# Patient Record
Sex: FEMALE | Race: WHITE | Employment: OTHER | URBAN - NONMETROPOLITAN AREA
[De-identification: names, ages, dates, MRNs, and addresses within clinical notes are randomized per-mention and may not be internally consistent; named-entity substitution may affect disease eponyms.]

---

## 2017-01-01 ENCOUNTER — TELEPHONE (OUTPATIENT)
Dept: PRIMARY CARE CLINIC | Age: 67
End: 2017-01-01

## 2017-01-01 ENCOUNTER — OFFICE VISIT (OUTPATIENT)
Dept: PRIMARY CARE CLINIC | Age: 67
End: 2017-01-01
Payer: MEDICARE

## 2017-01-01 ENCOUNTER — TELEPHONE (OUTPATIENT)
Dept: VASCULAR SURGERY | Age: 67
End: 2017-01-01

## 2017-01-01 ENCOUNTER — HOSPITAL ENCOUNTER (EMERGENCY)
Age: 67
End: 2017-12-24
Attending: EMERGENCY MEDICINE
Payer: MEDICARE

## 2017-01-01 ENCOUNTER — APPOINTMENT (OUTPATIENT)
Dept: GENERAL RADIOLOGY | Age: 67
End: 2017-01-01
Payer: COMMERCIAL

## 2017-01-01 ENCOUNTER — APPOINTMENT (OUTPATIENT)
Dept: GENERAL RADIOLOGY | Age: 67
DRG: 280 | End: 2017-01-01
Payer: MEDICARE

## 2017-01-01 ENCOUNTER — HOSPITAL ENCOUNTER (OUTPATIENT)
Dept: WOUND CARE | Age: 67
Discharge: HOME | End: 2017-10-20
Payer: MEDICARE

## 2017-01-01 ENCOUNTER — HOSPITAL ENCOUNTER (OUTPATIENT)
Dept: GENERAL RADIOLOGY | Age: 67
Discharge: HOME | End: 2017-02-06
Payer: COMMERCIAL

## 2017-01-01 ENCOUNTER — HOSPITAL ENCOUNTER (INPATIENT)
Age: 67
LOS: 1 days | Discharge: HOME | DRG: 264 | End: 2017-12-09
Attending: SURGERY | Admitting: SURGERY
Payer: MEDICARE

## 2017-01-01 ENCOUNTER — OFFICE VISIT (OUTPATIENT)
Dept: PRIMARY CARE CLINIC | Age: 67
End: 2017-01-01
Payer: COMMERCIAL

## 2017-01-01 ENCOUNTER — OFFICE VISIT (OUTPATIENT)
Dept: URGENT CARE | Age: 67
End: 2017-01-01

## 2017-01-01 ENCOUNTER — HOSPITAL ENCOUNTER (OUTPATIENT)
Dept: WOUND CARE | Age: 67
Discharge: HOME | End: 2017-10-06
Payer: MEDICARE

## 2017-01-01 ENCOUNTER — APPOINTMENT (OUTPATIENT)
Dept: GENERAL RADIOLOGY | Age: 67
End: 2017-01-01
Payer: MEDICARE

## 2017-01-01 ENCOUNTER — HOSPITAL ENCOUNTER (OUTPATIENT)
Dept: NUCLEAR MEDICINE | Age: 67
Discharge: HOME | End: 2017-11-15
Payer: MEDICARE

## 2017-01-01 ENCOUNTER — HOSPITAL ENCOUNTER (OUTPATIENT)
Dept: WOUND CARE | Age: 67
Discharge: HOME | End: 2017-06-05
Payer: COMMERCIAL

## 2017-01-01 ENCOUNTER — APPOINTMENT (OUTPATIENT)
Dept: GENERAL RADIOLOGY | Age: 67
DRG: 167 | End: 2017-01-01
Payer: COMMERCIAL

## 2017-01-01 ENCOUNTER — HOSPITAL ENCOUNTER (OUTPATIENT)
Dept: WOUND CARE | Age: 67
Discharge: HOME | End: 2017-04-10
Payer: COMMERCIAL

## 2017-01-01 ENCOUNTER — HOSPITAL ENCOUNTER (OUTPATIENT)
Dept: GENERAL RADIOLOGY | Age: 67
Discharge: HOME | End: 2017-09-11
Payer: MEDICARE

## 2017-01-01 ENCOUNTER — HOSPITAL ENCOUNTER (OUTPATIENT)
Dept: WOUND CARE | Age: 67
Discharge: HOME | End: 2017-07-31
Payer: MEDICARE

## 2017-01-01 ENCOUNTER — ANESTHESIA (OUTPATIENT)
Dept: OPERATING ROOM | Age: 67
End: 2017-01-01

## 2017-01-01 ENCOUNTER — HOSPITAL ENCOUNTER (OUTPATIENT)
Dept: WOUND CARE | Age: 67
Discharge: HOME | End: 2017-09-29
Payer: MEDICARE

## 2017-01-01 ENCOUNTER — HOSPITAL ENCOUNTER (OUTPATIENT)
Dept: WOUND CARE | Age: 67
Discharge: HOME | End: 2017-05-25
Payer: COMMERCIAL

## 2017-01-01 ENCOUNTER — HOSPITAL ENCOUNTER (INPATIENT)
Age: 67
LOS: 6 days | Discharge: HOME | DRG: 190 | End: 2017-01-23
Attending: EMERGENCY MEDICINE | Admitting: FAMILY MEDICINE
Payer: COMMERCIAL

## 2017-01-01 ENCOUNTER — HOSPITAL ENCOUNTER (OUTPATIENT)
Age: 67
Setting detail: OUTPATIENT SURGERY
Discharge: HOME | End: 2017-10-04
Attending: SURGERY | Admitting: SURGERY
Payer: MEDICARE

## 2017-01-01 ENCOUNTER — HOSPITAL ENCOUNTER (OUTPATIENT)
Dept: WOUND CARE | Age: 67
Discharge: HOME | End: 2017-05-11
Payer: COMMERCIAL

## 2017-01-01 ENCOUNTER — HOSPITAL ENCOUNTER (INPATIENT)
Age: 67
LOS: 8 days | Discharge: NURSING, REHAB OR LONG-TERM CARE | DRG: 167 | End: 2017-04-12
Attending: EMERGENCY MEDICINE | Admitting: HOSPITALIST
Payer: COMMERCIAL

## 2017-01-01 ENCOUNTER — HOSPITAL ENCOUNTER (OUTPATIENT)
Dept: WOUND CARE | Age: 67
Discharge: HOME | End: 2017-05-02
Payer: COMMERCIAL

## 2017-01-01 ENCOUNTER — HOSPITAL ENCOUNTER (OUTPATIENT)
Dept: GENERAL RADIOLOGY | Age: 67
Discharge: HOME | End: 2017-11-29
Payer: MEDICARE

## 2017-01-01 ENCOUNTER — HOSPITAL ENCOUNTER (OUTPATIENT)
Dept: GENERAL RADIOLOGY | Age: 67
Discharge: HOME | End: 2017-02-24
Payer: COMMERCIAL

## 2017-01-01 ENCOUNTER — APPOINTMENT (OUTPATIENT)
Dept: CT IMAGING | Age: 67
DRG: 682 | End: 2017-01-01
Payer: COMMERCIAL

## 2017-01-01 ENCOUNTER — APPOINTMENT (OUTPATIENT)
Dept: GENERAL RADIOLOGY | Age: 67
DRG: 189 | End: 2017-01-01
Payer: COMMERCIAL

## 2017-01-01 ENCOUNTER — HOSPITAL ENCOUNTER (INPATIENT)
Age: 67
LOS: 10 days | Discharge: HOME HEALTH | DRG: 682 | End: 2017-03-20
Attending: FAMILY MEDICINE | Admitting: HOSPITALIST
Payer: COMMERCIAL

## 2017-01-01 ENCOUNTER — HOSPITAL ENCOUNTER (OUTPATIENT)
Dept: WOUND CARE | Age: 67
Discharge: HOME | End: 2017-09-15
Payer: MEDICARE

## 2017-01-01 ENCOUNTER — HOSPITAL ENCOUNTER (INPATIENT)
Age: 67
LOS: 18 days | Discharge: NURSING, REHAB OR LONG-TERM CARE | DRG: 189 | End: 2017-06-19
Attending: EMERGENCY MEDICINE | Admitting: HOSPITALIST
Payer: COMMERCIAL

## 2017-01-01 ENCOUNTER — APPOINTMENT (OUTPATIENT)
Dept: CT IMAGING | Age: 67
DRG: 391 | End: 2017-01-01
Payer: MEDICARE

## 2017-01-01 ENCOUNTER — APPOINTMENT (OUTPATIENT)
Dept: CT IMAGING | Age: 67
DRG: 190 | End: 2017-01-01
Payer: COMMERCIAL

## 2017-01-01 ENCOUNTER — CARE COORDINATOR VISIT (OUTPATIENT)
Dept: PRIMARY CARE CLINIC | Age: 67
End: 2017-01-01

## 2017-01-01 ENCOUNTER — APPOINTMENT (OUTPATIENT)
Dept: GENERAL RADIOLOGY | Age: 67
DRG: 190 | End: 2017-01-01
Payer: COMMERCIAL

## 2017-01-01 ENCOUNTER — HOSPITAL ENCOUNTER (OUTPATIENT)
Dept: NON INVASIVE DIAGNOSTICS | Age: 67
Discharge: HOME | End: 2017-11-15
Payer: MEDICARE

## 2017-01-01 ENCOUNTER — HOSPITAL ENCOUNTER (OUTPATIENT)
Dept: WOUND CARE | Age: 67
Discharge: HOME | End: 2017-09-01
Payer: MEDICARE

## 2017-01-01 ENCOUNTER — HOSPITAL ENCOUNTER (OUTPATIENT)
Dept: WOUND CARE | Age: 67
Discharge: HOME | End: 2017-02-24
Payer: COMMERCIAL

## 2017-01-01 ENCOUNTER — HOSPITAL ENCOUNTER (OUTPATIENT)
Dept: PREADMISSION TESTING | Age: 67
Discharge: HOME | End: 2017-11-29
Payer: MEDICARE

## 2017-01-01 ENCOUNTER — APPOINTMENT (OUTPATIENT)
Dept: NUCLEAR MEDICINE | Age: 67
DRG: 190 | End: 2017-01-01
Payer: COMMERCIAL

## 2017-01-01 ENCOUNTER — APPOINTMENT (OUTPATIENT)
Dept: GENERAL RADIOLOGY | Age: 67
DRG: 391 | End: 2017-01-01
Payer: MEDICARE

## 2017-01-01 ENCOUNTER — APPOINTMENT (OUTPATIENT)
Dept: INTERVENTIONAL RADIOLOGY/VASCULAR | Age: 67
DRG: 189 | End: 2017-01-01
Payer: COMMERCIAL

## 2017-01-01 ENCOUNTER — PREP FOR PROCEDURE (OUTPATIENT)
Dept: VASCULAR SURGERY | Age: 67
End: 2017-01-01

## 2017-01-01 ENCOUNTER — APPOINTMENT (OUTPATIENT)
Dept: GENERAL RADIOLOGY | Age: 67
End: 2017-01-01
Attending: SURGERY
Payer: MEDICARE

## 2017-01-01 ENCOUNTER — HOSPITAL ENCOUNTER (INPATIENT)
Age: 67
LOS: 5 days | Discharge: HOME HEALTH | DRG: 391 | End: 2017-10-30
Attending: INTERNAL MEDICINE | Admitting: INTERNAL MEDICINE
Payer: MEDICARE

## 2017-01-01 ENCOUNTER — ANESTHESIA EVENT (OUTPATIENT)
Dept: OPERATING ROOM | Age: 67
End: 2017-01-01

## 2017-01-01 ENCOUNTER — OFFICE VISIT (OUTPATIENT)
Dept: CARDIOLOGY | Age: 67
End: 2017-01-01
Payer: MEDICARE

## 2017-01-01 ENCOUNTER — HOSPITAL ENCOUNTER (INPATIENT)
Age: 67
LOS: 4 days | Discharge: HOME | DRG: 280 | End: 2017-12-20
Attending: EMERGENCY MEDICINE | Admitting: FAMILY MEDICINE
Payer: MEDICARE

## 2017-01-01 ENCOUNTER — HOSPITAL ENCOUNTER (INPATIENT)
Age: 67
LOS: 1 days | Discharge: HOME | DRG: 286 | End: 2017-12-23
Attending: EMERGENCY MEDICINE | Admitting: INTERNAL MEDICINE
Payer: MEDICARE

## 2017-01-01 ENCOUNTER — HOSPITAL ENCOUNTER (OUTPATIENT)
Dept: WOUND CARE | Age: 67
Discharge: HOME | End: 2017-08-14
Payer: MEDICARE

## 2017-01-01 ENCOUNTER — HOSPITAL ENCOUNTER (OUTPATIENT)
Dept: VASCULAR LAB | Age: 67
Discharge: HOME | End: 2017-08-09
Payer: MEDICARE

## 2017-01-01 ENCOUNTER — ANESTHESIA EVENT (OUTPATIENT)
Dept: OPERATING ROOM | Age: 67
DRG: 264 | End: 2017-01-01
Payer: MEDICARE

## 2017-01-01 ENCOUNTER — APPOINTMENT (OUTPATIENT)
Dept: ULTRASOUND IMAGING | Age: 67
DRG: 682 | End: 2017-01-01
Payer: COMMERCIAL

## 2017-01-01 ENCOUNTER — HOSPITAL ENCOUNTER (OUTPATIENT)
Dept: WOUND CARE | Age: 67
Discharge: HOME | End: 2017-04-19
Payer: COMMERCIAL

## 2017-01-01 ENCOUNTER — HOSPITAL ENCOUNTER (OUTPATIENT)
Dept: WOUND CARE | Age: 67
Discharge: HOME | End: 2017-11-03
Payer: MEDICARE

## 2017-01-01 ENCOUNTER — ANESTHESIA (OUTPATIENT)
Dept: OPERATING ROOM | Age: 67
DRG: 264 | End: 2017-01-01
Payer: MEDICARE

## 2017-01-01 ENCOUNTER — APPOINTMENT (OUTPATIENT)
Dept: GENERAL RADIOLOGY | Age: 67
DRG: 286 | End: 2017-01-01
Payer: MEDICARE

## 2017-01-01 ENCOUNTER — HOSPITAL ENCOUNTER (OUTPATIENT)
Dept: WOUND CARE | Age: 67
Discharge: HOME | End: 2017-05-17
Payer: COMMERCIAL

## 2017-01-01 ENCOUNTER — HOSPITAL ENCOUNTER (OUTPATIENT)
Dept: PREADMISSION TESTING | Age: 67
Discharge: HOME | End: 2017-09-11
Payer: MEDICARE

## 2017-01-01 ENCOUNTER — HOSPITAL ENCOUNTER (INPATIENT)
Age: 67
LOS: 4 days | Discharge: HOME HEALTH | DRG: 189 | End: 2017-04-28
Attending: EMERGENCY MEDICINE | Admitting: INTERNAL MEDICINE
Payer: COMMERCIAL

## 2017-01-01 ENCOUNTER — APPOINTMENT (OUTPATIENT)
Dept: ULTRASOUND IMAGING | Age: 67
DRG: 189 | End: 2017-01-01
Payer: COMMERCIAL

## 2017-01-01 ENCOUNTER — OFFICE VISIT (OUTPATIENT)
Dept: VASCULAR SURGERY | Age: 67
End: 2017-01-01
Payer: MEDICARE

## 2017-01-01 ENCOUNTER — HOSPITAL ENCOUNTER (OUTPATIENT)
Dept: PREADMISSION TESTING | Age: 67
Discharge: HOME | End: 2017-11-28

## 2017-01-01 ENCOUNTER — APPOINTMENT (OUTPATIENT)
Dept: GENERAL RADIOLOGY | Age: 67
DRG: 682 | End: 2017-01-01
Payer: COMMERCIAL

## 2017-01-01 ENCOUNTER — HOSPITAL ENCOUNTER (EMERGENCY)
Age: 67
Discharge: HOME | End: 2017-01-28
Attending: EMERGENCY MEDICINE
Payer: COMMERCIAL

## 2017-01-01 VITALS
BODY MASS INDEX: 57.52 KG/M2 | OXYGEN SATURATION: 85 % | WEIGHT: 293 LBS | SYSTOLIC BLOOD PRESSURE: 126 MMHG | HEART RATE: 86 BPM | HEIGHT: 60 IN | RESPIRATION RATE: 20 BRPM | DIASTOLIC BLOOD PRESSURE: 72 MMHG | TEMPERATURE: 97.4 F

## 2017-01-01 VITALS
SYSTOLIC BLOOD PRESSURE: 96 MMHG | RESPIRATION RATE: 20 BRPM | DIASTOLIC BLOOD PRESSURE: 59 MMHG | HEIGHT: 61 IN | OXYGEN SATURATION: 91 % | HEART RATE: 85 BPM | WEIGHT: 250.3 LBS | BODY MASS INDEX: 47.25 KG/M2 | TEMPERATURE: 98.3 F

## 2017-01-01 VITALS
RESPIRATION RATE: 19 BRPM | WEIGHT: 280.13 LBS | DIASTOLIC BLOOD PRESSURE: 65 MMHG | OXYGEN SATURATION: 92 % | HEIGHT: 61 IN | SYSTOLIC BLOOD PRESSURE: 123 MMHG | HEART RATE: 73 BPM | BODY MASS INDEX: 52.89 KG/M2 | TEMPERATURE: 97.3 F

## 2017-01-01 VITALS
RESPIRATION RATE: 18 BRPM | WEIGHT: 288 LBS | TEMPERATURE: 97.9 F | DIASTOLIC BLOOD PRESSURE: 63 MMHG | HEART RATE: 74 BPM | SYSTOLIC BLOOD PRESSURE: 125 MMHG | HEIGHT: 60 IN | BODY MASS INDEX: 56.54 KG/M2

## 2017-01-01 VITALS
RESPIRATION RATE: 23 BRPM | SYSTOLIC BLOOD PRESSURE: 126 MMHG | WEIGHT: 293 LBS | HEIGHT: 60 IN | OXYGEN SATURATION: 93 % | BODY MASS INDEX: 57.52 KG/M2 | TEMPERATURE: 97.6 F | DIASTOLIC BLOOD PRESSURE: 76 MMHG | HEART RATE: 84 BPM

## 2017-01-01 VITALS
WEIGHT: 258 LBS | RESPIRATION RATE: 20 BRPM | TEMPERATURE: 97.3 F | HEART RATE: 82 BPM | BODY MASS INDEX: 48.71 KG/M2 | SYSTOLIC BLOOD PRESSURE: 88 MMHG | DIASTOLIC BLOOD PRESSURE: 61 MMHG | HEIGHT: 61 IN

## 2017-01-01 VITALS
DIASTOLIC BLOOD PRESSURE: 54 MMHG | TEMPERATURE: 97.1 F | HEART RATE: 72 BPM | SYSTOLIC BLOOD PRESSURE: 82 MMHG | RESPIRATION RATE: 22 BRPM | OXYGEN SATURATION: 98 % | HEIGHT: 61 IN | BODY MASS INDEX: 48.15 KG/M2 | WEIGHT: 255 LBS

## 2017-01-01 VITALS
SYSTOLIC BLOOD PRESSURE: 93 MMHG | HEIGHT: 61 IN | HEART RATE: 84 BPM | DIASTOLIC BLOOD PRESSURE: 50 MMHG | RESPIRATION RATE: 24 BRPM | WEIGHT: 255 LBS | BODY MASS INDEX: 48.15 KG/M2 | TEMPERATURE: 98.5 F

## 2017-01-01 VITALS
TEMPERATURE: 98.5 F | SYSTOLIC BLOOD PRESSURE: 104 MMHG | BODY MASS INDEX: 56.54 KG/M2 | HEART RATE: 68 BPM | WEIGHT: 288 LBS | HEIGHT: 60 IN | RESPIRATION RATE: 16 BRPM | DIASTOLIC BLOOD PRESSURE: 54 MMHG

## 2017-01-01 VITALS
BODY MASS INDEX: 48.15 KG/M2 | DIASTOLIC BLOOD PRESSURE: 54 MMHG | WEIGHT: 255 LBS | SYSTOLIC BLOOD PRESSURE: 104 MMHG | TEMPERATURE: 98.4 F | HEIGHT: 61 IN | HEART RATE: 102 BPM | RESPIRATION RATE: 24 BRPM

## 2017-01-01 VITALS
HEIGHT: 62 IN | DIASTOLIC BLOOD PRESSURE: 60 MMHG | SYSTOLIC BLOOD PRESSURE: 101 MMHG | WEIGHT: 246.9 LBS | RESPIRATION RATE: 18 BRPM | OXYGEN SATURATION: 94 % | TEMPERATURE: 96.4 F | BODY MASS INDEX: 45.43 KG/M2 | HEART RATE: 73 BPM

## 2017-01-01 VITALS
DIASTOLIC BLOOD PRESSURE: 57 MMHG | TEMPERATURE: 98.4 F | BODY MASS INDEX: 44.59 KG/M2 | SYSTOLIC BLOOD PRESSURE: 117 MMHG | RESPIRATION RATE: 16 BRPM | HEIGHT: 62 IN | WEIGHT: 242.31 LBS | OXYGEN SATURATION: 94 % | HEART RATE: 82 BPM

## 2017-01-01 VITALS
TEMPERATURE: 98 F | SYSTOLIC BLOOD PRESSURE: 96 MMHG | HEART RATE: 83 BPM | WEIGHT: 255 LBS | HEIGHT: 61 IN | RESPIRATION RATE: 18 BRPM | DIASTOLIC BLOOD PRESSURE: 57 MMHG | BODY MASS INDEX: 48.15 KG/M2

## 2017-01-01 VITALS — HEIGHT: 61 IN | BODY MASS INDEX: 48.15 KG/M2 | WEIGHT: 255 LBS

## 2017-01-01 VITALS
HEIGHT: 61 IN | BODY MASS INDEX: 47.2 KG/M2 | RESPIRATION RATE: 22 BRPM | TEMPERATURE: 97.7 F | DIASTOLIC BLOOD PRESSURE: 60 MMHG | HEART RATE: 83 BPM | WEIGHT: 250 LBS | SYSTOLIC BLOOD PRESSURE: 96 MMHG

## 2017-01-01 VITALS
HEART RATE: 107 BPM | TEMPERATURE: 97.1 F | DIASTOLIC BLOOD PRESSURE: 24 MMHG | BODY MASS INDEX: 48.71 KG/M2 | DIASTOLIC BLOOD PRESSURE: 54 MMHG | OXYGEN SATURATION: 100 % | SYSTOLIC BLOOD PRESSURE: 85 MMHG | OXYGEN SATURATION: 86 % | RESPIRATION RATE: 20 BRPM | HEIGHT: 61 IN | RESPIRATION RATE: 1 BRPM | WEIGHT: 258 LBS | SYSTOLIC BLOOD PRESSURE: 102 MMHG

## 2017-01-01 VITALS
HEIGHT: 61 IN | BODY MASS INDEX: 48.15 KG/M2 | WEIGHT: 255 LBS | SYSTOLIC BLOOD PRESSURE: 95 MMHG | TEMPERATURE: 97.3 F | RESPIRATION RATE: 22 BRPM | HEART RATE: 73 BPM | DIASTOLIC BLOOD PRESSURE: 55 MMHG

## 2017-01-01 VITALS
TEMPERATURE: 96.8 F | RESPIRATION RATE: 12 BRPM | DIASTOLIC BLOOD PRESSURE: 69 MMHG | HEART RATE: 46 BPM | OXYGEN SATURATION: 90 % | SYSTOLIC BLOOD PRESSURE: 161 MMHG

## 2017-01-01 VITALS
HEART RATE: 101 BPM | BODY MASS INDEX: 47.58 KG/M2 | HEIGHT: 61 IN | WEIGHT: 252 LBS | DIASTOLIC BLOOD PRESSURE: 60 MMHG | SYSTOLIC BLOOD PRESSURE: 118 MMHG

## 2017-01-01 VITALS
HEART RATE: 66 BPM | WEIGHT: 288.8 LBS | DIASTOLIC BLOOD PRESSURE: 68 MMHG | SYSTOLIC BLOOD PRESSURE: 124 MMHG | OXYGEN SATURATION: 90 % | HEIGHT: 60 IN | BODY MASS INDEX: 56.7 KG/M2

## 2017-01-01 VITALS
BODY MASS INDEX: 48.15 KG/M2 | TEMPERATURE: 97.7 F | DIASTOLIC BLOOD PRESSURE: 39 MMHG | HEIGHT: 61 IN | OXYGEN SATURATION: 87 % | HEART RATE: 68 BPM | WEIGHT: 255 LBS | RESPIRATION RATE: 16 BRPM | SYSTOLIC BLOOD PRESSURE: 115 MMHG

## 2017-01-01 VITALS
RESPIRATION RATE: 18 BRPM | BODY MASS INDEX: 56.54 KG/M2 | DIASTOLIC BLOOD PRESSURE: 69 MMHG | HEIGHT: 60 IN | SYSTOLIC BLOOD PRESSURE: 121 MMHG | TEMPERATURE: 97.9 F | HEART RATE: 69 BPM | WEIGHT: 288 LBS

## 2017-01-01 VITALS
HEART RATE: 73 BPM | BODY MASS INDEX: 57.52 KG/M2 | RESPIRATION RATE: 28 BRPM | SYSTOLIC BLOOD PRESSURE: 127 MMHG | WEIGHT: 293 LBS | HEIGHT: 60 IN | OXYGEN SATURATION: 77 % | DIASTOLIC BLOOD PRESSURE: 60 MMHG | TEMPERATURE: 97.9 F

## 2017-01-01 VITALS
TEMPERATURE: 98.1 F | WEIGHT: 293 LBS | HEIGHT: 60 IN | HEART RATE: 72 BPM | RESPIRATION RATE: 22 BRPM | SYSTOLIC BLOOD PRESSURE: 133 MMHG | BODY MASS INDEX: 57.52 KG/M2 | DIASTOLIC BLOOD PRESSURE: 68 MMHG

## 2017-01-01 VITALS
BODY MASS INDEX: 50.05 KG/M2 | SYSTOLIC BLOOD PRESSURE: 103 MMHG | HEART RATE: 91 BPM | RESPIRATION RATE: 18 BRPM | WEIGHT: 272 LBS | DIASTOLIC BLOOD PRESSURE: 57 MMHG | HEIGHT: 62 IN | TEMPERATURE: 97.8 F | OXYGEN SATURATION: 96 %

## 2017-01-01 VITALS
SYSTOLIC BLOOD PRESSURE: 133 MMHG | BODY MASS INDEX: 56.07 KG/M2 | TEMPERATURE: 97 F | DIASTOLIC BLOOD PRESSURE: 65 MMHG | RESPIRATION RATE: 20 BRPM | HEIGHT: 60 IN | HEART RATE: 66 BPM | WEIGHT: 285.6 LBS | OXYGEN SATURATION: 93 %

## 2017-01-01 VITALS
WEIGHT: 293 LBS | HEIGHT: 60 IN | BODY MASS INDEX: 57.52 KG/M2 | HEART RATE: 66 BPM | TEMPERATURE: 98.2 F | RESPIRATION RATE: 23 BRPM | DIASTOLIC BLOOD PRESSURE: 53 MMHG | OXYGEN SATURATION: 90 % | SYSTOLIC BLOOD PRESSURE: 114 MMHG

## 2017-01-01 VITALS
RESPIRATION RATE: 18 BRPM | HEART RATE: 58 BPM | DIASTOLIC BLOOD PRESSURE: 59 MMHG | SYSTOLIC BLOOD PRESSURE: 119 MMHG | TEMPERATURE: 97.8 F | WEIGHT: 293 LBS | HEIGHT: 60 IN | BODY MASS INDEX: 57.52 KG/M2

## 2017-01-01 VITALS
RESPIRATION RATE: 20 BRPM | SYSTOLIC BLOOD PRESSURE: 105 MMHG | DIASTOLIC BLOOD PRESSURE: 54 MMHG | WEIGHT: 293 LBS | BODY MASS INDEX: 55.32 KG/M2 | HEART RATE: 64 BPM | OXYGEN SATURATION: 97 % | TEMPERATURE: 96.8 F | HEIGHT: 61 IN

## 2017-01-01 VITALS
HEART RATE: 69 BPM | BODY MASS INDEX: 48.55 KG/M2 | TEMPERATURE: 97.2 F | RESPIRATION RATE: 18 BRPM | SYSTOLIC BLOOD PRESSURE: 89 MMHG | WEIGHT: 247.3 LBS | DIASTOLIC BLOOD PRESSURE: 54 MMHG | HEIGHT: 60 IN | OXYGEN SATURATION: 90 %

## 2017-01-01 VITALS
HEART RATE: 88 BPM | HEIGHT: 62 IN | DIASTOLIC BLOOD PRESSURE: 60 MMHG | RESPIRATION RATE: 24 BRPM | BODY MASS INDEX: 45.08 KG/M2 | SYSTOLIC BLOOD PRESSURE: 110 MMHG | WEIGHT: 245 LBS | TEMPERATURE: 97.6 F

## 2017-01-01 VITALS
DIASTOLIC BLOOD PRESSURE: 65 MMHG | SYSTOLIC BLOOD PRESSURE: 119 MMHG | BODY MASS INDEX: 47.46 KG/M2 | TEMPERATURE: 97.5 F | WEIGHT: 251.4 LBS | HEIGHT: 61 IN | HEART RATE: 76 BPM | RESPIRATION RATE: 20 BRPM | OXYGEN SATURATION: 93 %

## 2017-01-01 VITALS — BODY MASS INDEX: 47.39 KG/M2 | HEIGHT: 61 IN | WEIGHT: 251 LBS

## 2017-01-01 VITALS
SYSTOLIC BLOOD PRESSURE: 116 MMHG | HEART RATE: 87 BPM | TEMPERATURE: 98 F | BODY MASS INDEX: 49.5 KG/M2 | HEIGHT: 61 IN | DIASTOLIC BLOOD PRESSURE: 68 MMHG | WEIGHT: 262.2 LBS | OXYGEN SATURATION: 97 %

## 2017-01-01 VITALS
SYSTOLIC BLOOD PRESSURE: 104 MMHG | RESPIRATION RATE: 18 BRPM | HEART RATE: 52 BPM | TEMPERATURE: 97.2 F | DIASTOLIC BLOOD PRESSURE: 50 MMHG

## 2017-01-01 VITALS
BODY MASS INDEX: 48.15 KG/M2 | SYSTOLIC BLOOD PRESSURE: 80 MMHG | HEIGHT: 61 IN | HEART RATE: 74 BPM | DIASTOLIC BLOOD PRESSURE: 49 MMHG | TEMPERATURE: 97.2 F | RESPIRATION RATE: 20 BRPM | WEIGHT: 255 LBS

## 2017-01-01 VITALS
HEART RATE: 75 BPM | HEIGHT: 60 IN | BODY MASS INDEX: 57.52 KG/M2 | TEMPERATURE: 97.8 F | OXYGEN SATURATION: 90 % | DIASTOLIC BLOOD PRESSURE: 66 MMHG | WEIGHT: 293 LBS | SYSTOLIC BLOOD PRESSURE: 130 MMHG | RESPIRATION RATE: 18 BRPM

## 2017-01-01 DIAGNOSIS — F41.9 ANXIETY: ICD-10-CM

## 2017-01-01 DIAGNOSIS — G89.29 OTHER CHRONIC PAIN: ICD-10-CM

## 2017-01-01 DIAGNOSIS — J96.10 CHRONIC RESPIRATORY FAILURE, UNSPECIFIED WHETHER WITH HYPOXIA OR HYPERCAPNIA (HCC): ICD-10-CM

## 2017-01-01 DIAGNOSIS — N17.9 AKI (ACUTE KIDNEY INJURY) (HCC): Primary | ICD-10-CM

## 2017-01-01 DIAGNOSIS — L97.512 SKIN ULCER OF RIGHT GREAT TOE WITH FAT LAYER EXPOSED (HCC): Chronic | ICD-10-CM

## 2017-01-01 DIAGNOSIS — E11.621 DIABETIC ULCER OF LEFT HEEL WITH FAT LAYER EXPOSED (HCC): ICD-10-CM

## 2017-01-01 DIAGNOSIS — L89.153 DECUBITUS ULCER OF COCCYGEAL REGION, STAGE 3 (HCC): ICD-10-CM

## 2017-01-01 DIAGNOSIS — N18.30 TYPE 2 DIABETES MELLITUS WITH STAGE 3 CHRONIC KIDNEY DISEASE, WITH LONG-TERM CURRENT USE OF INSULIN (HCC): Chronic | ICD-10-CM

## 2017-01-01 DIAGNOSIS — L97.511 DIABETIC ULCER OF TOE OF RIGHT FOOT ASSOCIATED WITH TYPE 2 DIABETES MELLITUS, LIMITED TO BREAKDOWN OF SKIN (HCC): Chronic | ICD-10-CM

## 2017-01-01 DIAGNOSIS — N18.9 ACUTE ON CHRONIC RENAL FAILURE (HCC): ICD-10-CM

## 2017-01-01 DIAGNOSIS — S60.221A CONTUSION OF RIGHT HAND, INITIAL ENCOUNTER: Primary | ICD-10-CM

## 2017-01-01 DIAGNOSIS — I27.20 PULMONARY HYPERTENSION (HCC): Chronic | ICD-10-CM

## 2017-01-01 DIAGNOSIS — E66.01 MORBID OBESITY DUE TO EXCESS CALORIES (HCC): Chronic | ICD-10-CM

## 2017-01-01 DIAGNOSIS — J44.9 COPD (CHRONIC OBSTRUCTIVE PULMONARY DISEASE) WITH CHRONIC BRONCHITIS (HCC): ICD-10-CM

## 2017-01-01 DIAGNOSIS — I50.32 CHRONIC DIASTOLIC CHF (CONGESTIVE HEART FAILURE) (HCC): ICD-10-CM

## 2017-01-01 DIAGNOSIS — E78.2 DM TYPE 2 WITH DIABETIC MIXED HYPERLIPIDEMIA (HCC): ICD-10-CM

## 2017-01-01 DIAGNOSIS — N19 RENAL FAILURE: ICD-10-CM

## 2017-01-01 DIAGNOSIS — R06.02 SOB (SHORTNESS OF BREATH): Primary | ICD-10-CM

## 2017-01-01 DIAGNOSIS — J81.0 ACUTE PULMONARY EDEMA (HCC): ICD-10-CM

## 2017-01-01 DIAGNOSIS — Z99.2 TYPE 2 DIABETES MELLITUS WITH CHRONIC KIDNEY DISEASE ON CHRONIC DIALYSIS, WITHOUT LONG-TERM CURRENT USE OF INSULIN (HCC): ICD-10-CM

## 2017-01-01 DIAGNOSIS — N18.9 CHRONIC RENAL FAILURE, UNSPECIFIED CKD STAGE: Primary | ICD-10-CM

## 2017-01-01 DIAGNOSIS — J44.9 CHRONIC OBSTRUCTIVE PULMONARY DISEASE, UNSPECIFIED COPD TYPE (HCC): ICD-10-CM

## 2017-01-01 DIAGNOSIS — J44.9 COPD MIXED TYPE (HCC): ICD-10-CM

## 2017-01-01 DIAGNOSIS — E03.9 HYPOTHYROIDISM, UNSPECIFIED TYPE: ICD-10-CM

## 2017-01-01 DIAGNOSIS — I46.9 CARDIAC ARREST (HCC): Primary | ICD-10-CM

## 2017-01-01 DIAGNOSIS — J96.01 ACUTE RESPIRATORY FAILURE WITH HYPOXEMIA (HCC): Primary | ICD-10-CM

## 2017-01-01 DIAGNOSIS — N18.6 CKD (CHRONIC KIDNEY DISEASE) STAGE V REQUIRING CHRONIC DIALYSIS (HCC): Primary | ICD-10-CM

## 2017-01-01 DIAGNOSIS — R09.02 HYPOXIA: Primary | ICD-10-CM

## 2017-01-01 DIAGNOSIS — I10 ESSENTIAL HYPERTENSION: Chronic | ICD-10-CM

## 2017-01-01 DIAGNOSIS — R94.31 ABNORMAL EKG: ICD-10-CM

## 2017-01-01 DIAGNOSIS — Z79.4 TYPE 2 DIABETES MELLITUS WITH STAGE 3 CHRONIC KIDNEY DISEASE, WITH LONG-TERM CURRENT USE OF INSULIN (HCC): Chronic | ICD-10-CM

## 2017-01-01 DIAGNOSIS — E11.621 DIABETIC ULCER OF LEFT FOOT ASSOCIATED WITH TYPE 2 DIABETES MELLITUS (HCC): Chronic | ICD-10-CM

## 2017-01-01 DIAGNOSIS — N28.89 MASS OF URETER: Primary | ICD-10-CM

## 2017-01-01 DIAGNOSIS — I95.9 HYPOTENSION, UNSPECIFIED HYPOTENSION TYPE: ICD-10-CM

## 2017-01-01 DIAGNOSIS — I50.9 ACUTE ON CHRONIC CONGESTIVE HEART FAILURE, UNSPECIFIED CONGESTIVE HEART FAILURE TYPE: ICD-10-CM

## 2017-01-01 DIAGNOSIS — E11.40 CONTROLLED TYPE 2 DIABETES MELLITUS WITH DIABETIC NEUROPATHY, WITH LONG-TERM CURRENT USE OF INSULIN (HCC): ICD-10-CM

## 2017-01-01 DIAGNOSIS — J96.01 ACUTE RESPIRATORY FAILURE WITH HYPOXIA (HCC): Primary | ICD-10-CM

## 2017-01-01 DIAGNOSIS — N18.6 TYPE 2 DIABETES MELLITUS WITH CHRONIC KIDNEY DISEASE ON CHRONIC DIALYSIS, WITHOUT LONG-TERM CURRENT USE OF INSULIN (HCC): ICD-10-CM

## 2017-01-01 DIAGNOSIS — E11.621 DIABETIC ULCER OF TOE OF RIGHT FOOT ASSOCIATED WITH TYPE 2 DIABETES MELLITUS, LIMITED TO BREAKDOWN OF SKIN (HCC): Chronic | ICD-10-CM

## 2017-01-01 DIAGNOSIS — E87.20 LACTIC ACID ACIDOSIS: ICD-10-CM

## 2017-01-01 DIAGNOSIS — I10 ESSENTIAL HYPERTENSION: ICD-10-CM

## 2017-01-01 DIAGNOSIS — R77.8 ELEVATED TROPONIN: ICD-10-CM

## 2017-01-01 DIAGNOSIS — N30.00 ACUTE CYSTITIS WITHOUT HEMATURIA: ICD-10-CM

## 2017-01-01 DIAGNOSIS — N18.4 CHRONIC KIDNEY DISEASE, STAGE IV (SEVERE) (HCC): Chronic | ICD-10-CM

## 2017-01-01 DIAGNOSIS — Z79.4 CONTROLLED TYPE 2 DIABETES MELLITUS WITH DIABETIC NEUROPATHY, WITH LONG-TERM CURRENT USE OF INSULIN (HCC): ICD-10-CM

## 2017-01-01 DIAGNOSIS — L97.422 DIABETIC ULCER OF LEFT HEEL WITH FAT LAYER EXPOSED (HCC): Chronic | ICD-10-CM

## 2017-01-01 DIAGNOSIS — Z09 FOLLOW-UP EXAM: Primary | ICD-10-CM

## 2017-01-01 DIAGNOSIS — R11.2 NAUSEA AND VOMITING, INTRACTABILITY OF VOMITING NOT SPECIFIED, UNSPECIFIED VOMITING TYPE: Chronic | ICD-10-CM

## 2017-01-01 DIAGNOSIS — E11.69 DM TYPE 2 WITH DIABETIC MIXED HYPERLIPIDEMIA (HCC): ICD-10-CM

## 2017-01-01 DIAGNOSIS — E66.01 MORBID OBESITY DUE TO EXCESS CALORIES (HCC): ICD-10-CM

## 2017-01-01 DIAGNOSIS — Z99.2 STAGE 5 CHRONIC KIDNEY DISEASE ON CHRONIC DIALYSIS (HCC): ICD-10-CM

## 2017-01-01 DIAGNOSIS — E11.621 DIABETIC ULCER OF RIGHT FOOT ASSOCIATED WITH TYPE 2 DIABETES MELLITUS (HCC): ICD-10-CM

## 2017-01-01 DIAGNOSIS — Z99.2 HEMODIALYSIS PATIENT (HCC): ICD-10-CM

## 2017-01-01 DIAGNOSIS — E11.22 TYPE 2 DIABETES MELLITUS WITH STAGE 3 CHRONIC KIDNEY DISEASE, WITH LONG-TERM CURRENT USE OF INSULIN (HCC): Chronic | ICD-10-CM

## 2017-01-01 DIAGNOSIS — I10 ESSENTIAL HYPERTENSION: Primary | Chronic | ICD-10-CM

## 2017-01-01 DIAGNOSIS — Z76.89 REFERRAL OF PATIENT: ICD-10-CM

## 2017-01-01 DIAGNOSIS — R17 ELEVATED BILIRUBIN: ICD-10-CM

## 2017-01-01 DIAGNOSIS — M51.36 DISC DEGENERATION, LUMBAR: ICD-10-CM

## 2017-01-01 DIAGNOSIS — L97.422 DIABETIC ULCER OF LEFT HEEL WITH FAT LAYER EXPOSED (HCC): ICD-10-CM

## 2017-01-01 DIAGNOSIS — Z09 HOSPITAL DISCHARGE FOLLOW-UP: Primary | ICD-10-CM

## 2017-01-01 DIAGNOSIS — E78.2 MIXED HYPERLIPIDEMIA: ICD-10-CM

## 2017-01-01 DIAGNOSIS — I27.81 COR PULMONALE (CHRONIC) (HCC): Chronic | ICD-10-CM

## 2017-01-01 DIAGNOSIS — R06.02 SHORTNESS OF BREATH: ICD-10-CM

## 2017-01-01 DIAGNOSIS — R73.9 HYPERGLYCEMIA: ICD-10-CM

## 2017-01-01 DIAGNOSIS — E11.621 DIABETIC ULCER OF LEFT HEEL WITH FAT LAYER EXPOSED (HCC): Chronic | ICD-10-CM

## 2017-01-01 DIAGNOSIS — G47.33 OBSTRUCTIVE SLEEP APNEA: ICD-10-CM

## 2017-01-01 DIAGNOSIS — G47.33 OSA ON CPAP: ICD-10-CM

## 2017-01-01 DIAGNOSIS — N17.9 ACUTE ON CHRONIC RENAL FAILURE (HCC): ICD-10-CM

## 2017-01-01 DIAGNOSIS — Z01.818 PRE-OP EVALUATION: Primary | ICD-10-CM

## 2017-01-01 DIAGNOSIS — J18.9 PNEUMONIA DUE TO INFECTIOUS ORGANISM, UNSPECIFIED LATERALITY, UNSPECIFIED PART OF LUNG: ICD-10-CM

## 2017-01-01 DIAGNOSIS — Z09 HOSPITAL DISCHARGE FOLLOW-UP: ICD-10-CM

## 2017-01-01 DIAGNOSIS — R09.02 HYPOXIA: ICD-10-CM

## 2017-01-01 DIAGNOSIS — Z01.818 PREOP EXAMINATION: Primary | ICD-10-CM

## 2017-01-01 DIAGNOSIS — J44.1 COPD EXACERBATION (HCC): ICD-10-CM

## 2017-01-01 DIAGNOSIS — R07.9 ACUTE CHEST PAIN: Primary | ICD-10-CM

## 2017-01-01 DIAGNOSIS — L97.529 DIABETIC ULCER OF LEFT FOOT ASSOCIATED WITH TYPE 2 DIABETES MELLITUS (HCC): Chronic | ICD-10-CM

## 2017-01-01 DIAGNOSIS — R93.89 ABNORMAL CT SCAN: ICD-10-CM

## 2017-01-01 DIAGNOSIS — N18.6 STAGE 5 CHRONIC KIDNEY DISEASE ON CHRONIC DIALYSIS (HCC): ICD-10-CM

## 2017-01-01 DIAGNOSIS — R35.0 FREQUENCY OF URINATION: Chronic | ICD-10-CM

## 2017-01-01 DIAGNOSIS — E87.5 HYPERKALEMIA: ICD-10-CM

## 2017-01-01 DIAGNOSIS — N18.6 END STAGE RENAL DISEASE (HCC): ICD-10-CM

## 2017-01-01 DIAGNOSIS — J81.0 ACUTE PULMONARY EDEMA (HCC): Primary | ICD-10-CM

## 2017-01-01 DIAGNOSIS — L97.519 DIABETIC ULCER OF RIGHT FOOT ASSOCIATED WITH TYPE 2 DIABETES MELLITUS (HCC): ICD-10-CM

## 2017-01-01 DIAGNOSIS — K52.9 COLITIS: Primary | ICD-10-CM

## 2017-01-01 DIAGNOSIS — M15.9 OSTEOARTHRITIS OF MULTIPLE JOINTS, UNSPECIFIED OSTEOARTHRITIS TYPE: ICD-10-CM

## 2017-01-01 DIAGNOSIS — N17.9 ACUTE RENAL FAILURE, UNSPECIFIED ACUTE RENAL FAILURE TYPE (HCC): Primary | ICD-10-CM

## 2017-01-01 DIAGNOSIS — Z99.2 CKD (CHRONIC KIDNEY DISEASE) STAGE V REQUIRING CHRONIC DIALYSIS (HCC): Primary | ICD-10-CM

## 2017-01-01 DIAGNOSIS — E78.00 HYPERCHOLESTEREMIA: ICD-10-CM

## 2017-01-01 DIAGNOSIS — R10.31 RIGHT LOWER QUADRANT ABDOMINAL PAIN: Primary | ICD-10-CM

## 2017-01-01 DIAGNOSIS — E11.22 TYPE 2 DIABETES MELLITUS WITH CHRONIC KIDNEY DISEASE ON CHRONIC DIALYSIS, WITHOUT LONG-TERM CURRENT USE OF INSULIN (HCC): ICD-10-CM

## 2017-01-01 DIAGNOSIS — J96.21 ACUTE ON CHRONIC RESPIRATORY FAILURE WITH HYPOXIA AND HYPERCAPNIA (HCC): ICD-10-CM

## 2017-01-01 DIAGNOSIS — E11.21 CONTROLLED TYPE 2 DIABETES MELLITUS WITH DIABETIC NEPHROPATHY, WITHOUT LONG-TERM CURRENT USE OF INSULIN (HCC): ICD-10-CM

## 2017-01-01 DIAGNOSIS — Z51.81 THERAPEUTIC DRUG MONITORING: Primary | ICD-10-CM

## 2017-01-01 DIAGNOSIS — J44.1 COPD EXACERBATION (HCC): Primary | ICD-10-CM

## 2017-01-01 DIAGNOSIS — R06.00 DYSPNEA, UNSPECIFIED TYPE: ICD-10-CM

## 2017-01-01 DIAGNOSIS — N18.4 CHRONIC KIDNEY DISEASE, STAGE IV (SEVERE) (HCC): Primary | Chronic | ICD-10-CM

## 2017-01-01 DIAGNOSIS — J96.22 ACUTE ON CHRONIC RESPIRATORY FAILURE WITH HYPOXIA AND HYPERCAPNIA (HCC): Primary | ICD-10-CM

## 2017-01-01 DIAGNOSIS — L97.211 NON-PRESSURE CHRONIC ULCER OF RIGHT CALF, LIMITED TO BREAKDOWN OF SKIN (HCC): ICD-10-CM

## 2017-01-01 DIAGNOSIS — Z01.818 PRE-OP EVALUATION: ICD-10-CM

## 2017-01-01 DIAGNOSIS — J96.22 ACUTE ON CHRONIC RESPIRATORY FAILURE WITH HYPOXIA AND HYPERCAPNIA (HCC): ICD-10-CM

## 2017-01-01 DIAGNOSIS — J96.21 ACUTE ON CHRONIC RESPIRATORY FAILURE WITH HYPOXIA AND HYPERCAPNIA (HCC): Primary | ICD-10-CM

## 2017-01-01 DIAGNOSIS — Z99.89 OSA ON CPAP: ICD-10-CM

## 2017-01-01 DIAGNOSIS — J18.9 PNEUMONIA DUE TO ORGANISM: ICD-10-CM

## 2017-01-01 DIAGNOSIS — N18.6 ESRD (END STAGE RENAL DISEASE) (HCC): Chronic | ICD-10-CM

## 2017-01-01 DIAGNOSIS — L89.153 DECUBITUS ULCER OF COCCYGEAL REGION, STAGE 3 (HCC): Chronic | ICD-10-CM

## 2017-01-01 DIAGNOSIS — J98.4 CHRONIC LUNG DISEASE: ICD-10-CM

## 2017-01-01 DIAGNOSIS — N18.9 CHRONIC RENAL FAILURE, UNSPECIFIED STAGE: ICD-10-CM

## 2017-01-01 DIAGNOSIS — G47.33 OBSTRUCTIVE SLEEP APNEA TREATED WITH BIPAP: Chronic | ICD-10-CM

## 2017-01-01 DIAGNOSIS — M51.36 DISC DEGENERATION, LUMBAR: Primary | ICD-10-CM

## 2017-01-01 LAB
ACETONE BLOOD: NEGATIVE
ALBUMIN SERPL-MCNC: 2.11 G/DL (ref 3.75–5.01)
ALBUMIN SERPL-MCNC: 2.4 G/DL (ref 3.5–5.2)
ALBUMIN SERPL-MCNC: 2.8 G/DL (ref 3.5–5.2)
ALBUMIN SERPL-MCNC: 2.9 G/DL (ref 3.5–5.2)
ALBUMIN SERPL-MCNC: 3 G/DL (ref 3.5–5.2)
ALBUMIN SERPL-MCNC: 3.1 G/DL (ref 3.5–5.2)
ALBUMIN SERPL-MCNC: 3.1 G/DL (ref 3.5–5.2)
ALBUMIN SERPL-MCNC: 3.2 G/DL (ref 3.5–5.2)
ALBUMIN SERPL-MCNC: 3.2 G/DL (ref 3.5–5.2)
ALBUMIN SERPL-MCNC: 3.3 G/DL (ref 3.5–5.2)
ALBUMIN SERPL-MCNC: 3.4 G/DL (ref 3.5–5.2)
ALBUMIN SERPL-MCNC: 3.5 G/DL (ref 3.5–5.2)
ALBUMIN SERPL-MCNC: 3.5 G/DL (ref 3.5–5.2)
ALBUMIN SERPL-MCNC: 3.6 G/DL (ref 3.5–5.2)
ALBUMIN SERPL-MCNC: 3.6 G/DL (ref 3.5–5.2)
ALBUMIN SERPL-MCNC: 3.7 G/DL (ref 3.5–5.2)
ALBUMIN SERPL-MCNC: 3.9 G/DL (ref 3.5–5.2)
ALBUMIN SERPL-MCNC: 4 G/DL (ref 3.5–5.2)
ALP BLD-CCNC: 105 U/L (ref 35–104)
ALP BLD-CCNC: 128 U/L (ref 35–104)
ALP BLD-CCNC: 47 U/L (ref 35–104)
ALP BLD-CCNC: 49 U/L (ref 35–104)
ALP BLD-CCNC: 50 U/L (ref 35–104)
ALP BLD-CCNC: 53 U/L (ref 35–104)
ALP BLD-CCNC: 55 U/L (ref 35–104)
ALP BLD-CCNC: 58 U/L (ref 35–104)
ALP BLD-CCNC: 61 U/L (ref 35–104)
ALP BLD-CCNC: 62 U/L (ref 35–104)
ALP BLD-CCNC: 72 U/L (ref 35–104)
ALP BLD-CCNC: 73 U/L (ref 35–104)
ALP BLD-CCNC: 74 U/L (ref 35–104)
ALP BLD-CCNC: 76 U/L (ref 35–104)
ALP BLD-CCNC: 78 U/L (ref 35–104)
ALP BLD-CCNC: 79 U/L (ref 35–104)
ALP BLD-CCNC: 79 U/L (ref 35–104)
ALP BLD-CCNC: 80 U/L (ref 35–104)
ALP BLD-CCNC: 81 U/L (ref 35–104)
ALP BLD-CCNC: 81 U/L (ref 35–104)
ALP BLD-CCNC: 83 U/L (ref 35–104)
ALP BLD-CCNC: 84 U/L (ref 35–104)
ALP BLD-CCNC: 85 U/L (ref 35–104)
ALP BLD-CCNC: 87 U/L (ref 35–104)
ALPHA-1-GLOBULIN: 0.67 G/DL (ref 0.19–0.46)
ALPHA-2-GLOBULIN: 1.26 G/DL (ref 0.48–1.05)
ALT SERPL-CCNC: 10 U/L (ref 5–33)
ALT SERPL-CCNC: 13 U/L (ref 5–33)
ALT SERPL-CCNC: 13 U/L (ref 5–33)
ALT SERPL-CCNC: 14 U/L (ref 5–33)
ALT SERPL-CCNC: 15 U/L (ref 5–33)
ALT SERPL-CCNC: 16 U/L (ref 5–33)
ALT SERPL-CCNC: 17 U/L (ref 5–33)
ALT SERPL-CCNC: 19 U/L (ref 5–33)
ALT SERPL-CCNC: 24 U/L (ref 5–33)
ALT SERPL-CCNC: 26 U/L (ref 5–33)
ALT SERPL-CCNC: 27 U/L (ref 5–33)
ALT SERPL-CCNC: 29 U/L (ref 5–33)
ALT SERPL-CCNC: 35 U/L (ref 5–33)
ALT SERPL-CCNC: 423 U/L (ref 5–33)
ALT SERPL-CCNC: 7 U/L (ref 5–33)
ALT SERPL-CCNC: 8 U/L (ref 5–33)
ALT SERPL-CCNC: 8 U/L (ref 5–33)
ALT SERPL-CCNC: 9 U/L (ref 5–33)
AMPHETAMINE SCREEN, URINE: NORMAL
AMPHETAMINE SCREEN, URINE: POSITIVE
ANA BY IFA: NORMAL
ANA IGG, ELISA: NORMAL
ANCA IFA: NORMAL
ANION GAP SERPL CALCULATED.3IONS-SCNC: 10 MMOL/L (ref 7–19)
ANION GAP SERPL CALCULATED.3IONS-SCNC: 11 MMOL/L (ref 7–19)
ANION GAP SERPL CALCULATED.3IONS-SCNC: 12 MMOL/L (ref 7–19)
ANION GAP SERPL CALCULATED.3IONS-SCNC: 13 MMOL/L (ref 7–19)
ANION GAP SERPL CALCULATED.3IONS-SCNC: 14 MMOL/L (ref 7–19)
ANION GAP SERPL CALCULATED.3IONS-SCNC: 15 MMOL/L (ref 7–19)
ANION GAP SERPL CALCULATED.3IONS-SCNC: 16 MMOL/L (ref 7–19)
ANION GAP SERPL CALCULATED.3IONS-SCNC: 17 MMOL/L (ref 7–19)
ANION GAP SERPL CALCULATED.3IONS-SCNC: 18 MMOL/L (ref 7–19)
ANION GAP SERPL CALCULATED.3IONS-SCNC: 19 MMOL/L (ref 7–19)
ANION GAP SERPL CALCULATED.3IONS-SCNC: 20 MMOL/L (ref 7–19)
ANION GAP SERPL CALCULATED.3IONS-SCNC: 21 MMOL/L (ref 7–19)
ANION GAP SERPL CALCULATED.3IONS-SCNC: 24 MMOL/L (ref 7–19)
ANION GAP SERPL CALCULATED.3IONS-SCNC: 38 MMOL/L (ref 7–19)
ANISOCYTOSIS: ABNORMAL
APTT: 27 SEC (ref 26–36.2)
APTT: 36.6 SEC (ref 26–36.2)
APTT: 41.6 SEC (ref 26–36.2)
APTT: 91.9 SEC (ref 26–36.2)
AST SERPL-CCNC: 1031 U/L (ref 5–32)
AST SERPL-CCNC: 11 U/L (ref 5–32)
AST SERPL-CCNC: 13 U/L (ref 5–32)
AST SERPL-CCNC: 14 U/L (ref 5–32)
AST SERPL-CCNC: 14 U/L (ref 5–32)
AST SERPL-CCNC: 17 U/L (ref 5–32)
AST SERPL-CCNC: 18 U/L (ref 5–32)
AST SERPL-CCNC: 19 U/L (ref 5–32)
AST SERPL-CCNC: 19 U/L (ref 5–32)
AST SERPL-CCNC: 20 U/L (ref 5–32)
AST SERPL-CCNC: 22 U/L (ref 5–32)
AST SERPL-CCNC: 24 U/L (ref 5–32)
AST SERPL-CCNC: 25 U/L (ref 5–32)
AST SERPL-CCNC: 26 U/L (ref 5–32)
AST SERPL-CCNC: 26 U/L (ref 5–32)
AST SERPL-CCNC: 29 U/L (ref 5–32)
AST SERPL-CCNC: 31 U/L (ref 5–32)
AST SERPL-CCNC: 33 U/L (ref 5–32)
AST SERPL-CCNC: 34 U/L (ref 5–32)
AST SERPL-CCNC: 36 U/L (ref 5–32)
AST SERPL-CCNC: 42 U/L (ref 5–32)
AST SERPL-CCNC: 48 U/L (ref 5–32)
ATYPICAL LYMPHOCYTE RELATIVE PERCENT: 1 % (ref 0–8)
BACTERIA: ABNORMAL /HPF
BANDED NEUTROPHILS RELATIVE PERCENT: 7 % (ref 0–5)
BARBITURATE SCREEN URINE: NEGATIVE
BARBITURATE SCREEN, URINE: NORMAL
BASE EXCESS ARTERIAL: -0.1 MMOL/L (ref -2–2)
BASE EXCESS ARTERIAL: -1.2 MMOL/L (ref -2–2)
BASE EXCESS ARTERIAL: 0 MMOL/L (ref -2–2)
BASE EXCESS ARTERIAL: 1.7 MMOL/L (ref -2–2)
BASE EXCESS ARTERIAL: 10.7 MMOL/L (ref -2–2)
BASE EXCESS ARTERIAL: 19.5 MMOL/L (ref -2–2)
BASE EXCESS ARTERIAL: 2.1 MMOL/L (ref -2–2)
BASE EXCESS ARTERIAL: 2.1 MMOL/L (ref -2–2)
BASE EXCESS ARTERIAL: 2.9 MMOL/L (ref -2–2)
BASE EXCESS ARTERIAL: 20.8 MMOL/L (ref -2–2)
BASE EXCESS ARTERIAL: 21.2 MMOL/L (ref -2–2)
BASE EXCESS ARTERIAL: 21.9 MMOL/L (ref -2–2)
BASE EXCESS ARTERIAL: 4 MMOL/L (ref -2–2)
BASE EXCESS ARTERIAL: 4.2 MMOL/L (ref -2–2)
BASE EXCESS ARTERIAL: 4.6 MMOL/L (ref -2–2)
BASE EXCESS ARTERIAL: 5.3 MMOL/L (ref -2–2)
BASE EXCESS ARTERIAL: 6.5 MMOL/L (ref -2–2)
BASOPHILS ABSOLUTE: 0 K/UL (ref 0–0.2)
BASOPHILS ABSOLUTE: 0.1 K/UL (ref 0–0.2)
BASOPHILS ABSOLUTE: 0.2 K/UL (ref 0–0.2)
BASOPHILS MANUAL: 0 %
BASOPHILS MANUAL: 0 %
BASOPHILS RELATIVE PERCENT: 0 % (ref 0–1)
BASOPHILS RELATIVE PERCENT: 0 % (ref 0–1)
BASOPHILS RELATIVE PERCENT: 0.1 % (ref 0–1)
BASOPHILS RELATIVE PERCENT: 0.1 % (ref 0–1)
BASOPHILS RELATIVE PERCENT: 0.2 % (ref 0–1)
BASOPHILS RELATIVE PERCENT: 0.3 % (ref 0–1)
BASOPHILS RELATIVE PERCENT: 0.4 % (ref 0–1)
BASOPHILS RELATIVE PERCENT: 0.5 % (ref 0–1)
BASOPHILS RELATIVE PERCENT: 0.5 % (ref 0–1)
BASOPHILS RELATIVE PERCENT: 0.7 % (ref 0–1)
BASOPHILS RELATIVE PERCENT: 0.8 % (ref 0–1)
BASOPHILS RELATIVE PERCENT: 1 % (ref 0–1)
BENZODIAZEPINE SCREEN, URINE: NEGATIVE
BENZODIAZEPINE SCREEN, URINE: NORMAL
BETA GLOBULIN: 0 AU/ML (ref 0–19)
BETA GLOBULIN: 0.91 G/DL (ref 0.48–1.1)
BILIRUB SERPL-MCNC: 0.3 MG/DL (ref 0.2–1.2)
BILIRUB SERPL-MCNC: 0.3 MG/DL (ref 0.2–1.2)
BILIRUB SERPL-MCNC: 0.4 MG/DL (ref 0.2–1.2)
BILIRUB SERPL-MCNC: 0.5 MG/DL (ref 0.2–1.2)
BILIRUB SERPL-MCNC: 0.6 MG/DL (ref 0.2–1.2)
BILIRUB SERPL-MCNC: 0.7 MG/DL (ref 0.2–1.2)
BILIRUB SERPL-MCNC: 0.8 MG/DL (ref 0.2–1.2)
BILIRUB SERPL-MCNC: 0.9 MG/DL (ref 0.2–1.2)
BILIRUB SERPL-MCNC: 1 MG/DL (ref 0.2–1.2)
BILIRUB SERPL-MCNC: 1.1 MG/DL (ref 0.2–1.2)
BILIRUB SERPL-MCNC: 1.2 MG/DL (ref 0.2–1.2)
BILIRUB SERPL-MCNC: 1.3 MG/DL (ref 0.2–1.2)
BILIRUBIN URINE: ABNORMAL
BILIRUBIN URINE: NEGATIVE
BLOOD CULTURE, ROUTINE: ABNORMAL
BLOOD CULTURE, ROUTINE: NORMAL
BLOOD, URINE: ABNORMAL
BLOOD, URINE: NEGATIVE
BUN BLDV-MCNC: 113 MG/DL (ref 8–23)
BUN BLDV-MCNC: 117 MG/DL (ref 8–23)
BUN BLDV-MCNC: 118 MG/DL (ref 8–23)
BUN BLDV-MCNC: 122 MG/DL (ref 8–23)
BUN BLDV-MCNC: 122 MG/DL (ref 8–23)
BUN BLDV-MCNC: 123 MG/DL (ref 8–23)
BUN BLDV-MCNC: 18 MG/DL (ref 8–23)
BUN BLDV-MCNC: 20 MG/DL (ref 8–23)
BUN BLDV-MCNC: 21 MG/DL (ref 8–23)
BUN BLDV-MCNC: 21 MG/DL (ref 8–23)
BUN BLDV-MCNC: 23 MG/DL (ref 8–23)
BUN BLDV-MCNC: 25 MG/DL (ref 8–23)
BUN BLDV-MCNC: 28 MG/DL (ref 8–23)
BUN BLDV-MCNC: 28 MG/DL (ref 8–23)
BUN BLDV-MCNC: 29 MG/DL (ref 8–23)
BUN BLDV-MCNC: 29 MG/DL (ref 8–23)
BUN BLDV-MCNC: 31 MG/DL (ref 8–23)
BUN BLDV-MCNC: 32 MG/DL (ref 8–23)
BUN BLDV-MCNC: 35 MG/DL (ref 8–23)
BUN BLDV-MCNC: 36 MG/DL (ref 8–23)
BUN BLDV-MCNC: 40 MG/DL (ref 8–23)
BUN BLDV-MCNC: 41 MG/DL (ref 8–23)
BUN BLDV-MCNC: 43 MG/DL (ref 8–23)
BUN BLDV-MCNC: 44 MG/DL (ref 8–23)
BUN BLDV-MCNC: 44 MG/DL (ref 8–23)
BUN BLDV-MCNC: 47 MG/DL (ref 8–23)
BUN BLDV-MCNC: 50 MG/DL (ref 8–23)
BUN BLDV-MCNC: 51 MG/DL (ref 8–23)
BUN BLDV-MCNC: 53 MG/DL (ref 8–23)
BUN BLDV-MCNC: 55 MG/DL (ref 8–23)
BUN BLDV-MCNC: 56 MG/DL (ref 8–23)
BUN BLDV-MCNC: 58 MG/DL (ref 8–23)
BUN BLDV-MCNC: 59 MG/DL (ref 8–23)
BUN BLDV-MCNC: 59 MG/DL (ref 8–23)
BUN BLDV-MCNC: 61 MG/DL (ref 8–23)
BUN BLDV-MCNC: 63 MG/DL (ref 8–23)
BUN BLDV-MCNC: 65 MG/DL (ref 8–23)
BUN BLDV-MCNC: 66 MG/DL (ref 8–23)
BUN BLDV-MCNC: 67 MG/DL (ref 8–23)
BUN BLDV-MCNC: 68 MG/DL (ref 8–23)
BUN BLDV-MCNC: 68 MG/DL (ref 8–23)
BUN BLDV-MCNC: 69 MG/DL (ref 8–23)
BUN BLDV-MCNC: 72 MG/DL (ref 8–23)
BUN BLDV-MCNC: 73 MG/DL (ref 8–23)
BUN BLDV-MCNC: 73 MG/DL (ref 8–23)
BUN BLDV-MCNC: 74 MG/DL (ref 8–23)
BUN BLDV-MCNC: 85 MG/DL (ref 8–23)
BUN BLDV-MCNC: 86 MG/DL (ref 8–23)
BUN BLDV-MCNC: 89 MG/DL (ref 8–23)
BUN BLDV-MCNC: 93 MG/DL (ref 8–23)
C DIFFICILE TOXIN, EIA: NORMAL
C3 COMPLEMENT: 181 MG/DL (ref 88–201)
C4 COMPLEMENT: 37 MG/DL (ref 10–40)
CALCIUM IONIZED: 1.24 MMOL/L (ref 1.12–1.32)
CALCIUM IONIZED: 1.37 MMOL/L (ref 1.12–1.32)
CALCIUM IONIZED: 1.46 MMOL/L (ref 1.12–1.32)
CALCIUM SERPL-MCNC: 10 MG/DL (ref 8.8–10.2)
CALCIUM SERPL-MCNC: 10.2 MG/DL (ref 8.8–10.2)
CALCIUM SERPL-MCNC: 10.3 MG/DL (ref 8.8–10.2)
CALCIUM SERPL-MCNC: 10.4 MG/DL (ref 8.8–10.2)
CALCIUM SERPL-MCNC: 10.5 MG/DL (ref 8.8–10.2)
CALCIUM SERPL-MCNC: 10.5 MG/DL (ref 8.8–10.2)
CALCIUM SERPL-MCNC: 10.6 MG/DL (ref 8.8–10.2)
CALCIUM SERPL-MCNC: 10.6 MG/DL (ref 8.8–10.2)
CALCIUM SERPL-MCNC: 10.7 MG/DL (ref 8.8–10.2)
CALCIUM SERPL-MCNC: 12.1 MG/DL (ref 8.8–10.2)
CALCIUM SERPL-MCNC: 12.2 MG/DL (ref 8.8–10.2)
CALCIUM SERPL-MCNC: 12.3 MG/DL (ref 8.8–10.2)
CALCIUM SERPL-MCNC: 12.4 MG/DL (ref 8.8–10.2)
CALCIUM SERPL-MCNC: 12.6 MG/DL (ref 8.8–10.2)
CALCIUM SERPL-MCNC: 7.9 MG/DL (ref 8.8–10.2)
CALCIUM SERPL-MCNC: 8.2 MG/DL (ref 8.8–10.2)
CALCIUM SERPL-MCNC: 8.2 MG/DL (ref 8.8–10.2)
CALCIUM SERPL-MCNC: 8.3 MG/DL (ref 8.8–10.2)
CALCIUM SERPL-MCNC: 8.4 MG/DL (ref 8.8–10.2)
CALCIUM SERPL-MCNC: 8.4 MG/DL (ref 8.8–10.2)
CALCIUM SERPL-MCNC: 8.5 MG/DL (ref 8.8–10.2)
CALCIUM SERPL-MCNC: 8.5 MG/DL (ref 8.8–10.2)
CALCIUM SERPL-MCNC: 8.7 MG/DL (ref 8.8–10.2)
CALCIUM SERPL-MCNC: 8.7 MG/DL (ref 8.8–10.2)
CALCIUM SERPL-MCNC: 8.8 MG/DL (ref 8.8–10.2)
CALCIUM SERPL-MCNC: 8.9 MG/DL (ref 8.8–10.2)
CALCIUM SERPL-MCNC: 9 MG/DL (ref 8.8–10.2)
CALCIUM SERPL-MCNC: 9.1 MG/DL (ref 8.8–10.2)
CALCIUM SERPL-MCNC: 9.2 MG/DL (ref 8.8–10.2)
CALCIUM SERPL-MCNC: 9.3 MG/DL (ref 8.8–10.2)
CALCIUM SERPL-MCNC: 9.4 MG/DL (ref 8.8–10.2)
CALCIUM SERPL-MCNC: 9.5 MG/DL (ref 8.8–10.2)
CALCIUM SERPL-MCNC: 9.6 MG/DL (ref 8.8–10.2)
CALCIUM SERPL-MCNC: 9.7 MG/DL (ref 8.8–10.2)
CALCIUM SERPL-MCNC: 9.7 MG/DL (ref 8.8–10.2)
CALCIUM SERPL-MCNC: 9.8 MG/DL (ref 8.8–10.2)
CALCIUM SERPL-MCNC: 9.8 MG/DL (ref 8.8–10.2)
CALCIUM SERPL-MCNC: 9.9 MG/DL (ref 8.8–10.2)
CANNABINOID SCREEN URINE: NEGATIVE
CARBOXYHEMOGLOBIN ARTERIAL: 1.4 % (ref 0–5)
CARBOXYHEMOGLOBIN ARTERIAL: 2.3 % (ref 0–5)
CARBOXYHEMOGLOBIN ARTERIAL: 2.4 % (ref 0–5)
CARBOXYHEMOGLOBIN ARTERIAL: 2.5 % (ref 0–5)
CARBOXYHEMOGLOBIN ARTERIAL: 2.5 % (ref 0–5)
CARBOXYHEMOGLOBIN ARTERIAL: 2.6 % (ref 0–5)
CARBOXYHEMOGLOBIN ARTERIAL: 2.8 % (ref 0–5)
CARBOXYHEMOGLOBIN ARTERIAL: 3 % (ref 0–5)
CARBOXYHEMOGLOBIN ARTERIAL: 3 % (ref 0–5)
CARBOXYHEMOGLOBIN ARTERIAL: 3.1 % (ref 0–5)
CARBOXYHEMOGLOBIN ARTERIAL: 3.2 % (ref 0–5)
CARBOXYHEMOGLOBIN ARTERIAL: 3.2 % (ref 0–5)
CARBOXYHEMOGLOBIN ARTERIAL: 3.4 % (ref 0–5)
CASTS: ABNORMAL /LPF
CHLORIDE BLD-SCNC: 100 MMOL/L (ref 98–111)
CHLORIDE BLD-SCNC: 102 MMOL/L (ref 98–111)
CHLORIDE BLD-SCNC: 104 MMOL/L (ref 98–111)
CHLORIDE BLD-SCNC: 107 MMOL/L (ref 98–111)
CHLORIDE BLD-SCNC: 107 MMOL/L (ref 98–111)
CHLORIDE BLD-SCNC: 108 MMOL/L (ref 98–111)
CHLORIDE BLD-SCNC: 87 MMOL/L (ref 98–111)
CHLORIDE BLD-SCNC: 89 MMOL/L (ref 98–111)
CHLORIDE BLD-SCNC: 90 MMOL/L (ref 98–111)
CHLORIDE BLD-SCNC: 90 MMOL/L (ref 98–111)
CHLORIDE BLD-SCNC: 91 MMOL/L (ref 98–111)
CHLORIDE BLD-SCNC: 92 MMOL/L (ref 98–111)
CHLORIDE BLD-SCNC: 93 MMOL/L (ref 98–111)
CHLORIDE BLD-SCNC: 94 MMOL/L (ref 98–111)
CHLORIDE BLD-SCNC: 95 MMOL/L (ref 98–111)
CHLORIDE BLD-SCNC: 96 MMOL/L (ref 98–111)
CHLORIDE BLD-SCNC: 97 MMOL/L (ref 98–111)
CHLORIDE BLD-SCNC: 98 MMOL/L (ref 98–111)
CHLORIDE BLD-SCNC: 98 MMOL/L (ref 98–111)
CHLORIDE BLD-SCNC: 99 MMOL/L (ref 98–111)
CHP ED QC CHECK: NORMAL
CLARITY: ABNORMAL
CO2: 20 MMOL/L (ref 22–29)
CO2: 21 MMOL/L (ref 22–29)
CO2: 22 MMOL/L (ref 22–29)
CO2: 23 MMOL/L (ref 22–29)
CO2: 24 MMOL/L (ref 22–29)
CO2: 25 MMOL/L (ref 22–29)
CO2: 26 MMOL/L (ref 22–29)
CO2: 27 MMOL/L (ref 22–29)
CO2: 28 MMOL/L (ref 22–29)
CO2: 29 MMOL/L (ref 22–29)
CO2: 30 MMOL/L (ref 22–29)
CO2: 31 MMOL/L (ref 22–29)
CO2: 31 MMOL/L (ref 22–29)
CO2: 32 MMOL/L (ref 22–29)
CO2: 33 MMOL/L (ref 22–29)
CO2: 34 MMOL/L (ref 22–29)
CO2: 35 MMOL/L (ref 22–29)
CO2: 37 MMOL/L (ref 22–29)
CO2: 38 MMOL/L (ref 22–29)
CO2: 38 MMOL/L (ref 22–29)
CO2: 39 MMOL/L (ref 22–29)
CO2: 7 MMOL/L (ref 22–29)
COCAINE METABOLITE SCREEN URINE: NEGATIVE
COCAINE METABOLITE SCREEN URINE: NORMAL
COLOR: ABNORMAL
COLOR: YELLOW
CREAT SERPL-MCNC: 1.5 MG/DL (ref 0.5–0.9)
CREAT SERPL-MCNC: 1.5 MG/DL (ref 0.5–0.9)
CREAT SERPL-MCNC: 1.6 MG/DL (ref 0.5–0.9)
CREAT SERPL-MCNC: 1.7 MG/DL (ref 0.5–0.9)
CREAT SERPL-MCNC: 1.7 MG/DL (ref 0.5–0.9)
CREAT SERPL-MCNC: 1.8 MG/DL (ref 0.5–0.9)
CREAT SERPL-MCNC: 1.9 MG/DL (ref 0.5–0.9)
CREAT SERPL-MCNC: 2 MG/DL (ref 0.5–0.9)
CREAT SERPL-MCNC: 2.1 MG/DL (ref 0.5–0.9)
CREAT SERPL-MCNC: 2.1 MG/DL (ref 0.5–0.9)
CREAT SERPL-MCNC: 2.2 MG/DL (ref 0.5–0.9)
CREAT SERPL-MCNC: 2.3 MG/DL (ref 0.5–0.9)
CREAT SERPL-MCNC: 2.4 MG/DL (ref 0.5–0.9)
CREAT SERPL-MCNC: 2.5 MG/DL (ref 0.5–0.9)
CREAT SERPL-MCNC: 2.5 MG/DL (ref 0.5–0.9)
CREAT SERPL-MCNC: 2.6 MG/DL (ref 0.5–0.9)
CREAT SERPL-MCNC: 2.7 MG/DL (ref 0.5–0.9)
CREAT SERPL-MCNC: 2.8 MG/DL (ref 0.5–0.9)
CREAT SERPL-MCNC: 2.8 MG/DL (ref 0.5–0.9)
CREAT SERPL-MCNC: 3 MG/DL (ref 0.5–0.9)
CREAT SERPL-MCNC: 3.2 MG/DL (ref 0.5–0.9)
CREAT SERPL-MCNC: 3.3 MG/DL (ref 0.5–0.9)
CREAT SERPL-MCNC: 3.5 MG/DL (ref 0.5–0.9)
CREAT SERPL-MCNC: 3.6 MG/DL (ref 0.5–0.9)
CREAT SERPL-MCNC: 3.6 MG/DL (ref 0.5–0.9)
CREAT SERPL-MCNC: 3.7 MG/DL (ref 0.5–0.9)
CREAT SERPL-MCNC: 3.8 MG/DL (ref 0.5–0.9)
CREAT SERPL-MCNC: 3.8 MG/DL (ref 0.5–0.9)
CREAT SERPL-MCNC: 4 MG/DL (ref 0.5–0.9)
CREAT SERPL-MCNC: 4 MG/DL (ref 0.5–0.9)
CREAT SERPL-MCNC: 4.1 MG/DL (ref 0.5–0.9)
CREAT SERPL-MCNC: 4.2 MG/DL (ref 0.5–0.9)
CREAT SERPL-MCNC: 4.3 MG/DL (ref 0.5–0.9)
CREAT SERPL-MCNC: 4.4 MG/DL (ref 0.5–0.9)
CREAT SERPL-MCNC: 4.5 MG/DL (ref 0.5–0.9)
CREAT SERPL-MCNC: 4.6 MG/DL (ref 0.5–0.9)
CREAT SERPL-MCNC: 4.7 MG/DL (ref 0.5–0.9)
CREAT SERPL-MCNC: 4.9 MG/DL (ref 0.5–0.9)
CREAT SERPL-MCNC: 5.1 MG/DL (ref 0.5–0.9)
CREAT SERPL-MCNC: 5.1 MG/DL (ref 0.5–0.9)
CREAT SERPL-MCNC: 5.3 MG/DL (ref 0.5–0.9)
CREAT SERPL-MCNC: 5.4 MG/DL (ref 0.5–0.9)
CREAT SERPL-MCNC: 5.7 MG/DL (ref 0.5–0.9)
CREAT SERPL-MCNC: 5.8 MG/DL (ref 0.5–0.9)
CREAT SERPL-MCNC: 6.4 MG/DL (ref 0.5–0.9)
CREATININE URINE: 224.5 MG/DL (ref 4.2–622)
CULTURE, BLOOD 2: ABNORMAL
CULTURE, BLOOD 2: ABNORMAL
CULTURE, BLOOD 2: NORMAL
D DIMER: 1.41 NG/ML DDU (ref 0–0.48)
EKG P AXIS: 1 DEGREES
EKG P AXIS: 13 DEGREES
EKG P AXIS: 21 DEGREES
EKG P AXIS: 29 DEGREES
EKG P AXIS: 3 DEGREES
EKG P AXIS: 30 DEGREES
EKG P AXIS: 32 DEGREES
EKG P AXIS: 37 DEGREES
EKG P AXIS: 39 DEGREES
EKG P AXIS: 40 DEGREES
EKG P AXIS: 41 DEGREES
EKG P AXIS: 41 DEGREES
EKG P AXIS: 45 DEGREES
EKG P AXIS: 52 DEGREES
EKG P AXIS: 64 DEGREES
EKG P-R INTERVAL: 168 MS
EKG P-R INTERVAL: 170 MS
EKG P-R INTERVAL: 180 MS
EKG P-R INTERVAL: 182 MS
EKG P-R INTERVAL: 184 MS
EKG P-R INTERVAL: 186 MS
EKG P-R INTERVAL: 186 MS
EKG P-R INTERVAL: 188 MS
EKG P-R INTERVAL: 190 MS
EKG P-R INTERVAL: 192 MS
EKG P-R INTERVAL: 196 MS
EKG P-R INTERVAL: 198 MS
EKG P-R INTERVAL: 198 MS
EKG P-R INTERVAL: 204 MS
EKG P-R INTERVAL: 210 MS
EKG Q-T INTERVAL: 336 MS
EKG Q-T INTERVAL: 358 MS
EKG Q-T INTERVAL: 366 MS
EKG Q-T INTERVAL: 384 MS
EKG Q-T INTERVAL: 390 MS
EKG Q-T INTERVAL: 392 MS
EKG Q-T INTERVAL: 398 MS
EKG Q-T INTERVAL: 400 MS
EKG Q-T INTERVAL: 404 MS
EKG Q-T INTERVAL: 404 MS
EKG Q-T INTERVAL: 408 MS
EKG Q-T INTERVAL: 410 MS
EKG Q-T INTERVAL: 410 MS
EKG Q-T INTERVAL: 414 MS
EKG Q-T INTERVAL: 422 MS
EKG QRS DURATION: 104 MS
EKG QRS DURATION: 82 MS
EKG QRS DURATION: 84 MS
EKG QRS DURATION: 84 MS
EKG QRS DURATION: 86 MS
EKG QRS DURATION: 86 MS
EKG QRS DURATION: 88 MS
EKG QRS DURATION: 90 MS
EKG QRS DURATION: 92 MS
EKG QRS DURATION: 94 MS
EKG QRS DURATION: 98 MS
EKG QTC CALCULATION (BAZETT): 411 MS
EKG QTC CALCULATION (BAZETT): 412 MS
EKG QTC CALCULATION (BAZETT): 413 MS
EKG QTC CALCULATION (BAZETT): 417 MS
EKG QTC CALCULATION (BAZETT): 418 MS
EKG QTC CALCULATION (BAZETT): 418 MS
EKG QTC CALCULATION (BAZETT): 421 MS
EKG QTC CALCULATION (BAZETT): 422 MS
EKG QTC CALCULATION (BAZETT): 424 MS
EKG QTC CALCULATION (BAZETT): 426 MS
EKG QTC CALCULATION (BAZETT): 426 MS
EKG QTC CALCULATION (BAZETT): 432 MS
EKG QTC CALCULATION (BAZETT): 434 MS
EKG QTC CALCULATION (BAZETT): 436 MS
EKG QTC CALCULATION (BAZETT): 440 MS
EKG T AXIS: -13 DEGREES
EKG T AXIS: -17 DEGREES
EKG T AXIS: -26 DEGREES
EKG T AXIS: -37 DEGREES
EKG T AXIS: -43 DEGREES
EKG T AXIS: -43 DEGREES
EKG T AXIS: -46 DEGREES
EKG T AXIS: -52 DEGREES
EKG T AXIS: -57 DEGREES
EKG T AXIS: -85 DEGREES
EKG T AXIS: -9 DEGREES
EKG T AXIS: 16 DEGREES
EKG T AXIS: 18 DEGREES
EKG T AXIS: 29 DEGREES
EKG T AXIS: 38 DEGREES
EOSINOPHIL,URINE: NORMAL
EOSINOPHIL,URINE: NORMAL
EOSINOPHILS ABSOLUTE: 0 K/UL (ref 0–0.6)
EOSINOPHILS ABSOLUTE: 0.1 K/UL (ref 0–0.6)
EOSINOPHILS ABSOLUTE: 0.2 K/UL (ref 0–0.6)
EOSINOPHILS ABSOLUTE: 0.27 K/UL (ref 0–0.6)
EOSINOPHILS ABSOLUTE: 0.3 K/UL (ref 0–0.6)
EOSINOPHILS ABSOLUTE: 0.4 K/UL (ref 0–0.6)
EOSINOPHILS ABSOLUTE: 0.5 K/UL (ref 0–0.6)
EOSINOPHILS ABSOLUTE: 0.52 K/UL (ref 0–0.6)
EOSINOPHILS RELATIVE PERCENT: 0 % (ref 0–5)
EOSINOPHILS RELATIVE PERCENT: 0.1 % (ref 0–5)
EOSINOPHILS RELATIVE PERCENT: 0.5 % (ref 0–5)
EOSINOPHILS RELATIVE PERCENT: 0.7 % (ref 0–5)
EOSINOPHILS RELATIVE PERCENT: 0.7 % (ref 0–5)
EOSINOPHILS RELATIVE PERCENT: 1 % (ref 0–5)
EOSINOPHILS RELATIVE PERCENT: 1.1 % (ref 0–5)
EOSINOPHILS RELATIVE PERCENT: 1.5 % (ref 0–5)
EOSINOPHILS RELATIVE PERCENT: 1.6 % (ref 0–5)
EOSINOPHILS RELATIVE PERCENT: 1.6 % (ref 0–5)
EOSINOPHILS RELATIVE PERCENT: 1.7 % (ref 0–5)
EOSINOPHILS RELATIVE PERCENT: 1.7 % (ref 0–5)
EOSINOPHILS RELATIVE PERCENT: 1.8 % (ref 0–5)
EOSINOPHILS RELATIVE PERCENT: 1.8 % (ref 0–5)
EOSINOPHILS RELATIVE PERCENT: 1.9 % (ref 0–5)
EOSINOPHILS RELATIVE PERCENT: 2 % (ref 0–5)
EOSINOPHILS RELATIVE PERCENT: 2.1 % (ref 0–5)
EOSINOPHILS RELATIVE PERCENT: 2.2 % (ref 0–5)
EOSINOPHILS RELATIVE PERCENT: 2.3 % (ref 0–5)
EOSINOPHILS RELATIVE PERCENT: 2.4 % (ref 0–5)
EOSINOPHILS RELATIVE PERCENT: 2.8 % (ref 0–5)
EOSINOPHILS RELATIVE PERCENT: 3.3 % (ref 0–5)
EOSINOPHILS RELATIVE PERCENT: 3.5 % (ref 0–5)
EOSINOPHILS RELATIVE PERCENT: 3.6 % (ref 0–5)
EOSINOPHILS RELATIVE PERCENT: 4.8 % (ref 0–5)
EOSINOPHILS RELATIVE PERCENT: 4.9 % (ref 0–5)
EOSINOPHILS RELATIVE PERCENT: 5 % (ref 0–5)
EPITHELIAL CELLS, UA: 0 /HPF (ref 0–5)
EPITHELIAL CELLS, UA: 1 /HPF (ref 0–5)
EPITHELIAL CELLS, UA: ABNORMAL /HPF
FERRITIN: 159.9 NG/ML (ref 13–150)
FOLATE: 13.8 NG/ML (ref 4.8–37.3)
GAMMA GLOBULIN: 2.56 G/DL (ref 0.62–1.51)
GFR NON-AFRICAN AMERICAN: 10
GFR NON-AFRICAN AMERICAN: 11
GFR NON-AFRICAN AMERICAN: 12
GFR NON-AFRICAN AMERICAN: 13
GFR NON-AFRICAN AMERICAN: 14
GFR NON-AFRICAN AMERICAN: 14
GFR NON-AFRICAN AMERICAN: 16
GFR NON-AFRICAN AMERICAN: 17
GFR NON-AFRICAN AMERICAN: 17
GFR NON-AFRICAN AMERICAN: 18
GFR NON-AFRICAN AMERICAN: 18
GFR NON-AFRICAN AMERICAN: 19
GFR NON-AFRICAN AMERICAN: 19
GFR NON-AFRICAN AMERICAN: 20
GFR NON-AFRICAN AMERICAN: 21
GFR NON-AFRICAN AMERICAN: 22
GFR NON-AFRICAN AMERICAN: 23
GFR NON-AFRICAN AMERICAN: 23
GFR NON-AFRICAN AMERICAN: 25
GFR NON-AFRICAN AMERICAN: 26
GFR NON-AFRICAN AMERICAN: 28
GFR NON-AFRICAN AMERICAN: 30
GFR NON-AFRICAN AMERICAN: 30
GFR NON-AFRICAN AMERICAN: 32
GFR NON-AFRICAN AMERICAN: 35
GFR NON-AFRICAN AMERICAN: 35
GFR NON-AFRICAN AMERICAN: 6
GFR NON-AFRICAN AMERICAN: 7
GFR NON-AFRICAN AMERICAN: 7
GFR NON-AFRICAN AMERICAN: 8
GFR NON-AFRICAN AMERICAN: 9
GLOBULIN: 2.7 G/DL
GLOBULIN: 2.7 G/DL
GLOBULIN: 2.9 G/DL
GLOBULIN: 3 G/DL
GLOBULIN: 3.4 G/DL
GLOBULIN: 3.4 G/DL
GLOBULIN: 3.6 G/DL
GLOBULIN: 3.8 G/DL
GLOBULIN: 4.1 G/DL
GLOBULIN: 4.2 G/DL
GLUCOSE BLD-MCNC: 102 MG/DL (ref 74–109)
GLUCOSE BLD-MCNC: 103 MG/DL (ref 70–99)
GLUCOSE BLD-MCNC: 103 MG/DL (ref 70–99)
GLUCOSE BLD-MCNC: 105 MG/DL (ref 70–99)
GLUCOSE BLD-MCNC: 109 MG/DL (ref 70–99)
GLUCOSE BLD-MCNC: 110 MG/DL (ref 74–109)
GLUCOSE BLD-MCNC: 111 MG/DL (ref 70–99)
GLUCOSE BLD-MCNC: 111 MG/DL (ref 74–109)
GLUCOSE BLD-MCNC: 112 MG/DL (ref 74–109)
GLUCOSE BLD-MCNC: 113 MG/DL (ref 74–109)
GLUCOSE BLD-MCNC: 114 MG/DL (ref 74–109)
GLUCOSE BLD-MCNC: 117 MG/DL (ref 70–99)
GLUCOSE BLD-MCNC: 118 MG/DL (ref 70–99)
GLUCOSE BLD-MCNC: 120 MG/DL (ref 70–99)
GLUCOSE BLD-MCNC: 122 MG/DL (ref 70–99)
GLUCOSE BLD-MCNC: 122 MG/DL (ref 74–109)
GLUCOSE BLD-MCNC: 123 MG/DL (ref 70–99)
GLUCOSE BLD-MCNC: 123 MG/DL (ref 70–99)
GLUCOSE BLD-MCNC: 124 MG/DL (ref 70–99)
GLUCOSE BLD-MCNC: 125 MG/DL (ref 70–99)
GLUCOSE BLD-MCNC: 127 MG/DL (ref 70–99)
GLUCOSE BLD-MCNC: 128 MG/DL (ref 70–99)
GLUCOSE BLD-MCNC: 128 MG/DL (ref 70–99)
GLUCOSE BLD-MCNC: 128 MG/DL (ref 74–109)
GLUCOSE BLD-MCNC: 128 MG/DL (ref 74–109)
GLUCOSE BLD-MCNC: 129 MG/DL (ref 70–99)
GLUCOSE BLD-MCNC: 130 MG/DL (ref 70–99)
GLUCOSE BLD-MCNC: 133 MG/DL (ref 70–99)
GLUCOSE BLD-MCNC: 133 MG/DL (ref 70–99)
GLUCOSE BLD-MCNC: 136 MG/DL (ref 70–99)
GLUCOSE BLD-MCNC: 136 MG/DL (ref 74–109)
GLUCOSE BLD-MCNC: 137 MG/DL (ref 70–99)
GLUCOSE BLD-MCNC: 138 MG/DL (ref 70–99)
GLUCOSE BLD-MCNC: 138 MG/DL (ref 74–109)
GLUCOSE BLD-MCNC: 139 MG/DL (ref 70–99)
GLUCOSE BLD-MCNC: 140 MG/DL (ref 70–99)
GLUCOSE BLD-MCNC: 141 MG/DL (ref 74–109)
GLUCOSE BLD-MCNC: 143 MG/DL (ref 70–99)
GLUCOSE BLD-MCNC: 144 MG/DL (ref 74–109)
GLUCOSE BLD-MCNC: 145 MG/DL (ref 70–99)
GLUCOSE BLD-MCNC: 145 MG/DL (ref 74–109)
GLUCOSE BLD-MCNC: 145 MG/DL (ref 74–109)
GLUCOSE BLD-MCNC: 146 MG/DL
GLUCOSE BLD-MCNC: 146 MG/DL (ref 70–99)
GLUCOSE BLD-MCNC: 146 MG/DL (ref 70–99)
GLUCOSE BLD-MCNC: 146 MG/DL (ref 74–109)
GLUCOSE BLD-MCNC: 147 MG/DL (ref 70–99)
GLUCOSE BLD-MCNC: 148 MG/DL (ref 70–99)
GLUCOSE BLD-MCNC: 148 MG/DL (ref 70–99)
GLUCOSE BLD-MCNC: 148 MG/DL (ref 74–109)
GLUCOSE BLD-MCNC: 148 MG/DL (ref 74–109)
GLUCOSE BLD-MCNC: 149 MG/DL (ref 70–99)
GLUCOSE BLD-MCNC: 149 MG/DL (ref 74–109)
GLUCOSE BLD-MCNC: 149 MG/DL (ref 74–109)
GLUCOSE BLD-MCNC: 150 MG/DL (ref 74–109)
GLUCOSE BLD-MCNC: 151 MG/DL (ref 74–109)
GLUCOSE BLD-MCNC: 154 MG/DL (ref 70–99)
GLUCOSE BLD-MCNC: 155 MG/DL (ref 70–99)
GLUCOSE BLD-MCNC: 155 MG/DL (ref 70–99)
GLUCOSE BLD-MCNC: 155 MG/DL (ref 74–109)
GLUCOSE BLD-MCNC: 156 MG/DL (ref 70–99)
GLUCOSE BLD-MCNC: 157 MG/DL (ref 70–99)
GLUCOSE BLD-MCNC: 158 MG/DL (ref 70–99)
GLUCOSE BLD-MCNC: 158 MG/DL (ref 70–99)
GLUCOSE BLD-MCNC: 158 MG/DL (ref 74–109)
GLUCOSE BLD-MCNC: 158 MG/DL (ref 74–109)
GLUCOSE BLD-MCNC: 159 MG/DL (ref 70–99)
GLUCOSE BLD-MCNC: 159 MG/DL (ref 74–109)
GLUCOSE BLD-MCNC: 160 MG/DL (ref 70–99)
GLUCOSE BLD-MCNC: 160 MG/DL (ref 70–99)
GLUCOSE BLD-MCNC: 161 MG/DL (ref 70–99)
GLUCOSE BLD-MCNC: 161 MG/DL (ref 74–109)
GLUCOSE BLD-MCNC: 162 MG/DL (ref 70–99)
GLUCOSE BLD-MCNC: 162 MG/DL (ref 74–109)
GLUCOSE BLD-MCNC: 163 MG/DL (ref 70–99)
GLUCOSE BLD-MCNC: 163 MG/DL (ref 70–99)
GLUCOSE BLD-MCNC: 164 MG/DL (ref 70–99)
GLUCOSE BLD-MCNC: 165 MG/DL (ref 70–99)
GLUCOSE BLD-MCNC: 165 MG/DL (ref 70–99)
GLUCOSE BLD-MCNC: 167 MG/DL (ref 70–99)
GLUCOSE BLD-MCNC: 167 MG/DL (ref 70–99)
GLUCOSE BLD-MCNC: 168 MG/DL (ref 70–99)
GLUCOSE BLD-MCNC: 168 MG/DL (ref 74–109)
GLUCOSE BLD-MCNC: 170 MG/DL (ref 70–99)
GLUCOSE BLD-MCNC: 170 MG/DL (ref 70–99)
GLUCOSE BLD-MCNC: 171 MG/DL (ref 70–99)
GLUCOSE BLD-MCNC: 172 MG/DL (ref 70–99)
GLUCOSE BLD-MCNC: 172 MG/DL (ref 74–109)
GLUCOSE BLD-MCNC: 173 MG/DL (ref 70–99)
GLUCOSE BLD-MCNC: 175 MG/DL (ref 70–99)
GLUCOSE BLD-MCNC: 176 MG/DL (ref 70–99)
GLUCOSE BLD-MCNC: 177 MG/DL (ref 74–109)
GLUCOSE BLD-MCNC: 177 MG/DL (ref 74–109)
GLUCOSE BLD-MCNC: 178 MG/DL (ref 70–99)
GLUCOSE BLD-MCNC: 179 MG/DL (ref 70–99)
GLUCOSE BLD-MCNC: 180 MG/DL (ref 70–99)
GLUCOSE BLD-MCNC: 180 MG/DL (ref 70–99)
GLUCOSE BLD-MCNC: 182 MG/DL (ref 74–109)
GLUCOSE BLD-MCNC: 183 MG/DL (ref 70–99)
GLUCOSE BLD-MCNC: 183 MG/DL (ref 74–109)
GLUCOSE BLD-MCNC: 184 MG/DL (ref 70–99)
GLUCOSE BLD-MCNC: 186 MG/DL (ref 70–99)
GLUCOSE BLD-MCNC: 186 MG/DL (ref 70–99)
GLUCOSE BLD-MCNC: 187 MG/DL (ref 70–99)
GLUCOSE BLD-MCNC: 187 MG/DL (ref 70–99)
GLUCOSE BLD-MCNC: 187 MG/DL (ref 74–109)
GLUCOSE BLD-MCNC: 187 MG/DL (ref 74–109)
GLUCOSE BLD-MCNC: 188 MG/DL (ref 70–99)
GLUCOSE BLD-MCNC: 189 MG/DL (ref 70–99)
GLUCOSE BLD-MCNC: 190 MG/DL (ref 74–109)
GLUCOSE BLD-MCNC: 191 MG/DL (ref 70–99)
GLUCOSE BLD-MCNC: 193 MG/DL (ref 74–109)
GLUCOSE BLD-MCNC: 194 MG/DL (ref 70–99)
GLUCOSE BLD-MCNC: 194 MG/DL (ref 74–109)
GLUCOSE BLD-MCNC: 196 MG/DL (ref 70–99)
GLUCOSE BLD-MCNC: 197 MG/DL (ref 70–99)
GLUCOSE BLD-MCNC: 197 MG/DL (ref 70–99)
GLUCOSE BLD-MCNC: 198 MG/DL (ref 70–99)
GLUCOSE BLD-MCNC: 199 MG/DL (ref 70–99)
GLUCOSE BLD-MCNC: 199 MG/DL (ref 70–99)
GLUCOSE BLD-MCNC: 201 MG/DL (ref 70–99)
GLUCOSE BLD-MCNC: 202 MG/DL (ref 70–99)
GLUCOSE BLD-MCNC: 203 MG/DL (ref 70–99)
GLUCOSE BLD-MCNC: 203 MG/DL (ref 74–109)
GLUCOSE BLD-MCNC: 205 MG/DL (ref 70–99)
GLUCOSE BLD-MCNC: 207 MG/DL (ref 70–99)
GLUCOSE BLD-MCNC: 209 MG/DL (ref 70–99)
GLUCOSE BLD-MCNC: 210 MG/DL (ref 70–99)
GLUCOSE BLD-MCNC: 211 MG/DL (ref 70–99)
GLUCOSE BLD-MCNC: 211 MG/DL (ref 70–99)
GLUCOSE BLD-MCNC: 212 MG/DL (ref 70–99)
GLUCOSE BLD-MCNC: 213 MG/DL (ref 70–99)
GLUCOSE BLD-MCNC: 214 MG/DL (ref 70–99)
GLUCOSE BLD-MCNC: 216 MG/DL (ref 70–99)
GLUCOSE BLD-MCNC: 217 MG/DL (ref 70–99)
GLUCOSE BLD-MCNC: 218 MG/DL (ref 70–99)
GLUCOSE BLD-MCNC: 219 MG/DL (ref 70–99)
GLUCOSE BLD-MCNC: 220 MG/DL (ref 70–99)
GLUCOSE BLD-MCNC: 221 MG/DL (ref 70–99)
GLUCOSE BLD-MCNC: 221 MG/DL (ref 74–109)
GLUCOSE BLD-MCNC: 222 MG/DL (ref 70–99)
GLUCOSE BLD-MCNC: 222 MG/DL (ref 70–99)
GLUCOSE BLD-MCNC: 224 MG/DL (ref 70–99)
GLUCOSE BLD-MCNC: 225 MG/DL (ref 70–99)
GLUCOSE BLD-MCNC: 225 MG/DL (ref 70–99)
GLUCOSE BLD-MCNC: 226 MG/DL (ref 70–99)
GLUCOSE BLD-MCNC: 227 MG/DL (ref 70–99)
GLUCOSE BLD-MCNC: 229 MG/DL (ref 74–109)
GLUCOSE BLD-MCNC: 230 MG/DL (ref 70–99)
GLUCOSE BLD-MCNC: 230 MG/DL (ref 70–99)
GLUCOSE BLD-MCNC: 230 MG/DL (ref 74–109)
GLUCOSE BLD-MCNC: 232 MG/DL (ref 70–99)
GLUCOSE BLD-MCNC: 232 MG/DL (ref 70–99)
GLUCOSE BLD-MCNC: 233 MG/DL (ref 70–99)
GLUCOSE BLD-MCNC: 234 MG/DL (ref 70–99)
GLUCOSE BLD-MCNC: 235 MG/DL (ref 70–99)
GLUCOSE BLD-MCNC: 235 MG/DL (ref 70–99)
GLUCOSE BLD-MCNC: 235 MG/DL (ref 74–109)
GLUCOSE BLD-MCNC: 236 MG/DL (ref 70–99)
GLUCOSE BLD-MCNC: 240 MG/DL (ref 70–99)
GLUCOSE BLD-MCNC: 241 MG/DL (ref 70–99)
GLUCOSE BLD-MCNC: 241 MG/DL (ref 70–99)
GLUCOSE BLD-MCNC: 241 MG/DL (ref 74–109)
GLUCOSE BLD-MCNC: 243 MG/DL (ref 70–99)
GLUCOSE BLD-MCNC: 245 MG/DL (ref 70–99)
GLUCOSE BLD-MCNC: 246 MG/DL (ref 70–99)
GLUCOSE BLD-MCNC: 248 MG/DL (ref 70–99)
GLUCOSE BLD-MCNC: 251 MG/DL (ref 70–99)
GLUCOSE BLD-MCNC: 252 MG/DL (ref 70–99)
GLUCOSE BLD-MCNC: 256 MG/DL (ref 70–99)
GLUCOSE BLD-MCNC: 259 MG/DL (ref 70–99)
GLUCOSE BLD-MCNC: 259 MG/DL (ref 70–99)
GLUCOSE BLD-MCNC: 263 MG/DL (ref 70–99)
GLUCOSE BLD-MCNC: 266 MG/DL (ref 70–99)
GLUCOSE BLD-MCNC: 267 MG/DL (ref 74–109)
GLUCOSE BLD-MCNC: 269 MG/DL (ref 74–109)
GLUCOSE BLD-MCNC: 270 MG/DL (ref 70–99)
GLUCOSE BLD-MCNC: 271 MG/DL (ref 70–99)
GLUCOSE BLD-MCNC: 274 MG/DL (ref 70–99)
GLUCOSE BLD-MCNC: 274 MG/DL (ref 74–109)
GLUCOSE BLD-MCNC: 276 MG/DL (ref 74–109)
GLUCOSE BLD-MCNC: 277 MG/DL (ref 70–99)
GLUCOSE BLD-MCNC: 279 MG/DL (ref 70–99)
GLUCOSE BLD-MCNC: 279 MG/DL (ref 70–99)
GLUCOSE BLD-MCNC: 281 MG/DL (ref 74–109)
GLUCOSE BLD-MCNC: 283 MG/DL (ref 70–99)
GLUCOSE BLD-MCNC: 284 MG/DL (ref 70–99)
GLUCOSE BLD-MCNC: 288 MG/DL (ref 74–109)
GLUCOSE BLD-MCNC: 294 MG/DL (ref 70–99)
GLUCOSE BLD-MCNC: 295 MG/DL (ref 70–99)
GLUCOSE BLD-MCNC: 298 MG/DL (ref 70–99)
GLUCOSE BLD-MCNC: 299 MG/DL (ref 70–99)
GLUCOSE BLD-MCNC: 299 MG/DL (ref 70–99)
GLUCOSE BLD-MCNC: 307 MG/DL (ref 70–99)
GLUCOSE BLD-MCNC: 308 MG/DL (ref 74–109)
GLUCOSE BLD-MCNC: 322 MG/DL (ref 70–99)
GLUCOSE BLD-MCNC: 324 MG/DL (ref 70–99)
GLUCOSE BLD-MCNC: 338 MG/DL (ref 70–99)
GLUCOSE BLD-MCNC: 350 MG/DL (ref 70–99)
GLUCOSE BLD-MCNC: 360 MG/DL (ref 70–99)
GLUCOSE BLD-MCNC: 360 MG/DL (ref 74–109)
GLUCOSE BLD-MCNC: 363 MG/DL (ref 70–99)
GLUCOSE BLD-MCNC: 366 MG/DL (ref 70–99)
GLUCOSE BLD-MCNC: 367 MG/DL (ref 74–109)
GLUCOSE BLD-MCNC: 371 MG/DL (ref 70–99)
GLUCOSE BLD-MCNC: 374 MG/DL (ref 70–99)
GLUCOSE BLD-MCNC: 375 MG/DL (ref 70–99)
GLUCOSE BLD-MCNC: 378 MG/DL (ref 70–99)
GLUCOSE BLD-MCNC: 388 MG/DL (ref 74–109)
GLUCOSE BLD-MCNC: 401 MG/DL (ref 70–99)
GLUCOSE BLD-MCNC: 403 MG/DL (ref 74–109)
GLUCOSE BLD-MCNC: 428 MG/DL (ref 70–99)
GLUCOSE BLD-MCNC: 432 MG/DL (ref 70–99)
GLUCOSE BLD-MCNC: 436 MG/DL (ref 70–99)
GLUCOSE BLD-MCNC: 455 MG/DL (ref 70–99)
GLUCOSE BLD-MCNC: 455 MG/DL (ref 70–99)
GLUCOSE BLD-MCNC: 461 MG/DL (ref 70–99)
GLUCOSE BLD-MCNC: 463 MG/DL (ref 70–99)
GLUCOSE BLD-MCNC: 472 MG/DL (ref 70–99)
GLUCOSE BLD-MCNC: 473 MG/DL (ref 70–99)
GLUCOSE BLD-MCNC: 478 MG/DL (ref 70–99)
GLUCOSE BLD-MCNC: 478 MG/DL (ref 70–99)
GLUCOSE BLD-MCNC: 488 MG/DL (ref 74–109)
GLUCOSE BLD-MCNC: 497 MG/DL (ref 70–99)
GLUCOSE BLD-MCNC: 501 MG/DL (ref 70–99)
GLUCOSE BLD-MCNC: 503 MG/DL (ref 70–99)
GLUCOSE BLD-MCNC: 515 MG/DL (ref 70–99)
GLUCOSE BLD-MCNC: 530 MG/DL (ref 74–109)
GLUCOSE BLD-MCNC: 64 MG/DL (ref 70–99)
GLUCOSE BLD-MCNC: 66 MG/DL (ref 70–99)
GLUCOSE BLD-MCNC: 66 MG/DL (ref 70–99)
GLUCOSE BLD-MCNC: 74 MG/DL (ref 74–109)
GLUCOSE BLD-MCNC: 78 MG/DL (ref 74–109)
GLUCOSE BLD-MCNC: 79 MG/DL (ref 70–99)
GLUCOSE BLD-MCNC: 79 MG/DL (ref 70–99)
GLUCOSE BLD-MCNC: 81 MG/DL (ref 70–99)
GLUCOSE BLD-MCNC: 83 MG/DL (ref 70–99)
GLUCOSE BLD-MCNC: 85 MG/DL (ref 74–109)
GLUCOSE BLD-MCNC: 87 MG/DL (ref 70–99)
GLUCOSE BLD-MCNC: 91 MG/DL (ref 70–99)
GLUCOSE BLD-MCNC: 91 MG/DL (ref 70–99)
GLUCOSE BLD-MCNC: 92 MG/DL (ref 70–99)
GLUCOSE BLD-MCNC: 93 MG/DL (ref 70–99)
GLUCOSE BLD-MCNC: 93 MG/DL (ref 74–109)
GLUCOSE BLD-MCNC: 95 MG/DL (ref 74–109)
GLUCOSE BLD-MCNC: 96 MG/DL (ref 74–109)
GLUCOSE URINE: 100 MG/DL
GLUCOSE URINE: 100 MG/DL
GLUCOSE URINE: 500 MG/DL
GLUCOSE URINE: NEGATIVE MG/DL
HAV IGM SER IA-ACNC: NORMAL
HBA1C MFR BLD: 5.4 %
HBA1C MFR BLD: 6.6 %
HBA1C MFR BLD: 6.7 %
HBA1C MFR BLD: 7.2 %
HBA1C MFR BLD: 7.6 %
HBA1C MFR BLD: 7.7 %
HBV SURFACE AB TITR SER: NORMAL MIU/ML
HCO3 ARTERIAL: 0 MMOL/L (ref 22–26)
HCO3 ARTERIAL: 23.8 MMOL/L (ref 22–26)
HCO3 ARTERIAL: 24.3 MMOL/L (ref 22–26)
HCO3 ARTERIAL: 27.7 MMOL/L (ref 22–26)
HCO3 ARTERIAL: 28.5 MMOL/L (ref 22–26)
HCO3 ARTERIAL: 28.6 MMOL/L (ref 22–26)
HCO3 ARTERIAL: 28.9 MMOL/L (ref 22–26)
HCO3 ARTERIAL: 29.2 MMOL/L (ref 22–26)
HCO3 ARTERIAL: 29.2 MMOL/L (ref 22–26)
HCO3 ARTERIAL: 31 MMOL/L (ref 22–26)
HCO3 ARTERIAL: 31.1 MMOL/L (ref 22–26)
HCO3 ARTERIAL: 31.3 MMOL/L (ref 22–26)
HCO3 ARTERIAL: 37.2 MMOL/L (ref 22–26)
HCO3 ARTERIAL: 47.8 MMOL/L (ref 22–26)
HCO3 ARTERIAL: 48.8 MMOL/L (ref 22–26)
HCO3 ARTERIAL: 49.4 MMOL/L (ref 22–26)
HCO3 ARTERIAL: 49.9 MMOL/L (ref 22–26)
HCT VFR BLD CALC: 27.5 % (ref 37–47)
HCT VFR BLD CALC: 28.2 % (ref 37–47)
HCT VFR BLD CALC: 29.9 % (ref 37–47)
HCT VFR BLD CALC: 29.9 % (ref 37–47)
HCT VFR BLD CALC: 30 % (ref 37–47)
HCT VFR BLD CALC: 30.9 % (ref 37–47)
HCT VFR BLD CALC: 31 % (ref 37–47)
HCT VFR BLD CALC: 31.5 % (ref 37–47)
HCT VFR BLD CALC: 31.6 % (ref 37–47)
HCT VFR BLD CALC: 31.9 % (ref 37–47)
HCT VFR BLD CALC: 32 % (ref 37–47)
HCT VFR BLD CALC: 32.2 % (ref 37–47)
HCT VFR BLD CALC: 32.4 % (ref 37–47)
HCT VFR BLD CALC: 32.5 % (ref 37–47)
HCT VFR BLD CALC: 32.6 % (ref 37–47)
HCT VFR BLD CALC: 32.7 % (ref 37–47)
HCT VFR BLD CALC: 32.7 % (ref 37–47)
HCT VFR BLD CALC: 32.9 % (ref 37–47)
HCT VFR BLD CALC: 32.9 % (ref 37–47)
HCT VFR BLD CALC: 33.1 % (ref 37–47)
HCT VFR BLD CALC: 33.7 % (ref 37–47)
HCT VFR BLD CALC: 33.9 % (ref 37–47)
HCT VFR BLD CALC: 34 % (ref 37–47)
HCT VFR BLD CALC: 34.5 % (ref 37–47)
HCT VFR BLD CALC: 35.2 % (ref 37–47)
HCT VFR BLD CALC: 35.3 % (ref 37–47)
HCT VFR BLD CALC: 35.4 % (ref 37–47)
HCT VFR BLD CALC: 35.5 % (ref 37–47)
HCT VFR BLD CALC: 35.6 % (ref 37–47)
HCT VFR BLD CALC: 36 % (ref 37–47)
HCT VFR BLD CALC: 36.4 % (ref 37–47)
HCT VFR BLD CALC: 36.5 % (ref 37–47)
HCT VFR BLD CALC: 36.6 % (ref 37–47)
HCT VFR BLD CALC: 37 % (ref 37–47)
HCT VFR BLD CALC: 37.3 % (ref 37–47)
HCT VFR BLD CALC: 37.4 % (ref 37–47)
HCT VFR BLD CALC: 37.5 % (ref 37–47)
HCT VFR BLD CALC: 37.7 % (ref 37–47)
HCT VFR BLD CALC: 37.9 % (ref 37–47)
HCT VFR BLD CALC: 38.3 % (ref 37–47)
HCT VFR BLD CALC: 39.3 % (ref 37–47)
HCT VFR BLD CALC: 41.7 % (ref 37–47)
HCT VFR BLD CALC: 42.4 % (ref 37–47)
HCT VFR BLD CALC: 42.7 % (ref 37–47)
HEMOGLOBIN, ART, EXTENDED: 10.1 G/DL (ref 12–16)
HEMOGLOBIN, ART, EXTENDED: 10.2 G/DL (ref 12–16)
HEMOGLOBIN, ART, EXTENDED: 10.3 G/DL (ref 12–16)
HEMOGLOBIN, ART, EXTENDED: 10.4 G/DL (ref 12–16)
HEMOGLOBIN, ART, EXTENDED: 10.4 G/DL (ref 12–16)
HEMOGLOBIN, ART, EXTENDED: 10.5 G/DL (ref 12–16)
HEMOGLOBIN, ART, EXTENDED: 10.5 G/DL (ref 12–16)
HEMOGLOBIN, ART, EXTENDED: 11.6 G/DL (ref 12–16)
HEMOGLOBIN, ART, EXTENDED: 12 G/DL (ref 12–16)
HEMOGLOBIN, ART, EXTENDED: 12.7 G/DL (ref 12–16)
HEMOGLOBIN, ART, EXTENDED: 9.1 G/DL (ref 12–16)
HEMOGLOBIN, ART, EXTENDED: 9.2 G/DL (ref 12–16)
HEMOGLOBIN, ART, EXTENDED: 9.5 G/DL (ref 12–16)
HEMOGLOBIN, ART, EXTENDED: 9.7 G/DL (ref 12–16)
HEMOGLOBIN, ART, EXTENDED: 9.8 G/DL (ref 12–16)
HEMOGLOBIN, ART, EXTENDED: 9.8 G/DL (ref 12–16)
HEMOGLOBIN, ART, EXTENDED: 9.9 G/DL (ref 12–16)
HEMOGLOBIN: 10 G/DL (ref 12–16)
HEMOGLOBIN: 10 G/DL (ref 12–16)
HEMOGLOBIN: 10.2 G/DL (ref 12–16)
HEMOGLOBIN: 10.3 G/DL (ref 12–16)
HEMOGLOBIN: 10.5 G/DL (ref 12–16)
HEMOGLOBIN: 10.5 G/DL (ref 12–16)
HEMOGLOBIN: 10.6 G/DL (ref 12–16)
HEMOGLOBIN: 10.7 G/DL (ref 12–16)
HEMOGLOBIN: 10.7 G/DL (ref 12–16)
HEMOGLOBIN: 10.8 G/DL (ref 12–16)
HEMOGLOBIN: 10.9 G/DL (ref 12–16)
HEMOGLOBIN: 10.9 G/DL (ref 12–16)
HEMOGLOBIN: 11.1 G/DL (ref 12–16)
HEMOGLOBIN: 11.2 G/DL (ref 12–16)
HEMOGLOBIN: 11.3 G/DL (ref 12–16)
HEMOGLOBIN: 11.4 G/DL (ref 12–16)
HEMOGLOBIN: 11.5 G/DL (ref 12–16)
HEMOGLOBIN: 11.6 G/DL (ref 12–16)
HEMOGLOBIN: 11.7 G/DL (ref 12–16)
HEMOGLOBIN: 11.9 G/DL (ref 12–16)
HEMOGLOBIN: 12.3 G/DL (ref 12–16)
HEMOGLOBIN: 12.7 G/DL (ref 12–16)
HEMOGLOBIN: 12.7 G/DL (ref 12–16)
HEMOGLOBIN: 7.9 G/DL (ref 12–16)
HEMOGLOBIN: 8.3 G/DL (ref 12–16)
HEMOGLOBIN: 8.6 G/DL (ref 12–16)
HEMOGLOBIN: 9 G/DL (ref 12–16)
HEMOGLOBIN: 9 G/DL (ref 12–16)
HEMOGLOBIN: 9.2 G/DL (ref 12–16)
HEMOGLOBIN: 9.3 G/DL (ref 12–16)
HEMOGLOBIN: 9.4 G/DL (ref 12–16)
HEMOGLOBIN: 9.5 G/DL (ref 12–16)
HEMOGLOBIN: 9.5 G/DL (ref 12–16)
HEMOGLOBIN: 9.6 G/DL (ref 12–16)
HEMOGLOBIN: 9.7 G/DL (ref 12–16)
HEMOGLOBIN: 9.9 G/DL (ref 12–16)
HEPATITIS B CORE IGM ANTIBODY: NORMAL
HEPATITIS B SURFACE ANTIGEN INTERPRETATION: NORMAL
HEPATITIS C ANTIBODY INTERPRETATION: NORMAL
HYALINE CASTS: 2 /HPF (ref 0–8)
HYALINE CASTS: 3 /HPF (ref 0–8)
HYPOCHROMIA: ABNORMAL
INR BLD: 1.02 (ref 0.88–1.18)
INR BLD: 1.02 (ref 0.88–1.18)
INR BLD: 1.05 (ref 0.88–1.18)
INR BLD: 1.09 (ref 0.88–1.18)
INR BLD: 1.09 (ref 0.88–1.18)
INR BLD: 1.18 (ref 0.88–1.18)
INR BLD: 1.23 (ref 0.88–1.18)
IRON SATURATION: 22 % (ref 14–50)
IRON: 54 UG/DL (ref 37–145)
KETONES, URINE: ABNORMAL MG/DL
KETONES, URINE: NEGATIVE MG/DL
LACTIC ACID: 1.8 MMOL/L (ref 0.5–1.9)
LACTIC ACID: 2 MG/DL (ref 0.5–1.9)
LACTIC ACID: 2.1 MG/DL (ref 0.5–1.9)
LACTIC ACID: 2.3 MG/DL (ref 0.5–1.9)
LACTIC ACID: 2.6 MG/DL (ref 0.5–1.9)
LACTIC ACID: 2.9 MG/DL (ref 0.5–1.9)
LACTIC ACID: 2.9 MMOL/L (ref 0.5–1.9)
LACTIC ACID: 3.4 MG/DL (ref 0.5–1.9)
LACTIC ACID: 4.4 MMOL/L (ref 0.5–1.9)
LEUKOCYTE ESTERASE, URINE: ABNORMAL
LIPASE: 31 U/L (ref 13–60)
LV EF: 58 %
LVEF MODALITY: NORMAL
LYMPHOCYTES ABSOLUTE: 0.6 K/UL (ref 1.1–4.5)
LYMPHOCYTES ABSOLUTE: 0.6 K/UL (ref 1.1–4.5)
LYMPHOCYTES ABSOLUTE: 0.9 K/UL (ref 1.1–4.5)
LYMPHOCYTES ABSOLUTE: 1 K/UL (ref 1.1–4.5)
LYMPHOCYTES ABSOLUTE: 1.1 K/UL (ref 1.1–4.5)
LYMPHOCYTES ABSOLUTE: 1.1 K/UL (ref 1.1–4.5)
LYMPHOCYTES ABSOLUTE: 1.2 K/UL (ref 1.1–4.5)
LYMPHOCYTES ABSOLUTE: 1.3 K/UL (ref 1.1–4.5)
LYMPHOCYTES ABSOLUTE: 1.4 K/UL (ref 1.1–4.5)
LYMPHOCYTES ABSOLUTE: 1.5 K/UL (ref 1.1–4.5)
LYMPHOCYTES ABSOLUTE: 1.6 K/UL (ref 1.1–4.5)
LYMPHOCYTES ABSOLUTE: 1.7 K/UL (ref 1.1–4.5)
LYMPHOCYTES ABSOLUTE: 1.7 K/UL (ref 1.1–4.5)
LYMPHOCYTES ABSOLUTE: 1.9 K/UL (ref 1.1–4.5)
LYMPHOCYTES ABSOLUTE: 2 K/UL (ref 1.1–4.5)
LYMPHOCYTES ABSOLUTE: 3.2 K/UL (ref 1.1–4.5)
LYMPHOCYTES ABSOLUTE: 8 K/UL (ref 1.1–4.5)
LYMPHOCYTES RELATIVE PERCENT: 10.4 % (ref 20–40)
LYMPHOCYTES RELATIVE PERCENT: 10.9 % (ref 20–40)
LYMPHOCYTES RELATIVE PERCENT: 11.1 % (ref 20–40)
LYMPHOCYTES RELATIVE PERCENT: 12 % (ref 20–40)
LYMPHOCYTES RELATIVE PERCENT: 12.2 % (ref 20–40)
LYMPHOCYTES RELATIVE PERCENT: 12.3 % (ref 20–40)
LYMPHOCYTES RELATIVE PERCENT: 15.1 % (ref 20–40)
LYMPHOCYTES RELATIVE PERCENT: 15.3 % (ref 20–40)
LYMPHOCYTES RELATIVE PERCENT: 18 % (ref 20–40)
LYMPHOCYTES RELATIVE PERCENT: 36.4 % (ref 20–40)
LYMPHOCYTES RELATIVE PERCENT: 4.4 % (ref 20–40)
LYMPHOCYTES RELATIVE PERCENT: 4.7 % (ref 20–40)
LYMPHOCYTES RELATIVE PERCENT: 5 % (ref 20–40)
LYMPHOCYTES RELATIVE PERCENT: 5 % (ref 20–40)
LYMPHOCYTES RELATIVE PERCENT: 5.1 % (ref 20–40)
LYMPHOCYTES RELATIVE PERCENT: 5.1 % (ref 20–40)
LYMPHOCYTES RELATIVE PERCENT: 5.7 % (ref 20–40)
LYMPHOCYTES RELATIVE PERCENT: 5.7 % (ref 20–40)
LYMPHOCYTES RELATIVE PERCENT: 8 % (ref 20–40)
LYMPHOCYTES RELATIVE PERCENT: 8.1 % (ref 20–40)
LYMPHOCYTES RELATIVE PERCENT: 8.2 % (ref 20–40)
LYMPHOCYTES RELATIVE PERCENT: 8.5 % (ref 20–40)
LYMPHOCYTES RELATIVE PERCENT: 8.7 % (ref 20–40)
LYMPHOCYTES RELATIVE PERCENT: 8.7 % (ref 20–40)
LYMPHOCYTES RELATIVE PERCENT: 8.8 % (ref 20–40)
LYMPHOCYTES RELATIVE PERCENT: 8.9 % (ref 20–40)
LYMPHOCYTES RELATIVE PERCENT: 9 % (ref 20–40)
LYMPHOCYTES RELATIVE PERCENT: 9.4 % (ref 20–40)
LYMPHOCYTES RELATIVE PERCENT: 9.9 % (ref 20–40)
Lab: ABNORMAL
MACROCYTES: ABNORMAL
MAGNESIUM: 1.9 MG/DL (ref 1.6–2.4)
MAGNESIUM: 2.2 MG/DL (ref 1.6–2.4)
MAGNESIUM: 2.3 MG/DL (ref 1.6–2.4)
MAGNESIUM: 2.4 MG/DL (ref 1.6–2.4)
MAGNESIUM: 2.5 MG/DL (ref 1.6–2.4)
MAGNESIUM: 2.5 MG/DL (ref 1.6–2.4)
MAGNESIUM: 2.6 MG/DL (ref 1.6–2.4)
MCH RBC QN AUTO: 30.5 PG (ref 27–31)
MCH RBC QN AUTO: 30.5 PG (ref 27–31)
MCH RBC QN AUTO: 31 PG (ref 27–31)
MCH RBC QN AUTO: 31.2 PG (ref 27–31)
MCH RBC QN AUTO: 31.3 PG (ref 27–31)
MCH RBC QN AUTO: 31.3 PG (ref 27–31)
MCH RBC QN AUTO: 31.4 PG (ref 27–31)
MCH RBC QN AUTO: 31.5 PG (ref 27–31)
MCH RBC QN AUTO: 31.6 PG (ref 27–31)
MCH RBC QN AUTO: 31.7 PG (ref 27–31)
MCH RBC QN AUTO: 31.8 PG (ref 27–31)
MCH RBC QN AUTO: 31.9 PG (ref 27–31)
MCH RBC QN AUTO: 32 PG (ref 27–31)
MCH RBC QN AUTO: 32.1 PG (ref 27–31)
MCH RBC QN AUTO: 32.1 PG (ref 27–31)
MCH RBC QN AUTO: 32.2 PG (ref 27–31)
MCH RBC QN AUTO: 32.2 PG (ref 27–31)
MCH RBC QN AUTO: 32.3 PG (ref 27–31)
MCH RBC QN AUTO: 32.4 PG (ref 27–31)
MCH RBC QN AUTO: 32.5 PG (ref 27–31)
MCH RBC QN AUTO: 32.6 PG (ref 27–31)
MCH RBC QN AUTO: 32.6 PG (ref 27–31)
MCHC RBC AUTO-ENTMCNC: 25.5 G/DL (ref 33–37)
MCHC RBC AUTO-ENTMCNC: 28.3 G/DL (ref 33–37)
MCHC RBC AUTO-ENTMCNC: 28.4 G/DL (ref 33–37)
MCHC RBC AUTO-ENTMCNC: 28.6 G/DL (ref 33–37)
MCHC RBC AUTO-ENTMCNC: 28.7 G/DL (ref 33–37)
MCHC RBC AUTO-ENTMCNC: 28.8 G/DL (ref 33–37)
MCHC RBC AUTO-ENTMCNC: 28.8 G/DL (ref 33–37)
MCHC RBC AUTO-ENTMCNC: 28.9 G/DL (ref 33–37)
MCHC RBC AUTO-ENTMCNC: 29.1 G/DL (ref 33–37)
MCHC RBC AUTO-ENTMCNC: 29.1 G/DL (ref 33–37)
MCHC RBC AUTO-ENTMCNC: 29.2 G/DL (ref 33–37)
MCHC RBC AUTO-ENTMCNC: 29.3 G/DL (ref 33–37)
MCHC RBC AUTO-ENTMCNC: 29.4 G/DL (ref 33–37)
MCHC RBC AUTO-ENTMCNC: 29.5 G/DL (ref 33–37)
MCHC RBC AUTO-ENTMCNC: 29.6 G/DL (ref 33–37)
MCHC RBC AUTO-ENTMCNC: 29.7 G/DL (ref 33–37)
MCHC RBC AUTO-ENTMCNC: 29.7 G/DL (ref 33–37)
MCHC RBC AUTO-ENTMCNC: 29.8 G/DL (ref 33–37)
MCHC RBC AUTO-ENTMCNC: 29.9 G/DL (ref 33–37)
MCHC RBC AUTO-ENTMCNC: 30 G/DL (ref 33–37)
MCHC RBC AUTO-ENTMCNC: 30 G/DL (ref 33–37)
MCHC RBC AUTO-ENTMCNC: 30.1 G/DL (ref 33–37)
MCHC RBC AUTO-ENTMCNC: 30.2 G/DL (ref 33–37)
MCHC RBC AUTO-ENTMCNC: 30.3 G/DL (ref 33–37)
MCHC RBC AUTO-ENTMCNC: 30.4 G/DL (ref 33–37)
MCHC RBC AUTO-ENTMCNC: 30.4 G/DL (ref 33–37)
MCHC RBC AUTO-ENTMCNC: 30.6 G/DL (ref 33–37)
MCHC RBC AUTO-ENTMCNC: 30.7 G/DL (ref 33–37)
MCHC RBC AUTO-ENTMCNC: 30.7 G/DL (ref 33–37)
MCHC RBC AUTO-ENTMCNC: 31.1 G/DL (ref 33–37)
MCHC RBC AUTO-ENTMCNC: 31.2 G/DL (ref 33–37)
MCHC RBC AUTO-ENTMCNC: 31.5 G/DL (ref 33–37)
MCV RBC AUTO: 102.5 FL (ref 81–99)
MCV RBC AUTO: 102.5 FL (ref 81–99)
MCV RBC AUTO: 102.6 FL (ref 81–99)
MCV RBC AUTO: 102.8 FL (ref 81–99)
MCV RBC AUTO: 103.2 FL (ref 81–99)
MCV RBC AUTO: 103.3 FL (ref 81–99)
MCV RBC AUTO: 104.4 FL (ref 81–99)
MCV RBC AUTO: 104.9 FL (ref 81–99)
MCV RBC AUTO: 105.3 FL (ref 81–99)
MCV RBC AUTO: 105.4 FL (ref 81–99)
MCV RBC AUTO: 105.5 FL (ref 81–99)
MCV RBC AUTO: 105.7 FL (ref 81–99)
MCV RBC AUTO: 105.7 FL (ref 81–99)
MCV RBC AUTO: 106.2 FL (ref 81–99)
MCV RBC AUTO: 106.4 FL (ref 81–99)
MCV RBC AUTO: 106.4 FL (ref 81–99)
MCV RBC AUTO: 106.5 FL (ref 81–99)
MCV RBC AUTO: 106.6 FL (ref 81–99)
MCV RBC AUTO: 106.8 FL (ref 81–99)
MCV RBC AUTO: 106.9 FL (ref 81–99)
MCV RBC AUTO: 107.1 FL (ref 81–99)
MCV RBC AUTO: 107.5 FL (ref 81–99)
MCV RBC AUTO: 107.8 FL (ref 81–99)
MCV RBC AUTO: 108 FL (ref 81–99)
MCV RBC AUTO: 108.3 FL (ref 81–99)
MCV RBC AUTO: 108.7 FL (ref 81–99)
MCV RBC AUTO: 109.2 FL (ref 81–99)
MCV RBC AUTO: 109.2 FL (ref 81–99)
MCV RBC AUTO: 109.7 FL (ref 81–99)
MCV RBC AUTO: 109.8 FL (ref 81–99)
MCV RBC AUTO: 109.8 FL (ref 81–99)
MCV RBC AUTO: 109.9 FL (ref 81–99)
MCV RBC AUTO: 110 FL (ref 81–99)
MCV RBC AUTO: 110.2 FL (ref 81–99)
MCV RBC AUTO: 111.4 FL (ref 81–99)
MCV RBC AUTO: 111.5 FL (ref 81–99)
MCV RBC AUTO: 111.6 FL (ref 81–99)
MCV RBC AUTO: 111.9 FL (ref 81–99)
MCV RBC AUTO: 113.3 FL (ref 81–99)
MCV RBC AUTO: 125.3 FL (ref 81–99)
MDMA URINE: NORMAL
METAMYELOCYTES RELATIVE PERCENT: 1 %
METHADONE SCREEN, URINE: NORMAL
METHAMPHETAMINE, URINE: NORMAL
METHEMOGLOBIN ARTERIAL: 0.3 %
METHEMOGLOBIN ARTERIAL: 0.3 %
METHEMOGLOBIN ARTERIAL: 0.5 %
METHEMOGLOBIN ARTERIAL: 0.6 %
METHEMOGLOBIN ARTERIAL: 0.6 %
METHEMOGLOBIN ARTERIAL: 0.7 %
METHEMOGLOBIN ARTERIAL: 0.8 %
METHEMOGLOBIN ARTERIAL: 0.8 %
METHEMOGLOBIN ARTERIAL: 0.9 %
METHEMOGLOBIN ARTERIAL: 1 %
METHEMOGLOBIN ARTERIAL: 1.3 %
METHEMOGLOBIN ARTERIAL: 1.4 %
MONOCYTES ABSOLUTE: 0.3 K/UL (ref 0–0.9)
MONOCYTES ABSOLUTE: 0.7 K/UL (ref 0–0.9)
MONOCYTES ABSOLUTE: 0.8 K/UL (ref 0–0.9)
MONOCYTES ABSOLUTE: 0.9 K/UL (ref 0–0.9)
MONOCYTES ABSOLUTE: 0.9 K/UL (ref 0–0.9)
MONOCYTES ABSOLUTE: 1 K/UL (ref 0–0.9)
MONOCYTES ABSOLUTE: 1.1 K/UL (ref 0–0.9)
MONOCYTES ABSOLUTE: 1.3 K/UL (ref 0–0.9)
MONOCYTES ABSOLUTE: 1.4 K/UL (ref 0–0.9)
MONOCYTES ABSOLUTE: 1.4 K/UL (ref 0–0.9)
MONOCYTES ABSOLUTE: 1.5 K/UL (ref 0–0.9)
MONOCYTES ABSOLUTE: 1.6 K/UL (ref 0–0.9)
MONOCYTES ABSOLUTE: 1.7 K/UL (ref 0–0.9)
MONOCYTES ABSOLUTE: 1.7 K/UL (ref 0–0.9)
MONOCYTES ABSOLUTE: 1.8 K/UL (ref 0–0.9)
MONOCYTES ABSOLUTE: 1.9 K/UL (ref 0–0.9)
MONOCYTES ABSOLUTE: 1.9 K/UL (ref 0–0.9)
MONOCYTES ABSOLUTE: 2 K/UL (ref 0–0.9)
MONOCYTES RELATIVE PERCENT: 1.7 % (ref 0–10)
MONOCYTES RELATIVE PERCENT: 10.5 % (ref 0–10)
MONOCYTES RELATIVE PERCENT: 11.1 % (ref 0–10)
MONOCYTES RELATIVE PERCENT: 11.2 % (ref 0–10)
MONOCYTES RELATIVE PERCENT: 11.3 % (ref 0–10)
MONOCYTES RELATIVE PERCENT: 11.6 % (ref 0–10)
MONOCYTES RELATIVE PERCENT: 11.9 % (ref 0–10)
MONOCYTES RELATIVE PERCENT: 12.2 % (ref 0–10)
MONOCYTES RELATIVE PERCENT: 13.8 % (ref 0–10)
MONOCYTES RELATIVE PERCENT: 13.9 % (ref 0–10)
MONOCYTES RELATIVE PERCENT: 3.8 % (ref 0–10)
MONOCYTES RELATIVE PERCENT: 5.5 % (ref 0–10)
MONOCYTES RELATIVE PERCENT: 5.9 % (ref 0–10)
MONOCYTES RELATIVE PERCENT: 5.9 % (ref 0–10)
MONOCYTES RELATIVE PERCENT: 6 % (ref 0–10)
MONOCYTES RELATIVE PERCENT: 6.6 % (ref 0–10)
MONOCYTES RELATIVE PERCENT: 6.8 % (ref 0–10)
MONOCYTES RELATIVE PERCENT: 7.2 % (ref 0–10)
MONOCYTES RELATIVE PERCENT: 7.7 % (ref 0–10)
MONOCYTES RELATIVE PERCENT: 7.9 % (ref 0–10)
MONOCYTES RELATIVE PERCENT: 8 % (ref 0–10)
MONOCYTES RELATIVE PERCENT: 8.3 % (ref 0–10)
MONOCYTES RELATIVE PERCENT: 8.4 % (ref 0–10)
MONOCYTES RELATIVE PERCENT: 8.4 % (ref 0–10)
MONOCYTES RELATIVE PERCENT: 9 % (ref 0–10)
MONOCYTES RELATIVE PERCENT: 9 % (ref 0–10)
MONOCYTES RELATIVE PERCENT: 9.5 % (ref 0–10)
MONOCYTES RELATIVE PERCENT: 9.7 % (ref 0–10)
MONOCYTES RELATIVE PERCENT: 9.9 % (ref 0–10)
MRSA CULTURE ONLY: ABNORMAL
MRSA CULTURE ONLY: NORMAL
MYELOCYTE PERCENT: 2 %
NEUTROPHILS ABSOLUTE: 10.2 K/UL (ref 1.5–7.5)
NEUTROPHILS ABSOLUTE: 10.2 K/UL (ref 1.5–7.5)
NEUTROPHILS ABSOLUTE: 12 K/UL (ref 1.5–7.5)
NEUTROPHILS ABSOLUTE: 12.3 K/UL (ref 1.5–7.5)
NEUTROPHILS ABSOLUTE: 13.6 K/UL (ref 1.5–7.5)
NEUTROPHILS ABSOLUTE: 13.9 K/UL (ref 1.5–7.5)
NEUTROPHILS ABSOLUTE: 14.2 K/UL (ref 1.5–7.5)
NEUTROPHILS ABSOLUTE: 15.5 K/UL (ref 1.5–7.5)
NEUTROPHILS ABSOLUTE: 16.2 K/UL (ref 1.5–7.5)
NEUTROPHILS ABSOLUTE: 17.2 K/UL (ref 1.5–7.5)
NEUTROPHILS ABSOLUTE: 17.5 K/UL (ref 1.5–7.5)
NEUTROPHILS ABSOLUTE: 18 K/UL (ref 1.5–7.5)
NEUTROPHILS ABSOLUTE: 20.1 K/UL (ref 1.5–7.5)
NEUTROPHILS ABSOLUTE: 20.5 K/UL (ref 1.5–7.5)
NEUTROPHILS ABSOLUTE: 21.1 K/UL (ref 1.5–7.5)
NEUTROPHILS ABSOLUTE: 21.6 K/UL (ref 1.5–7.5)
NEUTROPHILS ABSOLUTE: 23.4 K/UL (ref 1.5–7.5)
NEUTROPHILS ABSOLUTE: 4.7 K/UL (ref 1.5–7.5)
NEUTROPHILS ABSOLUTE: 6.5 K/UL (ref 1.5–7.5)
NEUTROPHILS ABSOLUTE: 6.8 K/UL (ref 1.5–7.5)
NEUTROPHILS ABSOLUTE: 7.8 K/UL (ref 1.5–7.5)
NEUTROPHILS ABSOLUTE: 7.8 K/UL (ref 1.5–7.5)
NEUTROPHILS ABSOLUTE: 8.2 K/UL (ref 1.5–7.5)
NEUTROPHILS ABSOLUTE: 8.6 K/UL (ref 1.5–7.5)
NEUTROPHILS ABSOLUTE: 8.7 K/UL (ref 1.5–7.5)
NEUTROPHILS ABSOLUTE: 8.9 K/UL (ref 1.5–7.5)
NEUTROPHILS ABSOLUTE: 9.4 K/UL (ref 1.5–7.5)
NEUTROPHILS ABSOLUTE: 9.4 K/UL (ref 1.5–7.5)
NEUTROPHILS ABSOLUTE: 9.9 K/UL (ref 1.5–7.5)
NEUTROPHILS MANUAL: 72 %
NEUTROPHILS MANUAL: 79 %
NEUTROPHILS RELATIVE PERCENT: 39.5 % (ref 50–65)
NEUTROPHILS RELATIVE PERCENT: 65.5 % (ref 50–65)
NEUTROPHILS RELATIVE PERCENT: 69.6 % (ref 50–65)
NEUTROPHILS RELATIVE PERCENT: 69.8 % (ref 50–65)
NEUTROPHILS RELATIVE PERCENT: 69.9 % (ref 50–65)
NEUTROPHILS RELATIVE PERCENT: 70.9 % (ref 50–65)
NEUTROPHILS RELATIVE PERCENT: 72 % (ref 50–65)
NEUTROPHILS RELATIVE PERCENT: 72.6 % (ref 50–65)
NEUTROPHILS RELATIVE PERCENT: 73.2 % (ref 50–65)
NEUTROPHILS RELATIVE PERCENT: 73.8 % (ref 50–65)
NEUTROPHILS RELATIVE PERCENT: 74.5 % (ref 50–65)
NEUTROPHILS RELATIVE PERCENT: 76.1 % (ref 50–65)
NEUTROPHILS RELATIVE PERCENT: 76.4 % (ref 50–65)
NEUTROPHILS RELATIVE PERCENT: 76.5 % (ref 50–65)
NEUTROPHILS RELATIVE PERCENT: 76.6 % (ref 50–65)
NEUTROPHILS RELATIVE PERCENT: 77.1 % (ref 50–65)
NEUTROPHILS RELATIVE PERCENT: 77.6 % (ref 50–65)
NEUTROPHILS RELATIVE PERCENT: 79 % (ref 50–65)
NEUTROPHILS RELATIVE PERCENT: 80 % (ref 50–65)
NEUTROPHILS RELATIVE PERCENT: 80.3 % (ref 50–65)
NEUTROPHILS RELATIVE PERCENT: 80.3 % (ref 50–65)
NEUTROPHILS RELATIVE PERCENT: 80.6 % (ref 50–65)
NEUTROPHILS RELATIVE PERCENT: 80.9 % (ref 50–65)
NEUTROPHILS RELATIVE PERCENT: 82.1 % (ref 50–65)
NEUTROPHILS RELATIVE PERCENT: 84.3 % (ref 50–65)
NEUTROPHILS RELATIVE PERCENT: 84.3 % (ref 50–65)
NEUTROPHILS RELATIVE PERCENT: 84.6 % (ref 50–65)
NEUTROPHILS RELATIVE PERCENT: 86.6 % (ref 50–65)
NEUTROPHILS RELATIVE PERCENT: 92.6 % (ref 50–65)
NITRITE, URINE: NEGATIVE
O2 CONTENT ARTERIAL: 11.9 ML/DL
O2 CONTENT ARTERIAL: 12.4 ML/DL
O2 CONTENT ARTERIAL: 12.4 ML/DL
O2 CONTENT ARTERIAL: 12.5 ML/DL
O2 CONTENT ARTERIAL: 12.6 ML/DL
O2 CONTENT ARTERIAL: 12.8 ML/DL
O2 CONTENT ARTERIAL: 13.2 ML/DL
O2 CONTENT ARTERIAL: 13.2 ML/DL
O2 CONTENT ARTERIAL: 13.4 ML/DL
O2 CONTENT ARTERIAL: 13.5 ML/DL
O2 CONTENT ARTERIAL: 13.6 ML/DL
O2 CONTENT ARTERIAL: 13.9 ML/DL
O2 CONTENT ARTERIAL: 14.2 ML/DL
O2 CONTENT ARTERIAL: 14.9 ML/DL
O2 CONTENT ARTERIAL: 16.4 ML/DL
O2 SAT, ARTERIAL: 85.6 %
O2 SAT, ARTERIAL: 87.3 %
O2 SAT, ARTERIAL: 88.2 %
O2 SAT, ARTERIAL: 89 %
O2 SAT, ARTERIAL: 89.8 %
O2 SAT, ARTERIAL: 89.9 %
O2 SAT, ARTERIAL: 90.8 %
O2 SAT, ARTERIAL: 91.4 %
O2 SAT, ARTERIAL: 91.6 %
O2 SAT, ARTERIAL: 91.8 %
O2 SAT, ARTERIAL: 91.9 %
O2 SAT, ARTERIAL: 92.4 %
O2 SAT, ARTERIAL: 92.8 %
O2 SAT, ARTERIAL: 93.2 %
O2 SAT, ARTERIAL: 94.3 %
O2 SAT, ARTERIAL: 95 %
O2 SAT, ARTERIAL: 96.6 %
O2 THERAPY: ABNORMAL
OPIATE SCREEN URINE: NORMAL
OPIATE SCREEN URINE: POSITIVE
ORGANISM: ABNORMAL
OXYCODONE SCREEN URINE: NORMAL
PARATHYROID HORMONE INTACT: 156.1 PG/ML (ref 15–65)
PARATHYROID HORMONE INTACT: 168.9 PG/ML (ref 15–65)
PCO2 ARTERIAL: 35 MMHG (ref 35–45)
PCO2 ARTERIAL: 43 MMHG (ref 35–45)
PCO2 ARTERIAL: 45 MMHG (ref 35–45)
PCO2 ARTERIAL: 47 MMHG (ref 35–45)
PCO2 ARTERIAL: 49 MMHG (ref 35–45)
PCO2 ARTERIAL: 50 MMHG (ref 35–45)
PCO2 ARTERIAL: 50 MMHG (ref 35–45)
PCO2 ARTERIAL: 53 MMHG (ref 35–45)
PCO2 ARTERIAL: 55 MMHG (ref 35–45)
PCO2 ARTERIAL: 56 MMHG (ref 35–45)
PCO2 ARTERIAL: 60 MMHG (ref 35–45)
PCO2 ARTERIAL: 77 MMHG (ref 35–45)
PCO2 ARTERIAL: 77 MMHG (ref 35–45)
PCO2 ARTERIAL: 78 MMHG (ref 35–45)
PCO2 ARTERIAL: 79 MMHG (ref 35–45)
PDW BLD-RTO: 15.2 % (ref 11.5–14.5)
PDW BLD-RTO: 15.3 % (ref 11.5–14.5)
PDW BLD-RTO: 15.4 % (ref 11.5–14.5)
PDW BLD-RTO: 15.4 % (ref 11.5–14.5)
PDW BLD-RTO: 15.5 % (ref 11.5–14.5)
PDW BLD-RTO: 15.7 % (ref 11.5–14.5)
PDW BLD-RTO: 15.8 % (ref 11.5–14.5)
PDW BLD-RTO: 15.8 % (ref 11.5–14.5)
PDW BLD-RTO: 15.9 % (ref 11.5–14.5)
PDW BLD-RTO: 16 % (ref 11.5–14.5)
PDW BLD-RTO: 16 % (ref 11.5–14.5)
PDW BLD-RTO: 16.1 % (ref 11.5–14.5)
PDW BLD-RTO: 16.2 % (ref 11.5–14.5)
PDW BLD-RTO: 16.2 % (ref 11.5–14.5)
PDW BLD-RTO: 16.4 % (ref 11.5–14.5)
PDW BLD-RTO: 16.5 % (ref 11.5–14.5)
PDW BLD-RTO: 16.6 % (ref 11.5–14.5)
PDW BLD-RTO: 17 % (ref 11.5–14.5)
PDW BLD-RTO: 17.2 % (ref 11.5–14.5)
PDW BLD-RTO: 17.7 % (ref 11.5–14.5)
PDW BLD-RTO: 17.8 % (ref 11.5–14.5)
PDW BLD-RTO: 17.9 % (ref 11.5–14.5)
PDW BLD-RTO: 18.4 % (ref 11.5–14.5)
PDW BLD-RTO: 18.5 % (ref 11.5–14.5)
PDW BLD-RTO: 18.6 % (ref 11.5–14.5)
PDW BLD-RTO: 18.6 % (ref 11.5–14.5)
PDW BLD-RTO: 18.9 % (ref 11.5–14.5)
PDW BLD-RTO: 19.2 % (ref 11.5–14.5)
PDW BLD-RTO: 19.3 % (ref 11.5–14.5)
PDW BLD-RTO: 19.7 % (ref 11.5–14.5)
PDW BLD-RTO: 19.7 % (ref 11.5–14.5)
PDW BLD-RTO: 19.8 % (ref 11.5–14.5)
PDW BLD-RTO: 19.8 % (ref 11.5–14.5)
PDW BLD-RTO: 20.1 % (ref 11.5–14.5)
PDW BLD-RTO: 20.1 % (ref 11.5–14.5)
PDW BLD-RTO: 20.2 % (ref 11.5–14.5)
PDW BLD-RTO: 20.3 % (ref 11.5–14.5)
PERFORMED ON: ABNORMAL
PERFORMED ON: NORMAL
PH ARTERIAL: 6.8 (ref 7.35–7.45)
PH ARTERIAL: 7.32 (ref 7.35–7.45)
PH ARTERIAL: 7.34 (ref 7.35–7.45)
PH ARTERIAL: 7.36 (ref 7.35–7.45)
PH ARTERIAL: 7.39 (ref 7.35–7.45)
PH ARTERIAL: 7.39 (ref 7.35–7.45)
PH ARTERIAL: 7.4 (ref 7.35–7.45)
PH ARTERIAL: 7.4 (ref 7.35–7.45)
PH ARTERIAL: 7.41 (ref 7.35–7.45)
PH ARTERIAL: 7.41 (ref 7.35–7.45)
PH ARTERIAL: 7.42 (ref 7.35–7.45)
PH ARTERIAL: 7.44 (ref 7.35–7.45)
PH ARTERIAL: 7.45 (ref 7.35–7.45)
PH UA: 5
PH UA: 5.5
PH UA: 5.5
PH UA: 6
PH UA: 6.5
PHENCYCLIDINE SCREEN URINE: NORMAL
PHOSPHORUS: 3.8 MG/DL (ref 2.5–4.5)
PHOSPHORUS: 4.2 MG/DL (ref 2.5–4.5)
PHOSPHORUS: 4.4 MG/DL (ref 2.5–4.5)
PHOSPHORUS: 5.3 MG/DL (ref 2.5–4.5)
PHOSPHORUS: 6 MG/DL (ref 2.5–4.5)
PHOSPHORUS: 6.1 MG/DL (ref 2.5–4.5)
PHOSPHORUS: 6.1 MG/DL (ref 2.5–4.5)
PHOSPHORUS: 6.2 MG/DL (ref 2.5–4.5)
PLATELET # BLD: 117 K/UL (ref 130–400)
PLATELET # BLD: 123 K/UL (ref 130–400)
PLATELET # BLD: 124 K/UL (ref 130–400)
PLATELET # BLD: 138 K/UL (ref 130–400)
PLATELET # BLD: 140 K/UL (ref 130–400)
PLATELET # BLD: 143 K/UL (ref 130–400)
PLATELET # BLD: 149 K/UL (ref 130–400)
PLATELET # BLD: 149 K/UL (ref 130–400)
PLATELET # BLD: 150 K/UL (ref 130–400)
PLATELET # BLD: 155 K/UL (ref 130–400)
PLATELET # BLD: 158 K/UL (ref 130–400)
PLATELET # BLD: 162 K/UL (ref 130–400)
PLATELET # BLD: 164 K/UL (ref 130–400)
PLATELET # BLD: 166 K/UL (ref 130–400)
PLATELET # BLD: 169 K/UL (ref 130–400)
PLATELET # BLD: 171 K/UL (ref 130–400)
PLATELET # BLD: 184 K/UL (ref 130–400)
PLATELET # BLD: 186 K/UL (ref 130–400)
PLATELET # BLD: 187 K/UL (ref 130–400)
PLATELET # BLD: 194 K/UL (ref 130–400)
PLATELET # BLD: 196 K/UL (ref 130–400)
PLATELET # BLD: 197 K/UL (ref 130–400)
PLATELET # BLD: 201 K/UL (ref 130–400)
PLATELET # BLD: 203 K/UL (ref 130–400)
PLATELET # BLD: 204 K/UL (ref 130–400)
PLATELET # BLD: 206 K/UL (ref 130–400)
PLATELET # BLD: 207 K/UL (ref 130–400)
PLATELET # BLD: 222 K/UL (ref 130–400)
PLATELET # BLD: 229 K/UL (ref 130–400)
PLATELET # BLD: 242 K/UL (ref 130–400)
PLATELET # BLD: 242 K/UL (ref 130–400)
PLATELET # BLD: 246 K/UL (ref 130–400)
PLATELET # BLD: 246 K/UL (ref 130–400)
PLATELET # BLD: 253 K/UL (ref 130–400)
PLATELET # BLD: 254 K/UL (ref 130–400)
PLATELET # BLD: 261 K/UL (ref 130–400)
PLATELET # BLD: 265 K/UL (ref 130–400)
PLATELET # BLD: 267 K/UL (ref 130–400)
PLATELET # BLD: 272 K/UL (ref 130–400)
PLATELET # BLD: 274 K/UL (ref 130–400)
PLATELET # BLD: 276 K/UL (ref 130–400)
PLATELET # BLD: 287 K/UL (ref 130–400)
PLATELET # BLD: 287 K/UL (ref 130–400)
PLATELET # BLD: 288 K/UL (ref 130–400)
PLATELET # BLD: 289 K/UL (ref 130–400)
PLATELET # BLD: 290 K/UL (ref 130–400)
PLATELET # BLD: 66 K/UL (ref 130–400)
PLATELET # BLD: 82 K/UL (ref 130–400)
PLATELET SLIDE REVIEW: ABNORMAL
PLATELET SLIDE REVIEW: ABNORMAL
PLATELET SLIDE REVIEW: ADEQUATE
PMV BLD AUTO: 10.1 FL (ref 9.4–12.3)
PMV BLD AUTO: 10.1 FL (ref 9.4–12.3)
PMV BLD AUTO: 10.2 FL (ref 9.4–12.3)
PMV BLD AUTO: 10.2 FL (ref 9.4–12.3)
PMV BLD AUTO: 10.3 FL (ref 9.4–12.3)
PMV BLD AUTO: 10.4 FL (ref 9.4–12.3)
PMV BLD AUTO: 10.5 FL (ref 7.4–10.4)
PMV BLD AUTO: 10.5 FL (ref 9.4–12.3)
PMV BLD AUTO: 10.6 FL (ref 7.4–10.4)
PMV BLD AUTO: 10.7 FL (ref 9.4–12.3)
PMV BLD AUTO: 10.8 FL (ref 7.4–10.4)
PMV BLD AUTO: 10.8 FL (ref 7.4–10.4)
PMV BLD AUTO: 10.8 FL (ref 9.4–12.3)
PMV BLD AUTO: 10.9 FL (ref 7.4–10.4)
PMV BLD AUTO: 10.9 FL (ref 7.4–10.4)
PMV BLD AUTO: 10.9 FL (ref 9.4–12.3)
PMV BLD AUTO: 11 FL (ref 7.4–10.4)
PMV BLD AUTO: 11.1 FL (ref 7.4–10.4)
PMV BLD AUTO: 11.1 FL (ref 9.4–12.3)
PMV BLD AUTO: 11.1 FL (ref 9.4–12.3)
PMV BLD AUTO: 11.2 FL (ref 7.4–10.4)
PMV BLD AUTO: 11.2 FL (ref 9.4–12.3)
PMV BLD AUTO: 11.3 FL (ref 7.4–10.4)
PMV BLD AUTO: 11.4 FL (ref 7.4–10.4)
PMV BLD AUTO: 11.4 FL (ref 9.4–12.3)
PMV BLD AUTO: 11.8 FL (ref 7.4–10.4)
PMV BLD AUTO: 11.8 FL (ref 7.4–10.4)
PMV BLD AUTO: 12 FL (ref 7.4–10.4)
PMV BLD AUTO: 9.6 FL (ref 9.4–12.3)
PMV BLD AUTO: 9.9 FL (ref 9.4–12.3)
PO2 ARTERIAL: 135 MMHG (ref 80–100)
PO2 ARTERIAL: 135 MMHG (ref 80–100)
PO2 ARTERIAL: 47 MMHG (ref 80–100)
PO2 ARTERIAL: 52 MMHG (ref 80–100)
PO2 ARTERIAL: 54 MMHG (ref 80–100)
PO2 ARTERIAL: 54 MMHG (ref 80–100)
PO2 ARTERIAL: 55 MMHG (ref 80–100)
PO2 ARTERIAL: 56 MMHG (ref 80–100)
PO2 ARTERIAL: 57 MMHG (ref 80–100)
PO2 ARTERIAL: 62 MMHG (ref 80–100)
PO2 ARTERIAL: 64 MMHG (ref 80–100)
PO2 ARTERIAL: 65 MMHG (ref 80–100)
PO2 ARTERIAL: 66 MMHG (ref 80–100)
PO2 ARTERIAL: 67 MMHG (ref 80–100)
PO2 ARTERIAL: 85 MMHG (ref 80–100)
POC TROPONIN I: 0.01 NG/ML (ref 0–0.08)
POC TROPONIN I: 0.02 NG/ML (ref 0–0.08)
POC TROPONIN I: 0.03 NG/ML (ref 0–0.08)
POC TROPONIN I: 0.03 NG/ML (ref 0–0.08)
POC TROPONIN I: 0.13 NG/ML (ref 0–0.08)
POIKILOCYTES: ABNORMAL
POLYCHROMASIA: ABNORMAL
POTASSIUM SERPL-SCNC: 3.8 MMOL/L (ref 3.5–5)
POTASSIUM SERPL-SCNC: 3.9 MMOL/L (ref 3.5–5)
POTASSIUM SERPL-SCNC: 4 MMOL/L (ref 3.5–4.9)
POTASSIUM SERPL-SCNC: 4 MMOL/L (ref 3.5–5)
POTASSIUM SERPL-SCNC: 4.1 MMOL/L (ref 3.5–5)
POTASSIUM SERPL-SCNC: 4.2 MMOL/L (ref 3.5–5)
POTASSIUM SERPL-SCNC: 4.3 MMOL/L (ref 3.5–5)
POTASSIUM SERPL-SCNC: 4.4 MMOL/L (ref 3.5–5)
POTASSIUM SERPL-SCNC: 4.5 MMOL/L (ref 3.5–5)
POTASSIUM SERPL-SCNC: 4.6 MMOL/L (ref 3.5–5)
POTASSIUM SERPL-SCNC: 4.7 MMOL/L (ref 3.5–5)
POTASSIUM SERPL-SCNC: 4.8 MMOL/L (ref 3.5–5)
POTASSIUM SERPL-SCNC: 4.8 MMOL/L (ref 3.5–5)
POTASSIUM SERPL-SCNC: 4.9 MMOL/L (ref 3.5–5)
POTASSIUM SERPL-SCNC: 5 MMOL/L (ref 3.5–5)
POTASSIUM SERPL-SCNC: 5.1 MMOL/L (ref 3.5–5)
POTASSIUM SERPL-SCNC: 5.1 MMOL/L (ref 3.5–5)
POTASSIUM SERPL-SCNC: 5.2 MMOL/L (ref 3.5–5)
POTASSIUM SERPL-SCNC: 5.3 MMOL/L (ref 3.5–5)
POTASSIUM SERPL-SCNC: 5.4 MMOL/L (ref 3.5–5)
POTASSIUM SERPL-SCNC: 5.4 MMOL/L (ref 3.5–5)
POTASSIUM SERPL-SCNC: 5.5 MMOL/L (ref 3.5–5)
POTASSIUM SERPL-SCNC: 5.6 MMOL/L (ref 3.5–5)
POTASSIUM SERPL-SCNC: 6.2 MMOL/L (ref 3.5–5)
POTASSIUM SERPL-SCNC: 6.8 MMOL/L (ref 3.5–5)
POTASSIUM, WHOLE BLOOD: 3.7
POTASSIUM, WHOLE BLOOD: 3.9
POTASSIUM, WHOLE BLOOD: 3.9
POTASSIUM, WHOLE BLOOD: 4.1
POTASSIUM, WHOLE BLOOD: 4.2
POTASSIUM, WHOLE BLOOD: 4.3
POTASSIUM, WHOLE BLOOD: 4.3
POTASSIUM, WHOLE BLOOD: 4.5
POTASSIUM, WHOLE BLOOD: 4.6
POTASSIUM, WHOLE BLOOD: 4.6
POTASSIUM, WHOLE BLOOD: 4.9
POTASSIUM, WHOLE BLOOD: 5.2
POTASSIUM, WHOLE BLOOD: 5.2
POTASSIUM, WHOLE BLOOD: 5.4
POTASSIUM, WHOLE BLOOD: 5.4
POTASSIUM, WHOLE BLOOD: 5.6
POTASSIUM, WHOLE BLOOD: 5.7
PRO-BNP: 1403 PG/ML (ref 0–900)
PRO-BNP: 3277 PG/ML (ref 0–900)
PRO-BNP: 332 PG/ML (ref 0–900)
PRO-BNP: 4354 PG/ML (ref 0–900)
PRO-BNP: 5499 PG/ML (ref 0–900)
PRO-BNP: ABNORMAL PG/ML (ref 0–900)
PROPOXYPHENE SCREEN, URINE: NORMAL
PROTEIN ELECTROPHORESIS, SERUM: ABNORMAL
PROTEIN PROTEIN: 40 MG/DL (ref 15–45)
PROTEIN UA: 100 MG/DL
PROTEIN UA: 100 MG/DL
PROTEIN UA: 300 MG/DL
PROTEIN UA: 300 MG/DL
PROTEIN UA: ABNORMAL MG/DL
PROTHROMBIN TIME: 13.3 SEC (ref 12–14.6)
PROTHROMBIN TIME: 13.4 SEC (ref 12–14.6)
PROTHROMBIN TIME: 13.6 SEC (ref 12–14.6)
PROTHROMBIN TIME: 14.1 SEC (ref 12–14.6)
PROTHROMBIN TIME: 14.1 SEC (ref 12–14.6)
PROTHROMBIN TIME: 15 SEC (ref 12–14.6)
PROTHROMBIN TIME: 15.5 SEC (ref 12–14.6)
RAPID INFLUENZA  B AGN: NEGATIVE
RAPID INFLUENZA A AGN: NEGATIVE
RBC # BLD: 2.59 M/UL (ref 4.2–5.4)
RBC # BLD: 2.64 M/UL (ref 4.2–5.4)
RBC # BLD: 2.64 M/UL (ref 4.2–5.4)
RBC # BLD: 2.69 M/UL (ref 4.2–5.4)
RBC # BLD: 2.81 M/UL (ref 4.2–5.4)
RBC # BLD: 2.82 M/UL (ref 4.2–5.4)
RBC # BLD: 2.9 M/UL (ref 4.2–5.4)
RBC # BLD: 2.91 M/UL (ref 4.2–5.4)
RBC # BLD: 2.93 M/UL (ref 4.2–5.4)
RBC # BLD: 2.94 M/UL (ref 4.2–5.4)
RBC # BLD: 2.95 M/UL (ref 4.2–5.4)
RBC # BLD: 2.95 M/UL (ref 4.2–5.4)
RBC # BLD: 2.97 M/UL (ref 4.2–5.4)
RBC # BLD: 2.98 M/UL (ref 4.2–5.4)
RBC # BLD: 2.98 M/UL (ref 4.2–5.4)
RBC # BLD: 3 M/UL (ref 4.2–5.4)
RBC # BLD: 3.03 M/UL (ref 4.2–5.4)
RBC # BLD: 3.06 M/UL (ref 4.2–5.4)
RBC # BLD: 3.07 M/UL (ref 4.2–5.4)
RBC # BLD: 3.09 M/UL (ref 4.2–5.4)
RBC # BLD: 3.09 M/UL (ref 4.2–5.4)
RBC # BLD: 3.1 M/UL (ref 4.2–5.4)
RBC # BLD: 3.13 M/UL (ref 4.2–5.4)
RBC # BLD: 3.14 M/UL (ref 4.2–5.4)
RBC # BLD: 3.15 M/UL (ref 4.2–5.4)
RBC # BLD: 3.2 M/UL (ref 4.2–5.4)
RBC # BLD: 3.23 M/UL (ref 4.2–5.4)
RBC # BLD: 3.26 M/UL (ref 4.2–5.4)
RBC # BLD: 3.35 M/UL (ref 4.2–5.4)
RBC # BLD: 3.35 M/UL (ref 4.2–5.4)
RBC # BLD: 3.36 M/UL (ref 4.2–5.4)
RBC # BLD: 3.37 M/UL (ref 4.2–5.4)
RBC # BLD: 3.38 M/UL (ref 4.2–5.4)
RBC # BLD: 3.43 M/UL (ref 4.2–5.4)
RBC # BLD: 3.44 M/UL (ref 4.2–5.4)
RBC # BLD: 3.46 M/UL (ref 4.2–5.4)
RBC # BLD: 3.49 M/UL (ref 4.2–5.4)
RBC # BLD: 3.52 M/UL (ref 4.2–5.4)
RBC # BLD: 3.53 M/UL (ref 4.2–5.4)
RBC # BLD: 3.56 M/UL (ref 4.2–5.4)
RBC # BLD: 3.6 M/UL (ref 4.2–5.4)
RBC # BLD: 3.65 M/UL (ref 4.2–5.4)
RBC # BLD: 3.66 M/UL (ref 4.2–5.4)
RBC # BLD: 3.88 M/UL (ref 4.2–5.4)
RBC # BLD: 3.96 M/UL (ref 4.2–5.4)
RBC # BLD: 3.98 M/UL (ref 4.2–5.4)
RBC UA: 2 /HPF (ref 0–4)
RBC UA: 4 /HPF (ref 0–4)
RBC UA: ABNORMAL /HPF (ref 0–2)
RENAL EPITHELIAL, UA: ABNORMAL /HPF
RETICULOCYTE ABSOLUTE COUNT: 0.12 M/UL (ref 0.03–0.12)
RETICULOCYTE COUNT PCT: 3.73 % (ref 0.5–1.5)
RHEUMATOID FACTOR: 12 IU/ML (ref 0–14)
SODIUM BLD-SCNC: 132 MMOL/L (ref 136–145)
SODIUM BLD-SCNC: 132 MMOL/L (ref 136–145)
SODIUM BLD-SCNC: 133 MMOL/L (ref 136–145)
SODIUM BLD-SCNC: 134 MMOL/L (ref 136–145)
SODIUM BLD-SCNC: 135 MMOL/L (ref 136–145)
SODIUM BLD-SCNC: 136 MMOL/L (ref 136–145)
SODIUM BLD-SCNC: 137 MMOL/L (ref 136–145)
SODIUM BLD-SCNC: 138 MMOL/L (ref 136–145)
SODIUM BLD-SCNC: 139 MMOL/L (ref 136–145)
SODIUM BLD-SCNC: 140 MMOL/L (ref 136–145)
SODIUM BLD-SCNC: 141 MMOL/L (ref 136–145)
SODIUM BLD-SCNC: 142 MMOL/L (ref 136–145)
SODIUM BLD-SCNC: 143 MMOL/L (ref 136–145)
SODIUM BLD-SCNC: 143 MMOL/L (ref 136–145)
SODIUM BLD-SCNC: 144 MMOL/L (ref 136–145)
SODIUM BLD-SCNC: 146 MMOL/L (ref 136–145)
SODIUM BLD-SCNC: 148 MMOL/L (ref 136–145)
SODIUM URINE: 27 MMOL/L
SPE/IFE INTERPRETATION: ABNORMAL
SPECIFIC GRAVITY UA: 1.02
STOMATOCYTES: ABNORMAL
STREP PNEUMONIAE ANTIGEN, URINE: NORMAL
THC: NORMAL
TOTAL CK: 482 U/L (ref 26–192)
TOTAL IRON BINDING CAPACITY: 246 UG/DL (ref 250–400)
TOTAL PROTEIN: 5.9 G/DL (ref 6.6–8.7)
TOTAL PROTEIN: 6 G/DL (ref 6.6–8.7)
TOTAL PROTEIN: 6 G/DL (ref 6.6–8.7)
TOTAL PROTEIN: 6.1 G/DL (ref 6.6–8.7)
TOTAL PROTEIN: 6.3 G/DL (ref 6.6–8.7)
TOTAL PROTEIN: 6.3 G/DL (ref 6.6–8.7)
TOTAL PROTEIN: 6.5 G/DL (ref 6.6–8.7)
TOTAL PROTEIN: 6.6 G/DL (ref 6.6–8.7)
TOTAL PROTEIN: 7 G/DL (ref 6.6–8.7)
TOTAL PROTEIN: 7.1 G/DL (ref 6.6–8.7)
TOTAL PROTEIN: 7.2 G/DL (ref 6.6–8.7)
TOTAL PROTEIN: 7.3 G/DL (ref 6.6–8.7)
TOTAL PROTEIN: 7.5 G/DL (ref 6.6–8.7)
TOTAL PROTEIN: 7.5 G/DL (ref 6.6–8.7)
TOTAL PROTEIN: 7.5 G/DL (ref 6–8.3)
TOTAL PROTEIN: 7.6 G/DL (ref 6.6–8.7)
TOTAL PROTEIN: 8.1 G/DL (ref 6.6–8.7)
TOTAL PROTEIN: 8.2 G/DL (ref 6.6–8.7)
TRICYCLIC ANTIDEPRESSANTS, UR: NORMAL
TROPONIN: 0.01 NG/ML (ref 0–0.03)
TROPONIN: 0.02 NG/ML (ref 0–0.03)
TROPONIN: 0.03 NG/ML (ref 0–0.03)
TROPONIN: 0.03 NG/ML (ref 0–0.03)
TROPONIN: 0.07 NG/ML (ref 0–0.03)
TROPONIN: 0.19 NG/ML (ref 0–0.03)
TROPONIN: 0.23 NG/ML (ref 0–0.03)
TROPONIN: 0.25 NG/ML (ref 0–0.03)
TROPONIN: 0.28 NG/ML (ref 0–0.03)
TROPONIN: 0.3 NG/ML (ref 0–0.03)
TROPONIN: 0.32 NG/ML (ref 0–0.03)
TROPONIN: 0.34 NG/ML (ref 0–0.03)
TROPONIN: 0.37 NG/ML (ref 0–0.03)
TROPONIN: 0.38 NG/ML (ref 0–0.03)
TROPONIN: 0.45 NG/ML (ref 0–0.03)
TROPONIN: 0.46 NG/ML (ref 0–0.03)
TROPONIN: 0.47 NG/ML (ref 0–0.03)
TROPONIN: 0.47 NG/ML (ref 0–0.03)
TROPONIN: 0.48 NG/ML (ref 0–0.03)
TSH SERPL DL<=0.05 MIU/L-ACNC: 1.93 UIU/ML (ref 0.27–4.2)
TSH SERPL DL<=0.05 MIU/L-ACNC: 2.31 UIU/ML (ref 0.27–4.2)
TSH SERPL DL<=0.05 MIU/L-ACNC: 2.45 UIU/ML (ref 0.27–4.2)
URIC ACID, SERUM: 8.9 MG/DL (ref 2.4–5.7)
URIC ACID, SERUM: 9.8 MG/DL (ref 2.4–5.7)
URINE CULTURE, ROUTINE: ABNORMAL
URINE CULTURE, ROUTINE: NORMAL
URINE CULTURE, ROUTINE: NORMAL
UROBILINOGEN, URINE: 0.2 E.U./DL
UROBILINOGEN, URINE: 1 E.U./DL
VITAMIN B-12: 383 PG/ML (ref 211–946)
VITAMIN D 25-HYDROXY: 20.4 NG/ML
VITAMIN D 25-HYDROXY: 23.3 NG/ML
WBC # BLD: 10.2 K/UL (ref 4.8–10.8)
WBC # BLD: 10.2 K/UL (ref 4.8–10.8)
WBC # BLD: 10.3 K/UL (ref 4.8–10.8)
WBC # BLD: 10.4 K/UL (ref 4.8–10.8)
WBC # BLD: 10.5 K/UL (ref 4.8–10.8)
WBC # BLD: 10.7 K/UL (ref 4.8–10.8)
WBC # BLD: 11.3 K/UL (ref 4.8–10.8)
WBC # BLD: 11.4 K/UL (ref 4.8–10.8)
WBC # BLD: 11.8 K/UL (ref 4.8–10.8)
WBC # BLD: 11.9 K/UL (ref 4.8–10.8)
WBC # BLD: 12.4 K/UL (ref 4.8–10.8)
WBC # BLD: 12.7 K/UL (ref 4.8–10.8)
WBC # BLD: 12.8 K/UL (ref 4.8–10.8)
WBC # BLD: 12.8 K/UL (ref 4.8–10.8)
WBC # BLD: 13.7 K/UL (ref 4.8–10.8)
WBC # BLD: 14.1 K/UL (ref 4.8–10.8)
WBC # BLD: 14.8 K/UL (ref 4.8–10.8)
WBC # BLD: 14.9 K/UL (ref 4.8–10.8)
WBC # BLD: 16 K/UL (ref 4.8–10.8)
WBC # BLD: 16.8 K/UL (ref 4.8–10.8)
WBC # BLD: 17.5 K/UL (ref 4.8–10.8)
WBC # BLD: 18.4 K/UL (ref 4.8–10.8)
WBC # BLD: 18.6 K/UL (ref 4.8–10.8)
WBC # BLD: 18.7 K/UL (ref 4.8–10.8)
WBC # BLD: 18.8 K/UL (ref 4.8–10.8)
WBC # BLD: 20.2 K/UL (ref 4.8–10.8)
WBC # BLD: 20.7 K/UL (ref 4.8–10.8)
WBC # BLD: 21.9 K/UL (ref 4.8–10.8)
WBC # BLD: 22.5 K/UL (ref 4.8–10.8)
WBC # BLD: 23.2 K/UL (ref 4.8–10.8)
WBC # BLD: 23.8 K/UL (ref 4.8–10.8)
WBC # BLD: 25.4 K/UL (ref 4.8–10.8)
WBC # BLD: 25.5 K/UL (ref 4.8–10.8)
WBC # BLD: 25.7 K/UL (ref 4.8–10.8)
WBC # BLD: 26.7 K/UL (ref 4.8–10.8)
WBC # BLD: 27.8 K/UL (ref 4.8–10.8)
WBC # BLD: 7.1 K/UL (ref 4.8–10.8)
WBC # BLD: 7.3 K/UL (ref 4.8–10.8)
WBC # BLD: 7.7 K/UL (ref 4.8–10.8)
WBC # BLD: 8 K/UL (ref 4.8–10.8)
WBC # BLD: 8.5 K/UL (ref 4.8–10.8)
WBC # BLD: 8.7 K/UL (ref 4.8–10.8)
WBC # BLD: 8.9 K/UL (ref 4.8–10.8)
WBC # BLD: 9.2 K/UL (ref 4.8–10.8)
WBC # BLD: 9.7 K/UL (ref 4.8–10.8)
WBC # BLD: 9.7 K/UL (ref 4.8–10.8)
WBC # BLD: 9.8 K/UL (ref 4.8–10.8)
WBC # BLD: 9.9 K/UL (ref 4.8–10.8)
WBC UA: 200 /HPF (ref 0–5)
WBC UA: 70 /HPF (ref 0–5)
WBC UA: ABNORMAL /HPF (ref 0–5)

## 2017-01-01 PROCEDURE — 36415 COLL VENOUS BLD VENIPUNCTURE: CPT

## 2017-01-01 PROCEDURE — 94660 CPAP INITIATION&MGMT: CPT

## 2017-01-01 PROCEDURE — 6360000002 HC RX W HCPCS: Performed by: HOSPITALIST

## 2017-01-01 PROCEDURE — 6370000000 HC RX 637 (ALT 250 FOR IP): Performed by: NURSE PRACTITIONER

## 2017-01-01 PROCEDURE — 6370000000 HC RX 637 (ALT 250 FOR IP): Performed by: HOSPITALIST

## 2017-01-01 PROCEDURE — 82550 ASSAY OF CK (CPK): CPT

## 2017-01-01 PROCEDURE — 6360000002 HC RX W HCPCS: Performed by: NURSE PRACTITIONER

## 2017-01-01 PROCEDURE — 3700000000 HC ANESTHESIA ATTENDED CARE: Performed by: SURGERY

## 2017-01-01 PROCEDURE — 2700000000 HC OXYGEN THERAPY PER DAY

## 2017-01-01 PROCEDURE — 99232 SBSQ HOSP IP/OBS MODERATE 35: CPT | Performed by: HOSPITALIST

## 2017-01-01 PROCEDURE — 1111F DSCHRG MED/CURRENT MED MERGE: CPT | Performed by: NURSE PRACTITIONER

## 2017-01-01 PROCEDURE — 87324 CLOSTRIDIUM AG IA: CPT

## 2017-01-01 PROCEDURE — 6370000000 HC RX 637 (ALT 250 FOR IP): Performed by: INTERNAL MEDICINE

## 2017-01-01 PROCEDURE — 97116 GAIT TRAINING THERAPY: CPT

## 2017-01-01 PROCEDURE — 82803 BLOOD GASES ANY COMBINATION: CPT

## 2017-01-01 PROCEDURE — 84132 ASSAY OF SERUM POTASSIUM: CPT

## 2017-01-01 PROCEDURE — 1210000000 HC MED SURG R&B

## 2017-01-01 PROCEDURE — 2580000003 HC RX 258: Performed by: FAMILY MEDICINE

## 2017-01-01 PROCEDURE — 84484 ASSAY OF TROPONIN QUANT: CPT

## 2017-01-01 PROCEDURE — 99214 OFFICE O/P EST MOD 30 MIN: CPT | Performed by: NURSE PRACTITIONER

## 2017-01-01 PROCEDURE — 2140000000 HC CCU INTERMEDIATE R&B

## 2017-01-01 PROCEDURE — G8484 FLU IMMUNIZE NO ADMIN: HCPCS | Performed by: NURSE PRACTITIONER

## 2017-01-01 PROCEDURE — 82948 REAGENT STRIP/BLOOD GLUCOSE: CPT

## 2017-01-01 PROCEDURE — 99233 SBSQ HOSP IP/OBS HIGH 50: CPT | Performed by: HOSPITALIST

## 2017-01-01 PROCEDURE — 85025 COMPLETE CBC W/AUTO DIFF WBC: CPT

## 2017-01-01 PROCEDURE — 80048 BASIC METABOLIC PNL TOTAL CA: CPT

## 2017-01-01 PROCEDURE — 06HM33Z INSERTION OF INFUSION DEVICE INTO RIGHT FEMORAL VEIN, PERCUTANEOUS APPROACH: ICD-10-PCS | Performed by: SURGERY

## 2017-01-01 PROCEDURE — 6360000002 HC RX W HCPCS: Performed by: INTERNAL MEDICINE

## 2017-01-01 PROCEDURE — 97163 PT EVAL HIGH COMPLEX 45 MIN: CPT

## 2017-01-01 PROCEDURE — 6370000000 HC RX 637 (ALT 250 FOR IP): Performed by: PHYSICIAN ASSISTANT

## 2017-01-01 PROCEDURE — 5A1D70Z PERFORMANCE OF URINARY FILTRATION, INTERMITTENT, LESS THAN 6 HOURS PER DAY: ICD-10-PCS | Performed by: INTERNAL MEDICINE

## 2017-01-01 PROCEDURE — 2580000003 HC RX 258: Performed by: INTERNAL MEDICINE

## 2017-01-01 PROCEDURE — 99239 HOSP IP/OBS DSCHRG MGMT >30: CPT | Performed by: HOSPITALIST

## 2017-01-01 PROCEDURE — 77001 FLUOROGUIDE FOR VEIN DEVICE: CPT | Performed by: SURGERY

## 2017-01-01 PROCEDURE — 93005 ELECTROCARDIOGRAM TRACING: CPT

## 2017-01-01 PROCEDURE — 83540 ASSAY OF IRON: CPT

## 2017-01-01 PROCEDURE — 87040 BLOOD CULTURE FOR BACTERIA: CPT

## 2017-01-01 PROCEDURE — 2500000003 HC RX 250 WO HCPCS: Performed by: ANESTHESIOLOGY

## 2017-01-01 PROCEDURE — 99285 EMERGENCY DEPT VISIT HI MDM: CPT

## 2017-01-01 PROCEDURE — 71020 XR CHEST STANDARD TWO VW: CPT

## 2017-01-01 PROCEDURE — 87086 URINE CULTURE/COLONY COUNT: CPT

## 2017-01-01 PROCEDURE — 94762 N-INVAS EAR/PLS OXIMTRY CONT: CPT

## 2017-01-01 PROCEDURE — 8010000000 HC HEMODIALYSIS ACUTE INPT

## 2017-01-01 PROCEDURE — 85027 COMPLETE CBC AUTOMATED: CPT

## 2017-01-01 PROCEDURE — 94640 AIRWAY INHALATION TREATMENT: CPT

## 2017-01-01 PROCEDURE — 6360000002 HC RX W HCPCS: Performed by: SURGERY

## 2017-01-01 PROCEDURE — 6360000002 HC RX W HCPCS: Performed by: CLINICAL NURSE SPECIALIST

## 2017-01-01 PROCEDURE — 92526 ORAL FUNCTION THERAPY: CPT

## 2017-01-01 PROCEDURE — 2500000003 HC RX 250 WO HCPCS: Performed by: INTERNAL MEDICINE

## 2017-01-01 PROCEDURE — 97597 DBRDMT OPN WND 1ST 20 CM/<: CPT

## 2017-01-01 PROCEDURE — 71250 CT THORAX DX C-: CPT

## 2017-01-01 PROCEDURE — 6370000000 HC RX 637 (ALT 250 FOR IP): Performed by: CLINICAL NURSE SPECIALIST

## 2017-01-01 PROCEDURE — 2580000003 HC RX 258: Performed by: NURSE PRACTITIONER

## 2017-01-01 PROCEDURE — 6360000002 HC RX W HCPCS: Performed by: PHYSICIAN ASSISTANT

## 2017-01-01 PROCEDURE — 36600 WITHDRAWAL OF ARTERIAL BLOOD: CPT

## 2017-01-01 PROCEDURE — 87070 CULTURE OTHR SPECIMN AEROBIC: CPT

## 2017-01-01 PROCEDURE — 93320 DOPPLER ECHO COMPLETE: CPT | Performed by: INTERNAL MEDICINE

## 2017-01-01 PROCEDURE — 83735 ASSAY OF MAGNESIUM: CPT

## 2017-01-01 PROCEDURE — G8400 PT W/DXA NO RESULTS DOC: HCPCS | Performed by: CLINICAL NURSE SPECIALIST

## 2017-01-01 PROCEDURE — G8978 MOBILITY CURRENT STATUS: HCPCS

## 2017-01-01 PROCEDURE — 97110 THERAPEUTIC EXERCISES: CPT

## 2017-01-01 PROCEDURE — 71010 XR CHEST PORTABLE: CPT

## 2017-01-01 PROCEDURE — G0378 HOSPITAL OBSERVATION PER HR: HCPCS

## 2017-01-01 PROCEDURE — 2500000003 HC RX 250 WO HCPCS: Performed by: NURSE PRACTITIONER

## 2017-01-01 PROCEDURE — 80053 COMPREHEN METABOLIC PANEL: CPT

## 2017-01-01 PROCEDURE — 6360000002 HC RX W HCPCS

## 2017-01-01 PROCEDURE — 83605 ASSAY OF LACTIC ACID: CPT

## 2017-01-01 PROCEDURE — 6370000000 HC RX 637 (ALT 250 FOR IP): Performed by: FAMILY MEDICINE

## 2017-01-01 PROCEDURE — 11042 DBRDMT SUBQ TIS 1ST 20SQCM/<: CPT

## 2017-01-01 PROCEDURE — 99285 EMERGENCY DEPT VISIT HI MDM: CPT | Performed by: EMERGENCY MEDICINE

## 2017-01-01 PROCEDURE — 6360000004 HC RX CONTRAST MEDICATION: Performed by: INTERNAL MEDICINE

## 2017-01-01 PROCEDURE — 2500000003 HC RX 250 WO HCPCS: Performed by: CLINICAL NURSE SPECIALIST

## 2017-01-01 PROCEDURE — 83036 HEMOGLOBIN GLYCOSYLATED A1C: CPT

## 2017-01-01 PROCEDURE — 99212 OFFICE O/P EST SF 10 MIN: CPT

## 2017-01-01 PROCEDURE — 1123F ACP DISCUSS/DSCN MKR DOCD: CPT | Performed by: CLINICAL NURSE SPECIALIST

## 2017-01-01 PROCEDURE — 83880 ASSAY OF NATRIURETIC PEPTIDE: CPT

## 2017-01-01 PROCEDURE — 2720000001 HC MISC SURG SUPPLY STERILE $51-500

## 2017-01-01 PROCEDURE — 82607 VITAMIN B-12: CPT

## 2017-01-01 PROCEDURE — 94664 DEMO&/EVAL PT USE INHALER: CPT

## 2017-01-01 PROCEDURE — 36825 ARTERY-VEIN AUTOGRAFT: CPT | Performed by: SURGERY

## 2017-01-01 PROCEDURE — 99221 1ST HOSP IP/OBS SF/LOW 40: CPT | Performed by: FAMILY MEDICINE

## 2017-01-01 PROCEDURE — 93458 L HRT ARTERY/VENTRICLE ANGIO: CPT | Performed by: INTERNAL MEDICINE

## 2017-01-01 PROCEDURE — 2580000003 HC RX 258: Performed by: HOSPITALIST

## 2017-01-01 PROCEDURE — 02H633Z INSERTION OF INFUSION DEVICE INTO RIGHT ATRIUM, PERCUTANEOUS APPROACH: ICD-10-PCS | Performed by: SURGERY

## 2017-01-01 PROCEDURE — 76937 US GUIDE VASCULAR ACCESS: CPT | Performed by: SURGERY

## 2017-01-01 PROCEDURE — 84100 ASSAY OF PHOSPHORUS: CPT

## 2017-01-01 PROCEDURE — B2151ZZ FLUOROSCOPY OF LEFT HEART USING LOW OSMOLAR CONTRAST: ICD-10-PCS | Performed by: INTERNAL MEDICINE

## 2017-01-01 PROCEDURE — 93325 DOPPLER ECHO COLOR FLOW MAPG: CPT

## 2017-01-01 PROCEDURE — 6360000002 HC RX W HCPCS: Performed by: EMERGENCY MEDICINE

## 2017-01-01 PROCEDURE — 3017F COLORECTAL CA SCREEN DOC REV: CPT | Performed by: NURSE PRACTITIONER

## 2017-01-01 PROCEDURE — 3014F SCREEN MAMMO DOC REV: CPT | Performed by: NURSE PRACTITIONER

## 2017-01-01 PROCEDURE — 97164 PT RE-EVAL EST PLAN CARE: CPT

## 2017-01-01 PROCEDURE — 2500000003 HC RX 250 WO HCPCS: Performed by: HOSPITALIST

## 2017-01-01 PROCEDURE — 5A1D60Z PERFORMANCE OF URINARY FILTRATION, MULTIPLE: ICD-10-PCS | Performed by: INTERNAL MEDICINE

## 2017-01-01 PROCEDURE — 11042 DBRDMT SUBQ TIS 1ST 20SQCM/<: CPT | Performed by: SURGERY

## 2017-01-01 PROCEDURE — 85610 PROTHROMBIN TIME: CPT

## 2017-01-01 PROCEDURE — G8400 PT W/DXA NO RESULTS DOC: HCPCS | Performed by: NURSE PRACTITIONER

## 2017-01-01 PROCEDURE — 99215 OFFICE O/P EST HI 40 MIN: CPT | Performed by: NURSE PRACTITIONER

## 2017-01-01 PROCEDURE — 82746 ASSAY OF FOLIC ACID SERUM: CPT

## 2017-01-01 PROCEDURE — 99232 SBSQ HOSP IP/OBS MODERATE 35: CPT | Performed by: INTERNAL MEDICINE

## 2017-01-01 PROCEDURE — 99999 PR OFFICE/OUTPT VISIT,PROCEDURE ONLY: CPT | Performed by: INTERNAL MEDICINE

## 2017-01-01 PROCEDURE — 96365 THER/PROPH/DIAG IV INF INIT: CPT

## 2017-01-01 PROCEDURE — 84156 ASSAY OF PROTEIN URINE: CPT

## 2017-01-01 PROCEDURE — 85730 THROMBOPLASTIN TIME PARTIAL: CPT

## 2017-01-01 PROCEDURE — 36558 INSERT TUNNELED CV CATH: CPT | Performed by: SURGERY

## 2017-01-01 PROCEDURE — B24BZZ4 ULTRASONOGRAPHY OF HEART WITH AORTA, TRANSESOPHAGEAL: ICD-10-PCS | Performed by: INTERNAL MEDICINE

## 2017-01-01 PROCEDURE — 2500000003 HC RX 250 WO HCPCS: Performed by: NURSE ANESTHETIST, CERTIFIED REGISTERED

## 2017-01-01 PROCEDURE — 99222 1ST HOSP IP/OBS MODERATE 55: CPT | Performed by: INTERNAL MEDICINE

## 2017-01-01 PROCEDURE — 99213 OFFICE O/P EST LOW 20 MIN: CPT | Performed by: SURGERY

## 2017-01-01 PROCEDURE — 74176 CT ABD & PELVIS W/O CONTRAST: CPT

## 2017-01-01 PROCEDURE — 87185 SC STD ENZYME DETCJ PER NZM: CPT

## 2017-01-01 PROCEDURE — 92610 EVALUATE SWALLOWING FUNCTION: CPT

## 2017-01-01 PROCEDURE — 99239 HOSP IP/OBS DSCHRG MGMT >30: CPT | Performed by: INTERNAL MEDICINE

## 2017-01-01 PROCEDURE — 82306 VITAMIN D 25 HYDROXY: CPT

## 2017-01-01 PROCEDURE — 6370000000 HC RX 637 (ALT 250 FOR IP): Performed by: EMERGENCY MEDICINE

## 2017-01-01 PROCEDURE — 97162 PT EVAL MOD COMPLEX 30 MIN: CPT

## 2017-01-01 PROCEDURE — 99214 OFFICE O/P EST MOD 30 MIN: CPT | Performed by: CLINICAL NURSE SPECIALIST

## 2017-01-01 PROCEDURE — 6370000000 HC RX 637 (ALT 250 FOR IP): Performed by: SURGERY

## 2017-01-01 PROCEDURE — 73630 X-RAY EXAM OF FOOT: CPT

## 2017-01-01 PROCEDURE — G8427 DOCREV CUR MEDS BY ELIG CLIN: HCPCS | Performed by: NURSE PRACTITIONER

## 2017-01-01 PROCEDURE — 99238 HOSP IP/OBS DSCHRG MGMT 30/<: CPT | Performed by: INTERNAL MEDICINE

## 2017-01-01 PROCEDURE — C1769 GUIDE WIRE: HCPCS | Performed by: SURGERY

## 2017-01-01 PROCEDURE — 2580000003 HC RX 258: Performed by: SURGERY

## 2017-01-01 PROCEDURE — 3430000000 HC RX DIAGNOSTIC RADIOPHARMACEUTICAL: Performed by: EMERGENCY MEDICINE

## 2017-01-01 PROCEDURE — 97597 DBRDMT OPN WND 1ST 20 CM/<: CPT | Performed by: SURGERY

## 2017-01-01 PROCEDURE — G8979 MOBILITY GOAL STATUS: HCPCS

## 2017-01-01 PROCEDURE — A9540 TC99M MAA: HCPCS | Performed by: EMERGENCY MEDICINE

## 2017-01-01 PROCEDURE — C1760 CLOSURE DEV, VASC: HCPCS

## 2017-01-01 PROCEDURE — A9500 TC99M SESTAMIBI: HCPCS | Performed by: INTERNAL MEDICINE

## 2017-01-01 PROCEDURE — 99213 OFFICE O/P EST LOW 20 MIN: CPT

## 2017-01-01 PROCEDURE — 3014F SCREEN MAMMO DOC REV: CPT | Performed by: CLINICAL NURSE SPECIALIST

## 2017-01-01 PROCEDURE — 80074 ACUTE HEPATITIS PANEL: CPT

## 2017-01-01 PROCEDURE — 6360000002 HC RX W HCPCS: Performed by: FAMILY MEDICINE

## 2017-01-01 PROCEDURE — 99233 SBSQ HOSP IP/OBS HIGH 50: CPT | Performed by: FAMILY MEDICINE

## 2017-01-01 PROCEDURE — 4040F PNEUMOC VAC/ADMIN/RCVD: CPT | Performed by: CLINICAL NURSE SPECIALIST

## 2017-01-01 PROCEDURE — 99291 CRITICAL CARE FIRST HOUR: CPT | Performed by: EMERGENCY MEDICINE

## 2017-01-01 PROCEDURE — 83520 IMMUNOASSAY QUANT NOS NONAB: CPT

## 2017-01-01 PROCEDURE — 2500000003 HC RX 250 WO HCPCS: Performed by: FAMILY MEDICINE

## 2017-01-01 PROCEDURE — 99231 SBSQ HOSP IP/OBS SF/LOW 25: CPT | Performed by: INTERNAL MEDICINE

## 2017-01-01 PROCEDURE — 2500000003 HC RX 250 WO HCPCS: Performed by: SURGERY

## 2017-01-01 PROCEDURE — 87804 INFLUENZA ASSAY W/OPTIC: CPT

## 2017-01-01 PROCEDURE — 99999 PR OFFICE/OUTPT VISIT,PROCEDURE ONLY: CPT | Performed by: FAMILY MEDICINE

## 2017-01-01 PROCEDURE — 99285 EMERGENCY DEPT VISIT HI MDM: CPT | Performed by: INTERNAL MEDICINE

## 2017-01-01 PROCEDURE — 99253 IP/OBS CNSLTJ NEW/EST LOW 45: CPT | Performed by: SURGERY

## 2017-01-01 PROCEDURE — 99214 OFFICE O/P EST MOD 30 MIN: CPT

## 2017-01-01 PROCEDURE — 96374 THER/PROPH/DIAG INJ IV PUSH: CPT

## 2017-01-01 PROCEDURE — 76770 US EXAM ABDO BACK WALL COMP: CPT

## 2017-01-01 PROCEDURE — 3600000014 HC SURGERY LEVEL 4 ADDTL 15MIN: Performed by: SURGERY

## 2017-01-01 PROCEDURE — 1090F PRES/ABSN URINE INCON ASSESS: CPT | Performed by: NURSE PRACTITIONER

## 2017-01-01 PROCEDURE — 86039 ANTINUCLEAR ANTIBODIES (ANA): CPT

## 2017-01-01 PROCEDURE — 84300 ASSAY OF URINE SODIUM: CPT

## 2017-01-01 PROCEDURE — 84550 ASSAY OF BLOOD/URIC ACID: CPT

## 2017-01-01 PROCEDURE — 71035 XR CHEST 1 VW: CPT

## 2017-01-01 PROCEDURE — G8417 CALC BMI ABV UP PARAM F/U: HCPCS | Performed by: NURSE PRACTITIONER

## 2017-01-01 PROCEDURE — 81001 URINALYSIS AUTO W/SCOPE: CPT

## 2017-01-01 PROCEDURE — G8997 SWALLOW GOAL STATUS: HCPCS

## 2017-01-01 PROCEDURE — 36556 INSERT NON-TUNNEL CV CATH: CPT | Performed by: SURGERY

## 2017-01-01 PROCEDURE — 82330 ASSAY OF CALCIUM: CPT

## 2017-01-01 PROCEDURE — 89050 BODY FLUID CELL COUNT: CPT

## 2017-01-01 PROCEDURE — 4040F PNEUMOC VAC/ADMIN/RCVD: CPT | Performed by: NURSE PRACTITIONER

## 2017-01-01 PROCEDURE — 2720000001 HC MISC SURG SUPPLY STERILE $51-500: Performed by: SURGERY

## 2017-01-01 PROCEDURE — 97166 OT EVAL MOD COMPLEX 45 MIN: CPT

## 2017-01-01 PROCEDURE — 3700000001 HC ADD 15 MINUTES (ANESTHESIA): Performed by: SURGERY

## 2017-01-01 PROCEDURE — 83690 ASSAY OF LIPASE: CPT

## 2017-01-01 PROCEDURE — 86160 COMPLEMENT ANTIGEN: CPT

## 2017-01-01 PROCEDURE — 1036F TOBACCO NON-USER: CPT | Performed by: NURSE PRACTITIONER

## 2017-01-01 PROCEDURE — 0HBMXZZ EXCISION OF RIGHT FOOT SKIN, EXTERNAL APPROACH: ICD-10-PCS | Performed by: NURSE PRACTITIONER

## 2017-01-01 PROCEDURE — 97530 THERAPEUTIC ACTIVITIES: CPT

## 2017-01-01 PROCEDURE — 64415 NJX AA&/STRD BRCH PLXS IMG: CPT | Performed by: NURSE ANESTHETIST, CERTIFIED REGISTERED

## 2017-01-01 PROCEDURE — 83550 IRON BINDING TEST: CPT

## 2017-01-01 PROCEDURE — 99232 SBSQ HOSP IP/OBS MODERATE 35: CPT | Performed by: FAMILY MEDICINE

## 2017-01-01 PROCEDURE — C1887 CATHETER, GUIDING: HCPCS

## 2017-01-01 PROCEDURE — B2111ZZ FLUOROSCOPY OF MULTIPLE CORONARY ARTERIES USING LOW OSMOLAR CONTRAST: ICD-10-PCS | Performed by: INTERNAL MEDICINE

## 2017-01-01 PROCEDURE — 86706 HEP B SURFACE ANTIBODY: CPT

## 2017-01-01 PROCEDURE — 74177 CT ABD & PELVIS W/CONTRAST: CPT

## 2017-01-01 PROCEDURE — 7100000001 HC PACU RECOVERY - ADDTL 15 MIN: Performed by: SURGERY

## 2017-01-01 PROCEDURE — 3600000004 HC SURGERY LEVEL 4 BASE: Performed by: SURGERY

## 2017-01-01 PROCEDURE — 2580000003 HC RX 258: Performed by: EMERGENCY MEDICINE

## 2017-01-01 PROCEDURE — 85379 FIBRIN DEGRADATION QUANT: CPT

## 2017-01-01 PROCEDURE — 99291 CRITICAL CARE FIRST HOUR: CPT

## 2017-01-01 PROCEDURE — 99283 EMERGENCY DEPT VISIT LOW MDM: CPT | Performed by: EMERGENCY MEDICINE

## 2017-01-01 PROCEDURE — 93970 EXTREMITY STUDY: CPT

## 2017-01-01 PROCEDURE — 99223 1ST HOSP IP/OBS HIGH 75: CPT | Performed by: HOSPITALIST

## 2017-01-01 PROCEDURE — 78582 LUNG VENTILAT&PERFUS IMAGING: CPT

## 2017-01-01 PROCEDURE — 87077 CULTURE AEROBIC IDENTIFY: CPT

## 2017-01-01 PROCEDURE — 87340 HEPATITIS B SURFACE AG IA: CPT

## 2017-01-01 PROCEDURE — 80305 DRUG TEST PRSMV DIR OPT OBS: CPT | Performed by: NURSE PRACTITIONER

## 2017-01-01 PROCEDURE — 2500000003 HC RX 250 WO HCPCS: Performed by: EMERGENCY MEDICINE

## 2017-01-01 PROCEDURE — 99238 HOSP IP/OBS DSCHRG MGMT 30/<: CPT | Performed by: HOSPITALIST

## 2017-01-01 PROCEDURE — G8988 SELF CARE GOAL STATUS: HCPCS

## 2017-01-01 PROCEDURE — 1123F ACP DISCUSS/DSCN MKR DOCD: CPT | Performed by: NURSE PRACTITIONER

## 2017-01-01 PROCEDURE — G8987 SELF CARE CURRENT STATUS: HCPCS

## 2017-01-01 PROCEDURE — 3044F HG A1C LEVEL LT 7.0%: CPT | Performed by: NURSE PRACTITIONER

## 2017-01-01 PROCEDURE — G0365 VESSEL MAPPING HEMO ACCESS: HCPCS

## 2017-01-01 PROCEDURE — 3017F COLORECTAL CA SCREEN DOC REV: CPT | Performed by: CLINICAL NURSE SPECIALIST

## 2017-01-01 PROCEDURE — G8427 DOCREV CUR MEDS BY ELIG CLIN: HCPCS | Performed by: CLINICAL NURSE SPECIALIST

## 2017-01-01 PROCEDURE — 99284 EMERGENCY DEPT VISIT MOD MDM: CPT | Performed by: FAMILY MEDICINE

## 2017-01-01 PROCEDURE — 86255 FLUORESCENT ANTIBODY SCREEN: CPT

## 2017-01-01 PROCEDURE — 96375 TX/PRO/DX INJ NEW DRUG ADDON: CPT

## 2017-01-01 PROCEDURE — A4364 ADHESIVE, LIQUID OR EQUAL: HCPCS | Performed by: SURGERY

## 2017-01-01 PROCEDURE — 83970 ASSAY OF PARATHORMONE: CPT

## 2017-01-01 PROCEDURE — 85045 AUTOMATED RETICULOCYTE COUNT: CPT

## 2017-01-01 PROCEDURE — 99284 EMERGENCY DEPT VISIT MOD MDM: CPT | Performed by: EMERGENCY MEDICINE

## 2017-01-01 PROCEDURE — 86431 RHEUMATOID FACTOR QUANT: CPT

## 2017-01-01 PROCEDURE — G8417 CALC BMI ABV UP PARAM F/U: HCPCS | Performed by: CLINICAL NURSE SPECIALIST

## 2017-01-01 PROCEDURE — 92950 HEART/LUNG RESUSCITATION CPR: CPT

## 2017-01-01 PROCEDURE — 99223 1ST HOSP IP/OBS HIGH 75: CPT | Performed by: FAMILY MEDICINE

## 2017-01-01 PROCEDURE — B54BZZA ULTRASONOGRAPHY OF RIGHT LOWER EXTREMITY VEINS, GUIDANCE: ICD-10-PCS | Performed by: SURGERY

## 2017-01-01 PROCEDURE — A9558 XE133 XENON 10MCI: HCPCS | Performed by: EMERGENCY MEDICINE

## 2017-01-01 PROCEDURE — 97535 SELF CARE MNGMENT TRAINING: CPT

## 2017-01-01 PROCEDURE — 3044F HG A1C LEVEL LT 7.0%: CPT | Performed by: CLINICAL NURSE SPECIALIST

## 2017-01-01 PROCEDURE — 03170ZD BYPASS RIGHT BRACHIAL ARTERY TO UPPER ARM VEIN, OPEN APPROACH: ICD-10-PCS | Performed by: SURGERY

## 2017-01-01 PROCEDURE — 93000 ELECTROCARDIOGRAM COMPLETE: CPT | Performed by: CLINICAL NURSE SPECIALIST

## 2017-01-01 PROCEDURE — 78452 HT MUSCLE IMAGE SPECT MULT: CPT

## 2017-01-01 PROCEDURE — 2709999900 HC NON-CHARGEABLE SUPPLY

## 2017-01-01 PROCEDURE — 3430000000 HC RX DIAGNOSTIC RADIOPHARMACEUTICAL: Performed by: INTERNAL MEDICINE

## 2017-01-01 PROCEDURE — 82728 ASSAY OF FERRITIN: CPT

## 2017-01-01 PROCEDURE — 99999 PR OFFICE/OUTPT VISIT,PROCEDURE ONLY: CPT | Performed by: PHYSICIAN ASSISTANT

## 2017-01-01 PROCEDURE — B244ZZZ ULTRASONOGRAPHY OF RIGHT HEART: ICD-10-PCS | Performed by: SURGERY

## 2017-01-01 PROCEDURE — 99213 OFFICE O/P EST LOW 20 MIN: CPT | Performed by: PHYSICIAN ASSISTANT

## 2017-01-01 PROCEDURE — 99251 PR INITL INPATIENT CONSULT NEW/ESTAB PT 20 MIN: CPT | Performed by: NURSE PRACTITIONER

## 2017-01-01 PROCEDURE — 93923 UPR/LXTR ART STDY 3+ LVLS: CPT

## 2017-01-01 PROCEDURE — 99024 POSTOP FOLLOW-UP VISIT: CPT | Performed by: SURGERY

## 2017-01-01 PROCEDURE — 1111F DSCHRG MED/CURRENT MED MERGE: CPT | Performed by: CLINICAL NURSE SPECIALIST

## 2017-01-01 PROCEDURE — 96372 THER/PROPH/DIAG INJ SC/IM: CPT | Performed by: NURSE PRACTITIONER

## 2017-01-01 PROCEDURE — G0379 DIRECT REFER HOSPITAL OBSERV: HCPCS

## 2017-01-01 PROCEDURE — 93017 CV STRESS TEST TRACING ONLY: CPT

## 2017-01-01 PROCEDURE — 93325 DOPPLER ECHO COLOR FLOW MAPG: CPT | Performed by: INTERNAL MEDICINE

## 2017-01-01 PROCEDURE — 1090F PRES/ABSN URINE INCON ASSESS: CPT | Performed by: CLINICAL NURSE SPECIALIST

## 2017-01-01 PROCEDURE — 6360000002 HC RX W HCPCS: Performed by: NURSE ANESTHETIST, CERTIFIED REGISTERED

## 2017-01-01 PROCEDURE — 99223 1ST HOSP IP/OBS HIGH 75: CPT | Performed by: CLINICAL NURSE SPECIALIST

## 2017-01-01 PROCEDURE — 80307 DRUG TEST PRSMV CHEM ANLYZR: CPT

## 2017-01-01 PROCEDURE — 84443 ASSAY THYROID STIM HORMONE: CPT

## 2017-01-01 PROCEDURE — 2720000000 IR TUNNELED CATHETER PLACEMENT GREATER THAN 5 YEARS

## 2017-01-01 PROCEDURE — 99213 OFFICE O/P EST LOW 20 MIN: CPT | Performed by: NURSE PRACTITIONER

## 2017-01-01 PROCEDURE — 6360000002 HC RX W HCPCS: Performed by: ANESTHESIOLOGY

## 2017-01-01 PROCEDURE — 96360 HYDRATION IV INFUSION INIT: CPT

## 2017-01-01 PROCEDURE — 82009 KETONE BODYS QUAL: CPT

## 2017-01-01 PROCEDURE — 93306 TTE W/DOPPLER COMPLETE: CPT

## 2017-01-01 PROCEDURE — 99221 1ST HOSP IP/OBS SF/LOW 40: CPT | Performed by: INTERNAL MEDICINE

## 2017-01-01 PROCEDURE — G8996 SWALLOW CURRENT STATUS: HCPCS

## 2017-01-01 PROCEDURE — 96366 THER/PROPH/DIAG IV INF ADDON: CPT

## 2017-01-01 PROCEDURE — 2000000000 HC ICU R&B

## 2017-01-01 PROCEDURE — 2580000003 HC RX 258: Performed by: CLINICAL NURSE SPECIALIST

## 2017-01-01 PROCEDURE — 11042 DBRDMT SUBQ TIS 1ST 20SQCM/<: CPT | Performed by: NURSE PRACTITIONER

## 2017-01-01 PROCEDURE — 83036 HEMOGLOBIN GLYCOSYLATED A1C: CPT | Performed by: FAMILY MEDICINE

## 2017-01-01 PROCEDURE — 99284 EMERGENCY DEPT VISIT MOD MDM: CPT

## 2017-01-01 PROCEDURE — 99223 1ST HOSP IP/OBS HIGH 75: CPT | Performed by: INTERNAL MEDICINE

## 2017-01-01 PROCEDURE — 94761 N-INVAS EAR/PLS OXIMETRY MLT: CPT

## 2017-01-01 PROCEDURE — 99254 IP/OBS CNSLTJ NEW/EST MOD 60: CPT | Performed by: INTERNAL MEDICINE

## 2017-01-01 PROCEDURE — 87449 NOS EACH ORGANISM AG IA: CPT

## 2017-01-01 PROCEDURE — 93312 ECHO TRANSESOPHAGEAL: CPT | Performed by: INTERNAL MEDICINE

## 2017-01-01 PROCEDURE — 7100000000 HC PACU RECOVERY - FIRST 15 MIN: Performed by: SURGERY

## 2017-01-01 PROCEDURE — G8484 FLU IMMUNIZE NO ADMIN: HCPCS | Performed by: CLINICAL NURSE SPECIALIST

## 2017-01-01 PROCEDURE — 99214 OFFICE O/P EST MOD 30 MIN: CPT | Performed by: FAMILY MEDICINE

## 2017-01-01 PROCEDURE — 96376 TX/PRO/DX INJ SAME DRUG ADON: CPT

## 2017-01-01 PROCEDURE — 4A023N7 MEASUREMENT OF CARDIAC SAMPLING AND PRESSURE, LEFT HEART, PERCUTANEOUS APPROACH: ICD-10-PCS | Performed by: INTERNAL MEDICINE

## 2017-01-01 PROCEDURE — 82570 ASSAY OF URINE CREATININE: CPT

## 2017-01-01 PROCEDURE — 1036F TOBACCO NON-USER: CPT | Performed by: CLINICAL NURSE SPECIALIST

## 2017-01-01 PROCEDURE — P9045 ALBUMIN (HUMAN), 5%, 250 ML: HCPCS | Performed by: INTERNAL MEDICINE

## 2017-01-01 PROCEDURE — 86308 HETEROPHILE ANTIBODY SCREEN: CPT

## 2017-01-01 RX ORDER — BUMETANIDE 1 MG/1
1 TABLET ORAL DAILY
Status: DISCONTINUED | OUTPATIENT
Start: 2017-01-01 | End: 2017-01-01 | Stop reason: HOSPADM

## 2017-01-01 RX ORDER — HYDROCODONE BITARTRATE AND ACETAMINOPHEN 7.5; 325 MG/1; MG/1
1 TABLET ORAL EVERY 6 HOURS PRN
Status: CANCELLED | OUTPATIENT
Start: 2017-01-01

## 2017-01-01 RX ORDER — SODIUM CHLORIDE 0.9 % (FLUSH) 0.9 %
10 SYRINGE (ML) INJECTION PRN
Status: DISCONTINUED | OUTPATIENT
Start: 2017-01-01 | End: 2017-01-01 | Stop reason: HOSPADM

## 2017-01-01 RX ORDER — METOPROLOL TARTRATE 50 MG/1
50 TABLET, FILM COATED ORAL 2 TIMES DAILY
Status: DISCONTINUED | OUTPATIENT
Start: 2017-01-01 | End: 2017-01-01 | Stop reason: HOSPADM

## 2017-01-01 RX ORDER — MORPHINE SULFATE 4 MG/ML
4 INJECTION, SOLUTION INTRAMUSCULAR; INTRAVENOUS EVERY 5 MIN PRN
Status: DISCONTINUED | OUTPATIENT
Start: 2017-01-01 | End: 2017-01-01 | Stop reason: HOSPADM

## 2017-01-01 RX ORDER — CALCITRIOL 0.25 UG/1
0.25 CAPSULE, LIQUID FILLED ORAL
Status: DISCONTINUED | OUTPATIENT
Start: 2017-01-01 | End: 2017-01-01 | Stop reason: HOSPADM

## 2017-01-01 RX ORDER — LEVOFLOXACIN 500 MG/1
500 TABLET, FILM COATED ORAL DAILY
Status: DISCONTINUED | OUTPATIENT
Start: 2017-01-01 | End: 2017-01-01

## 2017-01-01 RX ORDER — SODIUM CHLORIDE 0.9 % (FLUSH) 0.9 %
10 SYRINGE (ML) INJECTION PRN
Status: DISCONTINUED | OUTPATIENT
Start: 2017-01-01 | End: 2017-01-01 | Stop reason: SDUPTHER

## 2017-01-01 RX ORDER — CLONIDINE HYDROCHLORIDE 0.2 MG/1
0.2 TABLET ORAL 2 TIMES DAILY
Status: DISCONTINUED | OUTPATIENT
Start: 2017-01-01 | End: 2017-01-01 | Stop reason: HOSPADM

## 2017-01-01 RX ORDER — ISOSORBIDE MONONITRATE 30 MG/1
30 TABLET, EXTENDED RELEASE ORAL DAILY
Status: DISCONTINUED | OUTPATIENT
Start: 2017-01-01 | End: 2017-01-01 | Stop reason: HOSPADM

## 2017-01-01 RX ORDER — MORPHINE SULFATE 4 MG/ML
2 INJECTION, SOLUTION INTRAMUSCULAR; INTRAVENOUS EVERY 5 MIN PRN
Status: DISCONTINUED | OUTPATIENT
Start: 2017-01-01 | End: 2017-01-01 | Stop reason: HOSPADM

## 2017-01-01 RX ORDER — GABAPENTIN 100 MG/1
300 CAPSULE ORAL 3 TIMES DAILY
Qty: 90 CAPSULE | Refills: 2 | Status: SHIPPED | OUTPATIENT
Start: 2017-01-01 | End: 2017-01-01 | Stop reason: SDUPTHER

## 2017-01-01 RX ORDER — LEVOFLOXACIN 500 MG/1
500 TABLET, FILM COATED ORAL DAILY
Status: DISCONTINUED | OUTPATIENT
Start: 2017-01-01 | End: 2017-01-01 | Stop reason: DRUGHIGH

## 2017-01-01 RX ORDER — DOCUSATE SODIUM 100 MG/1
100 CAPSULE, LIQUID FILLED ORAL 2 TIMES DAILY
Status: DISCONTINUED | OUTPATIENT
Start: 2017-01-01 | End: 2017-01-01 | Stop reason: HOSPADM

## 2017-01-01 RX ORDER — METHYLPREDNISOLONE SODIUM SUCCINATE 40 MG/ML
40 INJECTION, POWDER, LYOPHILIZED, FOR SOLUTION INTRAMUSCULAR; INTRAVENOUS EVERY 8 HOURS
Status: DISCONTINUED | OUTPATIENT
Start: 2017-01-01 | End: 2017-01-01

## 2017-01-01 RX ORDER — SODIUM CHLORIDE 0.9 % (FLUSH) 0.9 %
10 SYRINGE (ML) INJECTION EVERY 12 HOURS SCHEDULED
Status: DISCONTINUED | OUTPATIENT
Start: 2017-01-01 | End: 2017-01-01 | Stop reason: HOSPADM

## 2017-01-01 RX ORDER — BUMETANIDE 0.25 MG/ML
1 INJECTION, SOLUTION INTRAMUSCULAR; INTRAVENOUS ONCE
Status: COMPLETED | OUTPATIENT
Start: 2017-01-01 | End: 2017-01-01

## 2017-01-01 RX ORDER — HEPARIN SODIUM 5000 [USP'U]/ML
INJECTION, SOLUTION INTRAVENOUS; SUBCUTANEOUS
Status: COMPLETED | OUTPATIENT
Start: 2017-01-01 | End: 2017-01-01

## 2017-01-01 RX ORDER — HYDROCODONE BITARTRATE AND ACETAMINOPHEN 7.5; 325 MG/1; MG/1
1 TABLET ORAL EVERY 6 HOURS PRN
COMMUNITY
End: 2017-01-01 | Stop reason: ALTCHOICE

## 2017-01-01 RX ORDER — AMOXICILLIN 500 MG/1
500 CAPSULE ORAL 3 TIMES DAILY
Qty: 30 CAPSULE | Refills: 0 | Status: ON HOLD | OUTPATIENT
Start: 2017-01-01 | End: 2017-01-01 | Stop reason: HOSPADM

## 2017-01-01 RX ORDER — BUMETANIDE 0.25 MG/ML
1 INJECTION, SOLUTION INTRAMUSCULAR; INTRAVENOUS DAILY
Status: DISCONTINUED | OUTPATIENT
Start: 2017-01-01 | End: 2017-01-01

## 2017-01-01 RX ORDER — LEVOTHYROXINE SODIUM 0.1 MG/1
100 TABLET ORAL DAILY
Status: DISCONTINUED | OUTPATIENT
Start: 2017-01-01 | End: 2017-01-01 | Stop reason: HOSPADM

## 2017-01-01 RX ORDER — INSULIN GLARGINE 100 [IU]/ML
10 INJECTION, SOLUTION SUBCUTANEOUS 2 TIMES DAILY
Status: DISCONTINUED | OUTPATIENT
Start: 2017-01-01 | End: 2017-01-01

## 2017-01-01 RX ORDER — PANTOPRAZOLE SODIUM 40 MG/1
40 TABLET, DELAYED RELEASE ORAL
Status: DISCONTINUED | OUTPATIENT
Start: 2017-01-01 | End: 2017-01-01 | Stop reason: HOSPADM

## 2017-01-01 RX ORDER — DEXTROSE MONOHYDRATE 25 G/50ML
25 INJECTION, SOLUTION INTRAVENOUS ONCE
Status: DISCONTINUED | OUTPATIENT
Start: 2017-01-01 | End: 2017-01-01 | Stop reason: CLARIF

## 2017-01-01 RX ORDER — BISACODYL 10 MG
10 SUPPOSITORY, RECTAL RECTAL DAILY
Status: DISCONTINUED | OUTPATIENT
Start: 2017-01-01 | End: 2017-01-01 | Stop reason: HOSPADM

## 2017-01-01 RX ORDER — HYDROCODONE BITARTRATE AND ACETAMINOPHEN 7.5; 325 MG/1; MG/1
1 TABLET ORAL EVERY 6 HOURS PRN
Status: DISCONTINUED | OUTPATIENT
Start: 2017-01-01 | End: 2017-01-01 | Stop reason: HOSPADM

## 2017-01-01 RX ORDER — SODIUM CHLORIDE 9 MG/ML
INJECTION, SOLUTION INTRAVENOUS CONTINUOUS
Status: DISCONTINUED | OUTPATIENT
Start: 2017-01-01 | End: 2017-01-01

## 2017-01-01 RX ORDER — DEXTROSE MONOHYDRATE 25 G/50ML
12.5 INJECTION, SOLUTION INTRAVENOUS PRN
Status: DISCONTINUED | OUTPATIENT
Start: 2017-01-01 | End: 2017-01-01 | Stop reason: HOSPADM

## 2017-01-01 RX ORDER — HYDROCODONE BITARTRATE AND ACETAMINOPHEN 5; 325 MG/1; MG/1
1 TABLET ORAL EVERY 6 HOURS PRN
Status: DISCONTINUED | OUTPATIENT
Start: 2017-01-01 | End: 2017-01-01 | Stop reason: HOSPADM

## 2017-01-01 RX ORDER — MIDAZOLAM HYDROCHLORIDE 1 MG/ML
INJECTION INTRAMUSCULAR; INTRAVENOUS
Status: COMPLETED
Start: 2017-01-01 | End: 2017-01-01

## 2017-01-01 RX ORDER — NYSTATIN 100000 [USP'U]/G
POWDER TOPICAL 2 TIMES DAILY
Status: DISCONTINUED | OUTPATIENT
Start: 2017-01-01 | End: 2017-01-01 | Stop reason: HOSPADM

## 2017-01-01 RX ORDER — DOCUSATE SODIUM 100 MG/1
100 CAPSULE, LIQUID FILLED ORAL DAILY
Status: DISCONTINUED | OUTPATIENT
Start: 2017-01-01 | End: 2017-01-01 | Stop reason: HOSPADM

## 2017-01-01 RX ORDER — PANTOPRAZOLE SODIUM 40 MG/1
40 TABLET, DELAYED RELEASE ORAL
Qty: 30 TABLET | Refills: 3 | Status: SHIPPED | OUTPATIENT
Start: 2017-01-01 | End: 2017-01-01 | Stop reason: SDUPTHER

## 2017-01-01 RX ORDER — BUMETANIDE 0.25 MG/ML
0.5 INJECTION, SOLUTION INTRAMUSCULAR; INTRAVENOUS 2 TIMES DAILY
Status: DISCONTINUED | OUTPATIENT
Start: 2017-01-01 | End: 2017-01-01

## 2017-01-01 RX ORDER — ONDANSETRON 4 MG/1
4 TABLET, FILM COATED ORAL EVERY 8 HOURS PRN
Status: ON HOLD | COMMUNITY
End: 2017-01-01 | Stop reason: HOSPADM

## 2017-01-01 RX ORDER — ATORVASTATIN CALCIUM 40 MG/1
40 TABLET, FILM COATED ORAL DAILY
Status: DISCONTINUED | OUTPATIENT
Start: 2017-01-01 | End: 2017-01-01 | Stop reason: HOSPADM

## 2017-01-01 RX ORDER — MEPERIDINE HYDROCHLORIDE 50 MG/ML
12.5 INJECTION INTRAMUSCULAR; INTRAVENOUS; SUBCUTANEOUS EVERY 5 MIN PRN
Status: DISCONTINUED | OUTPATIENT
Start: 2017-01-01 | End: 2017-01-01 | Stop reason: HOSPADM

## 2017-01-01 RX ORDER — BISACODYL 10 MG
10 SUPPOSITORY, RECTAL RECTAL DAILY PRN
Status: DISCONTINUED | OUTPATIENT
Start: 2017-01-01 | End: 2017-01-01 | Stop reason: HOSPADM

## 2017-01-01 RX ORDER — ALLOPURINOL 100 MG/1
100 TABLET ORAL DAILY
Status: DISCONTINUED | OUTPATIENT
Start: 2017-01-01 | End: 2017-01-01 | Stop reason: HOSPADM

## 2017-01-01 RX ORDER — CALCITRIOL 0.25 UG/1
0.25 CAPSULE, LIQUID FILLED ORAL
Status: DISCONTINUED | OUTPATIENT
Start: 2017-01-01 | End: 2017-01-01

## 2017-01-01 RX ORDER — GLIPIZIDE 5 MG/1
1 TABLET ORAL DAILY
Status: DISCONTINUED | OUTPATIENT
Start: 2017-01-01 | End: 2017-01-01

## 2017-01-01 RX ORDER — BUMETANIDE 0.5 MG/1
0.5 TABLET ORAL DAILY
Status: DISCONTINUED | OUTPATIENT
Start: 2017-01-01 | End: 2017-01-01 | Stop reason: HOSPADM

## 2017-01-01 RX ORDER — DEXTROSE MONOHYDRATE 50 MG/ML
100 INJECTION, SOLUTION INTRAVENOUS PRN
Status: DISCONTINUED | OUTPATIENT
Start: 2017-01-01 | End: 2017-01-01 | Stop reason: HOSPADM

## 2017-01-01 RX ORDER — PROMETHAZINE HYDROCHLORIDE 25 MG/ML
6.25 INJECTION, SOLUTION INTRAMUSCULAR; INTRAVENOUS
Status: DISCONTINUED | OUTPATIENT
Start: 2017-01-01 | End: 2017-01-01 | Stop reason: HOSPADM

## 2017-01-01 RX ORDER — POLYETHYLENE GLYCOL 3350 17 G/17G
17 POWDER, FOR SOLUTION ORAL DAILY PRN
Status: DISCONTINUED | OUTPATIENT
Start: 2017-01-01 | End: 2017-01-01 | Stop reason: HOSPADM

## 2017-01-01 RX ORDER — LIDOCAINE HYDROCHLORIDE 10 MG/ML
1 INJECTION, SOLUTION EPIDURAL; INFILTRATION; INTRACAUDAL; PERINEURAL
Status: DISCONTINUED | OUTPATIENT
Start: 2017-01-01 | End: 2017-01-01 | Stop reason: HOSPADM

## 2017-01-01 RX ORDER — KETAMINE HYDROCHLORIDE 100 MG/ML
INJECTION, SOLUTION INTRAMUSCULAR; INTRAVENOUS PRN
Status: DISCONTINUED | OUTPATIENT
Start: 2017-01-01 | End: 2017-01-01 | Stop reason: SDUPTHER

## 2017-01-01 RX ORDER — MIDODRINE HYDROCHLORIDE 5 MG/1
5 TABLET ORAL
Status: DISCONTINUED | OUTPATIENT
Start: 2017-01-01 | End: 2017-01-01 | Stop reason: HOSPADM

## 2017-01-01 RX ORDER — MAGNESIUM CARB/ALUMINUM HYDROX 105-160MG
30 TABLET,CHEWABLE ORAL DAILY PRN
Status: DISCONTINUED | OUTPATIENT
Start: 2017-01-01 | End: 2017-01-01 | Stop reason: HOSPADM

## 2017-01-01 RX ORDER — SEVELAMER CARBONATE 800 MG/1
TABLET, FILM COATED ORAL
COMMUNITY
Start: 2017-01-01 | End: 2017-01-01 | Stop reason: ALTCHOICE

## 2017-01-01 RX ORDER — ALLOPURINOL 100 MG/1
100 TABLET ORAL DAILY
Qty: 30 TABLET | Refills: 5 | Status: SHIPPED | OUTPATIENT
Start: 2017-01-01

## 2017-01-01 RX ORDER — SODIUM CHLORIDE 9 MG/ML
INJECTION, SOLUTION INTRAVENOUS CONTINUOUS
Status: DISCONTINUED | OUTPATIENT
Start: 2017-01-01 | End: 2017-01-01 | Stop reason: HOSPADM

## 2017-01-01 RX ORDER — MORPHINE SULFATE 4 MG/ML
4 INJECTION, SOLUTION INTRAMUSCULAR; INTRAVENOUS
Status: DISCONTINUED | OUTPATIENT
Start: 2017-01-01 | End: 2017-01-01 | Stop reason: HOSPADM

## 2017-01-01 RX ORDER — GABAPENTIN 100 MG/1
300 CAPSULE ORAL 3 TIMES DAILY
Qty: 270 CAPSULE | Refills: 0 | Status: SHIPPED | OUTPATIENT
Start: 2017-01-01 | End: 2017-01-01 | Stop reason: CLARIF

## 2017-01-01 RX ORDER — LORAZEPAM 0.5 MG/1
0.5 TABLET ORAL 2 TIMES DAILY PRN
Qty: 60 TABLET | Refills: 0 | Status: SHIPPED | OUTPATIENT
Start: 2017-01-01 | End: 2017-01-01 | Stop reason: SDUPTHER

## 2017-01-01 RX ORDER — GLIPIZIDE 10 MG/1
10 TABLET ORAL DAILY
Status: DISCONTINUED | OUTPATIENT
Start: 2017-01-01 | End: 2017-01-01

## 2017-01-01 RX ORDER — INSULIN GLARGINE 100 [IU]/ML
10 INJECTION, SOLUTION SUBCUTANEOUS NIGHTLY
Status: DISCONTINUED | OUTPATIENT
Start: 2017-01-01 | End: 2017-01-01

## 2017-01-01 RX ORDER — DOCUSATE SODIUM 100 MG/1
200 CAPSULE, LIQUID FILLED ORAL 2 TIMES DAILY PRN
COMMUNITY

## 2017-01-01 RX ORDER — LORAZEPAM 0.5 MG/1
0.5 TABLET ORAL 2 TIMES DAILY PRN
Qty: 60 TABLET | Refills: 2 | OUTPATIENT
Start: 2017-01-01 | End: 2017-01-01 | Stop reason: SDUPTHER

## 2017-01-01 RX ORDER — IPRATROPIUM BROMIDE AND ALBUTEROL SULFATE 2.5; .5 MG/3ML; MG/3ML
1 SOLUTION RESPIRATORY (INHALATION) EVERY 4 HOURS PRN
Status: DISCONTINUED | OUTPATIENT
Start: 2017-01-01 | End: 2017-01-01 | Stop reason: HOSPADM

## 2017-01-01 RX ORDER — SODIUM CHLORIDE 0.9 % (FLUSH) 0.9 %
10 SYRINGE (ML) INJECTION PRN
Status: CANCELLED | OUTPATIENT
Start: 2017-01-01

## 2017-01-01 RX ORDER — GABAPENTIN 100 MG/1
100 CAPSULE ORAL NIGHTLY
Status: DISCONTINUED | OUTPATIENT
Start: 2017-01-01 | End: 2017-01-01 | Stop reason: HOSPADM

## 2017-01-01 RX ORDER — ONDANSETRON 2 MG/ML
4 INJECTION INTRAMUSCULAR; INTRAVENOUS EVERY 6 HOURS PRN
Status: DISCONTINUED | OUTPATIENT
Start: 2017-01-01 | End: 2017-01-01 | Stop reason: HOSPADM

## 2017-01-01 RX ORDER — NICOTINE POLACRILEX 4 MG
15 LOZENGE BUCCAL PRN
Status: DISCONTINUED | OUTPATIENT
Start: 2017-01-01 | End: 2017-01-01

## 2017-01-01 RX ORDER — METOCLOPRAMIDE HYDROCHLORIDE 5 MG/ML
10 INJECTION INTRAMUSCULAR; INTRAVENOUS
Status: DISCONTINUED | OUTPATIENT
Start: 2017-01-01 | End: 2017-01-01 | Stop reason: HOSPADM

## 2017-01-01 RX ORDER — GLIPIZIDE 10 MG/1
10 TABLET ORAL
Status: DISCONTINUED | OUTPATIENT
Start: 2017-01-01 | End: 2017-01-01 | Stop reason: HOSPADM

## 2017-01-01 RX ORDER — NICOTINE POLACRILEX 4 MG
15 LOZENGE BUCCAL PRN
Status: DISCONTINUED | OUTPATIENT
Start: 2017-01-01 | End: 2017-01-01 | Stop reason: HOSPADM

## 2017-01-01 RX ORDER — GLIPIZIDE 5 MG/1
5 TABLET ORAL DAILY
Qty: 30 TABLET | Refills: 2 | Status: SHIPPED | OUTPATIENT
Start: 2017-01-01 | End: 2017-01-01

## 2017-01-01 RX ORDER — ALBUTEROL SULFATE 2.5 MG/3ML
2.5 SOLUTION RESPIRATORY (INHALATION) EVERY 4 HOURS PRN
Status: DISCONTINUED | OUTPATIENT
Start: 2017-01-01 | End: 2017-01-01 | Stop reason: HOSPADM

## 2017-01-01 RX ORDER — ASPIRIN 81 MG/1
81 TABLET, CHEWABLE ORAL DAILY
Status: DISCONTINUED | OUTPATIENT
Start: 2017-01-01 | End: 2017-01-01 | Stop reason: HOSPADM

## 2017-01-01 RX ORDER — PANTOPRAZOLE SODIUM 40 MG/1
40 TABLET, DELAYED RELEASE ORAL
Qty: 30 TABLET | Refills: 5 | Status: SHIPPED | OUTPATIENT
Start: 2017-01-01

## 2017-01-01 RX ORDER — CLONIDINE HYDROCHLORIDE 0.1 MG/1
0.1 TABLET ORAL 2 TIMES DAILY
Status: DISCONTINUED | OUTPATIENT
Start: 2017-01-01 | End: 2017-01-01 | Stop reason: HOSPADM

## 2017-01-01 RX ORDER — GABAPENTIN 300 MG/1
300 CAPSULE ORAL 3 TIMES DAILY
Status: DISCONTINUED | OUTPATIENT
Start: 2017-01-01 | End: 2017-01-01 | Stop reason: HOSPADM

## 2017-01-01 RX ORDER — PREDNISONE 20 MG/1
20 TABLET ORAL DAILY
Status: DISCONTINUED | OUTPATIENT
Start: 2017-01-01 | End: 2017-01-01

## 2017-01-01 RX ORDER — FENTANYL CITRATE 50 UG/ML
INJECTION, SOLUTION INTRAMUSCULAR; INTRAVENOUS
Status: COMPLETED | OUTPATIENT
Start: 2017-01-01 | End: 2017-01-01

## 2017-01-01 RX ORDER — METOLAZONE 2.5 MG/1
2.5 TABLET ORAL DAILY
Refills: 0 | Status: ON HOLD | DISCHARGE
Start: 2017-01-01 | End: 2017-01-01 | Stop reason: HOSPADM

## 2017-01-01 RX ORDER — ACETAMINOPHEN 325 MG/1
650 TABLET ORAL EVERY 4 HOURS PRN
Status: DISCONTINUED | OUTPATIENT
Start: 2017-01-01 | End: 2017-01-01 | Stop reason: HOSPADM

## 2017-01-01 RX ORDER — INSULIN GLARGINE 100 [IU]/ML
15 INJECTION, SOLUTION SUBCUTANEOUS NIGHTLY
Qty: 1 VIAL | Refills: 0 | DISCHARGE
Start: 2017-01-01 | End: 2017-01-01 | Stop reason: ALTCHOICE

## 2017-01-01 RX ORDER — LEVOFLOXACIN 500 MG/1
250 TABLET, FILM COATED ORAL DAILY
Status: DISCONTINUED | OUTPATIENT
Start: 2017-01-01 | End: 2017-01-01 | Stop reason: HOSPADM

## 2017-01-01 RX ORDER — MORPHINE SULFATE 1 MG/ML
4 INJECTION, SOLUTION EPIDURAL; INTRATHECAL; INTRAVENOUS EVERY 5 MIN PRN
Status: DISCONTINUED | OUTPATIENT
Start: 2017-01-01 | End: 2017-01-01 | Stop reason: HOSPADM

## 2017-01-01 RX ORDER — LORAZEPAM 0.5 MG/1
0.5 TABLET ORAL 2 TIMES DAILY PRN
Status: DISCONTINUED | OUTPATIENT
Start: 2017-01-01 | End: 2017-01-01 | Stop reason: HOSPADM

## 2017-01-01 RX ORDER — DIPHENHYDRAMINE HCL 25 MG
25 TABLET ORAL EVERY 6 HOURS PRN
Status: DISCONTINUED | OUTPATIENT
Start: 2017-01-01 | End: 2017-01-01 | Stop reason: HOSPADM

## 2017-01-01 RX ORDER — SODIUM POLYSTYRENE SULFONATE 15 G/60ML
15 SUSPENSION ORAL; RECTAL ONCE
Status: DISCONTINUED | OUTPATIENT
Start: 2017-01-01 | End: 2017-01-01 | Stop reason: CLARIF

## 2017-01-01 RX ORDER — FLUCONAZOLE 100 MG/1
100 TABLET ORAL
Status: DISCONTINUED | OUTPATIENT
Start: 2017-01-01 | End: 2017-01-01 | Stop reason: HOSPADM

## 2017-01-01 RX ORDER — OMEGA-3S/DHA/EPA/FISH OIL/D3 300MG-1000
400 CAPSULE ORAL DAILY
Status: DISCONTINUED | OUTPATIENT
Start: 2017-01-01 | End: 2017-01-01 | Stop reason: HOSPADM

## 2017-01-01 RX ORDER — GLIPIZIDE 5 MG/1
5 TABLET ORAL DAILY
Qty: 30 TABLET | Refills: 2 | Status: SHIPPED | OUTPATIENT
Start: 2017-01-01 | End: 2017-01-01 | Stop reason: SDUPTHER

## 2017-01-01 RX ORDER — ONDANSETRON 2 MG/ML
4 INJECTION INTRAMUSCULAR; INTRAVENOUS ONCE
Status: COMPLETED | OUTPATIENT
Start: 2017-01-01 | End: 2017-01-01

## 2017-01-01 RX ORDER — ONDANSETRON 2 MG/ML
4 INJECTION INTRAMUSCULAR; INTRAVENOUS EVERY 4 HOURS PRN
Status: DISCONTINUED | OUTPATIENT
Start: 2017-01-01 | End: 2017-01-01 | Stop reason: HOSPADM

## 2017-01-01 RX ORDER — LORAZEPAM 0.5 MG/1
0.5 TABLET ORAL 2 TIMES DAILY PRN
Qty: 60 TABLET | Refills: 0 | Status: SHIPPED | OUTPATIENT
Start: 2017-01-01 | End: 2017-01-01 | Stop reason: CLARIF

## 2017-01-01 RX ORDER — SODIUM CHLORIDE 9 MG/ML
INJECTION, SOLUTION INTRAVENOUS CONTINUOUS
Status: ACTIVE | OUTPATIENT
Start: 2017-01-01 | End: 2017-01-01

## 2017-01-01 RX ORDER — SODIUM CHLORIDE 450 MG/100ML
INJECTION, SOLUTION INTRAVENOUS CONTINUOUS
Status: ACTIVE | OUTPATIENT
Start: 2017-01-01 | End: 2017-01-01

## 2017-01-01 RX ORDER — HYDROCODONE BITARTRATE AND ACETAMINOPHEN 7.5; 325 MG/1; MG/1
1 TABLET ORAL EVERY 6 HOURS PRN
Status: ON HOLD | COMMUNITY
End: 2017-01-01

## 2017-01-01 RX ORDER — CLINDAMYCIN PHOSPHATE 600 MG/50ML
600 INJECTION INTRAVENOUS ONCE
Status: COMPLETED | OUTPATIENT
Start: 2017-01-01 | End: 2017-01-01

## 2017-01-01 RX ORDER — IPRATROPIUM BROMIDE AND ALBUTEROL SULFATE 2.5; .5 MG/3ML; MG/3ML
1 SOLUTION RESPIRATORY (INHALATION) ONCE
Status: COMPLETED | OUTPATIENT
Start: 2017-01-01 | End: 2017-01-01

## 2017-01-01 RX ORDER — SODIUM POLYSTYRENE SULFONATE 15 G/60ML
30 SUSPENSION ORAL; RECTAL ONCE
Status: COMPLETED | OUTPATIENT
Start: 2017-01-01 | End: 2017-01-01

## 2017-01-01 RX ORDER — GLIPIZIDE 10 MG/1
10 TABLET ORAL
Status: DISCONTINUED | OUTPATIENT
Start: 2017-01-01 | End: 2017-01-01

## 2017-01-01 RX ORDER — LEVOFLOXACIN 250 MG/1
250 TABLET ORAL DAILY
Qty: 5 TABLET | Refills: 0 | Status: SHIPPED | OUTPATIENT
Start: 2017-01-01 | End: 2017-01-01

## 2017-01-01 RX ORDER — PREDNISONE 20 MG/1
20 TABLET ORAL
Status: DISCONTINUED | OUTPATIENT
Start: 2017-01-01 | End: 2017-01-01

## 2017-01-01 RX ORDER — HYDROCODONE BITARTRATE AND ACETAMINOPHEN 7.5; 325 MG/1; MG/1
1 TABLET ORAL EVERY 6 HOURS PRN
Status: SHIPPED | OUTPATIENT
Start: 2017-01-01 | End: 2017-01-01 | Stop reason: SDUPTHER

## 2017-01-01 RX ORDER — BUMETANIDE 0.25 MG/ML
1 INJECTION, SOLUTION INTRAMUSCULAR; INTRAVENOUS 2 TIMES DAILY
Status: DISCONTINUED | OUTPATIENT
Start: 2017-01-01 | End: 2017-01-01

## 2017-01-01 RX ORDER — LORAZEPAM 0.5 MG/1
0.5 TABLET ORAL 2 TIMES DAILY
Qty: 60 TABLET | Refills: 0 | Status: SHIPPED | OUTPATIENT
Start: 2017-01-01 | End: 2017-01-01 | Stop reason: SDUPTHER

## 2017-01-01 RX ORDER — LISINOPRIL 5 MG/1
5 TABLET ORAL DAILY
Status: DISCONTINUED | OUTPATIENT
Start: 2017-01-01 | End: 2017-01-01 | Stop reason: HOSPADM

## 2017-01-01 RX ORDER — DIPHENHYDRAMINE HYDROCHLORIDE 50 MG/ML
12.5 INJECTION INTRAMUSCULAR; INTRAVENOUS
Status: DISCONTINUED | OUTPATIENT
Start: 2017-01-01 | End: 2017-01-01 | Stop reason: HOSPADM

## 2017-01-01 RX ORDER — 0.9 % SODIUM CHLORIDE 0.9 %
250 INTRAVENOUS SOLUTION INTRAVENOUS ONCE
Status: COMPLETED | OUTPATIENT
Start: 2017-01-01 | End: 2017-01-01

## 2017-01-01 RX ORDER — MAGNESIUM SULFATE 1 G/100ML
1 INJECTION INTRAVENOUS PRN
Status: DISCONTINUED | OUTPATIENT
Start: 2017-01-01 | End: 2017-01-01 | Stop reason: HOSPADM

## 2017-01-01 RX ORDER — LABETALOL HYDROCHLORIDE 5 MG/ML
5 INJECTION, SOLUTION INTRAVENOUS EVERY 10 MIN PRN
Status: DISCONTINUED | OUTPATIENT
Start: 2017-01-01 | End: 2017-01-01 | Stop reason: HOSPADM

## 2017-01-01 RX ORDER — METRONIDAZOLE 500 MG/1
500 TABLET ORAL 3 TIMES DAILY
Qty: 21 TABLET | Refills: 0 | Status: SHIPPED | OUTPATIENT
Start: 2017-01-01 | End: 2017-01-01

## 2017-01-01 RX ORDER — HYDROCODONE BITARTRATE AND ACETAMINOPHEN 7.5; 325 MG/1; MG/1
1 TABLET ORAL EVERY 6 HOURS PRN
Qty: 120 TABLET | Refills: 0 | Status: ON HOLD | OUTPATIENT
Start: 2017-01-01 | End: 2017-01-01 | Stop reason: HOSPADM

## 2017-01-01 RX ORDER — BISACODYL 10 MG
10 SUPPOSITORY, RECTAL RECTAL ONCE
Status: COMPLETED | OUTPATIENT
Start: 2017-01-01 | End: 2017-01-01

## 2017-01-01 RX ORDER — MIDAZOLAM HYDROCHLORIDE 1 MG/ML
2 INJECTION INTRAMUSCULAR; INTRAVENOUS
Status: DISCONTINUED | OUTPATIENT
Start: 2017-01-01 | End: 2017-01-01 | Stop reason: HOSPADM

## 2017-01-01 RX ORDER — ALBUTEROL SULFATE 2.5 MG/3ML
2.5 SOLUTION RESPIRATORY (INHALATION) EVERY 4 HOURS PRN
Status: DISCONTINUED | OUTPATIENT
Start: 2017-01-01 | End: 2017-01-01

## 2017-01-01 RX ORDER — SODIUM POLYSTYRENE SULFONATE 15 G/60ML
15 SUSPENSION ORAL; RECTAL ONCE
Status: COMPLETED | OUTPATIENT
Start: 2017-01-01 | End: 2017-01-01

## 2017-01-01 RX ORDER — HYDROCODONE BITARTRATE AND ACETAMINOPHEN 7.5; 325 MG/1; MG/1
TABLET ORAL
Qty: 100 TABLET | Refills: 0 | Status: SHIPPED | OUTPATIENT
Start: 2017-01-01 | End: 2017-01-01 | Stop reason: SDUPTHER

## 2017-01-01 RX ORDER — HYDROCODONE BITARTRATE AND ACETAMINOPHEN 7.5; 325 MG/1; MG/1
1 TABLET ORAL EVERY 6 HOURS PRN
Qty: 100 TABLET | Refills: 0 | Status: ON HOLD | OUTPATIENT
Start: 2017-01-01 | End: 2017-01-01 | Stop reason: SDUPTHER

## 2017-01-01 RX ORDER — INSULIN GLARGINE 100 [IU]/ML
10 INJECTION, SOLUTION SUBCUTANEOUS NIGHTLY
Status: DISCONTINUED | OUTPATIENT
Start: 2017-01-01 | End: 2017-01-01 | Stop reason: HOSPADM

## 2017-01-01 RX ORDER — MIDODRINE HYDROCHLORIDE 5 MG/1
5 TABLET ORAL
Qty: 90 TABLET | Refills: 0 | DISCHARGE
Start: 2017-01-01 | End: 2017-01-01 | Stop reason: SDUPTHER

## 2017-01-01 RX ORDER — BUMETANIDE 1 MG/1
1 TABLET ORAL DAILY
Qty: 30 TABLET | Refills: 0 | Status: ON HOLD | DISCHARGE
Start: 2017-01-01 | End: 2017-01-01 | Stop reason: HOSPADM

## 2017-01-01 RX ORDER — SENNA PLUS 8.6 MG/1
1 TABLET ORAL NIGHTLY
Status: DISCONTINUED | OUTPATIENT
Start: 2017-01-01 | End: 2017-01-01 | Stop reason: HOSPADM

## 2017-01-01 RX ORDER — ASPIRIN 81 MG/1
81 TABLET ORAL ONCE
Status: CANCELLED | OUTPATIENT
Start: 2017-01-01 | End: 2017-01-01

## 2017-01-01 RX ORDER — METHYLPREDNISOLONE SODIUM SUCCINATE 40 MG/ML
20 INJECTION, POWDER, LYOPHILIZED, FOR SOLUTION INTRAMUSCULAR; INTRAVENOUS EVERY 12 HOURS
Status: DISCONTINUED | OUTPATIENT
Start: 2017-01-01 | End: 2017-01-01 | Stop reason: HOSPADM

## 2017-01-01 RX ORDER — HEPARIN SODIUM 1000 [USP'U]/ML
INJECTION, SOLUTION INTRAVENOUS; SUBCUTANEOUS PRN
Status: DISCONTINUED | OUTPATIENT
Start: 2017-01-01 | End: 2017-01-01 | Stop reason: SDUPTHER

## 2017-01-01 RX ORDER — SUCCINYLCHOLINE CHLORIDE 20 MG/ML
INJECTION INTRAMUSCULAR; INTRAVENOUS PRN
Status: DISCONTINUED | OUTPATIENT
Start: 2017-01-01 | End: 2017-01-01 | Stop reason: SDUPTHER

## 2017-01-01 RX ORDER — NITROGLYCERIN 0.4 MG/1
0.4 TABLET SUBLINGUAL EVERY 5 MIN PRN
Status: DISCONTINUED | OUTPATIENT
Start: 2017-01-01 | End: 2017-01-01 | Stop reason: HOSPADM

## 2017-01-01 RX ORDER — HEPARIN SODIUM 5000 [USP'U]/ML
5000 INJECTION, SOLUTION INTRAVENOUS; SUBCUTANEOUS EVERY 8 HOURS SCHEDULED
Status: DISCONTINUED | OUTPATIENT
Start: 2017-01-01 | End: 2017-01-01 | Stop reason: HOSPADM

## 2017-01-01 RX ORDER — HEPARIN SODIUM 5000 [USP'U]/ML
5000 INJECTION, SOLUTION INTRAVENOUS; SUBCUTANEOUS EVERY 8 HOURS SCHEDULED
Status: DISCONTINUED | OUTPATIENT
Start: 2017-01-01 | End: 2017-01-01

## 2017-01-01 RX ORDER — ALBUMIN, HUMAN INJ 5% 5 %
3250 SOLUTION INTRAVENOUS
Status: DISCONTINUED | OUTPATIENT
Start: 2017-01-01 | End: 2017-01-01 | Stop reason: HOSPADM

## 2017-01-01 RX ORDER — NALOXONE HYDROCHLORIDE 0.4 MG/ML
INJECTION, SOLUTION INTRAMUSCULAR; INTRAVENOUS; SUBCUTANEOUS
Status: COMPLETED
Start: 2017-01-01 | End: 2017-01-01

## 2017-01-01 RX ORDER — CALCIUM GLUCONATE 94 MG/ML
50 INJECTION, SOLUTION INTRAVENOUS
Status: DISCONTINUED | OUTPATIENT
Start: 2017-01-01 | End: 2017-01-01 | Stop reason: HOSPADM

## 2017-01-01 RX ORDER — EPINEPHRINE 1 MG/ML
INJECTION, SOLUTION, CONCENTRATE INTRAVENOUS PRN
Status: DISCONTINUED | OUTPATIENT
Start: 2017-01-01 | End: 2017-01-01 | Stop reason: SDUPTHER

## 2017-01-01 RX ORDER — PROMETHAZINE HYDROCHLORIDE 25 MG/1
12.5 TABLET ORAL EVERY 6 HOURS PRN
Status: DISCONTINUED | OUTPATIENT
Start: 2017-01-01 | End: 2017-01-01 | Stop reason: HOSPADM

## 2017-01-01 RX ORDER — ALBUTEROL SULFATE 2.5 MG/3ML
2.5 SOLUTION RESPIRATORY (INHALATION) 4 TIMES DAILY
Status: DISCONTINUED | OUTPATIENT
Start: 2017-01-01 | End: 2017-01-01

## 2017-01-01 RX ORDER — HEPARIN SODIUM 1000 [USP'U]/ML
1800 INJECTION, SOLUTION INTRAVENOUS; SUBCUTANEOUS
Status: DISCONTINUED | OUTPATIENT
Start: 2017-01-01 | End: 2017-01-01 | Stop reason: HOSPADM

## 2017-01-01 RX ORDER — CIPROFLOXACIN 250 MG/1
250 TABLET, FILM COATED ORAL 2 TIMES DAILY
Qty: 14 TABLET | Refills: 0 | Status: SHIPPED | OUTPATIENT
Start: 2017-01-01 | End: 2017-01-01

## 2017-01-01 RX ORDER — IPRATROPIUM BROMIDE AND ALBUTEROL SULFATE 2.5; .5 MG/3ML; MG/3ML
1 SOLUTION RESPIRATORY (INHALATION)
Status: DISCONTINUED | OUTPATIENT
Start: 2017-01-01 | End: 2017-01-01 | Stop reason: HOSPADM

## 2017-01-01 RX ORDER — ALBUTEROL SULFATE 2.5 MG/3ML
2.5 SOLUTION RESPIRATORY (INHALATION) ONCE
Status: COMPLETED | OUTPATIENT
Start: 2017-01-01 | End: 2017-01-01

## 2017-01-01 RX ORDER — METOPROLOL TARTRATE 50 MG/1
100 TABLET, FILM COATED ORAL 2 TIMES DAILY
Status: DISCONTINUED | OUTPATIENT
Start: 2017-01-01 | End: 2017-01-01 | Stop reason: HOSPADM

## 2017-01-01 RX ORDER — DOCUSATE SODIUM 100 MG/1
100 CAPSULE, LIQUID FILLED ORAL DAILY
Status: DISCONTINUED | OUTPATIENT
Start: 2017-01-01 | End: 2017-01-01

## 2017-01-01 RX ORDER — GABAPENTIN 100 MG/1
100 CAPSULE ORAL 3 TIMES DAILY
Status: DISCONTINUED | OUTPATIENT
Start: 2017-01-01 | End: 2017-01-01 | Stop reason: HOSPADM

## 2017-01-01 RX ORDER — BUMETANIDE 0.25 MG/ML
1 INJECTION, SOLUTION INTRAMUSCULAR; INTRAVENOUS EVERY 12 HOURS
Status: DISCONTINUED | OUTPATIENT
Start: 2017-01-01 | End: 2017-01-01

## 2017-01-01 RX ORDER — MIDAZOLAM HYDROCHLORIDE 1 MG/ML
INJECTION INTRAMUSCULAR; INTRAVENOUS
Status: DISCONTINUED
Start: 2017-01-01 | End: 2017-01-01 | Stop reason: WASHOUT

## 2017-01-01 RX ORDER — ALLOPURINOL 100 MG/1
100 TABLET ORAL DAILY
Status: DISCONTINUED | OUTPATIENT
Start: 2017-01-01 | End: 2017-01-01

## 2017-01-01 RX ORDER — ROPIVACAINE HYDROCHLORIDE 5 MG/ML
INJECTION, SOLUTION EPIDURAL; INFILTRATION; PERINEURAL PRN
Status: DISCONTINUED | OUTPATIENT
Start: 2017-01-01 | End: 2017-01-01 | Stop reason: SDUPTHER

## 2017-01-01 RX ORDER — DOCUSATE SODIUM 100 MG/1
200 CAPSULE, LIQUID FILLED ORAL 2 TIMES DAILY PRN
Status: DISCONTINUED | OUTPATIENT
Start: 2017-01-01 | End: 2017-01-01 | Stop reason: HOSPADM

## 2017-01-01 RX ORDER — HEPARIN SODIUM 1000 [USP'U]/ML
2000 INJECTION, SOLUTION INTRAVENOUS; SUBCUTANEOUS PRN
Status: DISCONTINUED | OUTPATIENT
Start: 2017-01-01 | End: 2017-01-01 | Stop reason: HOSPADM

## 2017-01-01 RX ORDER — METOLAZONE 5 MG/1
2.5 TABLET ORAL DAILY
Status: DISCONTINUED | OUTPATIENT
Start: 2017-01-01 | End: 2017-01-01 | Stop reason: HOSPADM

## 2017-01-01 RX ORDER — VANCOMYCIN HYDROCHLORIDE 1 G/200ML
1000 INJECTION, SOLUTION INTRAVENOUS
Status: DISPENSED | OUTPATIENT
Start: 2017-01-01 | End: 2017-01-01

## 2017-01-01 RX ORDER — GLIPIZIDE 10 MG/1
5 TABLET ORAL DAILY
Qty: 30 TABLET | Refills: 5 | Status: ON HOLD | OUTPATIENT
Start: 2017-01-01 | End: 2017-01-01 | Stop reason: HOSPADM

## 2017-01-01 RX ORDER — LORAZEPAM 0.5 MG/1
TABLET ORAL
Qty: 60 TABLET | Refills: 0 | Status: SHIPPED | OUTPATIENT
Start: 2017-01-01 | End: 2017-01-01 | Stop reason: SDUPTHER

## 2017-01-01 RX ORDER — MIDODRINE HYDROCHLORIDE 2.5 MG/1
5 TABLET ORAL
Status: DISCONTINUED | OUTPATIENT
Start: 2017-01-01 | End: 2017-01-01 | Stop reason: HOSPADM

## 2017-01-01 RX ORDER — HYDROCODONE BITARTRATE AND ACETAMINOPHEN 7.5; 325 MG/1; MG/1
1 TABLET ORAL EVERY 4 HOURS PRN
Status: DISCONTINUED | OUTPATIENT
Start: 2017-01-01 | End: 2017-01-01 | Stop reason: HOSPADM

## 2017-01-01 RX ORDER — METOPROLOL TARTRATE 50 MG/1
50 TABLET, FILM COATED ORAL 2 TIMES DAILY
Qty: 60 TABLET | Refills: 11 | Status: SHIPPED | OUTPATIENT
Start: 2017-01-01

## 2017-01-01 RX ORDER — MIDODRINE HYDROCHLORIDE 5 MG/1
5 TABLET ORAL
Qty: 90 TABLET | Refills: 3 | Status: SHIPPED | OUTPATIENT
Start: 2017-01-01 | End: 2017-01-01 | Stop reason: SDUPTHER

## 2017-01-01 RX ORDER — METHYLPREDNISOLONE SODIUM SUCCINATE 40 MG/ML
40 INJECTION, POWDER, LYOPHILIZED, FOR SOLUTION INTRAMUSCULAR; INTRAVENOUS ONCE
Status: DISCONTINUED | OUTPATIENT
Start: 2017-01-01 | End: 2017-01-01

## 2017-01-01 RX ORDER — VANCOMYCIN HYDROCHLORIDE 1 G/200ML
1000 INJECTION, SOLUTION INTRAVENOUS
Status: CANCELLED | OUTPATIENT
Start: 2017-01-01 | End: 2017-01-01

## 2017-01-01 RX ORDER — SUCRALFATE ORAL 1 G/10ML
1 SUSPENSION ORAL
Status: DISCONTINUED | OUTPATIENT
Start: 2017-01-01 | End: 2017-01-01 | Stop reason: HOSPADM

## 2017-01-01 RX ORDER — METHYLPREDNISOLONE ACETATE 40 MG/ML
40 INJECTION, SUSPENSION INTRA-ARTICULAR; INTRALESIONAL; INTRAMUSCULAR; SOFT TISSUE ONCE
Status: COMPLETED | OUTPATIENT
Start: 2017-01-01 | End: 2017-01-01

## 2017-01-01 RX ORDER — DEXTROSE MONOHYDRATE 25 G/50ML
12.5 INJECTION, SOLUTION INTRAVENOUS ONCE
Status: COMPLETED | OUTPATIENT
Start: 2017-01-01 | End: 2017-01-01

## 2017-01-01 RX ORDER — ALBUMIN, HUMAN INJ 5% 5 %
12.5 SOLUTION INTRAVENOUS ONCE
Status: COMPLETED | OUTPATIENT
Start: 2017-01-01 | End: 2017-01-01

## 2017-01-01 RX ORDER — SUCRALFATE ORAL 1 G/10ML
1 SUSPENSION ORAL
Qty: 1200 ML | Refills: 0 | DISCHARGE
Start: 2017-01-01 | End: 2017-01-01 | Stop reason: CLARIF

## 2017-01-01 RX ORDER — HEPARIN SODIUM 1000 [USP'U]/ML
INJECTION, SOLUTION INTRAVENOUS; SUBCUTANEOUS
Status: COMPLETED | OUTPATIENT
Start: 2017-01-01 | End: 2017-01-01

## 2017-01-01 RX ORDER — POLYETHYLENE GLYCOL 3350 17 G/17G
17 POWDER, FOR SOLUTION ORAL 2 TIMES DAILY
Status: DISCONTINUED | OUTPATIENT
Start: 2017-01-01 | End: 2017-01-01 | Stop reason: HOSPADM

## 2017-01-01 RX ORDER — SODIUM CHLORIDE 9 MG/ML
INJECTION, SOLUTION INTRAVENOUS CONTINUOUS
Status: DISCONTINUED | OUTPATIENT
Start: 2017-01-01 | End: 2017-01-01 | Stop reason: SDUPTHER

## 2017-01-01 RX ORDER — METOPROLOL TARTRATE 50 MG/1
50 TABLET, FILM COATED ORAL 2 TIMES DAILY
Qty: 60 TABLET | Refills: 0 | Status: SHIPPED | OUTPATIENT
Start: 2017-01-01 | End: 2017-01-01 | Stop reason: SDUPTHER

## 2017-01-01 RX ORDER — INSULIN GLARGINE 100 [IU]/ML
20 INJECTION, SOLUTION SUBCUTANEOUS 2 TIMES DAILY
Status: DISCONTINUED | OUTPATIENT
Start: 2017-01-01 | End: 2017-01-01 | Stop reason: HOSPADM

## 2017-01-01 RX ORDER — HYDRALAZINE HYDROCHLORIDE 20 MG/ML
5 INJECTION INTRAMUSCULAR; INTRAVENOUS EVERY 10 MIN PRN
Status: DISCONTINUED | OUTPATIENT
Start: 2017-01-01 | End: 2017-01-01 | Stop reason: HOSPADM

## 2017-01-01 RX ORDER — DEXTROSE MONOHYDRATE 50 MG/ML
100 INJECTION, SOLUTION INTRAVENOUS PRN
Status: DISCONTINUED | OUTPATIENT
Start: 2017-01-01 | End: 2017-01-01

## 2017-01-01 RX ORDER — BUMETANIDE 1 MG/1
2 TABLET ORAL DAILY
Status: DISCONTINUED | OUTPATIENT
Start: 2017-01-01 | End: 2017-01-01 | Stop reason: HOSPADM

## 2017-01-01 RX ORDER — BUMETANIDE 1 MG/1
1 TABLET ORAL DAILY
Status: DISCONTINUED | OUTPATIENT
Start: 2017-01-01 | End: 2017-01-01

## 2017-01-01 RX ORDER — LORAZEPAM 0.5 MG/1
TABLET ORAL
Qty: 60 TABLET | Refills: 0 | Status: CANCELLED | OUTPATIENT
Start: 2017-01-01

## 2017-01-01 RX ORDER — ONDANSETRON 4 MG/1
4 TABLET, ORALLY DISINTEGRATING ORAL EVERY 8 HOURS PRN
COMMUNITY
End: 2017-01-01 | Stop reason: SDUPTHER

## 2017-01-01 RX ORDER — CHLORHEXIDINE GLUCONATE 4 G/100ML
SOLUTION TOPICAL ONCE
Status: COMPLETED | OUTPATIENT
Start: 2017-01-01 | End: 2017-01-01

## 2017-01-01 RX ORDER — 0.9 % SODIUM CHLORIDE 0.9 %
1000 INTRAVENOUS SOLUTION INTRAVENOUS ONCE
Status: DISCONTINUED | OUTPATIENT
Start: 2017-01-01 | End: 2017-01-01

## 2017-01-01 RX ORDER — CHOLECALCIFEROL (VITAMIN D3) 10 MCG
1 TABLET ORAL DAILY
Status: DISCONTINUED | OUTPATIENT
Start: 2017-01-01 | End: 2017-01-01 | Stop reason: HOSPADM

## 2017-01-01 RX ORDER — LORAZEPAM 0.5 MG/1
0.5 TABLET ORAL 2 TIMES DAILY PRN
Qty: 60 TABLET | Refills: 2 | Status: SHIPPED | OUTPATIENT
Start: 2017-01-01

## 2017-01-01 RX ORDER — INSULIN GLARGINE 100 [IU]/ML
0.15 INJECTION, SOLUTION SUBCUTANEOUS NIGHTLY
Status: DISCONTINUED | OUTPATIENT
Start: 2017-01-01 | End: 2017-01-01

## 2017-01-01 RX ORDER — BUPIVACAINE HYDROCHLORIDE AND EPINEPHRINE 5; 5 MG/ML; UG/ML
INJECTION, SOLUTION PERINEURAL
Status: COMPLETED | OUTPATIENT
Start: 2017-01-01 | End: 2017-01-01

## 2017-01-01 RX ORDER — LEVOFLOXACIN 500 MG/1
250 TABLET, FILM COATED ORAL EVERY 24 HOURS
Status: DISCONTINUED | OUTPATIENT
Start: 2017-01-01 | End: 2017-01-01

## 2017-01-01 RX ORDER — POLYETHYLENE GLYCOL 3350 17 G/17G
17 POWDER, FOR SOLUTION ORAL DAILY
Status: DISCONTINUED | OUTPATIENT
Start: 2017-01-01 | End: 2017-01-01

## 2017-01-01 RX ORDER — LEVOFLOXACIN 500 MG/1
500 TABLET, FILM COATED ORAL ONCE
Status: COMPLETED | OUTPATIENT
Start: 2017-01-01 | End: 2017-01-01

## 2017-01-01 RX ORDER — SODIUM CHLORIDE 450 MG/100ML
INJECTION, SOLUTION INTRAVENOUS CONTINUOUS
Status: DISCONTINUED | OUTPATIENT
Start: 2017-01-01 | End: 2017-01-01 | Stop reason: HOSPADM

## 2017-01-01 RX ORDER — SCOLOPAMINE TRANSDERMAL SYSTEM 1 MG/1
1 PATCH, EXTENDED RELEASE TRANSDERMAL
Status: DISCONTINUED | OUTPATIENT
Start: 2017-01-01 | End: 2017-01-01 | Stop reason: HOSPADM

## 2017-01-01 RX ORDER — LEVOFLOXACIN 5 MG/ML
750 INJECTION, SOLUTION INTRAVENOUS ONCE
Status: COMPLETED | OUTPATIENT
Start: 2017-01-01 | End: 2017-01-01

## 2017-01-01 RX ORDER — CLONIDINE HYDROCHLORIDE 0.2 MG/1
0.2 TABLET ORAL 2 TIMES DAILY
Status: DISCONTINUED | OUTPATIENT
Start: 2017-01-01 | End: 2017-01-01

## 2017-01-01 RX ORDER — HEPARIN SODIUM 1000 [USP'U]/ML
1000 INJECTION, SOLUTION INTRAVENOUS; SUBCUTANEOUS ONCE
Status: DISCONTINUED | OUTPATIENT
Start: 2017-01-01 | End: 2017-01-01 | Stop reason: HOSPADM

## 2017-01-01 RX ORDER — LORAZEPAM 0.5 MG/1
0.5 TABLET ORAL 2 TIMES DAILY
COMMUNITY
End: 2017-01-01 | Stop reason: SDUPTHER

## 2017-01-01 RX ORDER — HYDROCODONE BITARTRATE AND ACETAMINOPHEN 7.5; 325 MG/1; MG/1
1 TABLET ORAL EVERY 6 HOURS PRN
Qty: 110 TABLET | Refills: 0 | Status: SHIPPED | OUTPATIENT
Start: 2017-01-01 | End: 2017-01-01

## 2017-01-01 RX ORDER — HYDROCODONE BITARTRATE AND ACETAMINOPHEN 7.5; 325 MG/1; MG/1
1 TABLET ORAL EVERY 6 HOURS PRN
Qty: 100 TABLET | Refills: 0 | Status: SHIPPED | OUTPATIENT
Start: 2017-01-01 | End: 2017-01-01 | Stop reason: SDUPTHER

## 2017-01-01 RX ORDER — GLIPIZIDE 5 MG/1
5 TABLET ORAL DAILY
Status: DISCONTINUED | OUTPATIENT
Start: 2017-01-01 | End: 2017-01-01 | Stop reason: HOSPADM

## 2017-01-01 RX ORDER — LEVOTHYROXINE SODIUM 0.1 MG/1
100 TABLET ORAL DAILY
Qty: 30 TABLET | Refills: 5 | Status: SHIPPED | OUTPATIENT
Start: 2017-01-01

## 2017-01-01 RX ORDER — CALCIUM CHLORIDE 100 MG/ML
1 INJECTION INTRAVENOUS; INTRAVENTRICULAR ONCE
Status: COMPLETED | OUTPATIENT
Start: 2017-01-01 | End: 2017-01-01

## 2017-01-01 RX ORDER — AZITHROMYCIN 250 MG/1
500 TABLET, FILM COATED ORAL DAILY
Status: DISCONTINUED | OUTPATIENT
Start: 2017-01-01 | End: 2017-01-01

## 2017-01-01 RX ORDER — LISINOPRIL 5 MG/1
5 TABLET ORAL DAILY
Status: DISCONTINUED | OUTPATIENT
Start: 2017-01-01 | End: 2017-01-01

## 2017-01-01 RX ORDER — ALBUTEROL SULFATE 2.5 MG/3ML
2.5 SOLUTION RESPIRATORY (INHALATION) EVERY 4 HOURS PRN
Qty: 120 EACH | Refills: 1 | Status: SHIPPED | OUTPATIENT
Start: 2017-01-01

## 2017-01-01 RX ORDER — HYDROCODONE BITARTRATE AND ACETAMINOPHEN 7.5; 325 MG/1; MG/1
1 TABLET ORAL EVERY 6 HOURS PRN
Qty: 100 TABLET | Refills: 0 | Status: SHIPPED | OUTPATIENT
Start: 2017-01-01

## 2017-01-01 RX ORDER — ONDANSETRON 2 MG/ML
INJECTION INTRAMUSCULAR; INTRAVENOUS PRN
Status: DISCONTINUED | OUTPATIENT
Start: 2017-01-01 | End: 2017-01-01 | Stop reason: SDUPTHER

## 2017-01-01 RX ORDER — SUCRALFATE 1 G/1
1 TABLET ORAL 4 TIMES DAILY
COMMUNITY
End: 2017-01-01 | Stop reason: SDUPTHER

## 2017-01-01 RX ORDER — ASPIRIN 81 MG/1
81 TABLET, CHEWABLE ORAL ONCE
Status: COMPLETED | OUTPATIENT
Start: 2017-01-01 | End: 2017-01-01

## 2017-01-01 RX ORDER — GLYCOPYRROLATE 0.2 MG/ML
INJECTION INTRAMUSCULAR; INTRAVENOUS PRN
Status: DISCONTINUED | OUTPATIENT
Start: 2017-01-01 | End: 2017-01-01 | Stop reason: SDUPTHER

## 2017-01-01 RX ORDER — INSULIN GLARGINE 100 [IU]/ML
30 INJECTION, SOLUTION SUBCUTANEOUS NIGHTLY
Status: DISCONTINUED | OUTPATIENT
Start: 2017-01-01 | End: 2017-01-01 | Stop reason: HOSPADM

## 2017-01-01 RX ORDER — POLYETHYLENE GLYCOL 3350 17 G/17G
17 POWDER, FOR SOLUTION ORAL DAILY PRN
COMMUNITY

## 2017-01-01 RX ORDER — METOLAZONE 2.5 MG/1
2.5 TABLET ORAL DAILY
Status: DISCONTINUED | OUTPATIENT
Start: 2017-01-01 | End: 2017-01-01

## 2017-01-01 RX ORDER — ISOSORBIDE MONONITRATE 30 MG/1
30 TABLET, EXTENDED RELEASE ORAL DAILY
Qty: 30 TABLET | Refills: 0 | Status: SHIPPED | OUTPATIENT
Start: 2017-01-01

## 2017-01-01 RX ORDER — 0.9 % SODIUM CHLORIDE 0.9 %
250 INTRAVENOUS SOLUTION INTRAVENOUS ONCE
Status: DISCONTINUED | OUTPATIENT
Start: 2017-01-01 | End: 2017-01-01

## 2017-01-01 RX ORDER — METOPROLOL TARTRATE 50 MG/1
50 TABLET, FILM COATED ORAL 2 TIMES DAILY
Status: DISCONTINUED | OUTPATIENT
Start: 2017-01-01 | End: 2017-01-01

## 2017-01-01 RX ORDER — HYDROCODONE BITARTRATE AND ACETAMINOPHEN 5; 325 MG/1; MG/1
1 TABLET ORAL EVERY 4 HOURS PRN
Status: DISCONTINUED | OUTPATIENT
Start: 2017-01-01 | End: 2017-01-01

## 2017-01-01 RX ORDER — SUCRALFATE 1 G/1
1 TABLET ORAL 4 TIMES DAILY
Status: DISCONTINUED | OUTPATIENT
Start: 2017-01-01 | End: 2017-01-01 | Stop reason: HOSPADM

## 2017-01-01 RX ORDER — METOPROLOL TARTRATE 50 MG/1
100 TABLET, FILM COATED ORAL 2 TIMES DAILY
Status: DISCONTINUED | OUTPATIENT
Start: 2017-01-01 | End: 2017-01-01

## 2017-01-01 RX ORDER — INSULIN GLARGINE 100 [IU]/ML
30 INJECTION, SOLUTION SUBCUTANEOUS NIGHTLY
Qty: 1 VIAL | Refills: 0 | DISCHARGE
Start: 2017-01-01 | End: 2017-01-01 | Stop reason: ALTCHOICE

## 2017-01-01 RX ORDER — 0.9 % SODIUM CHLORIDE 0.9 %
500 INTRAVENOUS SOLUTION INTRAVENOUS ONCE
Status: COMPLETED | OUTPATIENT
Start: 2017-01-01 | End: 2017-01-01

## 2017-01-01 RX ORDER — GABAPENTIN 300 MG/1
CAPSULE ORAL
Qty: 90 CAPSULE | Refills: 0 | OUTPATIENT
Start: 2017-01-01

## 2017-01-01 RX ORDER — FLUTICASONE PROPIONATE 50 MCG
1 SPRAY, SUSPENSION (ML) NASAL 2 TIMES DAILY
Status: DISCONTINUED | OUTPATIENT
Start: 2017-01-01 | End: 2017-01-01 | Stop reason: HOSPADM

## 2017-01-01 RX ORDER — INSULIN GLARGINE 100 [IU]/ML
15 INJECTION, SOLUTION SUBCUTANEOUS NIGHTLY
Status: DISCONTINUED | OUTPATIENT
Start: 2017-01-01 | End: 2017-01-01 | Stop reason: HOSPADM

## 2017-01-01 RX ORDER — HEPARIN SODIUM 1000 [USP'U]/ML
INJECTION, SOLUTION INTRAVENOUS; SUBCUTANEOUS
Status: DISCONTINUED
Start: 2017-01-01 | End: 2017-01-01 | Stop reason: HOSPADM

## 2017-01-01 RX ORDER — ERGOCALCIFEROL 1.25 MG/1
50000 CAPSULE ORAL WEEKLY
Status: DISCONTINUED | OUTPATIENT
Start: 2017-01-01 | End: 2017-01-01 | Stop reason: HOSPADM

## 2017-01-01 RX ORDER — FENTANYL CITRATE 50 UG/ML
INJECTION, SOLUTION INTRAMUSCULAR; INTRAVENOUS PRN
Status: DISCONTINUED | OUTPATIENT
Start: 2017-01-01 | End: 2017-01-01 | Stop reason: SDUPTHER

## 2017-01-01 RX ORDER — ATORVASTATIN CALCIUM 40 MG/1
40 TABLET, FILM COATED ORAL DAILY
Qty: 30 TABLET | Refills: 11 | Status: SHIPPED | OUTPATIENT
Start: 2017-01-01

## 2017-01-01 RX ORDER — CALCITRIOL 0.25 UG/1
0.25 CAPSULE, LIQUID FILLED ORAL
Qty: 30 CAPSULE | Refills: 3 | Status: ON HOLD | OUTPATIENT
Start: 2017-01-01 | End: 2017-01-01 | Stop reason: HOSPADM

## 2017-01-01 RX ORDER — LIDOCAINE HYDROCHLORIDE 10 MG/ML
1 INJECTION, SOLUTION EPIDURAL; INFILTRATION; INTRACAUDAL; PERINEURAL ONCE
Status: COMPLETED | OUTPATIENT
Start: 2017-01-01 | End: 2017-01-01

## 2017-01-01 RX ORDER — VANCOMYCIN HYDROCHLORIDE 1 G/200ML
1000 INJECTION, SOLUTION INTRAVENOUS
Status: COMPLETED | OUTPATIENT
Start: 2017-01-01 | End: 2017-01-01

## 2017-01-01 RX ORDER — PROMETHAZINE HYDROCHLORIDE 25 MG/1
25 TABLET ORAL EVERY 8 HOURS PRN
Qty: 60 TABLET | Refills: 1 | Status: CANCELLED | OUTPATIENT
Start: 2017-01-01 | End: 2017-01-01

## 2017-01-01 RX ORDER — LORAZEPAM 0.5 MG/1
0.5 TABLET ORAL 2 TIMES DAILY
Status: DISCONTINUED | OUTPATIENT
Start: 2017-01-01 | End: 2017-01-01 | Stop reason: HOSPADM

## 2017-01-01 RX ORDER — POLYETHYLENE GLYCOL 3350 17 G/17G
17 POWDER, FOR SOLUTION ORAL DAILY
Status: DISCONTINUED | OUTPATIENT
Start: 2017-01-01 | End: 2017-01-01 | Stop reason: HOSPADM

## 2017-01-01 RX ORDER — HYDROCODONE BITARTRATE AND ACETAMINOPHEN 7.5; 325 MG/1; MG/1
1 TABLET ORAL EVERY 6 HOURS PRN
Status: DISCONTINUED | OUTPATIENT
Start: 2017-01-01 | End: 2017-01-01 | Stop reason: SDUPTHER

## 2017-01-01 RX ORDER — BUMETANIDE 0.5 MG/1
0.5 TABLET ORAL DAILY
Qty: 30 TABLET | Refills: 3 | Status: ON HOLD | OUTPATIENT
Start: 2017-01-01 | End: 2017-01-01 | Stop reason: HOSPADM

## 2017-01-01 RX ORDER — LIDOCAINE HYDROCHLORIDE 20 MG/ML
INJECTION, SOLUTION INFILTRATION; PERINEURAL PRN
Status: DISCONTINUED | OUTPATIENT
Start: 2017-01-01 | End: 2017-01-01 | Stop reason: SDUPTHER

## 2017-01-01 RX ORDER — SUCRALFATE 1 G/1
1 TABLET ORAL 4 TIMES DAILY
Qty: 120 TABLET | Refills: 3 | Status: SHIPPED | OUTPATIENT
Start: 2017-01-01

## 2017-01-01 RX ORDER — METHYLPREDNISOLONE SODIUM SUCCINATE 40 MG/ML
40 INJECTION, POWDER, LYOPHILIZED, FOR SOLUTION INTRAMUSCULAR; INTRAVENOUS EVERY 12 HOURS
Status: DISCONTINUED | OUTPATIENT
Start: 2017-01-01 | End: 2017-01-01

## 2017-01-01 RX ORDER — FUROSEMIDE 10 MG/ML
80 INJECTION INTRAMUSCULAR; INTRAVENOUS ONCE
Status: COMPLETED | OUTPATIENT
Start: 2017-01-01 | End: 2017-01-01

## 2017-01-01 RX ORDER — BISACODYL 10 MG
10 SUPPOSITORY, RECTAL RECTAL DAILY PRN
Status: DISCONTINUED | OUTPATIENT
Start: 2017-01-01 | End: 2017-01-01

## 2017-01-01 RX ORDER — MIDODRINE HYDROCHLORIDE 10 MG/1
5 TABLET ORAL
Status: DISCONTINUED | OUTPATIENT
Start: 2017-01-01 | End: 2017-01-01 | Stop reason: HOSPADM

## 2017-01-01 RX ORDER — MIDODRINE HYDROCHLORIDE 5 MG/1
5 TABLET ORAL
Qty: 90 TABLET | Refills: 3 | Status: SHIPPED | OUTPATIENT
Start: 2017-01-01

## 2017-01-01 RX ORDER — HYDROCODONE BITARTRATE AND ACETAMINOPHEN 7.5; 325 MG/1; MG/1
1 TABLET ORAL EVERY 6 HOURS PRN
Qty: 20 TABLET | Refills: 0 | Status: SHIPPED | OUTPATIENT
Start: 2017-01-01 | End: 2017-01-01

## 2017-01-01 RX ORDER — OMEGA-3S/DHA/EPA/FISH OIL/D3 300MG-1000
400 CAPSULE ORAL DAILY
Status: DISCONTINUED | OUTPATIENT
Start: 2017-01-01 | End: 2017-01-01

## 2017-01-01 RX ORDER — BENZONATATE 100 MG/1
100 CAPSULE ORAL 3 TIMES DAILY PRN
Qty: 30 CAPSULE | Refills: 0 | Status: SHIPPED | OUTPATIENT
Start: 2017-01-01 | End: 2017-01-01

## 2017-01-01 RX ORDER — DOCUSATE SODIUM 100 MG/1
200 CAPSULE, LIQUID FILLED ORAL DAILY
Status: DISCONTINUED | OUTPATIENT
Start: 2017-01-01 | End: 2017-01-01 | Stop reason: HOSPADM

## 2017-01-01 RX ORDER — SUCRALFATE ORAL 1 G/10ML
1 SUSPENSION ORAL ONCE
Status: COMPLETED | OUTPATIENT
Start: 2017-01-01 | End: 2017-01-01

## 2017-01-01 RX ORDER — ASPIRIN 81 MG/1
81 TABLET ORAL ONCE
Status: DISCONTINUED | OUTPATIENT
Start: 2017-01-01 | End: 2017-01-01 | Stop reason: HOSPADM

## 2017-01-01 RX ORDER — METOPROLOL TARTRATE 50 MG/1
50 TABLET, FILM COATED ORAL 2 TIMES DAILY
Qty: 60 TABLET | Refills: 0 | DISCHARGE
Start: 2017-01-01 | End: 2017-01-01 | Stop reason: SDUPTHER

## 2017-01-01 RX ORDER — ONDANSETRON 4 MG/1
4 TABLET, ORALLY DISINTEGRATING ORAL EVERY 8 HOURS PRN
Status: DISCONTINUED | OUTPATIENT
Start: 2017-01-01 | End: 2017-01-01 | Stop reason: HOSPADM

## 2017-01-01 RX ORDER — METHYLPREDNISOLONE SODIUM SUCCINATE 125 MG/2ML
60 INJECTION, POWDER, LYOPHILIZED, FOR SOLUTION INTRAMUSCULAR; INTRAVENOUS EVERY 8 HOURS
Status: DISCONTINUED | OUTPATIENT
Start: 2017-01-01 | End: 2017-01-01

## 2017-01-01 RX ORDER — ERGOCALCIFEROL (VITAMIN D2) 1250 MCG
50000 CAPSULE ORAL WEEKLY
Qty: 30 CAPSULE | Refills: 0 | DISCHARGE
Start: 2017-01-01 | End: 2017-01-01 | Stop reason: ALTCHOICE

## 2017-01-01 RX ORDER — METOLAZONE 5 MG/1
2.5 TABLET ORAL 2 TIMES DAILY
Status: DISCONTINUED | OUTPATIENT
Start: 2017-01-01 | End: 2017-01-01 | Stop reason: HOSPADM

## 2017-01-01 RX ORDER — HYDROCODONE BITARTRATE AND ACETAMINOPHEN 5; 325 MG/1; MG/1
2 TABLET ORAL EVERY 4 HOURS PRN
Status: DISCONTINUED | OUTPATIENT
Start: 2017-01-01 | End: 2017-01-01

## 2017-01-01 RX ORDER — FENTANYL CITRATE 50 UG/ML
50 INJECTION, SOLUTION INTRAMUSCULAR; INTRAVENOUS
Status: DISCONTINUED | OUTPATIENT
Start: 2017-01-01 | End: 2017-01-01 | Stop reason: HOSPADM

## 2017-01-01 RX ORDER — INSULIN GLARGINE 100 [IU]/ML
20 INJECTION, SOLUTION SUBCUTANEOUS ONCE
Status: COMPLETED | OUTPATIENT
Start: 2017-01-01 | End: 2017-01-01

## 2017-01-01 RX ORDER — SODIUM CHLORIDE, SODIUM LACTATE, POTASSIUM CHLORIDE, CALCIUM CHLORIDE 600; 310; 30; 20 MG/100ML; MG/100ML; MG/100ML; MG/100ML
INJECTION, SOLUTION INTRAVENOUS CONTINUOUS
Status: DISCONTINUED | OUTPATIENT
Start: 2017-01-01 | End: 2017-01-01 | Stop reason: HOSPADM

## 2017-01-01 RX ORDER — GABAPENTIN 100 MG/1
100 CAPSULE ORAL DAILY
Status: DISCONTINUED | OUTPATIENT
Start: 2017-01-01 | End: 2017-01-01 | Stop reason: HOSPADM

## 2017-01-01 RX ORDER — MORPHINE SULFATE 1 MG/ML
2 INJECTION, SOLUTION EPIDURAL; INTRATHECAL; INTRAVENOUS EVERY 5 MIN PRN
Status: DISCONTINUED | OUTPATIENT
Start: 2017-01-01 | End: 2017-01-01 | Stop reason: HOSPADM

## 2017-01-01 RX ORDER — SODIUM CHLORIDE 0.9 % (FLUSH) 0.9 %
10 SYRINGE (ML) INJECTION EVERY 12 HOURS SCHEDULED
Status: CANCELLED | OUTPATIENT
Start: 2017-01-01

## 2017-01-01 RX ORDER — ONDANSETRON 4 MG/1
4 TABLET, ORALLY DISINTEGRATING ORAL EVERY 8 HOURS PRN
Qty: 90 TABLET | Refills: 1 | Status: SHIPPED | OUTPATIENT
Start: 2017-01-01 | End: 2017-01-01 | Stop reason: SDUPTHER

## 2017-01-01 RX ORDER — AMOXICILLIN AND CLAVULANATE POTASSIUM 875; 125 MG/1; MG/1
1 TABLET, FILM COATED ORAL 2 TIMES DAILY
Qty: 20 TABLET | Refills: 0 | Status: ON HOLD | OUTPATIENT
Start: 2017-01-01 | End: 2017-01-01 | Stop reason: HOSPADM

## 2017-01-01 RX ORDER — FLUCONAZOLE 2 MG/ML
200 INJECTION, SOLUTION INTRAVENOUS EVERY 24 HOURS
Status: DISCONTINUED | OUTPATIENT
Start: 2017-01-01 | End: 2017-01-01 | Stop reason: HOSPADM

## 2017-01-01 RX ORDER — CALCIUM GLUCONATE 94 MG/ML
1 INJECTION, SOLUTION INTRAVENOUS ONCE
Status: COMPLETED | OUTPATIENT
Start: 2017-01-01 | End: 2017-01-01

## 2017-01-01 RX ORDER — BUDESONIDE 0.5 MG/2ML
0.5 INHALANT ORAL ONCE
Status: COMPLETED | OUTPATIENT
Start: 2017-01-01 | End: 2017-01-01

## 2017-01-01 RX ORDER — SODIUM CHLORIDE 0.9 % (FLUSH) 0.9 %
10 SYRINGE (ML) INJECTION EVERY 12 HOURS SCHEDULED
Status: DISCONTINUED | OUTPATIENT
Start: 2017-01-01 | End: 2017-01-01 | Stop reason: SDUPTHER

## 2017-01-01 RX ADMIN — METHYLPREDNISOLONE SODIUM SUCCINATE 40 MG: 40 INJECTION, POWDER, FOR SOLUTION INTRAMUSCULAR; INTRAVENOUS at 15:26

## 2017-01-01 RX ADMIN — LORAZEPAM 0.5 MG: 0.5 TABLET ORAL at 08:41

## 2017-01-01 RX ADMIN — PANTOPRAZOLE SODIUM 40 MG: 40 TABLET, DELAYED RELEASE ORAL at 06:04

## 2017-01-01 RX ADMIN — LEVOTHYROXINE SODIUM 100 MCG: 100 TABLET ORAL at 05:56

## 2017-01-01 RX ADMIN — HYDROCODONE BITARTRATE AND ACETAMINOPHEN 1 TABLET: 5; 325 TABLET ORAL at 00:09

## 2017-01-01 RX ADMIN — MICONAZOLE NITRATE: 20 POWDER TOPICAL at 11:36

## 2017-01-01 RX ADMIN — BUMETANIDE 0.5 MG: 0.25 INJECTION, SOLUTION INTRAMUSCULAR; INTRAVENOUS at 09:18

## 2017-01-01 RX ADMIN — BISACODYL 10 MG: 10 SUPPOSITORY RECTAL at 14:18

## 2017-01-01 RX ADMIN — LINAGLIPTIN 5 MG: 5 TABLET, FILM COATED ORAL at 11:25

## 2017-01-01 RX ADMIN — ATORVASTATIN CALCIUM 40 MG: 40 TABLET, FILM COATED ORAL at 09:07

## 2017-01-01 RX ADMIN — IPRATROPIUM BROMIDE AND ALBUTEROL SULFATE 1 AMPULE: .5; 3 SOLUTION RESPIRATORY (INHALATION) at 14:59

## 2017-01-01 RX ADMIN — COLLAGENASE SANTYL: 250 OINTMENT TOPICAL at 11:13

## 2017-01-01 RX ADMIN — PANTOPRAZOLE SODIUM 40 MG: 40 TABLET, DELAYED RELEASE ORAL at 06:08

## 2017-01-01 RX ADMIN — DOCUSATE SODIUM 200 MG: 100 CAPSULE, LIQUID FILLED ORAL at 09:00

## 2017-01-01 RX ADMIN — LEVOTHYROXINE SODIUM 100 MCG: 100 TABLET ORAL at 06:09

## 2017-01-01 RX ADMIN — BUMETANIDE 0.5 MG: 0.25 INJECTION, SOLUTION INTRAMUSCULAR; INTRAVENOUS at 09:43

## 2017-01-01 RX ADMIN — METHYLPREDNISOLONE SODIUM SUCCINATE 40 MG: 40 INJECTION, POWDER, FOR SOLUTION INTRAMUSCULAR; INTRAVENOUS at 20:14

## 2017-01-01 RX ADMIN — DARBEPOETIN ALFA 100 MCG: 100 SOLUTION INTRAVENOUS; SUBCUTANEOUS at 06:37

## 2017-01-01 RX ADMIN — ENOXAPARIN SODIUM 30 MG: 30 INJECTION SUBCUTANEOUS at 10:35

## 2017-01-01 RX ADMIN — INSULIN LISPRO 2 UNITS: 100 INJECTION, SOLUTION INTRAVENOUS; SUBCUTANEOUS at 21:23

## 2017-01-01 RX ADMIN — PANTOPRAZOLE SODIUM 40 MG: 40 TABLET, DELAYED RELEASE ORAL at 06:19

## 2017-01-01 RX ADMIN — BUMETANIDE 1 MG: 0.25 INJECTION, SOLUTION INTRAMUSCULAR; INTRAVENOUS at 10:05

## 2017-01-01 RX ADMIN — LORAZEPAM 0.5 MG: 0.5 TABLET ORAL at 12:39

## 2017-01-01 RX ADMIN — MIDODRINE HYDROCHLORIDE 5 MG: 5 TABLET ORAL at 13:17

## 2017-01-01 RX ADMIN — PREDNISONE 20 MG: 20 TABLET ORAL at 13:02

## 2017-01-01 RX ADMIN — NYSTATIN: 100000 POWDER TOPICAL at 10:30

## 2017-01-01 RX ADMIN — GABAPENTIN 300 MG: 300 CAPSULE ORAL at 14:41

## 2017-01-01 RX ADMIN — METOPROLOL TARTRATE 100 MG: 50 TABLET ORAL at 21:34

## 2017-01-01 RX ADMIN — GABAPENTIN 100 MG: 100 CAPSULE ORAL at 21:49

## 2017-01-01 RX ADMIN — SUCRALFATE 1 G: 1 TABLET ORAL at 12:18

## 2017-01-01 RX ADMIN — ALLOPURINOL 100 MG: 100 TABLET ORAL at 08:59

## 2017-01-01 RX ADMIN — METOPROLOL TARTRATE 100 MG: 50 TABLET ORAL at 21:02

## 2017-01-01 RX ADMIN — ATORVASTATIN CALCIUM 40 MG: 40 TABLET, FILM COATED ORAL at 09:11

## 2017-01-01 RX ADMIN — IPRATROPIUM BROMIDE AND ALBUTEROL SULFATE 1 AMPULE: .5; 3 SOLUTION RESPIRATORY (INHALATION) at 12:46

## 2017-01-01 RX ADMIN — METOPROLOL TARTRATE 50 MG: 50 TABLET ORAL at 07:57

## 2017-01-01 RX ADMIN — INSULIN LISPRO 12 UNITS: 100 INJECTION, SOLUTION INTRAVENOUS; SUBCUTANEOUS at 08:43

## 2017-01-01 RX ADMIN — GABAPENTIN 100 MG: 100 CAPSULE ORAL at 21:02

## 2017-01-01 RX ADMIN — POLYETHYLENE GLYCOL 3350 17 G: 17 POWDER, FOR SOLUTION ORAL at 21:51

## 2017-01-01 RX ADMIN — MICONAZOLE NITRATE: 20.6 POWDER TOPICAL at 18:11

## 2017-01-01 RX ADMIN — LORAZEPAM 0.5 MG: 0.5 TABLET ORAL at 21:57

## 2017-01-01 RX ADMIN — INSULIN HUMAN 10 UNITS: 100 INJECTION, SOLUTION PARENTERAL at 13:50

## 2017-01-01 RX ADMIN — PANTOPRAZOLE SODIUM 40 MG: 40 TABLET, DELAYED RELEASE ORAL at 06:06

## 2017-01-01 RX ADMIN — MIDODRINE HYDROCHLORIDE 5 MG: 5 TABLET ORAL at 12:39

## 2017-01-01 RX ADMIN — Medication 10 ML: at 11:05

## 2017-01-01 RX ADMIN — IPRATROPIUM BROMIDE AND ALBUTEROL SULFATE 1 AMPULE: .5; 3 SOLUTION RESPIRATORY (INHALATION) at 18:07

## 2017-01-01 RX ADMIN — GLIPIZIDE 10 MG: 10 TABLET ORAL at 06:20

## 2017-01-01 RX ADMIN — MICONAZOLE NITRATE: 20 POWDER TOPICAL at 19:44

## 2017-01-01 RX ADMIN — INSULIN LISPRO 2 UNITS: 100 INJECTION, SOLUTION INTRAVENOUS; SUBCUTANEOUS at 13:21

## 2017-01-01 RX ADMIN — SUCRALFATE 1 G: 1 SUSPENSION ORAL at 18:22

## 2017-01-01 RX ADMIN — COLLAGENASE SANTYL: 250 OINTMENT TOPICAL at 10:45

## 2017-01-01 RX ADMIN — SUCRALFATE 1 G: 1 TABLET ORAL at 22:06

## 2017-01-01 RX ADMIN — SODIUM CHLORIDE: 9 INJECTION, SOLUTION INTRAVENOUS at 16:22

## 2017-01-01 RX ADMIN — IPRATROPIUM BROMIDE AND ALBUTEROL SULFATE 1 AMPULE: .5; 3 SOLUTION RESPIRATORY (INHALATION) at 15:30

## 2017-01-01 RX ADMIN — Medication 10 ML: at 10:46

## 2017-01-01 RX ADMIN — ATORVASTATIN CALCIUM 40 MG: 40 TABLET, FILM COATED ORAL at 11:06

## 2017-01-01 RX ADMIN — MICONAZOLE NITRATE: 20 POWDER TOPICAL at 09:52

## 2017-01-01 RX ADMIN — SILVER SULFADIAZINE: 10 CREAM TOPICAL at 09:08

## 2017-01-01 RX ADMIN — METOLAZONE 2.5 MG: 5 TABLET ORAL at 08:46

## 2017-01-01 RX ADMIN — IPRATROPIUM BROMIDE AND ALBUTEROL SULFATE 1 AMPULE: .5; 3 SOLUTION RESPIRATORY (INHALATION) at 07:13

## 2017-01-01 RX ADMIN — METOPROLOL TARTRATE 100 MG: 50 TABLET ORAL at 22:18

## 2017-01-01 RX ADMIN — TETRAKIS(2-METHOXYISOBUTYLISOCYANIDE)COPPER(I) TETRAFLUOROBORATE 30 MILLICURIE: 1 INJECTION, POWDER, LYOPHILIZED, FOR SOLUTION INTRAVENOUS at 12:10

## 2017-01-01 RX ADMIN — MICONAZOLE NITRATE: 20.6 POWDER TOPICAL at 07:59

## 2017-01-01 RX ADMIN — MICONAZOLE NITRATE: 20.6 POWDER TOPICAL at 20:30

## 2017-01-01 RX ADMIN — GABAPENTIN 100 MG: 100 CAPSULE ORAL at 20:56

## 2017-01-01 RX ADMIN — INSULIN LISPRO 3 UNITS: 100 INJECTION, SOLUTION INTRAVENOUS; SUBCUTANEOUS at 08:47

## 2017-01-01 RX ADMIN — IPRATROPIUM BROMIDE AND ALBUTEROL SULFATE 1 AMPULE: .5; 3 SOLUTION RESPIRATORY (INHALATION) at 06:28

## 2017-01-01 RX ADMIN — PANTOPRAZOLE SODIUM 40 MG: 40 TABLET, DELAYED RELEASE ORAL at 05:22

## 2017-01-01 RX ADMIN — INSULIN LISPRO 5 UNITS: 100 INJECTION, SOLUTION INTRAVENOUS; SUBCUTANEOUS at 12:17

## 2017-01-01 RX ADMIN — METOPROLOL TARTRATE 50 MG: 50 TABLET ORAL at 21:03

## 2017-01-01 RX ADMIN — IPRATROPIUM BROMIDE AND ALBUTEROL SULFATE 1 AMPULE: .5; 3 SOLUTION RESPIRATORY (INHALATION) at 14:46

## 2017-01-01 RX ADMIN — MICONAZOLE NITRATE: 20.6 POWDER TOPICAL at 21:41

## 2017-01-01 RX ADMIN — INSULIN LISPRO 6 UNITS: 100 INJECTION, SOLUTION INTRAVENOUS; SUBCUTANEOUS at 18:27

## 2017-01-01 RX ADMIN — DOCUSATE SODIUM 100 MG: 100 CAPSULE, LIQUID FILLED ORAL at 21:36

## 2017-01-01 RX ADMIN — DEXTROSE MONOHYDRATE 12.5 G: 500 INJECTION PARENTERAL at 22:48

## 2017-01-01 RX ADMIN — MICONAZOLE NITRATE: 20.6 POWDER TOPICAL at 10:45

## 2017-01-01 RX ADMIN — ENOXAPARIN SODIUM 30 MG: 30 INJECTION SUBCUTANEOUS at 17:07

## 2017-01-01 RX ADMIN — ALLOPURINOL 100 MG: 100 TABLET ORAL at 11:09

## 2017-01-01 RX ADMIN — INSULIN GLARGINE 20 UNITS: 100 INJECTION, SOLUTION SUBCUTANEOUS at 21:24

## 2017-01-01 RX ADMIN — INSULIN GLARGINE 20 UNITS: 100 INJECTION, SOLUTION SUBCUTANEOUS at 15:12

## 2017-01-01 RX ADMIN — LEVOTHYROXINE SODIUM 100 MCG: 100 TABLET ORAL at 05:48

## 2017-01-01 RX ADMIN — MICONAZOLE NITRATE: 20.6 POWDER TOPICAL at 08:48

## 2017-01-01 RX ADMIN — BUMETANIDE 1 MG: 1 TABLET ORAL at 08:40

## 2017-01-01 RX ADMIN — IPRATROPIUM BROMIDE AND ALBUTEROL SULFATE 1 AMPULE: .5; 3 SOLUTION RESPIRATORY (INHALATION) at 06:08

## 2017-01-01 RX ADMIN — INSULIN LISPRO 1 UNITS: 100 INJECTION, SOLUTION INTRAVENOUS; SUBCUTANEOUS at 08:14

## 2017-01-01 RX ADMIN — SILVER SULFADIAZINE: 10 CREAM TOPICAL at 10:46

## 2017-01-01 RX ADMIN — LORAZEPAM 0.5 MG: 0.5 TABLET ORAL at 09:11

## 2017-01-01 RX ADMIN — ASPIRIN 81 MG CHEWABLE TABLET 81 MG: 81 TABLET CHEWABLE at 09:00

## 2017-01-01 RX ADMIN — SUCRALFATE 1 G: 1 TABLET ORAL at 19:44

## 2017-01-01 RX ADMIN — ATORVASTATIN CALCIUM 40 MG: 40 TABLET, FILM COATED ORAL at 08:39

## 2017-01-01 RX ADMIN — ATORVASTATIN CALCIUM 40 MG: 40 TABLET, FILM COATED ORAL at 08:49

## 2017-01-01 RX ADMIN — LEVOTHYROXINE SODIUM 100 MCG: 100 TABLET ORAL at 06:19

## 2017-01-01 RX ADMIN — SODIUM POLYSTYRENE SULFONATE 15 G: 15 SUSPENSION ORAL; RECTAL at 17:21

## 2017-01-01 RX ADMIN — IPRATROPIUM BROMIDE AND ALBUTEROL SULFATE 1 AMPULE: .5; 3 SOLUTION RESPIRATORY (INHALATION) at 15:00

## 2017-01-01 RX ADMIN — Medication 10 ML: at 21:05

## 2017-01-01 RX ADMIN — HYDROCODONE BITARTRATE AND ACETAMINOPHEN 1 TABLET: 7.5; 325 TABLET ORAL at 01:15

## 2017-01-01 RX ADMIN — HYDROCODONE BITARTRATE AND ACETAMINOPHEN 2 TABLET: 5; 325 TABLET ORAL at 22:08

## 2017-01-01 RX ADMIN — LEVOTHYROXINE SODIUM 100 MCG: 100 TABLET ORAL at 16:18

## 2017-01-01 RX ADMIN — METOLAZONE 2.5 MG: 5 TABLET ORAL at 12:46

## 2017-01-01 RX ADMIN — LEVOTHYROXINE SODIUM 100 MCG: 100 TABLET ORAL at 06:31

## 2017-01-01 RX ADMIN — MICONAZOLE NITRATE: 20.6 POWDER TOPICAL at 09:15

## 2017-01-01 RX ADMIN — INSULIN LISPRO 8 UNITS: 100 INJECTION, SOLUTION INTRAVENOUS; SUBCUTANEOUS at 18:17

## 2017-01-01 RX ADMIN — SUCRALFATE 1 G: 1 SUSPENSION ORAL at 21:37

## 2017-01-01 RX ADMIN — METRONIDAZOLE 500 MG: 500 INJECTION, SOLUTION INTRAVENOUS at 02:28

## 2017-01-01 RX ADMIN — METOPROLOL TARTRATE 100 MG: 50 TABLET ORAL at 08:12

## 2017-01-01 RX ADMIN — LEVOTHYROXINE SODIUM 100 MCG: 100 TABLET ORAL at 05:36

## 2017-01-01 RX ADMIN — SODIUM CHLORIDE: 9 INJECTION, SOLUTION INTRAVENOUS at 22:57

## 2017-01-01 RX ADMIN — LEVOFLOXACIN 500 MG: 500 TABLET, FILM COATED ORAL at 12:41

## 2017-01-01 RX ADMIN — LEVOTHYROXINE SODIUM 100 MCG: 100 TABLET ORAL at 07:05

## 2017-01-01 RX ADMIN — FUROSEMIDE 80 MG: 10 INJECTION, SOLUTION INTRAMUSCULAR; INTRAVENOUS at 23:23

## 2017-01-01 RX ADMIN — BUMETANIDE 1 MG: 0.25 INJECTION INTRAMUSCULAR; INTRAVENOUS at 22:18

## 2017-01-01 RX ADMIN — ATORVASTATIN CALCIUM 40 MG: 40 TABLET, FILM COATED ORAL at 08:54

## 2017-01-01 RX ADMIN — CLONIDINE HYDROCHLORIDE 0.1 MG: 0.1 TABLET ORAL at 08:33

## 2017-01-01 RX ADMIN — ATORVASTATIN CALCIUM 40 MG: 40 TABLET, FILM COATED ORAL at 10:05

## 2017-01-01 RX ADMIN — DOCUSATE SODIUM 100 MG: 100 CAPSULE, LIQUID FILLED ORAL at 11:42

## 2017-01-01 RX ADMIN — CLONIDINE HYDROCHLORIDE 0.2 MG: 0.2 TABLET ORAL at 08:36

## 2017-01-01 RX ADMIN — DOCUSATE SODIUM 100 MG: 100 CAPSULE, LIQUID FILLED ORAL at 22:58

## 2017-01-01 RX ADMIN — MIDODRINE HYDROCHLORIDE 5 MG: 5 TABLET ORAL at 18:08

## 2017-01-01 RX ADMIN — METHYLPREDNISOLONE SODIUM SUCCINATE 40 MG: 40 INJECTION, POWDER, FOR SOLUTION INTRAMUSCULAR; INTRAVENOUS at 19:38

## 2017-01-01 RX ADMIN — LEVOTHYROXINE SODIUM 100 MCG: 100 TABLET ORAL at 06:08

## 2017-01-01 RX ADMIN — METOPROLOL TARTRATE 50 MG: 50 TABLET ORAL at 20:54

## 2017-01-01 RX ADMIN — GABAPENTIN 100 MG: 100 CAPSULE ORAL at 21:41

## 2017-01-01 RX ADMIN — IPRATROPIUM BROMIDE AND ALBUTEROL SULFATE 1 AMPULE: .5; 3 SOLUTION RESPIRATORY (INHALATION) at 09:44

## 2017-01-01 RX ADMIN — FLUTICASONE PROPIONATE 1 SPRAY: 50 SPRAY, METERED NASAL at 10:45

## 2017-01-01 RX ADMIN — IPRATROPIUM BROMIDE AND ALBUTEROL SULFATE 1 AMPULE: .5; 3 SOLUTION RESPIRATORY (INHALATION) at 18:48

## 2017-01-01 RX ADMIN — ASPIRIN 81 MG CHEWABLE TABLET 81 MG: 81 TABLET CHEWABLE at 09:54

## 2017-01-01 RX ADMIN — ATORVASTATIN CALCIUM 40 MG: 40 TABLET, FILM COATED ORAL at 08:27

## 2017-01-01 RX ADMIN — SILVER SULFADIAZINE: 10 CREAM TOPICAL at 09:03

## 2017-01-01 RX ADMIN — NEPHROCAP 1 MG: 1 CAP ORAL at 17:41

## 2017-01-01 RX ADMIN — SILVER SULFADIAZINE: 10 CREAM TOPICAL at 09:27

## 2017-01-01 RX ADMIN — FLUCONAZOLE 200 MG: 2 INJECTION, SOLUTION INTRAVENOUS at 10:26

## 2017-01-01 RX ADMIN — LISINOPRIL 5 MG: 5 TABLET ORAL at 16:18

## 2017-01-01 RX ADMIN — GABAPENTIN 100 MG: 100 CAPSULE ORAL at 21:59

## 2017-01-01 RX ADMIN — MICONAZOLE NITRATE: 20.6 POWDER TOPICAL at 08:21

## 2017-01-01 RX ADMIN — ONDANSETRON 4 MG: 2 INJECTION INTRAMUSCULAR; INTRAVENOUS at 15:50

## 2017-01-01 RX ADMIN — LORAZEPAM 0.5 MG: 0.5 TABLET ORAL at 07:57

## 2017-01-01 RX ADMIN — ENOXAPARIN SODIUM 80 MG: 80 INJECTION SUBCUTANEOUS at 18:17

## 2017-01-01 RX ADMIN — MICONAZOLE NITRATE: 20.6 POWDER TOPICAL at 10:27

## 2017-01-01 RX ADMIN — POLYETHYLENE GLYCOL 3350 17 G: 17 POWDER, FOR SOLUTION ORAL at 09:11

## 2017-01-01 RX ADMIN — DARBEPOETIN ALFA 100 MCG: 100 SOLUTION INTRAVENOUS; SUBCUTANEOUS at 19:32

## 2017-01-01 RX ADMIN — HYDROCODONE BITARTRATE AND ACETAMINOPHEN 1 TABLET: 7.5; 325 TABLET ORAL at 11:14

## 2017-01-01 RX ADMIN — HEPARIN SODIUM 6000 UNITS: 1000 INJECTION, SOLUTION INTRAVENOUS; SUBCUTANEOUS at 12:16

## 2017-01-01 RX ADMIN — MIDODRINE HYDROCHLORIDE 5 MG: 5 TABLET ORAL at 09:54

## 2017-01-01 RX ADMIN — MICONAZOLE NITRATE: 20.6 POWDER TOPICAL at 21:59

## 2017-01-01 RX ADMIN — LIDOCAINE HYDROCHLORIDE 1 ML: 10 INJECTION, SOLUTION EPIDURAL; INFILTRATION; INTRACAUDAL; PERINEURAL at 06:56

## 2017-01-01 RX ADMIN — ALLOPURINOL 100 MG: 100 TABLET ORAL at 11:05

## 2017-01-01 RX ADMIN — BUMETANIDE 1 MG: 0.25 INJECTION INTRAMUSCULAR; INTRAVENOUS at 09:25

## 2017-01-01 RX ADMIN — LORAZEPAM 0.5 MG: 0.5 TABLET ORAL at 08:07

## 2017-01-01 RX ADMIN — GABAPENTIN 100 MG: 100 CAPSULE ORAL at 20:54

## 2017-01-01 RX ADMIN — ASPIRIN 81 MG CHEWABLE TABLET 81 MG: 81 TABLET CHEWABLE at 12:39

## 2017-01-01 RX ADMIN — BUMETANIDE 1 MG: 1 TABLET ORAL at 09:54

## 2017-01-01 RX ADMIN — MICONAZOLE NITRATE: 20.6 POWDER TOPICAL at 22:15

## 2017-01-01 RX ADMIN — DOCUSATE SODIUM 100 MG: 100 CAPSULE, LIQUID FILLED ORAL at 10:26

## 2017-01-01 RX ADMIN — GABAPENTIN 100 MG: 100 CAPSULE ORAL at 22:43

## 2017-01-01 RX ADMIN — CLONIDINE HYDROCHLORIDE 0.1 MG: 0.1 TABLET ORAL at 21:40

## 2017-01-01 RX ADMIN — BUMETANIDE 0.5 MG: 0.25 INJECTION, SOLUTION INTRAMUSCULAR; INTRAVENOUS at 20:11

## 2017-01-01 RX ADMIN — IPRATROPIUM BROMIDE AND ALBUTEROL SULFATE 1 AMPULE: .5; 3 SOLUTION RESPIRATORY (INHALATION) at 11:55

## 2017-01-01 RX ADMIN — INSULIN LISPRO 6 UNITS: 100 INJECTION, SOLUTION INTRAVENOUS; SUBCUTANEOUS at 16:25

## 2017-01-01 RX ADMIN — CALCITRIOL 0.25 MCG: 0.25 CAPSULE, LIQUID FILLED ORAL at 08:06

## 2017-01-01 RX ADMIN — ENOXAPARIN SODIUM 30 MG: 30 INJECTION SUBCUTANEOUS at 16:57

## 2017-01-01 RX ADMIN — MIDODRINE HYDROCHLORIDE 5 MG: 5 TABLET ORAL at 17:59

## 2017-01-01 RX ADMIN — LORAZEPAM 0.5 MG: 0.5 TABLET ORAL at 06:02

## 2017-01-01 RX ADMIN — CEFTRIAXONE 1 G: 1 INJECTION, POWDER, FOR SOLUTION INTRAMUSCULAR; INTRAVENOUS at 18:19

## 2017-01-01 RX ADMIN — SILVER SULFADIAZINE: 10 CREAM TOPICAL at 11:00

## 2017-01-01 RX ADMIN — GLIPIZIDE 10 MG: 10 TABLET ORAL at 06:28

## 2017-01-01 RX ADMIN — LEVOTHYROXINE SODIUM 100 MCG: 100 TABLET ORAL at 06:37

## 2017-01-01 RX ADMIN — INSULIN LISPRO 5 UNITS: 100 INJECTION, SOLUTION INTRAVENOUS; SUBCUTANEOUS at 08:54

## 2017-01-01 RX ADMIN — HYDROCODONE BITARTRATE AND ACETAMINOPHEN 1 TABLET: 7.5; 325 TABLET ORAL at 15:44

## 2017-01-01 RX ADMIN — IPRATROPIUM BROMIDE AND ALBUTEROL SULFATE 1 AMPULE: .5; 3 SOLUTION RESPIRATORY (INHALATION) at 19:42

## 2017-01-01 RX ADMIN — HEPARIN SODIUM 5000 UNITS: 5000 INJECTION INTRAVENOUS; SUBCUTANEOUS at 06:11

## 2017-01-01 RX ADMIN — ASPIRIN 81 MG CHEWABLE TABLET 81 MG: 81 TABLET CHEWABLE at 10:07

## 2017-01-01 RX ADMIN — LEVOFLOXACIN 500 MG: 500 TABLET, FILM COATED ORAL at 18:17

## 2017-01-01 RX ADMIN — INSULIN GLARGINE 30 UNITS: 100 INJECTION, SOLUTION SUBCUTANEOUS at 22:44

## 2017-01-01 RX ADMIN — DOCUSATE SODIUM 100 MG: 100 CAPSULE, LIQUID FILLED ORAL at 08:45

## 2017-01-01 RX ADMIN — HEPARIN SODIUM 5000 UNITS: 5000 INJECTION INTRAVENOUS; SUBCUTANEOUS at 21:27

## 2017-01-01 RX ADMIN — ATORVASTATIN CALCIUM 40 MG: 40 TABLET, FILM COATED ORAL at 08:06

## 2017-01-01 RX ADMIN — ONDANSETRON 4 MG: 2 INJECTION INTRAMUSCULAR; INTRAVENOUS at 21:41

## 2017-01-01 RX ADMIN — MICONAZOLE NITRATE: 20.6 POWDER TOPICAL at 21:06

## 2017-01-01 RX ADMIN — SENNOSIDES 8.6 MG: 8.6 TABLET, FILM COATED ORAL at 21:51

## 2017-01-01 RX ADMIN — HYDROCODONE BITARTRATE AND ACETAMINOPHEN 1 TABLET: 5; 325 TABLET ORAL at 05:59

## 2017-01-01 RX ADMIN — IPRATROPIUM BROMIDE AND ALBUTEROL SULFATE 1 AMPULE: .5; 3 SOLUTION RESPIRATORY (INHALATION) at 10:55

## 2017-01-01 RX ADMIN — FLUTICASONE PROPIONATE 1 SPRAY: 50 SPRAY, METERED NASAL at 09:21

## 2017-01-01 RX ADMIN — LINAGLIPTIN 5 MG: 5 TABLET, FILM COATED ORAL at 10:47

## 2017-01-01 RX ADMIN — INSULIN LISPRO 5 UNITS: 100 INJECTION, SOLUTION INTRAVENOUS; SUBCUTANEOUS at 21:49

## 2017-01-01 RX ADMIN — AZITHROMYCIN 500 MG: 250 TABLET, FILM COATED ORAL at 09:11

## 2017-01-01 RX ADMIN — METOLAZONE 2.5 MG: 5 TABLET ORAL at 22:19

## 2017-01-01 RX ADMIN — METOPROLOL TARTRATE 100 MG: 50 TABLET ORAL at 20:16

## 2017-01-01 RX ADMIN — SILVER SULFADIAZINE: 10 CREAM TOPICAL at 16:58

## 2017-01-01 RX ADMIN — IPRATROPIUM BROMIDE AND ALBUTEROL SULFATE 1 AMPULE: .5; 3 SOLUTION RESPIRATORY (INHALATION) at 10:17

## 2017-01-01 RX ADMIN — IPRATROPIUM BROMIDE AND ALBUTEROL SULFATE 1 AMPULE: .5; 3 SOLUTION RESPIRATORY (INHALATION) at 20:16

## 2017-01-01 RX ADMIN — LEVOTHYROXINE SODIUM 100 MCG: 100 TABLET ORAL at 06:28

## 2017-01-01 RX ADMIN — HYDROCODONE BITARTRATE AND ACETAMINOPHEN 1 TABLET: 7.5; 325 TABLET ORAL at 22:07

## 2017-01-01 RX ADMIN — BUMETANIDE 1 MG: 0.25 INJECTION, SOLUTION INTRAMUSCULAR; INTRAVENOUS at 08:44

## 2017-01-01 RX ADMIN — PREDNISONE 20 MG: 20 TABLET ORAL at 10:44

## 2017-01-01 RX ADMIN — CLONIDINE HYDROCHLORIDE 0.2 MG: 0.2 TABLET ORAL at 21:23

## 2017-01-01 RX ADMIN — DOCUSATE SODIUM 100 MG: 100 CAPSULE, LIQUID FILLED ORAL at 08:33

## 2017-01-01 RX ADMIN — INSULIN LISPRO 2 UNITS: 100 INJECTION, SOLUTION INTRAVENOUS; SUBCUTANEOUS at 21:41

## 2017-01-01 RX ADMIN — ALLOPURINOL 100 MG: 100 TABLET ORAL at 10:05

## 2017-01-01 RX ADMIN — PREDNISONE 20 MG: 20 TABLET ORAL at 17:29

## 2017-01-01 RX ADMIN — ASPIRIN 81 MG CHEWABLE TABLET 81 MG: 81 TABLET CHEWABLE at 08:39

## 2017-01-01 RX ADMIN — ENOXAPARIN SODIUM 30 MG: 30 INJECTION SUBCUTANEOUS at 17:37

## 2017-01-01 RX ADMIN — REGADENOSON 0.4 MG: 0.08 INJECTION, SOLUTION INTRAVENOUS at 12:10

## 2017-01-01 RX ADMIN — BUMETANIDE 1 MG: 1 TABLET ORAL at 19:38

## 2017-01-01 RX ADMIN — MIDODRINE HYDROCHLORIDE 5 MG: 10 TABLET ORAL at 08:31

## 2017-01-01 RX ADMIN — ENOXAPARIN SODIUM 40 MG: 40 INJECTION SUBCUTANEOUS at 16:18

## 2017-01-01 RX ADMIN — LEVOTHYROXINE SODIUM 100 MCG: 100 TABLET ORAL at 13:11

## 2017-01-01 RX ADMIN — Medication 10 ML: at 11:53

## 2017-01-01 RX ADMIN — INSULIN LISPRO 15 UNITS: 100 INJECTION, SOLUTION INTRAVENOUS; SUBCUTANEOUS at 17:11

## 2017-01-01 RX ADMIN — ALLOPURINOL 100 MG: 100 TABLET ORAL at 08:42

## 2017-01-01 RX ADMIN — GLYCOPYRROLATE 0.2 MG: 0.2 INJECTION, SOLUTION INTRAMUSCULAR; INTRAVENOUS at 08:14

## 2017-01-01 RX ADMIN — SUCRALFATE 1 G: 1 SUSPENSION ORAL at 18:39

## 2017-01-01 RX ADMIN — ONDANSETRON 4 MG: 2 INJECTION INTRAMUSCULAR; INTRAVENOUS at 09:49

## 2017-01-01 RX ADMIN — ENOXAPARIN SODIUM 30 MG: 30 INJECTION SUBCUTANEOUS at 09:54

## 2017-01-01 RX ADMIN — ASPIRIN 81 MG CHEWABLE TABLET 81 MG: 81 TABLET CHEWABLE at 10:44

## 2017-01-01 RX ADMIN — LORAZEPAM 0.5 MG: 0.5 TABLET ORAL at 21:02

## 2017-01-01 RX ADMIN — ALLOPURINOL 100 MG: 100 TABLET ORAL at 09:00

## 2017-01-01 RX ADMIN — ALLOPURINOL 100 MG: 100 TABLET ORAL at 08:30

## 2017-01-01 RX ADMIN — EPINEPHRINE 0.5 MG: 1 INJECTION, SOLUTION INTRAMUSCULAR; SUBCUTANEOUS at 08:21

## 2017-01-01 RX ADMIN — PANTOPRAZOLE SODIUM 40 MG: 40 TABLET, DELAYED RELEASE ORAL at 06:13

## 2017-01-01 RX ADMIN — ASPIRIN 81 MG CHEWABLE TABLET 81 MG: 81 TABLET CHEWABLE at 08:33

## 2017-01-01 RX ADMIN — IPRATROPIUM BROMIDE AND ALBUTEROL SULFATE 1 AMPULE: .5; 3 SOLUTION RESPIRATORY (INHALATION) at 14:25

## 2017-01-01 RX ADMIN — IPRATROPIUM BROMIDE AND ALBUTEROL SULFATE 1 AMPULE: .5; 3 SOLUTION RESPIRATORY (INHALATION) at 15:16

## 2017-01-01 RX ADMIN — Medication 10 ML: at 01:26

## 2017-01-01 RX ADMIN — ASPIRIN 81 MG CHEWABLE TABLET 81 MG: 81 TABLET CHEWABLE at 08:46

## 2017-01-01 RX ADMIN — HYDROCODONE BITARTRATE AND ACETAMINOPHEN 1 TABLET: 7.5; 325 TABLET ORAL at 21:22

## 2017-01-01 RX ADMIN — FLUTICASONE PROPIONATE 1 SPRAY: 50 SPRAY, METERED NASAL at 22:05

## 2017-01-01 RX ADMIN — IPRATROPIUM BROMIDE AND ALBUTEROL SULFATE 1 AMPULE: .5; 3 SOLUTION RESPIRATORY (INHALATION) at 21:47

## 2017-01-01 RX ADMIN — LEVOTHYROXINE SODIUM 100 MCG: 100 TABLET ORAL at 05:02

## 2017-01-01 RX ADMIN — INSULIN LISPRO 2 UNITS: 100 INJECTION, SOLUTION INTRAVENOUS; SUBCUTANEOUS at 18:03

## 2017-01-01 RX ADMIN — PANTOPRAZOLE SODIUM 40 MG: 40 TABLET, DELAYED RELEASE ORAL at 06:03

## 2017-01-01 RX ADMIN — ISOSORBIDE MONONITRATE 30 MG: 30 TABLET, EXTENDED RELEASE ORAL at 10:08

## 2017-01-01 RX ADMIN — GABAPENTIN 100 MG: 100 CAPSULE ORAL at 20:29

## 2017-01-01 RX ADMIN — SILVER SULFADIAZINE: 10 CREAM TOPICAL at 21:41

## 2017-01-01 RX ADMIN — GABAPENTIN 300 MG: 300 CAPSULE ORAL at 22:06

## 2017-01-01 RX ADMIN — HYDROCODONE BITARTRATE AND ACETAMINOPHEN 1 TABLET: 7.5; 325 TABLET ORAL at 10:53

## 2017-01-01 RX ADMIN — LORAZEPAM 0.5 MG: 0.5 TABLET ORAL at 11:06

## 2017-01-01 RX ADMIN — GABAPENTIN 300 MG: 300 CAPSULE ORAL at 18:20

## 2017-01-01 RX ADMIN — HEPARIN SODIUM 5000 UNITS: 5000 INJECTION INTRAVENOUS; SUBCUTANEOUS at 05:58

## 2017-01-01 RX ADMIN — CEFTRIAXONE SODIUM 1 G: 1 INJECTION, POWDER, FOR SOLUTION INTRAMUSCULAR; INTRAVENOUS at 19:56

## 2017-01-01 RX ADMIN — METOPROLOL TARTRATE 100 MG: 50 TABLET ORAL at 08:39

## 2017-01-01 RX ADMIN — PANTOPRAZOLE SODIUM 40 MG: 40 TABLET, DELAYED RELEASE ORAL at 06:11

## 2017-01-01 RX ADMIN — ASPIRIN 81 MG CHEWABLE TABLET 81 MG: 81 TABLET CHEWABLE at 08:35

## 2017-01-01 RX ADMIN — LEVOTHYROXINE SODIUM 100 MCG: 100 TABLET ORAL at 09:23

## 2017-01-01 RX ADMIN — HEPARIN SODIUM 5000 UNITS: 5000 INJECTION, SOLUTION INTRAVENOUS; SUBCUTANEOUS at 06:32

## 2017-01-01 RX ADMIN — PANTOPRAZOLE SODIUM 40 MG: 40 TABLET, DELAYED RELEASE ORAL at 06:09

## 2017-01-01 RX ADMIN — BUMETANIDE 2 MG: 1 TABLET ORAL at 08:20

## 2017-01-01 RX ADMIN — ATORVASTATIN CALCIUM 40 MG: 40 TABLET, FILM COATED ORAL at 09:24

## 2017-01-01 RX ADMIN — METOPROLOL TARTRATE 100 MG: 50 TABLET ORAL at 22:07

## 2017-01-01 RX ADMIN — ENOXAPARIN SODIUM 30 MG: 100 INJECTION SUBCUTANEOUS at 09:24

## 2017-01-01 RX ADMIN — DOCUSATE SODIUM 100 MG: 100 CAPSULE, LIQUID FILLED ORAL at 10:46

## 2017-01-01 RX ADMIN — INSULIN LISPRO 4 UNITS: 100 INJECTION, SOLUTION INTRAVENOUS; SUBCUTANEOUS at 11:25

## 2017-01-01 RX ADMIN — GLIPIZIDE 5 MG: 5 TABLET ORAL at 09:54

## 2017-01-01 RX ADMIN — CHOLECALCIFEROL TAB 10 MCG (400 UNIT) 400 UNITS: 10 TAB at 08:07

## 2017-01-01 RX ADMIN — IPRATROPIUM BROMIDE AND ALBUTEROL SULFATE 1 AMPULE: .5; 3 SOLUTION RESPIRATORY (INHALATION) at 15:26

## 2017-01-01 RX ADMIN — GLIPIZIDE 5 MG: 5 TABLET ORAL at 11:35

## 2017-01-01 RX ADMIN — ONDANSETRON 4 MG: 2 INJECTION INTRAMUSCULAR; INTRAVENOUS at 10:44

## 2017-01-01 RX ADMIN — INSULIN LISPRO 1 UNITS: 100 INJECTION, SOLUTION INTRAVENOUS; SUBCUTANEOUS at 17:51

## 2017-01-01 RX ADMIN — GABAPENTIN 300 MG: 300 CAPSULE ORAL at 19:44

## 2017-01-01 RX ADMIN — DOCUSATE SODIUM 200 MG: 100 CAPSULE, LIQUID FILLED ORAL at 08:40

## 2017-01-01 RX ADMIN — Medication 10 ML: at 12:36

## 2017-01-01 RX ADMIN — LEVOTHYROXINE SODIUM 100 MCG: 100 TABLET ORAL at 06:05

## 2017-01-01 RX ADMIN — SILVER SULFADIAZINE: 10 CREAM TOPICAL at 09:58

## 2017-01-01 RX ADMIN — HYDROCODONE BITARTRATE AND ACETAMINOPHEN 1 TABLET: 7.5; 325 TABLET ORAL at 21:02

## 2017-01-01 RX ADMIN — INSULIN LISPRO 5 UNITS: 100 INJECTION, SOLUTION INTRAVENOUS; SUBCUTANEOUS at 20:54

## 2017-01-01 RX ADMIN — PROMETHAZINE HYDROCHLORIDE 12.5 MG: 25 TABLET ORAL at 17:11

## 2017-01-01 RX ADMIN — IPRATROPIUM BROMIDE AND ALBUTEROL SULFATE 1 AMPULE: .5; 3 SOLUTION RESPIRATORY (INHALATION) at 18:54

## 2017-01-01 RX ADMIN — HYDROCODONE BITARTRATE AND ACETAMINOPHEN 1 TABLET: 7.5; 325 TABLET ORAL at 03:03

## 2017-01-01 RX ADMIN — SODIUM BICARBONATE 50 MEQ: 84 INJECTION, SOLUTION INTRAVENOUS at 06:18

## 2017-01-01 RX ADMIN — ENOXAPARIN SODIUM 30 MG: 30 INJECTION SUBCUTANEOUS at 08:22

## 2017-01-01 RX ADMIN — IPRATROPIUM BROMIDE AND ALBUTEROL SULFATE 1 AMPULE: .5; 3 SOLUTION RESPIRATORY (INHALATION) at 06:29

## 2017-01-01 RX ADMIN — PANTOPRAZOLE SODIUM 40 MG: 40 TABLET, DELAYED RELEASE ORAL at 06:28

## 2017-01-01 RX ADMIN — INSULIN GLARGINE 30 UNITS: 100 INJECTION, SOLUTION SUBCUTANEOUS at 19:53

## 2017-01-01 RX ADMIN — COLLAGENASE SANTYL: 250 OINTMENT TOPICAL at 07:59

## 2017-01-01 RX ADMIN — ASPIRIN 81 MG CHEWABLE TABLET 81 MG: 81 TABLET CHEWABLE at 08:15

## 2017-01-01 RX ADMIN — SODIUM CHLORIDE: 9 INJECTION, SOLUTION INTRAVENOUS at 10:08

## 2017-01-01 RX ADMIN — GLIPIZIDE 5 MG: 5 TABLET ORAL at 09:01

## 2017-01-01 RX ADMIN — DOCUSATE SODIUM 100 MG: 100 CAPSULE, LIQUID FILLED ORAL at 21:51

## 2017-01-01 RX ADMIN — METOPROLOL TARTRATE 50 MG: 50 TABLET, FILM COATED ORAL at 21:26

## 2017-01-01 RX ADMIN — LINAGLIPTIN 5 MG: 5 TABLET, FILM COATED ORAL at 07:57

## 2017-01-01 RX ADMIN — INSULIN LISPRO 3 UNITS: 100 INJECTION, SOLUTION INTRAVENOUS; SUBCUTANEOUS at 11:43

## 2017-01-01 RX ADMIN — MAGNESIUM HYDROXIDE 30 ML: 400 SUSPENSION ORAL at 11:00

## 2017-01-01 RX ADMIN — IPRATROPIUM BROMIDE AND ALBUTEROL SULFATE 1 AMPULE: .5; 3 SOLUTION RESPIRATORY (INHALATION) at 15:58

## 2017-01-01 RX ADMIN — METOPROLOL TARTRATE 100 MG: 50 TABLET ORAL at 20:39

## 2017-01-01 RX ADMIN — ATORVASTATIN CALCIUM 40 MG: 40 TABLET, FILM COATED ORAL at 10:36

## 2017-01-01 RX ADMIN — METHYLPREDNISOLONE SODIUM SUCCINATE 40 MG: 40 INJECTION, POWDER, FOR SOLUTION INTRAMUSCULAR; INTRAVENOUS at 02:48

## 2017-01-01 RX ADMIN — HYDROCODONE BITARTRATE AND ACETAMINOPHEN 1 TABLET: 7.5; 325 TABLET ORAL at 23:24

## 2017-01-01 RX ADMIN — DOCUSATE SODIUM 100 MG: 100 CAPSULE, LIQUID FILLED ORAL at 20:41

## 2017-01-01 RX ADMIN — DOCUSATE SODIUM 100 MG: 100 CAPSULE, LIQUID FILLED ORAL at 20:54

## 2017-01-01 RX ADMIN — ATORVASTATIN CALCIUM 40 MG: 40 TABLET, FILM COATED ORAL at 08:45

## 2017-01-01 RX ADMIN — ATORVASTATIN CALCIUM 40 MG: 40 TABLET, FILM COATED ORAL at 09:25

## 2017-01-01 RX ADMIN — NYSTATIN: 100000 POWDER TOPICAL at 20:15

## 2017-01-01 RX ADMIN — INSULIN LISPRO 6 UNITS: 100 INJECTION, SOLUTION INTRAVENOUS; SUBCUTANEOUS at 11:29

## 2017-01-01 RX ADMIN — DOCUSATE SODIUM 100 MG: 100 CAPSULE, LIQUID FILLED ORAL at 21:40

## 2017-01-01 RX ADMIN — INSULIN LISPRO 7 UNITS: 100 INJECTION, SOLUTION INTRAVENOUS; SUBCUTANEOUS at 22:19

## 2017-01-01 RX ADMIN — SENNOSIDES 8.6 MG: 8.6 TABLET, FILM COATED ORAL at 20:54

## 2017-01-01 RX ADMIN — IPRATROPIUM BROMIDE AND ALBUTEROL SULFATE 1 AMPULE: .5; 3 SOLUTION RESPIRATORY (INHALATION) at 07:14

## 2017-01-01 RX ADMIN — GABAPENTIN 300 MG: 300 CAPSULE ORAL at 21:45

## 2017-01-01 RX ADMIN — PANTOPRAZOLE SODIUM 40 MG: 40 TABLET, DELAYED RELEASE ORAL at 05:55

## 2017-01-01 RX ADMIN — INSULIN LISPRO 12 UNITS: 100 INJECTION, SOLUTION INTRAVENOUS; SUBCUTANEOUS at 12:26

## 2017-01-01 RX ADMIN — DOCUSATE SODIUM 100 MG: 100 CAPSULE, LIQUID FILLED ORAL at 08:47

## 2017-01-01 RX ADMIN — CLONIDINE HYDROCHLORIDE 0.2 MG: 0.2 TABLET ORAL at 20:41

## 2017-01-01 RX ADMIN — METOPROLOL TARTRATE 100 MG: 50 TABLET ORAL at 11:09

## 2017-01-01 RX ADMIN — METOPROLOL TARTRATE 100 MG: 50 TABLET ORAL at 09:07

## 2017-01-01 RX ADMIN — LEVOTHYROXINE SODIUM 100 MCG: 100 TABLET ORAL at 05:45

## 2017-01-01 RX ADMIN — INSULIN GLARGINE 20 UNITS: 100 INJECTION, SOLUTION SUBCUTANEOUS at 21:14

## 2017-01-01 RX ADMIN — AZITHROMYCIN MONOHYDRATE 500 MG: 500 INJECTION, POWDER, LYOPHILIZED, FOR SOLUTION INTRAVENOUS at 19:38

## 2017-01-01 RX ADMIN — PROCHLORPERAZINE EDISYLATE 10 MG: 5 INJECTION INTRAMUSCULAR; INTRAVENOUS at 11:20

## 2017-01-01 RX ADMIN — ATORVASTATIN CALCIUM 40 MG: 40 TABLET, FILM COATED ORAL at 09:54

## 2017-01-01 RX ADMIN — INSULIN GLARGINE 15 UNITS: 100 INJECTION, SOLUTION SUBCUTANEOUS at 21:17

## 2017-01-01 RX ADMIN — BUMETANIDE 0.5 MG: 0.5 TABLET ORAL at 12:16

## 2017-01-01 RX ADMIN — MICONAZOLE NITRATE: 20.6 POWDER TOPICAL at 21:04

## 2017-01-01 RX ADMIN — CALCITRIOL 0.25 MCG: 0.25 CAPSULE, LIQUID FILLED ORAL at 10:30

## 2017-01-01 RX ADMIN — ERGOCALCIFEROL 50000 UNITS: 1.25 CAPSULE ORAL at 10:45

## 2017-01-01 RX ADMIN — INSULIN LISPRO 9 UNITS: 100 INJECTION, SOLUTION INTRAVENOUS; SUBCUTANEOUS at 12:58

## 2017-01-01 RX ADMIN — INSULIN LISPRO 3 UNITS: 100 INJECTION, SOLUTION INTRAVENOUS; SUBCUTANEOUS at 12:41

## 2017-01-01 RX ADMIN — PANTOPRAZOLE SODIUM 40 MG: 40 TABLET, DELAYED RELEASE ORAL at 06:26

## 2017-01-01 RX ADMIN — IPRATROPIUM BROMIDE AND ALBUTEROL SULFATE 1 AMPULE: .5; 3 SOLUTION RESPIRATORY (INHALATION) at 07:36

## 2017-01-01 RX ADMIN — ASPIRIN 81 MG CHEWABLE TABLET 81 MG: 81 TABLET CHEWABLE at 11:35

## 2017-01-01 RX ADMIN — COLLAGENASE SANTYL: 250 OINTMENT TOPICAL at 08:09

## 2017-01-01 RX ADMIN — PANTOPRAZOLE SODIUM 40 MG: 40 TABLET, DELAYED RELEASE ORAL at 06:31

## 2017-01-01 RX ADMIN — INSULIN LISPRO 3 UNITS: 100 INJECTION, SOLUTION INTRAVENOUS; SUBCUTANEOUS at 21:27

## 2017-01-01 RX ADMIN — INSULIN LISPRO 3 UNITS: 100 INJECTION, SOLUTION INTRAVENOUS; SUBCUTANEOUS at 17:53

## 2017-01-01 RX ADMIN — PANTOPRAZOLE SODIUM 40 MG: 40 TABLET, DELAYED RELEASE ORAL at 06:01

## 2017-01-01 RX ADMIN — SENNOSIDES 8.6 MG: 8.6 TABLET, FILM COATED ORAL at 21:36

## 2017-01-01 RX ADMIN — PANTOPRAZOLE SODIUM 40 MG: 40 TABLET, DELAYED RELEASE ORAL at 05:19

## 2017-01-01 RX ADMIN — METOPROLOL TARTRATE 100 MG: 50 TABLET ORAL at 09:33

## 2017-01-01 RX ADMIN — INSULIN GLARGINE 20 UNITS: 100 INJECTION, SOLUTION SUBCUTANEOUS at 21:05

## 2017-01-01 RX ADMIN — INSULIN LISPRO 6 UNITS: 100 INJECTION, SOLUTION INTRAVENOUS; SUBCUTANEOUS at 18:40

## 2017-01-01 RX ADMIN — GABAPENTIN 300 MG: 300 CAPSULE ORAL at 08:42

## 2017-01-01 RX ADMIN — ASPIRIN 81 MG 81 MG: 81 TABLET ORAL at 16:18

## 2017-01-01 RX ADMIN — MIDODRINE HYDROCHLORIDE 5 MG: 5 TABLET ORAL at 12:34

## 2017-01-01 RX ADMIN — GABAPENTIN 100 MG: 100 CAPSULE ORAL at 13:11

## 2017-01-01 RX ADMIN — IPRATROPIUM BROMIDE AND ALBUTEROL SULFATE 1 AMPULE: .5; 3 SOLUTION RESPIRATORY (INHALATION) at 11:00

## 2017-01-01 RX ADMIN — METOPROLOL TARTRATE 50 MG: 50 TABLET ORAL at 22:43

## 2017-01-01 RX ADMIN — COLLAGENASE SANTYL: 250 OINTMENT TOPICAL at 09:57

## 2017-01-01 RX ADMIN — ENOXAPARIN SODIUM 30 MG: 30 INJECTION SUBCUTANEOUS at 17:19

## 2017-01-01 RX ADMIN — INSULIN HUMAN 6 UNITS: 100 INJECTION, SOLUTION PARENTERAL at 22:48

## 2017-01-01 RX ADMIN — LORAZEPAM 0.5 MG: 0.5 TABLET ORAL at 21:03

## 2017-01-01 RX ADMIN — CEFTRIAXONE 1 G: 1 INJECTION, POWDER, FOR SOLUTION INTRAMUSCULAR; INTRAVENOUS at 02:52

## 2017-01-01 RX ADMIN — GABAPENTIN 300 MG: 300 CAPSULE ORAL at 21:27

## 2017-01-01 RX ADMIN — HYDROCODONE BITARTRATE AND ACETAMINOPHEN 1 TABLET: 7.5; 325 TABLET ORAL at 20:45

## 2017-01-01 RX ADMIN — SILVER SULFADIAZINE: 10 CREAM TOPICAL at 21:07

## 2017-01-01 RX ADMIN — LEVOTHYROXINE SODIUM 100 MCG: 100 TABLET ORAL at 06:35

## 2017-01-01 RX ADMIN — CEFTRIAXONE SODIUM 1 G: 1 INJECTION, POWDER, FOR SOLUTION INTRAMUSCULAR; INTRAVENOUS at 20:01

## 2017-01-01 RX ADMIN — INSULIN LISPRO 1 UNITS: 100 INJECTION, SOLUTION INTRAVENOUS; SUBCUTANEOUS at 17:25

## 2017-01-01 RX ADMIN — ATORVASTATIN CALCIUM 40 MG: 40 TABLET, FILM COATED ORAL at 11:03

## 2017-01-01 RX ADMIN — COLLAGENASE SANTYL: 250 OINTMENT TOPICAL at 08:07

## 2017-01-01 RX ADMIN — AZITHROMYCIN MONOHYDRATE 500 MG: 500 INJECTION, POWDER, LYOPHILIZED, FOR SOLUTION INTRAVENOUS at 20:15

## 2017-01-01 RX ADMIN — METOPROLOL TARTRATE 100 MG: 50 TABLET ORAL at 21:36

## 2017-01-01 RX ADMIN — MICONAZOLE NITRATE: 20.6 POWDER TOPICAL at 20:09

## 2017-01-01 RX ADMIN — METHYLPREDNISOLONE SODIUM SUCCINATE 60 MG: 125 INJECTION, POWDER, FOR SOLUTION INTRAMUSCULAR; INTRAVENOUS at 18:39

## 2017-01-01 RX ADMIN — Medication 10 MILLICURIE: at 15:55

## 2017-01-01 RX ADMIN — GABAPENTIN 100 MG: 100 CAPSULE ORAL at 08:20

## 2017-01-01 RX ADMIN — CALCIUM CHLORIDE 1 G: 100 INJECTION, SOLUTION INTRAVENOUS at 22:48

## 2017-01-01 RX ADMIN — LEVOTHYROXINE SODIUM 100 MCG: 100 TABLET ORAL at 06:18

## 2017-01-01 RX ADMIN — LINAGLIPTIN 5 MG: 5 TABLET, FILM COATED ORAL at 20:24

## 2017-01-01 RX ADMIN — ATORVASTATIN CALCIUM 40 MG: 40 TABLET, FILM COATED ORAL at 09:19

## 2017-01-01 RX ADMIN — INSULIN LISPRO 5 UNITS: 100 INJECTION, SOLUTION INTRAVENOUS; SUBCUTANEOUS at 21:46

## 2017-01-01 RX ADMIN — CALCITRIOL 0.25 MCG: 0.25 CAPSULE, LIQUID FILLED ORAL at 08:12

## 2017-01-01 RX ADMIN — POLYETHYLENE GLYCOL 3350 17 G: 17 POWDER, FOR SOLUTION ORAL at 20:38

## 2017-01-01 RX ADMIN — CHOLECALCIFEROL TAB 10 MCG (400 UNIT) 400 UNITS: 10 TAB at 08:12

## 2017-01-01 RX ADMIN — PANTOPRAZOLE SODIUM 40 MG: 40 TABLET, DELAYED RELEASE ORAL at 06:14

## 2017-01-01 RX ADMIN — ASPIRIN 81 MG CHEWABLE TABLET 81 MG: 81 TABLET CHEWABLE at 09:24

## 2017-01-01 RX ADMIN — MICONAZOLE NITRATE: 20 POWDER TOPICAL at 21:00

## 2017-01-01 RX ADMIN — ATORVASTATIN CALCIUM 40 MG: 40 TABLET, FILM COATED ORAL at 08:48

## 2017-01-01 RX ADMIN — LORAZEPAM 0.5 MG: 0.5 TABLET ORAL at 20:54

## 2017-01-01 RX ADMIN — LEVOFLOXACIN 750 MG: 5 INJECTION, SOLUTION INTRAVENOUS at 12:34

## 2017-01-01 RX ADMIN — HYDROCODONE BITARTRATE AND ACETAMINOPHEN 1 TABLET: 5; 325 TABLET ORAL at 21:03

## 2017-01-01 RX ADMIN — GABAPENTIN 100 MG: 100 CAPSULE ORAL at 22:07

## 2017-01-01 RX ADMIN — CLONIDINE HYDROCHLORIDE 0.1 MG: 0.1 TABLET ORAL at 20:03

## 2017-01-01 RX ADMIN — ENOXAPARIN SODIUM 30 MG: 30 INJECTION SUBCUTANEOUS at 08:40

## 2017-01-01 RX ADMIN — MICONAZOLE NITRATE: 20.6 POWDER TOPICAL at 20:44

## 2017-01-01 RX ADMIN — GABAPENTIN 300 MG: 300 CAPSULE ORAL at 00:47

## 2017-01-01 RX ADMIN — GABAPENTIN 300 MG: 300 CAPSULE ORAL at 08:41

## 2017-01-01 RX ADMIN — MIDODRINE HYDROCHLORIDE 5 MG: 5 TABLET ORAL at 10:37

## 2017-01-01 RX ADMIN — POLYETHYLENE GLYCOL 3350 17 G: 17 POWDER, FOR SOLUTION ORAL at 08:35

## 2017-01-01 RX ADMIN — INSULIN GLARGINE 17 UNITS: 100 INJECTION, SOLUTION SUBCUTANEOUS at 20:48

## 2017-01-01 RX ADMIN — SUCRALFATE 1 G: 1 SUSPENSION ORAL at 06:03

## 2017-01-01 RX ADMIN — PANTOPRAZOLE SODIUM 40 MG: 40 TABLET, DELAYED RELEASE ORAL at 08:35

## 2017-01-01 RX ADMIN — METOLAZONE 2.5 MG: 5 TABLET ORAL at 21:59

## 2017-01-01 RX ADMIN — INSULIN LISPRO 4 UNITS: 100 INJECTION, SOLUTION INTRAVENOUS; SUBCUTANEOUS at 08:30

## 2017-01-01 RX ADMIN — SUCCINYLCHOLINE CHLORIDE 80 MG: 20 INJECTION, SOLUTION INTRAMUSCULAR; INTRAVENOUS; PARENTERAL at 08:15

## 2017-01-01 RX ADMIN — METOPROLOL TARTRATE 100 MG: 50 TABLET ORAL at 20:12

## 2017-01-01 RX ADMIN — SUCRALFATE 1 G: 1 SUSPENSION ORAL at 13:16

## 2017-01-01 RX ADMIN — SILVER SULFADIAZINE: 10 CREAM TOPICAL at 08:32

## 2017-01-01 RX ADMIN — GABAPENTIN 300 MG: 300 CAPSULE ORAL at 09:23

## 2017-01-01 RX ADMIN — PANTOPRAZOLE SODIUM 40 MG: 40 TABLET, DELAYED RELEASE ORAL at 06:39

## 2017-01-01 RX ADMIN — INSULIN LISPRO 3 UNITS: 100 INJECTION, SOLUTION INTRAVENOUS; SUBCUTANEOUS at 17:18

## 2017-01-01 RX ADMIN — LINAGLIPTIN 5 MG: 5 TABLET, FILM COATED ORAL at 08:18

## 2017-01-01 RX ADMIN — HYDROCODONE BITARTRATE AND ACETAMINOPHEN 1 TABLET: 7.5; 325 TABLET ORAL at 04:30

## 2017-01-01 RX ADMIN — METOPROLOL TARTRATE 100 MG: 50 TABLET ORAL at 21:10

## 2017-01-01 RX ADMIN — ASPIRIN 81 MG 81 MG: 81 TABLET ORAL at 08:40

## 2017-01-01 RX ADMIN — ALLOPURINOL 100 MG: 100 TABLET ORAL at 08:44

## 2017-01-01 RX ADMIN — GABAPENTIN 100 MG: 100 CAPSULE ORAL at 00:02

## 2017-01-01 RX ADMIN — CLONIDINE HYDROCHLORIDE 0.2 MG: 0.2 TABLET ORAL at 21:11

## 2017-01-01 RX ADMIN — HYDROCODONE BITARTRATE AND ACETAMINOPHEN 1 TABLET: 7.5; 325 TABLET ORAL at 12:02

## 2017-01-01 RX ADMIN — CHOLECALCIFEROL TAB 10 MCG (400 UNIT) 400 UNITS: 10 TAB at 08:40

## 2017-01-01 RX ADMIN — Medication 5 MILLICURIE: at 15:55

## 2017-01-01 RX ADMIN — MICONAZOLE NITRATE: 20.6 POWDER TOPICAL at 10:32

## 2017-01-01 RX ADMIN — ENOXAPARIN SODIUM 30 MG: 30 INJECTION SUBCUTANEOUS at 16:58

## 2017-01-01 RX ADMIN — METOPROLOL TARTRATE 100 MG: 50 TABLET ORAL at 21:42

## 2017-01-01 RX ADMIN — METRONIDAZOLE 500 MG: 500 INJECTION, SOLUTION INTRAVENOUS at 11:26

## 2017-01-01 RX ADMIN — BISACODYL 10 MG: 10 SUPPOSITORY RECTAL at 14:44

## 2017-01-01 RX ADMIN — PANTOPRAZOLE SODIUM 40 MG: 40 TABLET, DELAYED RELEASE ORAL at 05:24

## 2017-01-01 RX ADMIN — LORAZEPAM 0.5 MG: 0.5 TABLET ORAL at 08:15

## 2017-01-01 RX ADMIN — SODIUM CHLORIDE: 9 INJECTION, SOLUTION INTRAVENOUS at 20:17

## 2017-01-01 RX ADMIN — INSULIN LISPRO 3 UNITS: 100 INJECTION, SOLUTION INTRAVENOUS; SUBCUTANEOUS at 17:08

## 2017-01-01 RX ADMIN — METOPROLOL TARTRATE 100 MG: 50 TABLET ORAL at 09:49

## 2017-01-01 RX ADMIN — GABAPENTIN 300 MG: 300 CAPSULE ORAL at 09:55

## 2017-01-01 RX ADMIN — METOLAZONE: 5 TABLET ORAL at 10:39

## 2017-01-01 RX ADMIN — INSULIN GLARGINE 20 UNITS: 100 INJECTION, SOLUTION SUBCUTANEOUS at 08:44

## 2017-01-01 RX ADMIN — PANTOPRAZOLE SODIUM 40 MG: 40 TABLET, DELAYED RELEASE ORAL at 05:53

## 2017-01-01 RX ADMIN — PANTOPRAZOLE SODIUM 40 MG: 40 TABLET, DELAYED RELEASE ORAL at 05:02

## 2017-01-01 RX ADMIN — HEPARIN SODIUM 5000 UNITS: 5000 INJECTION INTRAVENOUS; SUBCUTANEOUS at 06:01

## 2017-01-01 RX ADMIN — MICONAZOLE NITRATE: 20.6 POWDER TOPICAL at 22:11

## 2017-01-01 RX ADMIN — BUMETANIDE 1 MG: 0.25 INJECTION INTRAMUSCULAR; INTRAVENOUS at 08:46

## 2017-01-01 RX ADMIN — INSULIN GLARGINE 20 UNITS: 100 INJECTION, SOLUTION SUBCUTANEOUS at 09:21

## 2017-01-01 RX ADMIN — METOPROLOL TARTRATE 100 MG: 50 TABLET ORAL at 08:47

## 2017-01-01 RX ADMIN — ALLOPURINOL 100 MG: 100 TABLET ORAL at 09:49

## 2017-01-01 RX ADMIN — HYDROCODONE BITARTRATE AND ACETAMINOPHEN 1 TABLET: 5; 325 TABLET ORAL at 23:50

## 2017-01-01 RX ADMIN — LORAZEPAM 0.5 MG: 0.5 TABLET ORAL at 20:29

## 2017-01-01 RX ADMIN — DOCUSATE SODIUM 100 MG: 100 CAPSULE, LIQUID FILLED ORAL at 09:07

## 2017-01-01 RX ADMIN — AMPICILLIN SODIUM AND SULBACTAM SODIUM 1.5 G: 1; .5 INJECTION, POWDER, FOR SOLUTION INTRAMUSCULAR; INTRAVENOUS at 20:12

## 2017-01-01 RX ADMIN — ASPIRIN 81 MG CHEWABLE TABLET 81 MG: 81 TABLET CHEWABLE at 08:41

## 2017-01-01 RX ADMIN — CEFTRIAXONE 1 G: 1 INJECTION, SOLUTION INTRAVENOUS at 22:00

## 2017-01-01 RX ADMIN — SODIUM CHLORIDE: 9 INJECTION, SOLUTION INTRAVENOUS at 21:18

## 2017-01-01 RX ADMIN — INSULIN LISPRO 2 UNITS: 100 INJECTION, SOLUTION INTRAVENOUS; SUBCUTANEOUS at 12:23

## 2017-01-01 RX ADMIN — IPRATROPIUM BROMIDE AND ALBUTEROL SULFATE 1 AMPULE: .5; 3 SOLUTION RESPIRATORY (INHALATION) at 11:13

## 2017-01-01 RX ADMIN — MICONAZOLE NITRATE: 20 POWDER TOPICAL at 22:12

## 2017-01-01 RX ADMIN — LISINOPRIL 5 MG: 5 TABLET ORAL at 09:19

## 2017-01-01 RX ADMIN — HEPARIN SODIUM 5000 UNITS: 5000 INJECTION INTRAVENOUS; SUBCUTANEOUS at 06:14

## 2017-01-01 RX ADMIN — SILVER SULFADIAZINE: 10 CREAM TOPICAL at 18:17

## 2017-01-01 RX ADMIN — CHOLECALCIFEROL TAB 10 MCG (400 UNIT) 400 UNITS: 10 TAB at 08:27

## 2017-01-01 RX ADMIN — SENNOSIDES 8.6 MG: 8.6 TABLET, FILM COATED ORAL at 20:02

## 2017-01-01 RX ADMIN — LORAZEPAM 0.5 MG: 0.5 TABLET ORAL at 20:55

## 2017-01-01 RX ADMIN — MIDODRINE HYDROCHLORIDE 5 MG: 10 TABLET ORAL at 14:51

## 2017-01-01 RX ADMIN — ONDANSETRON 4 MG: 2 INJECTION INTRAMUSCULAR; INTRAVENOUS at 02:43

## 2017-01-01 RX ADMIN — ACETAMINOPHEN 650 MG: 325 TABLET, FILM COATED ORAL at 16:00

## 2017-01-01 RX ADMIN — BISACODYL 10 MG: 10 SUPPOSITORY RECTAL at 11:29

## 2017-01-01 RX ADMIN — ASPIRIN 81 MG CHEWABLE TABLET 81 MG: 81 TABLET CHEWABLE at 09:07

## 2017-01-01 RX ADMIN — ASPIRIN 81 MG CHEWABLE TABLET 81 MG: 81 TABLET CHEWABLE at 09:49

## 2017-01-01 RX ADMIN — METOPROLOL TARTRATE 100 MG: 50 TABLET ORAL at 08:19

## 2017-01-01 RX ADMIN — COLLAGENASE SANTYL: 250 OINTMENT TOPICAL at 20:20

## 2017-01-01 RX ADMIN — BUMETANIDE 1 MG: 0.25 INJECTION INTRAMUSCULAR; INTRAVENOUS at 22:06

## 2017-01-01 RX ADMIN — MIDODRINE HYDROCHLORIDE 5 MG: 10 TABLET ORAL at 14:12

## 2017-01-01 RX ADMIN — SODIUM POLYSTYRENE SULFONATE 30 G: 15 SUSPENSION ORAL; RECTAL at 16:58

## 2017-01-01 RX ADMIN — BUMETANIDE 1 MG: 0.25 INJECTION, SOLUTION INTRAMUSCULAR; INTRAVENOUS at 20:39

## 2017-01-01 RX ADMIN — INSULIN LISPRO 5 UNITS: 100 INJECTION, SOLUTION INTRAVENOUS; SUBCUTANEOUS at 21:13

## 2017-01-01 RX ADMIN — ALLOPURINOL 100 MG: 100 TABLET ORAL at 10:25

## 2017-01-01 RX ADMIN — DOCUSATE SODIUM 100 MG: 100 CAPSULE, LIQUID FILLED ORAL at 08:36

## 2017-01-01 RX ADMIN — IPRATROPIUM BROMIDE AND ALBUTEROL SULFATE 1 AMPULE: .5; 3 SOLUTION RESPIRATORY (INHALATION) at 07:41

## 2017-01-01 RX ADMIN — METOLAZONE 2.5 MG: 2.5 TABLET ORAL at 08:07

## 2017-01-01 RX ADMIN — PANTOPRAZOLE SODIUM 40 MG: 40 TABLET, DELAYED RELEASE ORAL at 04:50

## 2017-01-01 RX ADMIN — NALOXONE HYDROCHLORIDE 0.4 MG: 0.4 INJECTION, SOLUTION INTRAMUSCULAR; INTRAVENOUS; SUBCUTANEOUS at 16:52

## 2017-01-01 RX ADMIN — LINAGLIPTIN 5 MG: 5 TABLET, FILM COATED ORAL at 08:08

## 2017-01-01 RX ADMIN — ATORVASTATIN CALCIUM 40 MG: 40 TABLET, FILM COATED ORAL at 08:40

## 2017-01-01 RX ADMIN — AMPICILLIN SODIUM AND SULBACTAM SODIUM 1.5 G: 1; .5 INJECTION, POWDER, FOR SOLUTION INTRAMUSCULAR; INTRAVENOUS at 05:50

## 2017-01-01 RX ADMIN — LORAZEPAM 0.5 MG: 0.5 TABLET ORAL at 08:08

## 2017-01-01 RX ADMIN — CEFTRIAXONE 1 G: 1 INJECTION, POWDER, FOR SOLUTION INTRAMUSCULAR; INTRAVENOUS at 02:29

## 2017-01-01 RX ADMIN — MICONAZOLE NITRATE: 20.6 POWDER TOPICAL at 08:27

## 2017-01-01 RX ADMIN — GABAPENTIN 100 MG: 100 CAPSULE ORAL at 21:22

## 2017-01-01 RX ADMIN — INSULIN LISPRO 4 UNITS: 100 INJECTION, SOLUTION INTRAVENOUS; SUBCUTANEOUS at 18:38

## 2017-01-01 RX ADMIN — ASPIRIN 81 MG 81 MG: 81 TABLET ORAL at 08:21

## 2017-01-01 RX ADMIN — INSULIN LISPRO 2 UNITS: 100 INJECTION, SOLUTION INTRAVENOUS; SUBCUTANEOUS at 21:46

## 2017-01-01 RX ADMIN — IPRATROPIUM BROMIDE AND ALBUTEROL SULFATE 1 AMPULE: .5; 3 SOLUTION RESPIRATORY (INHALATION) at 06:36

## 2017-01-01 RX ADMIN — CHOLECALCIFEROL TAB 10 MCG (400 UNIT) 400 UNITS: 10 TAB at 08:39

## 2017-01-01 RX ADMIN — INSULIN LISPRO 3 UNITS: 100 INJECTION, SOLUTION INTRAVENOUS; SUBCUTANEOUS at 13:17

## 2017-01-01 RX ADMIN — MICONAZOLE NITRATE: 20 POWDER TOPICAL at 09:01

## 2017-01-01 RX ADMIN — ALLOPURINOL 100 MG: 100 TABLET ORAL at 09:54

## 2017-01-01 RX ADMIN — COLLAGENASE SANTYL: 250 OINTMENT TOPICAL at 20:56

## 2017-01-01 RX ADMIN — METOPROLOL TARTRATE 100 MG: 50 TABLET ORAL at 10:06

## 2017-01-01 RX ADMIN — SUCRALFATE 1 G: 1 TABLET ORAL at 17:39

## 2017-01-01 RX ADMIN — METOPROLOL TARTRATE 50 MG: 50 TABLET ORAL at 09:11

## 2017-01-01 RX ADMIN — COLLAGENASE SANTYL: 250 OINTMENT TOPICAL at 08:19

## 2017-01-01 RX ADMIN — CHOLECALCIFEROL TAB 10 MCG (400 UNIT) 400 UNITS: 10 TAB at 08:08

## 2017-01-01 RX ADMIN — SUCRALFATE 1 G: 1 SUSPENSION ORAL at 10:36

## 2017-01-01 RX ADMIN — CLONIDINE HYDROCHLORIDE 0.2 MG: 0.2 TABLET ORAL at 09:25

## 2017-01-01 RX ADMIN — ATORVASTATIN CALCIUM 40 MG: 40 TABLET, FILM COATED ORAL at 12:38

## 2017-01-01 RX ADMIN — IPRATROPIUM BROMIDE AND ALBUTEROL SULFATE 1 AMPULE: .5; 3 SOLUTION RESPIRATORY (INHALATION) at 14:53

## 2017-01-01 RX ADMIN — NYSTATIN: 100000 POWDER TOPICAL at 10:45

## 2017-01-01 RX ADMIN — HEPARIN SODIUM 5000 UNITS: 5000 INJECTION INTRAVENOUS; SUBCUTANEOUS at 14:12

## 2017-01-01 RX ADMIN — LINAGLIPTIN 5 MG: 5 TABLET, FILM COATED ORAL at 10:26

## 2017-01-01 RX ADMIN — CALCITRIOL 0.25 MCG: 0.25 CAPSULE, LIQUID FILLED ORAL at 09:24

## 2017-01-01 RX ADMIN — GABAPENTIN 300 MG: 300 CAPSULE ORAL at 14:00

## 2017-01-01 RX ADMIN — DOCUSATE SODIUM 100 MG: 100 CAPSULE, LIQUID FILLED ORAL at 09:32

## 2017-01-01 RX ADMIN — MICONAZOLE NITRATE: 20.6 POWDER TOPICAL at 09:24

## 2017-01-01 RX ADMIN — CLINDAMYCIN PHOSPHATE 600 MG: 600 INJECTION INTRAVENOUS at 23:00

## 2017-01-01 RX ADMIN — BUMETANIDE 1 MG: 0.25 INJECTION, SOLUTION INTRAMUSCULAR; INTRAVENOUS at 19:48

## 2017-01-01 RX ADMIN — SILVER SULFADIAZINE: 10 CREAM TOPICAL at 11:13

## 2017-01-01 RX ADMIN — FLUTICASONE PROPIONATE 1 SPRAY: 50 SPRAY, METERED NASAL at 08:16

## 2017-01-01 RX ADMIN — IPRATROPIUM BROMIDE AND ALBUTEROL SULFATE 1 AMPULE: .5; 3 SOLUTION RESPIRATORY (INHALATION) at 10:26

## 2017-01-01 RX ADMIN — METHYLPREDNISOLONE SODIUM SUCCINATE 40 MG: 40 INJECTION, POWDER, FOR SOLUTION INTRAMUSCULAR; INTRAVENOUS at 20:18

## 2017-01-01 RX ADMIN — SILVER SULFADIAZINE: 10 CREAM TOPICAL at 19:39

## 2017-01-01 RX ADMIN — INSULIN LISPRO 7 UNITS: 100 INJECTION, SOLUTION INTRAVENOUS; SUBCUTANEOUS at 22:17

## 2017-01-01 RX ADMIN — CLONIDINE HYDROCHLORIDE 0.2 MG: 0.2 TABLET ORAL at 08:40

## 2017-01-01 RX ADMIN — INSULIN LISPRO 10 UNITS: 100 INJECTION, SOLUTION INTRAVENOUS; SUBCUTANEOUS at 07:34

## 2017-01-01 RX ADMIN — BUMETANIDE 0.5 MG: 0.25 INJECTION, SOLUTION INTRAMUSCULAR; INTRAVENOUS at 11:51

## 2017-01-01 RX ADMIN — BUMETANIDE 1 MG: 0.25 INJECTION, SOLUTION INTRAMUSCULAR; INTRAVENOUS at 14:54

## 2017-01-01 RX ADMIN — BUMETANIDE 1 MG: 0.25 INJECTION, SOLUTION INTRAMUSCULAR; INTRAVENOUS at 08:05

## 2017-01-01 RX ADMIN — METOPROLOL TARTRATE 50 MG: 50 TABLET ORAL at 22:06

## 2017-01-01 RX ADMIN — IPRATROPIUM BROMIDE AND ALBUTEROL SULFATE 1 AMPULE: .5; 3 SOLUTION RESPIRATORY (INHALATION) at 06:52

## 2017-01-01 RX ADMIN — MICONAZOLE NITRATE: 20.6 POWDER TOPICAL at 08:42

## 2017-01-01 RX ADMIN — HEPARIN SODIUM 2000 UNITS: 1000 INJECTION, SOLUTION INTRAVENOUS; SUBCUTANEOUS at 13:32

## 2017-01-01 RX ADMIN — SUCRALFATE 1 G: 1 SUSPENSION ORAL at 06:05

## 2017-01-01 RX ADMIN — AZITHROMYCIN 500 MG: 250 TABLET, FILM COATED ORAL at 08:18

## 2017-01-01 RX ADMIN — PROMETHAZINE HYDROCHLORIDE 12.5 MG: 25 TABLET ORAL at 10:34

## 2017-01-01 RX ADMIN — LINAGLIPTIN 5 MG: 5 TABLET, FILM COATED ORAL at 11:06

## 2017-01-01 RX ADMIN — IPRATROPIUM BROMIDE AND ALBUTEROL SULFATE 1 AMPULE: .5; 3 SOLUTION RESPIRATORY (INHALATION) at 06:19

## 2017-01-01 RX ADMIN — LEVOTHYROXINE SODIUM 100 MCG: 100 TABLET ORAL at 06:27

## 2017-01-01 RX ADMIN — IPRATROPIUM BROMIDE AND ALBUTEROL SULFATE 1 AMPULE: .5; 3 SOLUTION RESPIRATORY (INHALATION) at 11:56

## 2017-01-01 RX ADMIN — SUCRALFATE 1 G: 1 SUSPENSION ORAL at 09:48

## 2017-01-01 RX ADMIN — ALLOPURINOL 100 MG: 100 TABLET ORAL at 08:41

## 2017-01-01 RX ADMIN — HYDROCODONE BITARTRATE AND ACETAMINOPHEN 1 TABLET: 7.5; 325 TABLET ORAL at 20:39

## 2017-01-01 RX ADMIN — HEPARIN SODIUM 5000 UNITS: 5000 INJECTION INTRAVENOUS; SUBCUTANEOUS at 21:31

## 2017-01-01 RX ADMIN — METHYLPREDNISOLONE SODIUM SUCCINATE 60 MG: 125 INJECTION, POWDER, FOR SOLUTION INTRAMUSCULAR; INTRAVENOUS at 08:17

## 2017-01-01 RX ADMIN — COLLAGENASE SANTYL: 250 OINTMENT TOPICAL at 11:14

## 2017-01-01 RX ADMIN — LORAZEPAM 0.5 MG: 0.5 TABLET ORAL at 22:43

## 2017-01-01 RX ADMIN — MICONAZOLE NITRATE: 20.6 POWDER TOPICAL at 08:53

## 2017-01-01 RX ADMIN — METRONIDAZOLE 500 MG: 500 INJECTION, SOLUTION INTRAVENOUS at 20:25

## 2017-01-01 RX ADMIN — ASPIRIN 81 MG CHEWABLE TABLET 81 MG: 81 TABLET CHEWABLE at 08:40

## 2017-01-01 RX ADMIN — MIDODRINE HYDROCHLORIDE 5 MG: 10 TABLET ORAL at 11:24

## 2017-01-01 RX ADMIN — SODIUM POLYSTYRENE SULFONATE 30 G: 15 SUSPENSION ORAL; RECTAL at 19:38

## 2017-01-01 RX ADMIN — AZITHROMYCIN 500 MG: 250 TABLET, FILM COATED ORAL at 10:46

## 2017-01-01 RX ADMIN — GABAPENTIN 300 MG: 300 CAPSULE ORAL at 11:36

## 2017-01-01 RX ADMIN — ONDANSETRON 4 MG: 2 INJECTION INTRAMUSCULAR; INTRAVENOUS at 10:08

## 2017-01-01 RX ADMIN — METHYLPREDNISOLONE SODIUM SUCCINATE 20 MG: 40 INJECTION, POWDER, FOR SOLUTION INTRAMUSCULAR; INTRAVENOUS at 09:34

## 2017-01-01 RX ADMIN — ATORVASTATIN CALCIUM 40 MG: 40 TABLET, FILM COATED ORAL at 08:12

## 2017-01-01 RX ADMIN — ALLOPURINOL 100 MG: 100 TABLET ORAL at 08:27

## 2017-01-01 RX ADMIN — INSULIN LISPRO 9 UNITS: 100 INJECTION, SOLUTION INTRAVENOUS; SUBCUTANEOUS at 17:25

## 2017-01-01 RX ADMIN — ERGOCALCIFEROL 50000 UNITS: 1.25 CAPSULE ORAL at 09:50

## 2017-01-01 RX ADMIN — ENOXAPARIN SODIUM 30 MG: 30 INJECTION SUBCUTANEOUS at 09:11

## 2017-01-01 RX ADMIN — ATORVASTATIN CALCIUM 40 MG: 40 TABLET, FILM COATED ORAL at 09:32

## 2017-01-01 RX ADMIN — GABAPENTIN 300 MG: 300 CAPSULE ORAL at 11:23

## 2017-01-01 RX ADMIN — CEFTRIAXONE 1 G: 1 INJECTION, SOLUTION INTRAVENOUS at 00:11

## 2017-01-01 RX ADMIN — GABAPENTIN 300 MG: 300 CAPSULE ORAL at 21:31

## 2017-01-01 RX ADMIN — INSULIN LISPRO 1 UNITS: 100 INJECTION, SOLUTION INTRAVENOUS; SUBCUTANEOUS at 20:48

## 2017-01-01 RX ADMIN — MIDODRINE HYDROCHLORIDE 5 MG: 5 TABLET ORAL at 17:38

## 2017-01-01 RX ADMIN — MICONAZOLE NITRATE: 20.6 POWDER TOPICAL at 21:02

## 2017-01-01 RX ADMIN — METHYLPREDNISOLONE SODIUM SUCCINATE 20 MG: 40 INJECTION, POWDER, FOR SOLUTION INTRAMUSCULAR; INTRAVENOUS at 21:32

## 2017-01-01 RX ADMIN — GABAPENTIN 100 MG: 100 CAPSULE ORAL at 21:46

## 2017-01-01 RX ADMIN — Medication 10 ML: at 22:13

## 2017-01-01 RX ADMIN — IPRATROPIUM BROMIDE AND ALBUTEROL SULFATE 1 AMPULE: .5; 3 SOLUTION RESPIRATORY (INHALATION) at 11:37

## 2017-01-01 RX ADMIN — INSULIN LISPRO 3 UNITS: 100 INJECTION, SOLUTION INTRAVENOUS; SUBCUTANEOUS at 08:32

## 2017-01-01 RX ADMIN — GABAPENTIN 100 MG: 100 CAPSULE ORAL at 22:41

## 2017-01-01 RX ADMIN — AZITHROMYCIN 500 MG: 250 TABLET, FILM COATED ORAL at 10:44

## 2017-01-01 RX ADMIN — METOLAZONE 2.5 MG: 5 TABLET ORAL at 22:07

## 2017-01-01 RX ADMIN — ENOXAPARIN SODIUM 30 MG: 30 INJECTION SUBCUTANEOUS at 17:30

## 2017-01-01 RX ADMIN — LISINOPRIL 5 MG: 5 TABLET ORAL at 08:39

## 2017-01-01 RX ADMIN — ALLOPURINOL 100 MG: 100 TABLET ORAL at 08:08

## 2017-01-01 RX ADMIN — ASPIRIN 81 MG CHEWABLE TABLET 81 MG: 81 TABLET CHEWABLE at 09:11

## 2017-01-01 RX ADMIN — ASPIRIN 81 MG CHEWABLE TABLET 81 MG: 81 TABLET CHEWABLE at 10:36

## 2017-01-01 RX ADMIN — BUMETANIDE 1 MG: 0.25 INJECTION INTRAMUSCULAR; INTRAVENOUS at 12:17

## 2017-01-01 RX ADMIN — DOCUSATE SODIUM 100 MG: 100 CAPSULE, LIQUID FILLED ORAL at 21:02

## 2017-01-01 RX ADMIN — ASPIRIN 81 MG CHEWABLE TABLET 81 MG: 81 TABLET CHEWABLE at 10:46

## 2017-01-01 RX ADMIN — POLYETHYLENE GLYCOL 3350 17 G: 17 POWDER, FOR SOLUTION ORAL at 12:39

## 2017-01-01 RX ADMIN — HYDROCODONE BITARTRATE AND ACETAMINOPHEN 1 TABLET: 7.5; 325 TABLET ORAL at 05:19

## 2017-01-01 RX ADMIN — GABAPENTIN 300 MG: 300 CAPSULE ORAL at 14:12

## 2017-01-01 RX ADMIN — METOPROLOL TARTRATE 100 MG: 50 TABLET ORAL at 19:53

## 2017-01-01 RX ADMIN — ALLOPURINOL 100 MG: 100 TABLET ORAL at 13:11

## 2017-01-01 RX ADMIN — ATORVASTATIN CALCIUM 40 MG: 40 TABLET, FILM COATED ORAL at 08:59

## 2017-01-01 RX ADMIN — HYDROCODONE BITARTRATE AND ACETAMINOPHEN 1 TABLET: 7.5; 325 TABLET ORAL at 21:58

## 2017-01-01 RX ADMIN — INSULIN LISPRO 7 UNITS: 100 INJECTION, SOLUTION INTRAVENOUS; SUBCUTANEOUS at 11:51

## 2017-01-01 RX ADMIN — EPINEPHRINE 1 MG: 0.1 INJECTION INTRACARDIAC; INTRAVENOUS at 06:17

## 2017-01-01 RX ADMIN — INSULIN LISPRO 2 UNITS: 100 INJECTION, SOLUTION INTRAVENOUS; SUBCUTANEOUS at 08:31

## 2017-01-01 RX ADMIN — SODIUM CHLORIDE: 4.5 INJECTION, SOLUTION INTRAVENOUS at 06:55

## 2017-01-01 RX ADMIN — ONDANSETRON 4 MG: 2 INJECTION INTRAMUSCULAR; INTRAVENOUS at 09:33

## 2017-01-01 RX ADMIN — SILVER SULFADIAZINE: 10 CREAM TOPICAL at 17:51

## 2017-01-01 RX ADMIN — ALLOPURINOL 100 MG: 100 TABLET ORAL at 10:08

## 2017-01-01 RX ADMIN — INSULIN LISPRO 5 UNITS: 100 INJECTION, SOLUTION INTRAVENOUS; SUBCUTANEOUS at 17:37

## 2017-01-01 RX ADMIN — MICONAZOLE NITRATE: 20.6 POWDER TOPICAL at 08:09

## 2017-01-01 RX ADMIN — DOCUSATE SODIUM 100 MG: 100 CAPSULE, LIQUID FILLED ORAL at 22:43

## 2017-01-01 RX ADMIN — CLONIDINE HYDROCHLORIDE 0.2 MG: 0.2 TABLET ORAL at 21:34

## 2017-01-01 RX ADMIN — METOPROLOL TARTRATE 100 MG: 50 TABLET ORAL at 08:08

## 2017-01-01 RX ADMIN — LISINOPRIL 5 MG: 5 TABLET ORAL at 08:40

## 2017-01-01 RX ADMIN — DOCUSATE SODIUM 100 MG: 100 CAPSULE, LIQUID FILLED ORAL at 09:02

## 2017-01-01 RX ADMIN — LIDOCAINE HYDROCHLORIDE 15 ML: 20 INJECTION, SOLUTION INFILTRATION; PERINEURAL at 07:21

## 2017-01-01 RX ADMIN — INSULIN LISPRO 1 UNITS: 100 INJECTION, SOLUTION INTRAVENOUS; SUBCUTANEOUS at 16:55

## 2017-01-01 RX ADMIN — ENOXAPARIN SODIUM 30 MG: 30 INJECTION SUBCUTANEOUS at 11:52

## 2017-01-01 RX ADMIN — INSULIN LISPRO 6 UNITS: 100 INJECTION, SOLUTION INTRAVENOUS; SUBCUTANEOUS at 17:42

## 2017-01-01 RX ADMIN — MICONAZOLE NITRATE: 20.6 POWDER TOPICAL at 10:38

## 2017-01-01 RX ADMIN — INSULIN LISPRO 3 UNITS: 100 INJECTION, SOLUTION INTRAVENOUS; SUBCUTANEOUS at 08:14

## 2017-01-01 RX ADMIN — INSULIN GLARGINE 15 UNITS: 100 INJECTION, SOLUTION SUBCUTANEOUS at 21:03

## 2017-01-01 RX ADMIN — ALLOPURINOL 100 MG: 100 TABLET ORAL at 08:06

## 2017-01-01 RX ADMIN — ATORVASTATIN CALCIUM 40 MG: 40 TABLET, FILM COATED ORAL at 10:08

## 2017-01-01 RX ADMIN — SODIUM POLYSTYRENE SULFONATE 30 G: 15 SUSPENSION ORAL; RECTAL at 06:17

## 2017-01-01 RX ADMIN — Medication 10 ML: at 08:46

## 2017-01-01 RX ADMIN — CEFTRIAXONE SODIUM 1 G: 1 INJECTION, POWDER, FOR SOLUTION INTRAMUSCULAR; INTRAVENOUS at 00:20

## 2017-01-01 RX ADMIN — INSULIN LISPRO 3 UNITS: 100 INJECTION, SOLUTION INTRAVENOUS; SUBCUTANEOUS at 17:37

## 2017-01-01 RX ADMIN — DOCUSATE SODIUM 100 MG: 100 CAPSULE, LIQUID FILLED ORAL at 21:29

## 2017-01-01 RX ADMIN — DOCUSATE SODIUM 100 MG: 100 CAPSULE, LIQUID FILLED ORAL at 08:46

## 2017-01-01 RX ADMIN — BUMETANIDE 1 MG: 1 TABLET ORAL at 08:27

## 2017-01-01 RX ADMIN — HYDROCODONE BITARTRATE AND ACETAMINOPHEN 1 TABLET: 7.5; 325 TABLET ORAL at 15:09

## 2017-01-01 RX ADMIN — GABAPENTIN 300 MG: 300 CAPSULE ORAL at 08:30

## 2017-01-01 RX ADMIN — METOPROLOL TARTRATE 100 MG: 50 TABLET ORAL at 21:48

## 2017-01-01 RX ADMIN — PANTOPRAZOLE SODIUM 40 MG: 40 TABLET, DELAYED RELEASE ORAL at 06:42

## 2017-01-01 RX ADMIN — HYDROCODONE BITARTRATE AND ACETAMINOPHEN 1 TABLET: 7.5; 325 TABLET ORAL at 08:46

## 2017-01-01 RX ADMIN — COLLAGENASE SANTYL: 250 OINTMENT TOPICAL at 22:44

## 2017-01-01 RX ADMIN — ALLOPURINOL 100 MG: 100 TABLET ORAL at 10:37

## 2017-01-01 RX ADMIN — MIDODRINE HYDROCHLORIDE 5 MG: 5 TABLET ORAL at 06:08

## 2017-01-01 RX ADMIN — DOCUSATE SODIUM 100 MG: 100 CAPSULE, LIQUID FILLED ORAL at 08:19

## 2017-01-01 RX ADMIN — GABAPENTIN 100 MG: 100 CAPSULE ORAL at 21:36

## 2017-01-01 RX ADMIN — CALCIUM GLUCONATE 1 G: 94 INJECTION, SOLUTION INTRAVENOUS at 22:59

## 2017-01-01 RX ADMIN — HEPARIN SODIUM 1800 UNITS: 1000 INJECTION, SOLUTION INTRAVENOUS; SUBCUTANEOUS at 11:00

## 2017-01-01 RX ADMIN — INSULIN LISPRO 9 UNITS: 100 INJECTION, SOLUTION INTRAVENOUS; SUBCUTANEOUS at 18:30

## 2017-01-01 RX ADMIN — BUMETANIDE 1 MG: 1 TABLET ORAL at 08:08

## 2017-01-01 RX ADMIN — Medication 10 ML: at 08:53

## 2017-01-01 RX ADMIN — MICONAZOLE NITRATE: 20.6 POWDER TOPICAL at 21:22

## 2017-01-01 RX ADMIN — EPINEPHRINE 1 MG: 0.1 INJECTION INTRACARDIAC; INTRAVENOUS at 06:34

## 2017-01-01 RX ADMIN — FLUTICASONE PROPIONATE 1 SPRAY: 50 SPRAY, METERED NASAL at 20:25

## 2017-01-01 RX ADMIN — Medication 30 MG: at 07:55

## 2017-01-01 RX ADMIN — IPRATROPIUM BROMIDE AND ALBUTEROL SULFATE 1 AMPULE: .5; 3 SOLUTION RESPIRATORY (INHALATION) at 14:03

## 2017-01-01 RX ADMIN — IPRATROPIUM BROMIDE AND ALBUTEROL SULFATE 1 AMPULE: .5; 3 SOLUTION RESPIRATORY (INHALATION) at 20:00

## 2017-01-01 RX ADMIN — HYDROCODONE BITARTRATE AND ACETAMINOPHEN 1 TABLET: 7.5; 325 TABLET ORAL at 13:07

## 2017-01-01 RX ADMIN — HYDROCODONE BITARTRATE AND ACETAMINOPHEN 1 TABLET: 5; 325 TABLET ORAL at 07:57

## 2017-01-01 RX ADMIN — CEFTRIAXONE 1 G: 1 INJECTION, SOLUTION INTRAVENOUS at 21:31

## 2017-01-01 RX ADMIN — CLONIDINE HYDROCHLORIDE 0.1 MG: 0.1 TABLET ORAL at 09:25

## 2017-01-01 RX ADMIN — HYDROCODONE BITARTRATE AND ACETAMINOPHEN 1 TABLET: 7.5; 325 TABLET ORAL at 00:19

## 2017-01-01 RX ADMIN — FLUCONAZOLE 200 MG: 2 INJECTION, SOLUTION INTRAVENOUS at 06:05

## 2017-01-01 RX ADMIN — SUCRALFATE 1 G: 1 TABLET ORAL at 18:20

## 2017-01-01 RX ADMIN — ENOXAPARIN SODIUM 40 MG: 40 INJECTION SUBCUTANEOUS at 08:14

## 2017-01-01 RX ADMIN — ATORVASTATIN CALCIUM 40 MG: 40 TABLET, FILM COATED ORAL at 09:10

## 2017-01-01 RX ADMIN — METOPROLOL TARTRATE 100 MG: 50 TABLET ORAL at 09:24

## 2017-01-01 RX ADMIN — GABAPENTIN 100 MG: 100 CAPSULE ORAL at 16:18

## 2017-01-01 RX ADMIN — IPRATROPIUM BROMIDE AND ALBUTEROL SULFATE 1 AMPULE: .5; 3 SOLUTION RESPIRATORY (INHALATION) at 07:20

## 2017-01-01 RX ADMIN — PREDNISONE 20 MG: 20 TABLET ORAL at 08:08

## 2017-01-01 RX ADMIN — GABAPENTIN 300 MG: 300 CAPSULE ORAL at 10:08

## 2017-01-01 RX ADMIN — MICONAZOLE NITRATE: 20.6 POWDER TOPICAL at 11:25

## 2017-01-01 RX ADMIN — ENOXAPARIN SODIUM 30 MG: 30 INJECTION SUBCUTANEOUS at 08:12

## 2017-01-01 RX ADMIN — CLONIDINE HYDROCHLORIDE 0.2 MG: 0.2 TABLET ORAL at 21:04

## 2017-01-01 RX ADMIN — CEFTRIAXONE SODIUM 1 G: 1 INJECTION, POWDER, FOR SOLUTION INTRAMUSCULAR; INTRAVENOUS at 19:53

## 2017-01-01 RX ADMIN — HYDROCODONE BITARTRATE AND ACETAMINOPHEN 1 TABLET: 7.5; 325 TABLET ORAL at 20:22

## 2017-01-01 RX ADMIN — CEFTRIAXONE SODIUM 1 G: 1 INJECTION, POWDER, FOR SOLUTION INTRAMUSCULAR; INTRAVENOUS at 21:47

## 2017-01-01 RX ADMIN — IPRATROPIUM BROMIDE AND ALBUTEROL SULFATE 1 AMPULE: .5; 3 SOLUTION RESPIRATORY (INHALATION) at 14:50

## 2017-01-01 RX ADMIN — CHOLECALCIFEROL TAB 10 MCG (400 UNIT) 400 UNITS: 10 TAB at 08:35

## 2017-01-01 RX ADMIN — LISINOPRIL 5 MG: 5 TABLET ORAL at 10:05

## 2017-01-01 RX ADMIN — IPRATROPIUM BROMIDE AND ALBUTEROL SULFATE 1 AMPULE: .5; 3 SOLUTION RESPIRATORY (INHALATION) at 10:42

## 2017-01-01 RX ADMIN — CLONIDINE HYDROCHLORIDE 0.2 MG: 0.2 TABLET ORAL at 20:39

## 2017-01-01 RX ADMIN — Medication 10 ML: at 21:25

## 2017-01-01 RX ADMIN — INSULIN LISPRO 2 UNITS: 100 INJECTION, SOLUTION INTRAVENOUS; SUBCUTANEOUS at 20:50

## 2017-01-01 RX ADMIN — GABAPENTIN 100 MG: 100 CAPSULE ORAL at 08:45

## 2017-01-01 RX ADMIN — INSULIN LISPRO 4 UNITS: 100 INJECTION, SOLUTION INTRAVENOUS; SUBCUTANEOUS at 12:30

## 2017-01-01 RX ADMIN — MIDODRINE HYDROCHLORIDE 5 MG: 5 TABLET ORAL at 14:04

## 2017-01-01 RX ADMIN — INSULIN GLARGINE 30 UNITS: 100 INJECTION, SOLUTION SUBCUTANEOUS at 21:45

## 2017-01-01 RX ADMIN — HYDROCODONE BITARTRATE AND ACETAMINOPHEN 1 TABLET: 7.5; 325 TABLET ORAL at 13:48

## 2017-01-01 RX ADMIN — SILVER SULFADIAZINE: 10 CREAM TOPICAL at 20:40

## 2017-01-01 RX ADMIN — BUMETANIDE 1 MG: 0.25 INJECTION, SOLUTION INTRAMUSCULAR; INTRAVENOUS at 09:36

## 2017-01-01 RX ADMIN — DOCUSATE SODIUM 100 MG: 100 CAPSULE, LIQUID FILLED ORAL at 11:06

## 2017-01-01 RX ADMIN — MICONAZOLE NITRATE: 20.6 POWDER TOPICAL at 11:16

## 2017-01-01 RX ADMIN — LORAZEPAM 0.5 MG: 0.5 TABLET ORAL at 10:26

## 2017-01-01 RX ADMIN — INSULIN GLARGINE 17 UNITS: 100 INJECTION, SOLUTION SUBCUTANEOUS at 21:24

## 2017-01-01 RX ADMIN — PANTOPRAZOLE SODIUM 40 MG: 40 TABLET, DELAYED RELEASE ORAL at 11:52

## 2017-01-01 RX ADMIN — MICONAZOLE NITRATE: 20.6 POWDER TOPICAL at 09:26

## 2017-01-01 RX ADMIN — IPRATROPIUM BROMIDE AND ALBUTEROL SULFATE 1 AMPULE: .5; 3 SOLUTION RESPIRATORY (INHALATION) at 11:22

## 2017-01-01 RX ADMIN — BUMETANIDE 1 MG: 1 TABLET ORAL at 09:10

## 2017-01-01 RX ADMIN — HEPARIN SODIUM 5000 UNITS: 5000 INJECTION, SOLUTION INTRAVENOUS; SUBCUTANEOUS at 22:58

## 2017-01-01 RX ADMIN — GABAPENTIN 100 MG: 100 CAPSULE ORAL at 08:40

## 2017-01-01 RX ADMIN — SILVER SULFADIAZINE: 10 CREAM TOPICAL at 09:15

## 2017-01-01 RX ADMIN — INSULIN LISPRO 6 UNITS: 100 INJECTION, SOLUTION INTRAVENOUS; SUBCUTANEOUS at 18:04

## 2017-01-01 RX ADMIN — ONDANSETRON 4 MG: 2 INJECTION INTRAMUSCULAR; INTRAVENOUS at 23:00

## 2017-01-01 RX ADMIN — SILVER SULFADIAZINE: 10 CREAM TOPICAL at 08:48

## 2017-01-01 RX ADMIN — MIDODRINE HYDROCHLORIDE 5 MG: 10 TABLET ORAL at 08:41

## 2017-01-01 RX ADMIN — BUMETANIDE 1 MG: 0.25 INJECTION, SOLUTION INTRAMUSCULAR; INTRAVENOUS at 08:33

## 2017-01-01 RX ADMIN — ALLOPURINOL 100 MG: 100 TABLET ORAL at 08:12

## 2017-01-01 RX ADMIN — MIDODRINE HYDROCHLORIDE 5 MG: 10 TABLET ORAL at 17:17

## 2017-01-01 RX ADMIN — IPRATROPIUM BROMIDE AND ALBUTEROL SULFATE 1 AMPULE: .5; 3 SOLUTION RESPIRATORY (INHALATION) at 10:02

## 2017-01-01 RX ADMIN — ASPIRIN 81 MG CHEWABLE TABLET 81 MG: 81 TABLET CHEWABLE at 09:10

## 2017-01-01 RX ADMIN — INSULIN LISPRO 9 UNITS: 100 INJECTION, SOLUTION INTRAVENOUS; SUBCUTANEOUS at 13:20

## 2017-01-01 RX ADMIN — EPINEPHRINE 1 MG: 0.1 INJECTION INTRACARDIAC; INTRAVENOUS at 07:00

## 2017-01-01 RX ADMIN — ONDANSETRON 4 MG: 2 INJECTION INTRAMUSCULAR; INTRAVENOUS at 20:13

## 2017-01-01 RX ADMIN — LISINOPRIL 5 MG: 5 TABLET ORAL at 08:20

## 2017-01-01 RX ADMIN — HEPARIN SODIUM 5000 UNITS: 5000 INJECTION INTRAVENOUS; SUBCUTANEOUS at 15:59

## 2017-01-01 RX ADMIN — MIDODRINE HYDROCHLORIDE 5 MG: 10 TABLET ORAL at 21:22

## 2017-01-01 RX ADMIN — LORAZEPAM 0.5 MG: 0.5 TABLET ORAL at 09:10

## 2017-01-01 RX ADMIN — IPRATROPIUM BROMIDE AND ALBUTEROL SULFATE 1 AMPULE: .5; 3 SOLUTION RESPIRATORY (INHALATION) at 11:31

## 2017-01-01 RX ADMIN — MICONAZOLE NITRATE: 20.6 POWDER TOPICAL at 11:14

## 2017-01-01 RX ADMIN — METOPROLOL TARTRATE 100 MG: 50 TABLET ORAL at 20:41

## 2017-01-01 RX ADMIN — EPINEPHRINE 1 MCG/MIN: 1 INJECTION, SOLUTION, CONCENTRATE INTRAVENOUS at 07:19

## 2017-01-01 RX ADMIN — HEPARIN SODIUM 5000 UNITS: 5000 INJECTION, SOLUTION INTRAVENOUS; SUBCUTANEOUS at 06:22

## 2017-01-01 RX ADMIN — MICONAZOLE NITRATE: 20.6 POWDER TOPICAL at 21:48

## 2017-01-01 RX ADMIN — SUCRALFATE 1 G: 1 TABLET ORAL at 17:31

## 2017-01-01 RX ADMIN — MICONAZOLE NITRATE: 20.6 POWDER TOPICAL at 16:57

## 2017-01-01 RX ADMIN — SODIUM BICARBONATE 50 MEQ: 84 INJECTION, SOLUTION INTRAVENOUS at 06:35

## 2017-01-01 RX ADMIN — METOPROLOL TARTRATE 50 MG: 50 TABLET ORAL at 21:27

## 2017-01-01 RX ADMIN — METOPROLOL TARTRATE 50 MG: 50 TABLET ORAL at 10:08

## 2017-01-01 RX ADMIN — IPRATROPIUM BROMIDE AND ALBUTEROL SULFATE 1 AMPULE: .5; 3 SOLUTION RESPIRATORY (INHALATION) at 06:47

## 2017-01-01 RX ADMIN — SILVER SULFADIAZINE: 10 CREAM TOPICAL at 07:59

## 2017-01-01 RX ADMIN — METHYLPREDNISOLONE SODIUM SUCCINATE 40 MG: 40 INJECTION, POWDER, FOR SOLUTION INTRAMUSCULAR; INTRAVENOUS at 17:30

## 2017-01-01 RX ADMIN — BUMETANIDE 1 MG: 0.25 INJECTION, SOLUTION INTRAMUSCULAR; INTRAVENOUS at 12:38

## 2017-01-01 RX ADMIN — ATORVASTATIN CALCIUM 40 MG: 40 TABLET, FILM COATED ORAL at 09:50

## 2017-01-01 RX ADMIN — LEVOTHYROXINE SODIUM 100 MCG: 100 TABLET ORAL at 06:12

## 2017-01-01 RX ADMIN — PANTOPRAZOLE SODIUM 40 MG: 40 TABLET, DELAYED RELEASE ORAL at 06:32

## 2017-01-01 RX ADMIN — METOPROLOL TARTRATE 100 MG: 50 TABLET ORAL at 20:34

## 2017-01-01 RX ADMIN — MICONAZOLE NITRATE: 20.6 POWDER TOPICAL at 20:53

## 2017-01-01 RX ADMIN — ROPIVACAINE HYDROCHLORIDE 15 ML: 5 INJECTION, SOLUTION EPIDURAL; INFILTRATION; PERINEURAL at 07:21

## 2017-01-01 RX ADMIN — INSULIN LISPRO 6 UNITS: 100 INJECTION, SOLUTION INTRAVENOUS; SUBCUTANEOUS at 12:20

## 2017-01-01 RX ADMIN — INSULIN GLARGINE 20 UNITS: 100 INJECTION, SOLUTION SUBCUTANEOUS at 13:50

## 2017-01-01 RX ADMIN — BUMETANIDE 0.5 MG: 0.25 INJECTION, SOLUTION INTRAMUSCULAR; INTRAVENOUS at 21:21

## 2017-01-01 RX ADMIN — METOPROLOL TARTRATE 100 MG: 50 TABLET ORAL at 21:59

## 2017-01-01 RX ADMIN — IPRATROPIUM BROMIDE AND ALBUTEROL SULFATE 1 AMPULE: .5; 3 SOLUTION RESPIRATORY (INHALATION) at 16:03

## 2017-01-01 RX ADMIN — METOPROLOL TARTRATE 100 MG: 50 TABLET ORAL at 20:53

## 2017-01-01 RX ADMIN — MICONAZOLE NITRATE: 20.6 POWDER TOPICAL at 10:00

## 2017-01-01 RX ADMIN — ATORVASTATIN CALCIUM 40 MG: 40 TABLET, FILM COATED ORAL at 11:25

## 2017-01-01 RX ADMIN — HYDROCODONE BITARTRATE AND ACETAMINOPHEN 1 TABLET: 7.5; 325 TABLET ORAL at 10:30

## 2017-01-01 RX ADMIN — METOPROLOL TARTRATE 100 MG: 50 TABLET ORAL at 08:35

## 2017-01-01 RX ADMIN — SENNOSIDES 8.6 MG: 8.6 TABLET, FILM COATED ORAL at 21:03

## 2017-01-01 RX ADMIN — INSULIN LISPRO 9 UNITS: 100 INJECTION, SOLUTION INTRAVENOUS; SUBCUTANEOUS at 21:05

## 2017-01-01 RX ADMIN — INSULIN LISPRO 9 UNITS: 100 INJECTION, SOLUTION INTRAVENOUS; SUBCUTANEOUS at 12:00

## 2017-01-01 RX ADMIN — MIDODRINE HYDROCHLORIDE 5 MG: 5 TABLET ORAL at 12:18

## 2017-01-01 RX ADMIN — IPRATROPIUM BROMIDE AND ALBUTEROL SULFATE 1 AMPULE: .5; 3 SOLUTION RESPIRATORY (INHALATION) at 20:59

## 2017-01-01 RX ADMIN — ENOXAPARIN SODIUM 30 MG: 30 INJECTION SUBCUTANEOUS at 18:37

## 2017-01-01 RX ADMIN — ALLOPURINOL 100 MG: 100 TABLET ORAL at 12:39

## 2017-01-01 RX ADMIN — AZITHROMYCIN 500 MG: 250 TABLET, FILM COATED ORAL at 08:59

## 2017-01-01 RX ADMIN — PANTOPRAZOLE SODIUM 40 MG: 40 TABLET, DELAYED RELEASE ORAL at 05:56

## 2017-01-01 RX ADMIN — PANTOPRAZOLE SODIUM 40 MG: 40 TABLET, DELAYED RELEASE ORAL at 05:48

## 2017-01-01 RX ADMIN — METOPROLOL TARTRATE 100 MG: 50 TABLET ORAL at 21:57

## 2017-01-01 RX ADMIN — IPRATROPIUM BROMIDE AND ALBUTEROL SULFATE 1 AMPULE: .5; 3 SOLUTION RESPIRATORY (INHALATION) at 15:21

## 2017-01-01 RX ADMIN — PANTOPRAZOLE SODIUM 40 MG: 40 TABLET, DELAYED RELEASE ORAL at 06:18

## 2017-01-01 RX ADMIN — MIDODRINE HYDROCHLORIDE 5 MG: 10 TABLET ORAL at 18:01

## 2017-01-01 RX ADMIN — INSULIN LISPRO 6 UNITS: 100 INJECTION, SOLUTION INTRAVENOUS; SUBCUTANEOUS at 11:43

## 2017-01-01 RX ADMIN — CLONIDINE HYDROCHLORIDE 0.2 MG: 0.2 TABLET ORAL at 10:05

## 2017-01-01 RX ADMIN — HYDROCODONE BITARTRATE AND ACETAMINOPHEN 2 TABLET: 5; 325 TABLET ORAL at 15:50

## 2017-01-01 RX ADMIN — CEFTRIAXONE 1 G: 1 INJECTION, POWDER, FOR SOLUTION INTRAMUSCULAR; INTRAVENOUS at 17:31

## 2017-01-01 RX ADMIN — SODIUM CHLORIDE: 4.5 INJECTION, SOLUTION INTRAVENOUS at 21:45

## 2017-01-01 RX ADMIN — ALLOPURINOL 100 MG: 100 TABLET ORAL at 07:57

## 2017-01-01 RX ADMIN — LEVOTHYROXINE SODIUM 100 MCG: 100 TABLET ORAL at 06:22

## 2017-01-01 RX ADMIN — INSULIN LISPRO 6 UNITS: 100 INJECTION, SOLUTION INTRAVENOUS; SUBCUTANEOUS at 20:30

## 2017-01-01 RX ADMIN — ATORVASTATIN CALCIUM 40 MG: 40 TABLET, FILM COATED ORAL at 10:46

## 2017-01-01 RX ADMIN — METOPROLOL TARTRATE 100 MG: 50 TABLET ORAL at 00:02

## 2017-01-01 RX ADMIN — BUMETANIDE 1 MG: 1 TABLET ORAL at 10:36

## 2017-01-01 RX ADMIN — SILVER SULFADIAZINE: 10 CREAM TOPICAL at 10:31

## 2017-01-01 RX ADMIN — CLONIDINE HYDROCHLORIDE 0.2 MG: 0.2 TABLET ORAL at 20:22

## 2017-01-01 RX ADMIN — CLONIDINE HYDROCHLORIDE 0.2 MG: 0.2 TABLET ORAL at 09:18

## 2017-01-01 RX ADMIN — FLUTICASONE PROPIONATE 1 SPRAY: 50 SPRAY, METERED NASAL at 21:25

## 2017-01-01 RX ADMIN — BUMETANIDE 1 MG: 1 TABLET ORAL at 08:07

## 2017-01-01 RX ADMIN — METOPROLOL TARTRATE 100 MG: 50 TABLET ORAL at 08:40

## 2017-01-01 RX ADMIN — INSULIN GLARGINE 10 UNITS: 100 INJECTION, SOLUTION SUBCUTANEOUS at 22:15

## 2017-01-01 RX ADMIN — LEVOTHYROXINE SODIUM 100 MCG: 100 TABLET ORAL at 06:26

## 2017-01-01 RX ADMIN — BUMETANIDE 1 MG: 0.25 INJECTION, SOLUTION INTRAMUSCULAR; INTRAVENOUS at 08:39

## 2017-01-01 RX ADMIN — SENNOSIDES 8.6 MG: 8.6 TABLET, FILM COATED ORAL at 22:43

## 2017-01-01 RX ADMIN — SUCRALFATE 1 G: 1 TABLET ORAL at 09:01

## 2017-01-01 RX ADMIN — IPRATROPIUM BROMIDE AND ALBUTEROL SULFATE 1 AMPULE: .5; 3 SOLUTION RESPIRATORY (INHALATION) at 15:28

## 2017-01-01 RX ADMIN — IPRATROPIUM BROMIDE AND ALBUTEROL SULFATE 1 AMPULE: .5; 3 SOLUTION RESPIRATORY (INHALATION) at 07:08

## 2017-01-01 RX ADMIN — MIDODRINE HYDROCHLORIDE 5 MG: 5 TABLET ORAL at 10:08

## 2017-01-01 RX ADMIN — METOLAZONE 2.5 MG: 2.5 TABLET ORAL at 08:41

## 2017-01-01 RX ADMIN — ATORVASTATIN CALCIUM 40 MG: 40 TABLET, FILM COATED ORAL at 07:57

## 2017-01-01 RX ADMIN — Medication 10 ML: at 20:23

## 2017-01-01 RX ADMIN — BUMETANIDE 0.5 MG: 0.25 INJECTION, SOLUTION INTRAMUSCULAR; INTRAVENOUS at 21:05

## 2017-01-01 RX ADMIN — IPRATROPIUM BROMIDE AND ALBUTEROL SULFATE 1 AMPULE: .5; 3 SOLUTION RESPIRATORY (INHALATION) at 07:05

## 2017-01-01 RX ADMIN — MIDODRINE HYDROCHLORIDE 5 MG: 5 TABLET ORAL at 08:15

## 2017-01-01 RX ADMIN — INSULIN HUMAN 20 UNITS: 100 INJECTION, SOLUTION PARENTERAL at 17:27

## 2017-01-01 RX ADMIN — PANTOPRAZOLE SODIUM 40 MG: 40 TABLET, DELAYED RELEASE ORAL at 05:28

## 2017-01-01 RX ADMIN — GLIPIZIDE 5 MG: 5 TABLET ORAL at 08:40

## 2017-01-01 RX ADMIN — COLLAGENASE SANTYL: 250 OINTMENT TOPICAL at 20:55

## 2017-01-01 RX ADMIN — SILVER SULFADIAZINE: 10 CREAM TOPICAL at 20:08

## 2017-01-01 RX ADMIN — INSULIN LISPRO 12 UNITS: 100 INJECTION, SOLUTION INTRAVENOUS; SUBCUTANEOUS at 08:07

## 2017-01-01 RX ADMIN — MICONAZOLE NITRATE: 20.6 POWDER TOPICAL at 08:59

## 2017-01-01 RX ADMIN — MIDODRINE HYDROCHLORIDE 5 MG: 10 TABLET ORAL at 09:30

## 2017-01-01 RX ADMIN — ENOXAPARIN SODIUM 30 MG: 30 INJECTION SUBCUTANEOUS at 20:08

## 2017-01-01 RX ADMIN — IPRATROPIUM BROMIDE AND ALBUTEROL SULFATE 1 AMPULE: .5; 3 SOLUTION RESPIRATORY (INHALATION) at 13:18

## 2017-01-01 RX ADMIN — METOPROLOL TARTRATE 50 MG: 50 TABLET ORAL at 21:45

## 2017-01-01 RX ADMIN — IPRATROPIUM BROMIDE AND ALBUTEROL SULFATE 1 AMPULE: .5; 3 SOLUTION RESPIRATORY (INHALATION) at 18:40

## 2017-01-01 RX ADMIN — MICONAZOLE NITRATE: 20.6 POWDER TOPICAL at 20:45

## 2017-01-01 RX ADMIN — METOPROLOL TARTRATE 100 MG: 50 TABLET ORAL at 08:59

## 2017-01-01 RX ADMIN — INSULIN LISPRO 7 UNITS: 100 INJECTION, SOLUTION INTRAVENOUS; SUBCUTANEOUS at 22:07

## 2017-01-01 RX ADMIN — ENOXAPARIN SODIUM 30 MG: 30 INJECTION SUBCUTANEOUS at 16:27

## 2017-01-01 RX ADMIN — INSULIN LISPRO 2 UNITS: 100 INJECTION, SOLUTION INTRAVENOUS; SUBCUTANEOUS at 17:30

## 2017-01-01 RX ADMIN — HYDROCODONE BITARTRATE AND ACETAMINOPHEN 1 TABLET: 7.5; 325 TABLET ORAL at 20:24

## 2017-01-01 RX ADMIN — AZITHROMYCIN 500 MG: 250 TABLET, FILM COATED ORAL at 10:25

## 2017-01-01 RX ADMIN — INSULIN LISPRO 5 UNITS: 100 INJECTION, SOLUTION INTRAVENOUS; SUBCUTANEOUS at 20:38

## 2017-01-01 RX ADMIN — ONDANSETRON 4 MG: 2 INJECTION INTRAMUSCULAR; INTRAVENOUS at 01:38

## 2017-01-01 RX ADMIN — COLLAGENASE SANTYL: 250 OINTMENT TOPICAL at 21:04

## 2017-01-01 RX ADMIN — PANTOPRAZOLE SODIUM 40 MG: 40 TABLET, DELAYED RELEASE ORAL at 06:27

## 2017-01-01 RX ADMIN — MICONAZOLE NITRATE: 20.6 POWDER TOPICAL at 20:57

## 2017-01-01 RX ADMIN — HYDROCODONE BITARTRATE AND ACETAMINOPHEN 1 TABLET: 7.5; 325 TABLET ORAL at 08:52

## 2017-01-01 RX ADMIN — INSULIN LISPRO 1 UNITS: 100 INJECTION, SOLUTION INTRAVENOUS; SUBCUTANEOUS at 21:36

## 2017-01-01 RX ADMIN — HEPARIN SODIUM 5000 UNITS: 5000 INJECTION INTRAVENOUS; SUBCUTANEOUS at 06:02

## 2017-01-01 RX ADMIN — PREDNISONE 20 MG: 20 TABLET ORAL at 11:10

## 2017-01-01 RX ADMIN — INSULIN LISPRO 20 UNITS: 100 INJECTION, SOLUTION INTRAVENOUS; SUBCUTANEOUS at 08:26

## 2017-01-01 RX ADMIN — METOPROLOL TARTRATE 100 MG: 50 TABLET ORAL at 20:03

## 2017-01-01 RX ADMIN — Medication 10 ML: at 09:35

## 2017-01-01 RX ADMIN — METHYLPREDNISOLONE SODIUM SUCCINATE 40 MG: 40 INJECTION, POWDER, FOR SOLUTION INTRAMUSCULAR; INTRAVENOUS at 02:55

## 2017-01-01 RX ADMIN — ALLOPURINOL 100 MG: 100 TABLET ORAL at 08:16

## 2017-01-01 RX ADMIN — ASPIRIN 81 MG CHEWABLE TABLET 81 MG: 81 TABLET CHEWABLE at 09:31

## 2017-01-01 RX ADMIN — CLONIDINE HYDROCHLORIDE 0.2 MG: 0.2 TABLET ORAL at 08:13

## 2017-01-01 RX ADMIN — INSULIN LISPRO 3 UNITS: 100 INJECTION, SOLUTION INTRAVENOUS; SUBCUTANEOUS at 08:25

## 2017-01-01 RX ADMIN — NYSTATIN: 100000 POWDER TOPICAL at 12:10

## 2017-01-01 RX ADMIN — CLONIDINE HYDROCHLORIDE 0.2 MG: 0.2 TABLET ORAL at 08:45

## 2017-01-01 RX ADMIN — HEPARIN SODIUM 5000 UNITS: 5000 INJECTION, SOLUTION INTRAVENOUS; SUBCUTANEOUS at 13:32

## 2017-01-01 RX ADMIN — SUCRALFATE 1 G: 1 SUSPENSION ORAL at 22:43

## 2017-01-01 RX ADMIN — INSULIN LISPRO 5 UNITS: 100 INJECTION, SOLUTION INTRAVENOUS; SUBCUTANEOUS at 21:14

## 2017-01-01 RX ADMIN — HYDROCODONE BITARTRATE AND ACETAMINOPHEN 1 TABLET: 7.5; 325 TABLET ORAL at 11:22

## 2017-01-01 RX ADMIN — ALLOPURINOL 100 MG: 100 TABLET ORAL at 16:18

## 2017-01-01 RX ADMIN — SILVER SULFADIAZINE: 10 CREAM TOPICAL at 10:00

## 2017-01-01 RX ADMIN — PANTOPRAZOLE SODIUM 40 MG: 40 TABLET, DELAYED RELEASE ORAL at 06:12

## 2017-01-01 RX ADMIN — MIDODRINE HYDROCHLORIDE 5 MG: 5 TABLET ORAL at 09:01

## 2017-01-01 RX ADMIN — ASPIRIN 81 MG 81 MG: 81 TABLET ORAL at 08:17

## 2017-01-01 RX ADMIN — PANTOPRAZOLE SODIUM 40 MG: 40 TABLET, DELAYED RELEASE ORAL at 09:24

## 2017-01-01 RX ADMIN — INSULIN LISPRO 6 UNITS: 100 INJECTION, SOLUTION INTRAVENOUS; SUBCUTANEOUS at 08:13

## 2017-01-01 RX ADMIN — METOPROLOL TARTRATE 100 MG: 50 TABLET ORAL at 08:33

## 2017-01-01 RX ADMIN — BISACODYL 10 MG: 10 SUPPOSITORY RECTAL at 12:40

## 2017-01-01 RX ADMIN — SODIUM CHLORIDE 250 ML: 9 INJECTION, SOLUTION INTRAVENOUS at 06:17

## 2017-01-01 RX ADMIN — MICONAZOLE NITRATE: 20.6 POWDER TOPICAL at 12:41

## 2017-01-01 RX ADMIN — ENOXAPARIN SODIUM 30 MG: 30 INJECTION SUBCUTANEOUS at 09:18

## 2017-01-01 RX ADMIN — SODIUM CHLORIDE 75 ML/HR: 9 INJECTION, SOLUTION INTRAVENOUS at 13:52

## 2017-01-01 RX ADMIN — METOPROLOL TARTRATE 50 MG: 50 TABLET ORAL at 19:44

## 2017-01-01 RX ADMIN — INSULIN LISPRO 9 UNITS: 100 INJECTION, SOLUTION INTRAVENOUS; SUBCUTANEOUS at 12:05

## 2017-01-01 RX ADMIN — METOLAZONE 2.5 MG: 2.5 TABLET ORAL at 08:08

## 2017-01-01 RX ADMIN — HYDROCODONE BITARTRATE AND ACETAMINOPHEN 1 TABLET: 7.5; 325 TABLET ORAL at 22:27

## 2017-01-01 RX ADMIN — LEVOFLOXACIN 750 MG: 5 INJECTION, SOLUTION INTRAVENOUS at 12:47

## 2017-01-01 RX ADMIN — INSULIN LISPRO 9 UNITS: 100 INJECTION, SOLUTION INTRAVENOUS; SUBCUTANEOUS at 17:43

## 2017-01-01 RX ADMIN — ASPIRIN 81 MG CHEWABLE TABLET 81 MG: 81 TABLET CHEWABLE at 11:09

## 2017-01-01 RX ADMIN — METOPROLOL TARTRATE 100 MG: 50 TABLET ORAL at 09:53

## 2017-01-01 RX ADMIN — AZITHROMYCIN 500 MG: 250 TABLET, FILM COATED ORAL at 08:08

## 2017-01-01 RX ADMIN — METHYLPREDNISOLONE SODIUM SUCCINATE 40 MG: 40 INJECTION, POWDER, FOR SOLUTION INTRAMUSCULAR; INTRAVENOUS at 09:18

## 2017-01-01 RX ADMIN — ALLOPURINOL 100 MG: 100 TABLET ORAL at 21:22

## 2017-01-01 RX ADMIN — Medication 10 ML: at 08:33

## 2017-01-01 RX ADMIN — ATORVASTATIN CALCIUM 40 MG: 40 TABLET, FILM COATED ORAL at 09:33

## 2017-01-01 RX ADMIN — INSULIN LISPRO 2 UNITS: 100 INJECTION, SOLUTION INTRAVENOUS; SUBCUTANEOUS at 21:58

## 2017-01-01 RX ADMIN — METOPROLOL TARTRATE 100 MG: 50 TABLET ORAL at 21:29

## 2017-01-01 RX ADMIN — ALLOPURINOL 100 MG: 100 TABLET ORAL at 08:39

## 2017-01-01 RX ADMIN — IPRATROPIUM BROMIDE AND ALBUTEROL SULFATE 1 AMPULE: .5; 3 SOLUTION RESPIRATORY (INHALATION) at 14:19

## 2017-01-01 RX ADMIN — PANTOPRAZOLE SODIUM 40 MG: 40 TABLET, DELAYED RELEASE ORAL at 05:45

## 2017-01-01 RX ADMIN — HYDROCODONE BITARTRATE AND ACETAMINOPHEN 1 TABLET: 7.5; 325 TABLET ORAL at 00:02

## 2017-01-01 RX ADMIN — LEVOTHYROXINE SODIUM 100 MCG: 100 TABLET ORAL at 05:47

## 2017-01-01 RX ADMIN — INSULIN LISPRO 4 UNITS: 100 INJECTION, SOLUTION INTRAVENOUS; SUBCUTANEOUS at 16:25

## 2017-01-01 RX ADMIN — IPRATROPIUM BROMIDE AND ALBUTEROL SULFATE 1 AMPULE: .5; 3 SOLUTION RESPIRATORY (INHALATION) at 09:59

## 2017-01-01 RX ADMIN — IPRATROPIUM BROMIDE AND ALBUTEROL SULFATE 1 AMPULE: .5; 3 SOLUTION RESPIRATORY (INHALATION) at 20:01

## 2017-01-01 RX ADMIN — ISOSORBIDE MONONITRATE 30 MG: 30 TABLET, EXTENDED RELEASE ORAL at 15:48

## 2017-01-01 RX ADMIN — ASPIRIN 81 MG CHEWABLE TABLET 81 MG: 81 TABLET CHEWABLE at 08:08

## 2017-01-01 RX ADMIN — GABAPENTIN 300 MG: 300 CAPSULE ORAL at 20:57

## 2017-01-01 RX ADMIN — MICONAZOLE NITRATE: 20.6 POWDER TOPICAL at 20:55

## 2017-01-01 RX ADMIN — SUCRALFATE 1 G: 1 TABLET ORAL at 21:45

## 2017-01-01 RX ADMIN — HYDROCODONE BITARTRATE AND ACETAMINOPHEN 1 TABLET: 7.5; 325 TABLET ORAL at 05:36

## 2017-01-01 RX ADMIN — CLONIDINE HYDROCHLORIDE 0.2 MG: 0.2 TABLET ORAL at 10:40

## 2017-01-01 RX ADMIN — ENOXAPARIN SODIUM 40 MG: 40 INJECTION SUBCUTANEOUS at 08:35

## 2017-01-01 RX ADMIN — Medication 10 ML: at 13:04

## 2017-01-01 RX ADMIN — VANCOMYCIN HYDROCHLORIDE 1000 MG: 1 INJECTION, SOLUTION INTRAVENOUS at 06:40

## 2017-01-01 RX ADMIN — ATORVASTATIN CALCIUM 40 MG: 40 TABLET, FILM COATED ORAL at 10:45

## 2017-01-01 RX ADMIN — ATORVASTATIN CALCIUM 40 MG: 40 TABLET, FILM COATED ORAL at 08:35

## 2017-01-01 RX ADMIN — SODIUM CHLORIDE 250 ML: 9 INJECTION, SOLUTION INTRAVENOUS at 12:31

## 2017-01-01 RX ADMIN — GLIPIZIDE 10 MG: 10 TABLET ORAL at 08:45

## 2017-01-01 RX ADMIN — ENOXAPARIN SODIUM 30 MG: 100 INJECTION SUBCUTANEOUS at 08:48

## 2017-01-01 RX ADMIN — SUCRALFATE 1 G: 1 TABLET ORAL at 20:57

## 2017-01-01 RX ADMIN — SILVER SULFADIAZINE: 10 CREAM TOPICAL at 09:49

## 2017-01-01 RX ADMIN — METRONIDAZOLE 500 MG: 500 INJECTION, SOLUTION INTRAVENOUS at 02:25

## 2017-01-01 RX ADMIN — METHYLPREDNISOLONE SODIUM SUCCINATE 40 MG: 40 INJECTION, POWDER, FOR SOLUTION INTRAMUSCULAR; INTRAVENOUS at 21:05

## 2017-01-01 RX ADMIN — POTASSIUM CHLORIDE: 2 INJECTION, SOLUTION, CONCENTRATE INTRAVENOUS at 20:02

## 2017-01-01 RX ADMIN — GLIPIZIDE 5 MG: 5 TABLET ORAL at 11:03

## 2017-01-01 RX ADMIN — HEPARIN SODIUM 5000 UNITS: 5000 INJECTION INTRAVENOUS; SUBCUTANEOUS at 22:05

## 2017-01-01 RX ADMIN — LORAZEPAM 0.5 MG: 0.5 TABLET ORAL at 10:46

## 2017-01-01 RX ADMIN — LEVOTHYROXINE SODIUM 100 MCG: 100 TABLET ORAL at 06:40

## 2017-01-01 RX ADMIN — INSULIN GLARGINE 30 UNITS: 100 INJECTION, SOLUTION SUBCUTANEOUS at 21:03

## 2017-01-01 RX ADMIN — LEVOTHYROXINE SODIUM 100 MCG: 100 TABLET ORAL at 06:11

## 2017-01-01 RX ADMIN — AZITHROMYCIN 500 MG: 250 TABLET, FILM COATED ORAL at 11:06

## 2017-01-01 RX ADMIN — ATORVASTATIN CALCIUM 40 MG: 40 TABLET, FILM COATED ORAL at 08:07

## 2017-01-01 RX ADMIN — IPRATROPIUM BROMIDE AND ALBUTEROL SULFATE 1 AMPULE: .5; 3 SOLUTION RESPIRATORY (INHALATION) at 19:40

## 2017-01-01 RX ADMIN — GABAPENTIN 300 MG: 300 CAPSULE ORAL at 08:53

## 2017-01-01 RX ADMIN — IPRATROPIUM BROMIDE AND ALBUTEROL SULFATE 1 AMPULE: .5; 3 SOLUTION RESPIRATORY (INHALATION) at 06:56

## 2017-01-01 RX ADMIN — ALLOPURINOL 100 MG: 100 TABLET ORAL at 08:40

## 2017-01-01 RX ADMIN — METOPROLOL TARTRATE 100 MG: 50 TABLET ORAL at 21:46

## 2017-01-01 RX ADMIN — HYDROCODONE BITARTRATE AND ACETAMINOPHEN 1 TABLET: 5; 325 TABLET ORAL at 06:36

## 2017-01-01 RX ADMIN — ASPIRIN 81 MG CHEWABLE TABLET 81 MG: 81 TABLET CHEWABLE at 08:47

## 2017-01-01 RX ADMIN — MIDODRINE HYDROCHLORIDE 5 MG: 5 TABLET ORAL at 16:00

## 2017-01-01 RX ADMIN — CLONIDINE HYDROCHLORIDE 0.2 MG: 0.2 TABLET ORAL at 21:36

## 2017-01-01 RX ADMIN — BUMETANIDE 2 MG: 1 TABLET ORAL at 08:45

## 2017-01-01 RX ADMIN — ENOXAPARIN SODIUM 40 MG: 40 INJECTION SUBCUTANEOUS at 10:06

## 2017-01-01 RX ADMIN — ATORVASTATIN CALCIUM 40 MG: 40 TABLET, FILM COATED ORAL at 08:33

## 2017-01-01 RX ADMIN — DOCUSATE SODIUM 100 MG: 100 CAPSULE, LIQUID FILLED ORAL at 09:11

## 2017-01-01 RX ADMIN — DOCUSATE SODIUM 100 MG: 100 CAPSULE, LIQUID FILLED ORAL at 08:15

## 2017-01-01 RX ADMIN — HYDROCODONE BITARTRATE AND ACETAMINOPHEN 1 TABLET: 7.5; 325 TABLET ORAL at 22:14

## 2017-01-01 RX ADMIN — INSULIN LISPRO 3 UNITS: 100 INJECTION, SOLUTION INTRAVENOUS; SUBCUTANEOUS at 08:11

## 2017-01-01 RX ADMIN — LEVOTHYROXINE SODIUM 100 MCG: 100 TABLET ORAL at 06:03

## 2017-01-01 RX ADMIN — ASPIRIN 81 MG CHEWABLE TABLET 81 MG: 81 TABLET CHEWABLE at 09:02

## 2017-01-01 RX ADMIN — NYSTATIN: 100000 POWDER TOPICAL at 09:20

## 2017-01-01 RX ADMIN — MIDODRINE HYDROCHLORIDE 5 MG: 10 TABLET ORAL at 12:47

## 2017-01-01 RX ADMIN — POLYETHYLENE GLYCOL 3350 17 G: 17 POWDER, FOR SOLUTION ORAL at 10:26

## 2017-01-01 RX ADMIN — GABAPENTIN 100 MG: 100 CAPSULE ORAL at 20:24

## 2017-01-01 RX ADMIN — PROMETHAZINE HYDROCHLORIDE 12.5 MG: 25 TABLET ORAL at 17:01

## 2017-01-01 RX ADMIN — LEVOTHYROXINE SODIUM 100 MCG: 100 TABLET ORAL at 05:24

## 2017-01-01 RX ADMIN — INSULIN LISPRO 6 UNITS: 100 INJECTION, SOLUTION INTRAVENOUS; SUBCUTANEOUS at 20:21

## 2017-01-01 RX ADMIN — INSULIN LISPRO 4 UNITS: 100 INJECTION, SOLUTION INTRAVENOUS; SUBCUTANEOUS at 10:09

## 2017-01-01 RX ADMIN — ASPIRIN 81 MG CHEWABLE TABLET 81 MG: 81 TABLET CHEWABLE at 09:23

## 2017-01-01 RX ADMIN — ALLOPURINOL 100 MG: 100 TABLET ORAL at 11:36

## 2017-01-01 RX ADMIN — INSULIN LISPRO 2 UNITS: 100 INJECTION, SOLUTION INTRAVENOUS; SUBCUTANEOUS at 13:30

## 2017-01-01 RX ADMIN — PANTOPRAZOLE SODIUM 40 MG: 40 TABLET, DELAYED RELEASE ORAL at 05:47

## 2017-01-01 RX ADMIN — GABAPENTIN 100 MG: 100 CAPSULE ORAL at 09:18

## 2017-01-01 RX ADMIN — HYDROCODONE BITARTRATE AND ACETAMINOPHEN 2 TABLET: 5; 325 TABLET ORAL at 03:38

## 2017-01-01 RX ADMIN — SILVER SULFADIAZINE: 10 CREAM TOPICAL at 12:43

## 2017-01-01 RX ADMIN — SILVER SULFADIAZINE: 10 CREAM TOPICAL at 20:50

## 2017-01-01 RX ADMIN — SUCRALFATE 1 G: 1 SUSPENSION ORAL at 20:58

## 2017-01-01 RX ADMIN — MICONAZOLE NITRATE: 20.6 POWDER TOPICAL at 22:03

## 2017-01-01 RX ADMIN — ATORVASTATIN CALCIUM 40 MG: 40 TABLET, FILM COATED ORAL at 10:25

## 2017-01-01 RX ADMIN — METOPROLOL TARTRATE 100 MG: 50 TABLET ORAL at 20:28

## 2017-01-01 RX ADMIN — PREDNISONE 20 MG: 20 TABLET ORAL at 17:19

## 2017-01-01 RX ADMIN — ALLOPURINOL 100 MG: 100 TABLET ORAL at 08:36

## 2017-01-01 RX ADMIN — IPRATROPIUM BROMIDE AND ALBUTEROL SULFATE 1 AMPULE: .5; 3 SOLUTION RESPIRATORY (INHALATION) at 10:24

## 2017-01-01 RX ADMIN — INSULIN LISPRO 6 UNITS: 100 INJECTION, SOLUTION INTRAVENOUS; SUBCUTANEOUS at 12:25

## 2017-01-01 RX ADMIN — INSULIN GLARGINE 15 UNITS: 100 INJECTION, SOLUTION SUBCUTANEOUS at 20:40

## 2017-01-01 RX ADMIN — ALLOPURINOL 100 MG: 100 TABLET ORAL at 09:34

## 2017-01-01 RX ADMIN — CEFTRIAXONE SODIUM 1 G: 1 INJECTION, POWDER, FOR SOLUTION INTRAMUSCULAR; INTRAVENOUS at 21:45

## 2017-01-01 RX ADMIN — LORAZEPAM 0.5 MG: 0.5 TABLET ORAL at 09:53

## 2017-01-01 RX ADMIN — INSULIN LISPRO 2 UNITS: 100 INJECTION, SOLUTION INTRAVENOUS; SUBCUTANEOUS at 08:48

## 2017-01-01 RX ADMIN — MICONAZOLE NITRATE: 20.6 POWDER TOPICAL at 08:23

## 2017-01-01 RX ADMIN — ATORVASTATIN CALCIUM 40 MG: 40 TABLET, FILM COATED ORAL at 08:37

## 2017-01-01 RX ADMIN — MICONAZOLE NITRATE: 20.6 POWDER TOPICAL at 22:01

## 2017-01-01 RX ADMIN — IPRATROPIUM BROMIDE AND ALBUTEROL SULFATE 1 AMPULE: .5; 3 SOLUTION RESPIRATORY (INHALATION) at 06:39

## 2017-01-01 RX ADMIN — MIDAZOLAM 2 MG: 1 INJECTION INTRAMUSCULAR; INTRAVENOUS at 07:19

## 2017-01-01 RX ADMIN — METHYLPREDNISOLONE SODIUM SUCCINATE 40 MG: 40 INJECTION, POWDER, FOR SOLUTION INTRAMUSCULAR; INTRAVENOUS at 02:50

## 2017-01-01 RX ADMIN — LEVOTHYROXINE SODIUM 100 MCG: 100 TABLET ORAL at 05:29

## 2017-01-01 RX ADMIN — LEVOTHYROXINE SODIUM 100 MCG: 100 TABLET ORAL at 06:14

## 2017-01-01 RX ADMIN — METOPROLOL TARTRATE 100 MG: 50 TABLET ORAL at 21:40

## 2017-01-01 RX ADMIN — ENOXAPARIN SODIUM 30 MG: 30 INJECTION SUBCUTANEOUS at 18:22

## 2017-01-01 RX ADMIN — METOPROLOL TARTRATE 100 MG: 50 TABLET ORAL at 08:20

## 2017-01-01 RX ADMIN — INSULIN LISPRO 4 UNITS: 100 INJECTION, SOLUTION INTRAVENOUS; SUBCUTANEOUS at 17:19

## 2017-01-01 RX ADMIN — HYDROCODONE BITARTRATE AND ACETAMINOPHEN 2 TABLET: 5; 325 TABLET ORAL at 22:01

## 2017-01-01 RX ADMIN — CLONIDINE HYDROCHLORIDE 0.1 MG: 0.1 TABLET ORAL at 09:07

## 2017-01-01 RX ADMIN — METHYLPREDNISOLONE SODIUM SUCCINATE 20 MG: 40 INJECTION, POWDER, FOR SOLUTION INTRAMUSCULAR; INTRAVENOUS at 21:43

## 2017-01-01 RX ADMIN — SENNOSIDES 8.6 MG: 8.6 TABLET, FILM COATED ORAL at 21:57

## 2017-01-01 RX ADMIN — INSULIN LISPRO 3 UNITS: 100 INJECTION, SOLUTION INTRAVENOUS; SUBCUTANEOUS at 18:38

## 2017-01-01 RX ADMIN — MICONAZOLE NITRATE: 20.6 POWDER TOPICAL at 21:05

## 2017-01-01 RX ADMIN — ASPIRIN 81 MG CHEWABLE TABLET 81 MG: 81 TABLET CHEWABLE at 13:11

## 2017-01-01 RX ADMIN — IPRATROPIUM BROMIDE AND ALBUTEROL SULFATE 1 AMPULE: .5; 3 SOLUTION RESPIRATORY (INHALATION) at 20:49

## 2017-01-01 RX ADMIN — ALBUTEROL SULFATE 10 MG: 2.5 SOLUTION RESPIRATORY (INHALATION) at 21:40

## 2017-01-01 RX ADMIN — ENOXAPARIN SODIUM 30 MG: 30 INJECTION SUBCUTANEOUS at 16:56

## 2017-01-01 RX ADMIN — INSULIN LISPRO 1 UNITS: 100 INJECTION, SOLUTION INTRAVENOUS; SUBCUTANEOUS at 20:43

## 2017-01-01 RX ADMIN — INSULIN GLARGINE 30 UNITS: 100 INJECTION, SOLUTION SUBCUTANEOUS at 21:02

## 2017-01-01 RX ADMIN — SUCRALFATE 1 G: 1 TABLET ORAL at 09:54

## 2017-01-01 RX ADMIN — COLLAGENASE SANTYL: 250 OINTMENT TOPICAL at 20:26

## 2017-01-01 RX ADMIN — HYDROCODONE BITARTRATE AND ACETAMINOPHEN 1 TABLET: 7.5; 325 TABLET ORAL at 05:02

## 2017-01-01 RX ADMIN — LINAGLIPTIN 5 MG: 5 TABLET, FILM COATED ORAL at 10:45

## 2017-01-01 RX ADMIN — INSULIN GLARGINE 30 UNITS: 100 INJECTION, SOLUTION SUBCUTANEOUS at 21:43

## 2017-01-01 RX ADMIN — GABAPENTIN 300 MG: 300 CAPSULE ORAL at 15:53

## 2017-01-01 RX ADMIN — LEVOTHYROXINE SODIUM 100 MCG: 100 TABLET ORAL at 06:15

## 2017-01-01 RX ADMIN — INSULIN LISPRO 3 UNITS: 100 INJECTION, SOLUTION INTRAVENOUS; SUBCUTANEOUS at 21:41

## 2017-01-01 RX ADMIN — METOPROLOL TARTRATE 100 MG: 50 TABLET ORAL at 09:09

## 2017-01-01 RX ADMIN — DOCUSATE SODIUM 100 MG: 100 CAPSULE, LIQUID FILLED ORAL at 21:04

## 2017-01-01 RX ADMIN — SUCRALFATE 1 G: 1 TABLET ORAL at 08:40

## 2017-01-01 RX ADMIN — GABAPENTIN 100 MG: 100 CAPSULE ORAL at 22:19

## 2017-01-01 RX ADMIN — HYDROCODONE BITARTRATE AND ACETAMINOPHEN 2 TABLET: 5; 325 TABLET ORAL at 11:05

## 2017-01-01 RX ADMIN — HYDROCODONE BITARTRATE AND ACETAMINOPHEN 1 TABLET: 7.5; 325 TABLET ORAL at 06:44

## 2017-01-01 RX ADMIN — HYDROCODONE BITARTRATE AND ACETAMINOPHEN 1 TABLET: 7.5; 325 TABLET ORAL at 09:03

## 2017-01-01 RX ADMIN — INSULIN LISPRO 9 UNITS: 100 INJECTION, SOLUTION INTRAVENOUS; SUBCUTANEOUS at 17:19

## 2017-01-01 RX ADMIN — POLYETHYLENE GLYCOL 3350 17 G: 17 POWDER, FOR SOLUTION ORAL at 08:15

## 2017-01-01 RX ADMIN — COLLAGENASE SANTYL: 250 OINTMENT TOPICAL at 22:11

## 2017-01-01 RX ADMIN — CEFTRIAXONE SODIUM 1 G: 1 INJECTION, POWDER, FOR SOLUTION INTRAMUSCULAR; INTRAVENOUS at 20:33

## 2017-01-01 RX ADMIN — DOCUSATE SODIUM 100 MG: 100 CAPSULE, LIQUID FILLED ORAL at 09:25

## 2017-01-01 RX ADMIN — IPRATROPIUM BROMIDE AND ALBUTEROL SULFATE 1 AMPULE: .5; 3 SOLUTION RESPIRATORY (INHALATION) at 18:50

## 2017-01-01 RX ADMIN — INSULIN LISPRO 17 UNITS: 100 INJECTION, SOLUTION INTRAVENOUS; SUBCUTANEOUS at 12:14

## 2017-01-01 RX ADMIN — Medication 10 ML: at 21:46

## 2017-01-01 RX ADMIN — Medication 30 ML: at 08:43

## 2017-01-01 RX ADMIN — MICONAZOLE NITRATE: 20 POWDER TOPICAL at 21:28

## 2017-01-01 RX ADMIN — CLONIDINE HYDROCHLORIDE 0.2 MG: 0.2 TABLET ORAL at 09:32

## 2017-01-01 RX ADMIN — INSULIN LISPRO 3 UNITS: 100 INJECTION, SOLUTION INTRAVENOUS; SUBCUTANEOUS at 16:03

## 2017-01-01 RX ADMIN — CLONIDINE HYDROCHLORIDE 0.2 MG: 0.2 TABLET ORAL at 08:54

## 2017-01-01 RX ADMIN — NEPHROCAP 1 MG: 1 CAP ORAL at 10:08

## 2017-01-01 RX ADMIN — METRONIDAZOLE 500 MG: 500 INJECTION, SOLUTION INTRAVENOUS at 18:50

## 2017-01-01 RX ADMIN — SILVER SULFADIAZINE: 10 CREAM TOPICAL at 10:08

## 2017-01-01 RX ADMIN — HEPARIN SODIUM 2000 UNITS: 1000 INJECTION, SOLUTION INTRAVENOUS; SUBCUTANEOUS at 13:30

## 2017-01-01 RX ADMIN — COLLAGENASE SANTYL: 250 OINTMENT TOPICAL at 05:08

## 2017-01-01 RX ADMIN — LINAGLIPTIN 5 MG: 5 TABLET, FILM COATED ORAL at 08:07

## 2017-01-01 RX ADMIN — INSULIN GLARGINE 30 UNITS: 100 INJECTION, SOLUTION SUBCUTANEOUS at 21:50

## 2017-01-01 RX ADMIN — MIDODRINE HYDROCHLORIDE 5 MG: 5 TABLET ORAL at 11:30

## 2017-01-01 RX ADMIN — ALLOPURINOL 100 MG: 100 TABLET ORAL at 08:07

## 2017-01-01 RX ADMIN — INSULIN GLARGINE 20 UNITS: 100 INJECTION, SOLUTION SUBCUTANEOUS at 08:46

## 2017-01-01 RX ADMIN — SILVER SULFADIAZINE: 10 CREAM TOPICAL at 20:44

## 2017-01-01 RX ADMIN — METOPROLOL TARTRATE 100 MG: 50 TABLET ORAL at 08:07

## 2017-01-01 RX ADMIN — MIDODRINE HYDROCHLORIDE 5 MG: 5 TABLET ORAL at 15:41

## 2017-01-01 RX ADMIN — IPRATROPIUM BROMIDE AND ALBUTEROL SULFATE 1 AMPULE: .5; 3 SOLUTION RESPIRATORY (INHALATION) at 06:58

## 2017-01-01 RX ADMIN — BUMETANIDE 0.5 MG: 0.25 INJECTION, SOLUTION INTRAMUSCULAR; INTRAVENOUS at 20:22

## 2017-01-01 RX ADMIN — HEPARIN SODIUM 3000 UNITS: 1000 INJECTION, SOLUTION INTRAVENOUS; SUBCUTANEOUS at 08:12

## 2017-01-01 RX ADMIN — IPRATROPIUM BROMIDE AND ALBUTEROL SULFATE 1 AMPULE: .5; 3 SOLUTION RESPIRATORY (INHALATION) at 15:31

## 2017-01-01 RX ADMIN — GABAPENTIN 300 MG: 300 CAPSULE ORAL at 09:01

## 2017-01-01 RX ADMIN — FENTANYL CITRATE 25 MCG: 50 INJECTION, SOLUTION INTRAMUSCULAR; INTRAVENOUS at 08:50

## 2017-01-01 RX ADMIN — METOPROLOL TARTRATE 100 MG: 50 TABLET ORAL at 21:24

## 2017-01-01 RX ADMIN — HEPARIN SODIUM 5000 UNITS: 5000 INJECTION, SOLUTION INTRAVENOUS; SUBCUTANEOUS at 12:03

## 2017-01-01 RX ADMIN — INSULIN LISPRO 3 UNITS: 100 INJECTION, SOLUTION INTRAVENOUS; SUBCUTANEOUS at 22:14

## 2017-01-01 RX ADMIN — MICONAZOLE NITRATE: 20.6 POWDER TOPICAL at 11:21

## 2017-01-01 RX ADMIN — INSULIN LISPRO 3 UNITS: 100 INJECTION, SOLUTION INTRAVENOUS; SUBCUTANEOUS at 09:25

## 2017-01-01 RX ADMIN — INSULIN LISPRO 6 UNITS: 100 INJECTION, SOLUTION INTRAVENOUS; SUBCUTANEOUS at 08:24

## 2017-01-01 RX ADMIN — LORAZEPAM 0.5 MG: 0.5 TABLET ORAL at 10:40

## 2017-01-01 RX ADMIN — LEVOTHYROXINE SODIUM 100 MCG: 100 TABLET ORAL at 06:01

## 2017-01-01 RX ADMIN — DOCUSATE SODIUM 100 MG: 100 CAPSULE, LIQUID FILLED ORAL at 09:33

## 2017-01-01 RX ADMIN — LORAZEPAM 0.5 MG: 0.5 TABLET ORAL at 09:00

## 2017-01-01 RX ADMIN — INSULIN LISPRO 2 UNITS: 100 INJECTION, SOLUTION INTRAVENOUS; SUBCUTANEOUS at 20:52

## 2017-01-01 RX ADMIN — LORAZEPAM 0.5 MG: 0.5 TABLET ORAL at 10:37

## 2017-01-01 RX ADMIN — INSULIN LISPRO 1 UNITS: 100 INJECTION, SOLUTION INTRAVENOUS; SUBCUTANEOUS at 21:18

## 2017-01-01 RX ADMIN — Medication 10 ML: at 20:56

## 2017-01-01 RX ADMIN — CALCITRIOL 0.25 MCG: 0.25 CAPSULE, LIQUID FILLED ORAL at 12:16

## 2017-01-01 RX ADMIN — MIDODRINE HYDROCHLORIDE 5 MG: 10 TABLET ORAL at 17:44

## 2017-01-01 RX ADMIN — CHOLECALCIFEROL TAB 10 MCG (400 UNIT) 400 UNITS: 10 TAB at 09:02

## 2017-01-01 RX ADMIN — MIDODRINE HYDROCHLORIDE 5 MG: 5 TABLET ORAL at 17:31

## 2017-01-01 RX ADMIN — HYDROCODONE BITARTRATE AND ACETAMINOPHEN 1 TABLET: 7.5; 325 TABLET ORAL at 22:56

## 2017-01-01 RX ADMIN — DOCUSATE SODIUM 100 MG: 100 CAPSULE, LIQUID FILLED ORAL at 09:49

## 2017-01-01 RX ADMIN — SUCRALFATE 1 G: 1 TABLET ORAL at 10:08

## 2017-01-01 RX ADMIN — ENOXAPARIN SODIUM 30 MG: 30 INJECTION SUBCUTANEOUS at 08:46

## 2017-01-01 RX ADMIN — FLUCONAZOLE 100 MG: 100 TABLET ORAL at 17:39

## 2017-01-01 RX ADMIN — MICONAZOLE NITRATE: 20.6 POWDER TOPICAL at 20:01

## 2017-01-01 RX ADMIN — MICONAZOLE NITRATE: 20 POWDER TOPICAL at 10:09

## 2017-01-01 RX ADMIN — GABAPENTIN 300 MG: 300 CAPSULE ORAL at 14:51

## 2017-01-01 RX ADMIN — SUCRALFATE 1 G: 1 SUSPENSION ORAL at 21:48

## 2017-01-01 RX ADMIN — INSULIN GLARGINE 30 UNITS: 100 INJECTION, SOLUTION SUBCUTANEOUS at 20:15

## 2017-01-01 RX ADMIN — IPRATROPIUM BROMIDE AND ALBUTEROL SULFATE 1 AMPULE: .5; 3 SOLUTION RESPIRATORY (INHALATION) at 20:30

## 2017-01-01 RX ADMIN — CLONIDINE HYDROCHLORIDE 0.2 MG: 0.2 TABLET ORAL at 20:56

## 2017-01-01 RX ADMIN — INSULIN LISPRO 3 UNITS: 100 INJECTION, SOLUTION INTRAVENOUS; SUBCUTANEOUS at 16:57

## 2017-01-01 RX ADMIN — PANTOPRAZOLE SODIUM 40 MG: 40 TABLET, DELAYED RELEASE ORAL at 05:58

## 2017-01-01 RX ADMIN — Medication 10 ML: at 22:57

## 2017-01-01 RX ADMIN — METRONIDAZOLE 500 MG: 500 INJECTION, SOLUTION INTRAVENOUS at 20:28

## 2017-01-01 RX ADMIN — INSULIN LISPRO 4 UNITS: 100 INJECTION, SOLUTION INTRAVENOUS; SUBCUTANEOUS at 17:30

## 2017-01-01 RX ADMIN — INSULIN LISPRO 3 UNITS: 100 INJECTION, SOLUTION INTRAVENOUS; SUBCUTANEOUS at 21:24

## 2017-01-01 RX ADMIN — INSULIN LISPRO 4 UNITS: 100 INJECTION, SOLUTION INTRAVENOUS; SUBCUTANEOUS at 18:18

## 2017-01-01 RX ADMIN — ONDANSETRON 4 MG: 2 INJECTION INTRAMUSCULAR; INTRAVENOUS at 01:30

## 2017-01-01 RX ADMIN — HEPARIN SODIUM 5000 UNITS: 5000 INJECTION INTRAVENOUS; SUBCUTANEOUS at 23:30

## 2017-01-01 RX ADMIN — INSULIN GLARGINE 20 UNITS: 100 INJECTION, SOLUTION SUBCUTANEOUS at 08:30

## 2017-01-01 RX ADMIN — ENOXAPARIN SODIUM 30 MG: 30 INJECTION SUBCUTANEOUS at 16:00

## 2017-01-01 RX ADMIN — BUMETANIDE 1 MG: 0.25 INJECTION, SOLUTION INTRAMUSCULAR; INTRAVENOUS at 21:11

## 2017-01-01 RX ADMIN — CLONIDINE HYDROCHLORIDE 0.1 MG: 0.1 TABLET ORAL at 20:45

## 2017-01-01 RX ADMIN — HYDROCODONE BITARTRATE AND ACETAMINOPHEN 1 TABLET: 7.5; 325 TABLET ORAL at 10:22

## 2017-01-01 RX ADMIN — IPRATROPIUM BROMIDE AND ALBUTEROL SULFATE 1 AMPULE: .5; 3 SOLUTION RESPIRATORY (INHALATION) at 06:41

## 2017-01-01 RX ADMIN — METRONIDAZOLE 500 MG: 500 INJECTION, SOLUTION INTRAVENOUS at 10:35

## 2017-01-01 RX ADMIN — BUMETANIDE 1 MG: 1 TABLET ORAL at 08:12

## 2017-01-01 RX ADMIN — METOPROLOL TARTRATE 100 MG: 50 TABLET ORAL at 09:26

## 2017-01-01 RX ADMIN — DOCUSATE SODIUM 100 MG: 100 CAPSULE, LIQUID FILLED ORAL at 21:22

## 2017-01-01 RX ADMIN — FLUTICASONE PROPIONATE 1 SPRAY: 50 SPRAY, METERED NASAL at 20:15

## 2017-01-01 RX ADMIN — COLLAGENASE SANTYL: 250 OINTMENT TOPICAL at 20:33

## 2017-01-01 RX ADMIN — LEVOTHYROXINE SODIUM 100 MCG: 100 TABLET ORAL at 05:22

## 2017-01-01 RX ADMIN — LEVOTHYROXINE SODIUM 100 MCG: 100 TABLET ORAL at 06:38

## 2017-01-01 RX ADMIN — LEVOTHYROXINE SODIUM 100 MCG: 100 TABLET ORAL at 05:53

## 2017-01-01 RX ADMIN — LORAZEPAM 0.5 MG: 0.5 TABLET ORAL at 22:42

## 2017-01-01 RX ADMIN — METRONIDAZOLE 500 MG: 500 INJECTION, SOLUTION INTRAVENOUS at 02:57

## 2017-01-01 RX ADMIN — MIDODRINE HYDROCHLORIDE 5 MG: 5 TABLET ORAL at 10:47

## 2017-01-01 RX ADMIN — CEFAZOLIN SODIUM 1 G: 1 INJECTION, SOLUTION INTRAVENOUS at 07:57

## 2017-01-01 RX ADMIN — HYDROCODONE BITARTRATE AND ACETAMINOPHEN 1 TABLET: 7.5; 325 TABLET ORAL at 16:57

## 2017-01-01 RX ADMIN — DOCUSATE SODIUM 100 MG: 100 CAPSULE, LIQUID FILLED ORAL at 20:03

## 2017-01-01 RX ADMIN — DOCUSATE SODIUM 100 MG: 100 CAPSULE, LIQUID FILLED ORAL at 07:57

## 2017-01-01 RX ADMIN — GABAPENTIN 300 MG: 300 CAPSULE ORAL at 15:48

## 2017-01-01 RX ADMIN — MICONAZOLE NITRATE: 20.6 POWDER TOPICAL at 22:45

## 2017-01-01 RX ADMIN — ATORVASTATIN CALCIUM 40 MG: 40 TABLET, FILM COATED ORAL at 08:15

## 2017-01-01 RX ADMIN — METOPROLOL TARTRATE 100 MG: 50 TABLET ORAL at 10:35

## 2017-01-01 RX ADMIN — SILVER SULFADIAZINE: 10 CREAM TOPICAL at 09:28

## 2017-01-01 RX ADMIN — DOCUSATE SODIUM 100 MG: 100 CAPSULE, LIQUID FILLED ORAL at 19:53

## 2017-01-01 RX ADMIN — INSULIN GLARGINE 30 UNITS: 100 INJECTION, SOLUTION SUBCUTANEOUS at 21:55

## 2017-01-01 RX ADMIN — SODIUM CHLORIDE: 9 INJECTION, SOLUTION INTRAVENOUS at 05:49

## 2017-01-01 RX ADMIN — LINAGLIPTIN 5 MG: 5 TABLET, FILM COATED ORAL at 09:49

## 2017-01-01 RX ADMIN — PANTOPRAZOLE SODIUM 40 MG: 40 TABLET, DELAYED RELEASE ORAL at 06:02

## 2017-01-01 RX ADMIN — ASPIRIN 81 MG CHEWABLE TABLET 81 MG: 81 TABLET CHEWABLE at 11:25

## 2017-01-01 RX ADMIN — ALLOPURINOL 100 MG: 100 TABLET ORAL at 09:10

## 2017-01-01 RX ADMIN — HYDROCODONE BITARTRATE AND ACETAMINOPHEN 1 TABLET: 7.5; 325 TABLET ORAL at 04:50

## 2017-01-01 RX ADMIN — LORAZEPAM 0.5 MG: 0.5 TABLET ORAL at 21:46

## 2017-01-01 RX ADMIN — SILVER SULFADIAZINE: 10 CREAM TOPICAL at 10:27

## 2017-01-01 RX ADMIN — DOCUSATE SODIUM 100 MG: 100 CAPSULE, LIQUID FILLED ORAL at 10:45

## 2017-01-01 RX ADMIN — MICONAZOLE NITRATE: 20.6 POWDER TOPICAL at 20:56

## 2017-01-01 RX ADMIN — CLONIDINE HYDROCHLORIDE 0.1 MG: 0.1 TABLET ORAL at 20:52

## 2017-01-01 RX ADMIN — GABAPENTIN 300 MG: 300 CAPSULE ORAL at 20:48

## 2017-01-01 RX ADMIN — DOCUSATE SODIUM 100 MG: 100 CAPSULE, LIQUID FILLED ORAL at 20:02

## 2017-01-01 RX ADMIN — SILVER SULFADIAZINE: 10 CREAM TOPICAL at 12:46

## 2017-01-01 RX ADMIN — LEVOTHYROXINE SODIUM 100 MCG: 100 TABLET ORAL at 05:58

## 2017-01-01 RX ADMIN — SUCRALFATE 1 G: 1 TABLET ORAL at 21:27

## 2017-01-01 RX ADMIN — ASPIRIN 81 MG CHEWABLE TABLET 81 MG: 81 TABLET CHEWABLE at 08:06

## 2017-01-01 RX ADMIN — BUDESONIDE 500 MCG: 0.5 SUSPENSION RESPIRATORY (INHALATION) at 13:17

## 2017-01-01 RX ADMIN — SILVER SULFADIAZINE: 10 CREAM TOPICAL at 14:53

## 2017-01-01 RX ADMIN — INSULIN LISPRO 4 UNITS: 100 INJECTION, SOLUTION INTRAVENOUS; SUBCUTANEOUS at 19:38

## 2017-01-01 RX ADMIN — MICONAZOLE NITRATE: 20.6 POWDER TOPICAL at 08:32

## 2017-01-01 RX ADMIN — SUCRALFATE 1 G: 1 TABLET ORAL at 17:59

## 2017-01-01 RX ADMIN — SENNOSIDES 8.6 MG: 8.6 TABLET, FILM COATED ORAL at 21:02

## 2017-01-01 RX ADMIN — ALBUTEROL SULFATE 2.5 MG: 2.5 SOLUTION RESPIRATORY (INHALATION) at 22:49

## 2017-01-01 RX ADMIN — SILVER SULFADIAZINE: 10 CREAM TOPICAL at 09:00

## 2017-01-01 RX ADMIN — Medication 10 ML: at 20:14

## 2017-01-01 RX ADMIN — Medication 10 ML: at 10:37

## 2017-01-01 RX ADMIN — ATORVASTATIN CALCIUM 40 MG: 40 TABLET, FILM COATED ORAL at 11:08

## 2017-01-01 RX ADMIN — INSULIN LISPRO 15 UNITS: 100 INJECTION, SOLUTION INTRAVENOUS; SUBCUTANEOUS at 17:29

## 2017-01-01 RX ADMIN — CEFAZOLIN SODIUM 1 G: 1 INJECTION, SOLUTION INTRAVENOUS at 11:51

## 2017-01-01 RX ADMIN — COLLAGENASE SANTYL: 250 OINTMENT TOPICAL at 21:06

## 2017-01-01 RX ADMIN — POLYETHYLENE GLYCOL 3350 17 G: 17 POWDER, FOR SOLUTION ORAL at 10:46

## 2017-01-01 RX ADMIN — SUCRALFATE 1 G: 1 SUSPENSION ORAL at 16:56

## 2017-01-01 RX ADMIN — SODIUM CHLORIDE: 9 INJECTION, SOLUTION INTRAVENOUS at 18:24

## 2017-01-01 RX ADMIN — BUMETANIDE 1 MG: 0.25 INJECTION, SOLUTION INTRAMUSCULAR; INTRAVENOUS at 20:29

## 2017-01-01 RX ADMIN — HYDROCODONE BITARTRATE AND ACETAMINOPHEN 1 TABLET: 7.5; 325 TABLET ORAL at 02:33

## 2017-01-01 RX ADMIN — ASPIRIN 81 MG CHEWABLE TABLET 81 MG: 81 TABLET CHEWABLE at 08:13

## 2017-01-01 RX ADMIN — AZITHROMYCIN 500 MG: 250 TABLET, FILM COATED ORAL at 11:10

## 2017-01-01 RX ADMIN — ASPIRIN 81 MG CHEWABLE TABLET 81 MG: 81 TABLET CHEWABLE at 10:25

## 2017-01-01 RX ADMIN — METOPROLOL TARTRATE 100 MG: 50 TABLET ORAL at 20:56

## 2017-01-01 RX ADMIN — CHOLECALCIFEROL TAB 10 MCG (400 UNIT) 400 UNITS: 10 TAB at 09:00

## 2017-01-01 RX ADMIN — LEVOTHYROXINE SODIUM 100 MCG: 100 TABLET ORAL at 05:19

## 2017-01-01 RX ADMIN — CHOLECALCIFEROL TAB 10 MCG (400 UNIT) 400 UNITS: 10 TAB at 09:24

## 2017-01-01 RX ADMIN — MIDODRINE HYDROCHLORIDE 5 MG: 5 TABLET ORAL at 18:21

## 2017-01-01 RX ADMIN — POLYETHYLENE GLYCOL 3350 17 G: 17 POWDER, FOR SOLUTION ORAL at 08:18

## 2017-01-01 RX ADMIN — LORAZEPAM 0.5 MG: 0.5 TABLET ORAL at 08:27

## 2017-01-01 RX ADMIN — CLONIDINE HYDROCHLORIDE 0.2 MG: 0.2 TABLET ORAL at 20:13

## 2017-01-01 RX ADMIN — DOCUSATE SODIUM 100 MG: 100 CAPSULE, LIQUID FILLED ORAL at 12:38

## 2017-01-01 RX ADMIN — MICONAZOLE NITRATE: 20.6 POWDER TOPICAL at 21:18

## 2017-01-01 RX ADMIN — SUCRALFATE 1 G: 1 TABLET ORAL at 11:35

## 2017-01-01 RX ADMIN — HYDROCODONE BITARTRATE AND ACETAMINOPHEN 1 TABLET: 7.5; 325 TABLET ORAL at 23:27

## 2017-01-01 RX ADMIN — FENTANYL CITRATE 25 MCG: 50 INJECTION INTRAMUSCULAR; INTRAVENOUS at 12:04

## 2017-01-01 RX ADMIN — SODIUM CHLORIDE 500 ML: 9 INJECTION, SOLUTION INTRAVENOUS at 06:15

## 2017-01-01 RX ADMIN — INSULIN LISPRO 6 UNITS: 100 INJECTION, SOLUTION INTRAVENOUS; SUBCUTANEOUS at 21:27

## 2017-01-01 RX ADMIN — LORAZEPAM 0.5 MG: 0.5 TABLET ORAL at 21:41

## 2017-01-01 RX ADMIN — ATORVASTATIN CALCIUM 40 MG: 40 TABLET, FILM COATED ORAL at 08:21

## 2017-01-01 RX ADMIN — SUCRALFATE 1 G: 1 SUSPENSION ORAL at 16:00

## 2017-01-01 RX ADMIN — METOPROLOL TARTRATE 100 MG: 50 TABLET ORAL at 20:47

## 2017-01-01 RX ADMIN — Medication 10 ML: at 11:26

## 2017-01-01 RX ADMIN — LINAGLIPTIN 5 MG: 5 TABLET, FILM COATED ORAL at 11:08

## 2017-01-01 RX ADMIN — ASPIRIN 81 MG CHEWABLE TABLET 81 MG: 81 TABLET CHEWABLE at 08:59

## 2017-01-01 RX ADMIN — METOPROLOL TARTRATE 100 MG: 50 TABLET ORAL at 21:04

## 2017-01-01 RX ADMIN — POLYETHYLENE GLYCOL 3350 17 G: 17 POWDER, FOR SOLUTION ORAL at 09:02

## 2017-01-01 RX ADMIN — ASPIRIN 81 MG CHEWABLE TABLET 81 MG: 81 TABLET CHEWABLE at 08:45

## 2017-01-01 RX ADMIN — HYDROCODONE BITARTRATE AND ACETAMINOPHEN 1 TABLET: 7.5; 325 TABLET ORAL at 05:30

## 2017-01-01 RX ADMIN — LEVOTHYROXINE SODIUM 100 MCG: 100 TABLET ORAL at 08:40

## 2017-01-01 RX ADMIN — LORAZEPAM 0.5 MG: 0.5 TABLET ORAL at 08:13

## 2017-01-01 RX ADMIN — PANTOPRAZOLE SODIUM 40 MG: 40 TABLET, DELAYED RELEASE ORAL at 06:15

## 2017-01-01 RX ADMIN — CLONIDINE HYDROCHLORIDE 0.2 MG: 0.2 TABLET ORAL at 21:43

## 2017-01-01 RX ADMIN — IOPAMIDOL 90 ML: 755 INJECTION, SOLUTION INTRAVENOUS at 13:36

## 2017-01-01 RX ADMIN — IPRATROPIUM BROMIDE AND ALBUTEROL SULFATE 1 AMPULE: .5; 3 SOLUTION RESPIRATORY (INHALATION) at 20:51

## 2017-01-01 RX ADMIN — ATORVASTATIN CALCIUM 40 MG: 40 TABLET, FILM COATED ORAL at 08:46

## 2017-01-01 RX ADMIN — ALLOPURINOL 100 MG: 100 TABLET ORAL at 10:46

## 2017-01-01 RX ADMIN — INSULIN LISPRO 2 UNITS: 100 INJECTION, SOLUTION INTRAVENOUS; SUBCUTANEOUS at 12:53

## 2017-01-01 RX ADMIN — ONDANSETRON 4 MG: 2 INJECTION INTRAMUSCULAR; INTRAVENOUS at 21:13

## 2017-01-01 RX ADMIN — SUCRALFATE 1 G: 1 TABLET ORAL at 18:08

## 2017-01-01 RX ADMIN — GABAPENTIN 300 MG: 300 CAPSULE ORAL at 21:22

## 2017-01-01 RX ADMIN — Medication 10 ML: at 00:23

## 2017-01-01 RX ADMIN — HEPARIN SODIUM 5000 UNITS: 5000 INJECTION INTRAVENOUS; SUBCUTANEOUS at 20:59

## 2017-01-01 RX ADMIN — INSULIN GLARGINE 30 UNITS: 100 INJECTION, SOLUTION SUBCUTANEOUS at 21:07

## 2017-01-01 RX ADMIN — ATORVASTATIN CALCIUM 40 MG: 40 TABLET, FILM COATED ORAL at 16:18

## 2017-01-01 RX ADMIN — CLONIDINE HYDROCHLORIDE 0.2 MG: 0.2 TABLET ORAL at 21:02

## 2017-01-01 RX ADMIN — HYDROCODONE BITARTRATE AND ACETAMINOPHEN 2 TABLET: 5; 325 TABLET ORAL at 16:27

## 2017-01-01 RX ADMIN — COLLAGENASE SANTYL: 250 OINTMENT TOPICAL at 18:17

## 2017-01-01 RX ADMIN — HYDROCODONE BITARTRATE AND ACETAMINOPHEN 1 TABLET: 7.5; 325 TABLET ORAL at 21:45

## 2017-01-01 RX ADMIN — SODIUM CHLORIDE 250 ML: 9 INJECTION, SOLUTION INTRAVENOUS at 08:29

## 2017-01-01 RX ADMIN — GABAPENTIN 100 MG: 100 CAPSULE ORAL at 20:01

## 2017-01-01 RX ADMIN — MICONAZOLE NITRATE: 20.6 POWDER TOPICAL at 09:56

## 2017-01-01 RX ADMIN — HYDROCODONE BITARTRATE AND ACETAMINOPHEN 2 TABLET: 5; 325 TABLET ORAL at 18:31

## 2017-01-01 RX ADMIN — IPRATROPIUM BROMIDE AND ALBUTEROL SULFATE 1 AMPULE: .5; 3 SOLUTION RESPIRATORY (INHALATION) at 14:27

## 2017-01-01 RX ADMIN — ATORVASTATIN CALCIUM 40 MG: 40 TABLET, FILM COATED ORAL at 09:49

## 2017-01-01 RX ADMIN — INSULIN GLARGINE 30 UNITS: 100 INJECTION, SOLUTION SUBCUTANEOUS at 20:53

## 2017-01-01 RX ADMIN — INSULIN LISPRO 5 UNITS: 100 INJECTION, SOLUTION INTRAVENOUS; SUBCUTANEOUS at 08:38

## 2017-01-01 RX ADMIN — SODIUM CHLORIDE 250 ML: 9 INJECTION, SOLUTION INTRAVENOUS at 22:00

## 2017-01-01 RX ADMIN — METOPROLOL TARTRATE 100 MG: 50 TABLET ORAL at 20:29

## 2017-01-01 RX ADMIN — DOCUSATE SODIUM 100 MG: 100 CAPSULE, LIQUID FILLED ORAL at 22:41

## 2017-01-01 RX ADMIN — ASPIRIN 81 MG 81 MG: 81 TABLET ORAL at 08:45

## 2017-01-01 RX ADMIN — ASPIRIN 81 MG 81 MG: 81 TABLET ORAL at 09:19

## 2017-01-01 RX ADMIN — GABAPENTIN 100 MG: 100 CAPSULE ORAL at 08:16

## 2017-01-01 RX ADMIN — MIDODRINE HYDROCHLORIDE 5 MG: 10 TABLET ORAL at 15:52

## 2017-01-01 RX ADMIN — DOCUSATE SODIUM 100 MG: 100 CAPSULE, LIQUID FILLED ORAL at 21:34

## 2017-01-01 RX ADMIN — MIDODRINE HYDROCHLORIDE 5 MG: 2.5 TABLET ORAL at 11:03

## 2017-01-01 RX ADMIN — INSULIN LISPRO 9 UNITS: 100 INJECTION, SOLUTION INTRAVENOUS; SUBCUTANEOUS at 09:19

## 2017-01-01 RX ADMIN — LISINOPRIL 5 MG: 5 TABLET ORAL at 08:44

## 2017-01-01 RX ADMIN — IPRATROPIUM BROMIDE AND ALBUTEROL SULFATE 1 AMPULE: .5; 3 SOLUTION RESPIRATORY (INHALATION) at 10:43

## 2017-01-01 RX ADMIN — HEPARIN SODIUM 5000 UNITS: 5000 INJECTION INTRAVENOUS; SUBCUTANEOUS at 06:13

## 2017-01-01 RX ADMIN — METRONIDAZOLE 500 MG: 500 INJECTION, SOLUTION INTRAVENOUS at 08:31

## 2017-01-01 RX ADMIN — EPINEPHRINE 1 MG: 0.1 INJECTION INTRACARDIAC; INTRAVENOUS at 07:26

## 2017-01-01 RX ADMIN — MICONAZOLE NITRATE: 20.6 POWDER TOPICAL at 10:07

## 2017-01-01 RX ADMIN — COLLAGENASE SANTYL: 250 OINTMENT TOPICAL at 21:22

## 2017-01-01 RX ADMIN — INSULIN LISPRO 12 UNITS: 100 INJECTION, SOLUTION INTRAVENOUS; SUBCUTANEOUS at 17:16

## 2017-01-01 RX ADMIN — INSULIN LISPRO 2 UNITS: 100 INJECTION, SOLUTION INTRAVENOUS; SUBCUTANEOUS at 12:17

## 2017-01-01 RX ADMIN — INSULIN GLARGINE 30 UNITS: 100 INJECTION, SOLUTION SUBCUTANEOUS at 21:25

## 2017-01-01 RX ADMIN — HYDROCODONE BITARTRATE AND ACETAMINOPHEN 1 TABLET: 7.5; 325 TABLET ORAL at 12:23

## 2017-01-01 RX ADMIN — METHYLPREDNISOLONE SODIUM SUCCINATE 60 MG: 125 INJECTION, POWDER, FOR SOLUTION INTRAMUSCULAR; INTRAVENOUS at 01:39

## 2017-01-01 RX ADMIN — MIDODRINE HYDROCHLORIDE 5 MG: 2.5 TABLET ORAL at 13:11

## 2017-01-01 RX ADMIN — INSULIN LISPRO 12 UNITS: 100 INJECTION, SOLUTION INTRAVENOUS; SUBCUTANEOUS at 17:32

## 2017-01-01 RX ADMIN — IPRATROPIUM BROMIDE AND ALBUTEROL SULFATE 1 AMPULE: .5; 3 SOLUTION RESPIRATORY (INHALATION) at 11:10

## 2017-01-01 RX ADMIN — SUCRALFATE 1 G: 1 SUSPENSION ORAL at 01:30

## 2017-01-01 RX ADMIN — Medication 10 ML: at 09:57

## 2017-01-01 RX ADMIN — GABAPENTIN 100 MG: 100 CAPSULE ORAL at 20:16

## 2017-01-01 RX ADMIN — METOLAZONE 2.5 MG: 5 TABLET ORAL at 09:28

## 2017-01-01 RX ADMIN — CLONIDINE HYDROCHLORIDE 0.2 MG: 0.2 TABLET ORAL at 08:20

## 2017-01-01 RX ADMIN — POLYETHYLENE GLYCOL 3350 17 G: 17 POWDER, FOR SOLUTION ORAL at 07:58

## 2017-01-01 RX ADMIN — PANTOPRAZOLE SODIUM 40 MG: 40 TABLET, DELAYED RELEASE ORAL at 06:05

## 2017-01-01 RX ADMIN — Medication 10 ML: at 20:53

## 2017-01-01 RX ADMIN — METOPROLOL TARTRATE 100 MG: 50 TABLET ORAL at 01:15

## 2017-01-01 RX ADMIN — NYSTATIN: 100000 POWDER TOPICAL at 22:05

## 2017-01-01 RX ADMIN — ENOXAPARIN SODIUM 40 MG: 40 INJECTION SUBCUTANEOUS at 08:05

## 2017-01-01 RX ADMIN — ENOXAPARIN SODIUM 40 MG: 40 INJECTION SUBCUTANEOUS at 08:47

## 2017-01-01 RX ADMIN — NYSTATIN: 100000 POWDER TOPICAL at 08:52

## 2017-01-01 RX ADMIN — ONDANSETRON 4 MG: 2 INJECTION INTRAMUSCULAR; INTRAVENOUS at 22:59

## 2017-01-01 RX ADMIN — HYDROCODONE BITARTRATE AND ACETAMINOPHEN 1 TABLET: 7.5; 325 TABLET ORAL at 18:09

## 2017-01-01 RX ADMIN — METOPROLOL TARTRATE 50 MG: 50 TABLET ORAL at 08:40

## 2017-01-01 RX ADMIN — INSULIN LISPRO 2 UNITS: 100 INJECTION, SOLUTION INTRAVENOUS; SUBCUTANEOUS at 21:32

## 2017-01-01 RX ADMIN — DOCUSATE SODIUM 100 MG: 100 CAPSULE, LIQUID FILLED ORAL at 21:46

## 2017-01-01 RX ADMIN — MICONAZOLE NITRATE: 20 POWDER TOPICAL at 08:46

## 2017-01-01 RX ADMIN — LEVOTHYROXINE SODIUM 100 MCG: 100 TABLET ORAL at 06:02

## 2017-01-01 RX ADMIN — HYDROCODONE BITARTRATE AND ACETAMINOPHEN 1 TABLET: 7.5; 325 TABLET ORAL at 11:29

## 2017-01-01 RX ADMIN — COLLAGENASE SANTYL: 250 OINTMENT TOPICAL at 12:40

## 2017-01-01 RX ADMIN — IPRATROPIUM BROMIDE AND ALBUTEROL SULFATE 1 AMPULE: .5; 3 SOLUTION RESPIRATORY (INHALATION) at 10:45

## 2017-01-01 RX ADMIN — GLIPIZIDE 10 MG: 10 TABLET ORAL at 09:19

## 2017-01-01 RX ADMIN — HYDROCODONE BITARTRATE AND ACETAMINOPHEN 1 TABLET: 7.5; 325 TABLET ORAL at 18:33

## 2017-01-01 RX ADMIN — BUMETANIDE 1 MG: 0.25 INJECTION, SOLUTION INTRAMUSCULAR; INTRAVENOUS at 11:49

## 2017-01-01 RX ADMIN — HYDROCODONE BITARTRATE AND ACETAMINOPHEN 1 TABLET: 7.5; 325 TABLET ORAL at 16:56

## 2017-01-01 RX ADMIN — CHOLECALCIFEROL TAB 10 MCG (400 UNIT) 400 UNITS: 10 TAB at 11:09

## 2017-01-01 RX ADMIN — MIDODRINE HYDROCHLORIDE 5 MG: 5 TABLET ORAL at 08:41

## 2017-01-01 RX ADMIN — HYDROCODONE BITARTRATE AND ACETAMINOPHEN 1 TABLET: 7.5; 325 TABLET ORAL at 11:41

## 2017-01-01 RX ADMIN — INSULIN LISPRO 4 UNITS: 100 INJECTION, SOLUTION INTRAVENOUS; SUBCUTANEOUS at 22:16

## 2017-01-01 RX ADMIN — SUCRALFATE 1 G: 1 SUSPENSION ORAL at 06:09

## 2017-01-01 RX ADMIN — LEVOTHYROXINE SODIUM 100 MCG: 100 TABLET ORAL at 06:06

## 2017-01-01 RX ADMIN — CALCITRIOL 0.25 MCG: 0.25 CAPSULE, LIQUID FILLED ORAL at 09:02

## 2017-01-01 RX ADMIN — COLLAGENASE SANTYL: 250 OINTMENT TOPICAL at 20:11

## 2017-01-01 RX ADMIN — LEVOTHYROXINE SODIUM 100 MCG: 100 TABLET ORAL at 04:51

## 2017-01-01 RX ADMIN — HYDROCODONE BITARTRATE AND ACETAMINOPHEN 2 TABLET: 5; 325 TABLET ORAL at 01:58

## 2017-01-01 RX ADMIN — PANTOPRAZOLE SODIUM 40 MG: 40 TABLET, DELAYED RELEASE ORAL at 06:22

## 2017-01-01 RX ADMIN — NYSTATIN 1 BOTTLE: 100000 POWDER TOPICAL at 21:25

## 2017-01-01 RX ADMIN — Medication 10 ML: at 21:27

## 2017-01-01 RX ADMIN — HEPARIN SODIUM 5000 UNITS: 5000 INJECTION INTRAVENOUS; SUBCUTANEOUS at 14:50

## 2017-01-01 RX ADMIN — GABAPENTIN 100 MG: 100 CAPSULE ORAL at 20:39

## 2017-01-01 RX ADMIN — IPRATROPIUM BROMIDE AND ALBUTEROL SULFATE 1 AMPULE: .5; 3 SOLUTION RESPIRATORY (INHALATION) at 07:22

## 2017-01-01 RX ADMIN — METOPROLOL TARTRATE 100 MG: 50 TABLET ORAL at 20:24

## 2017-01-01 RX ADMIN — GABAPENTIN 100 MG: 100 CAPSULE ORAL at 20:34

## 2017-01-01 RX ADMIN — PREDNISONE 20 MG: 20 TABLET ORAL at 13:20

## 2017-01-01 RX ADMIN — BISACODYL 10 MG: 10 SUPPOSITORY RECTAL at 10:45

## 2017-01-01 RX ADMIN — INSULIN HUMAN 10 UNITS: 100 INJECTION, SOLUTION PARENTERAL at 12:31

## 2017-01-01 RX ADMIN — POLYETHYLENE GLYCOL 3350 17 G: 17 POWDER, FOR SOLUTION ORAL at 09:50

## 2017-01-01 RX ADMIN — SODIUM CHLORIDE 250 ML: 9 INJECTION, SOLUTION INTRAVENOUS at 23:01

## 2017-01-01 RX ADMIN — LEVOFLOXACIN 250 MG: 500 TABLET, FILM COATED ORAL at 08:21

## 2017-01-01 RX ADMIN — FLUTICASONE PROPIONATE 1 SPRAY: 50 SPRAY, METERED NASAL at 08:23

## 2017-01-01 RX ADMIN — INSULIN LISPRO 6 UNITS: 100 INJECTION, SOLUTION INTRAVENOUS; SUBCUTANEOUS at 08:42

## 2017-01-01 RX ADMIN — AMPICILLIN SODIUM AND SULBACTAM SODIUM 1.5 G: 1; .5 INJECTION, POWDER, FOR SOLUTION INTRAMUSCULAR; INTRAVENOUS at 06:08

## 2017-01-01 RX ADMIN — LEVOTHYROXINE SODIUM 100 MCG: 100 TABLET ORAL at 06:13

## 2017-01-01 RX ADMIN — INSULIN LISPRO 18 UNITS: 100 INJECTION, SOLUTION INTRAVENOUS; SUBCUTANEOUS at 13:02

## 2017-01-01 RX ADMIN — MICONAZOLE NITRATE: 20.6 POWDER TOPICAL at 20:23

## 2017-01-01 RX ADMIN — ASPIRIN 81 MG CHEWABLE TABLET 81 MG: 81 TABLET CHEWABLE at 08:07

## 2017-01-01 RX ADMIN — METOPROLOL TARTRATE 50 MG: 50 TABLET ORAL at 10:37

## 2017-01-01 RX ADMIN — INSULIN LISPRO 3 UNITS: 100 INJECTION, SOLUTION INTRAVENOUS; SUBCUTANEOUS at 10:55

## 2017-01-01 RX ADMIN — INSULIN LISPRO 6 UNITS: 100 INJECTION, SOLUTION INTRAVENOUS; SUBCUTANEOUS at 08:09

## 2017-01-01 RX ADMIN — DOCUSATE SODIUM 200 MG: 100 CAPSULE, LIQUID FILLED ORAL at 11:36

## 2017-01-01 RX ADMIN — CLONIDINE HYDROCHLORIDE 0.2 MG: 0.2 TABLET ORAL at 22:19

## 2017-01-01 RX ADMIN — METOPROLOL TARTRATE 100 MG: 50 TABLET ORAL at 21:07

## 2017-01-01 RX ADMIN — GABAPENTIN 100 MG: 100 CAPSULE ORAL at 13:48

## 2017-01-01 RX ADMIN — CLONIDINE HYDROCHLORIDE 0.2 MG: 0.2 TABLET ORAL at 00:02

## 2017-01-01 RX ADMIN — CHLORHEXIDINE GLUCONATE: 213 SOLUTION TOPICAL at 20:45

## 2017-01-01 RX ADMIN — IPRATROPIUM BROMIDE AND ALBUTEROL SULFATE 1 AMPULE: .5; 3 SOLUTION RESPIRATORY (INHALATION) at 10:16

## 2017-01-01 RX ADMIN — MICONAZOLE NITRATE: 20.6 POWDER TOPICAL at 12:45

## 2017-01-01 RX ADMIN — METOPROLOL TARTRATE 50 MG: 50 TABLET ORAL at 08:15

## 2017-01-01 RX ADMIN — MICONAZOLE NITRATE: 20.6 POWDER TOPICAL at 21:32

## 2017-01-01 RX ADMIN — SILVER SULFADIAZINE: 10 CREAM TOPICAL at 10:56

## 2017-01-01 RX ADMIN — HEPARIN SODIUM 5000 UNITS: 5000 INJECTION INTRAVENOUS; SUBCUTANEOUS at 14:04

## 2017-01-01 RX ADMIN — ATORVASTATIN CALCIUM 40 MG: 40 TABLET, FILM COATED ORAL at 08:30

## 2017-01-01 RX ADMIN — ALLOPURINOL 100 MG: 100 TABLET ORAL at 08:19

## 2017-01-01 RX ADMIN — ALLOPURINOL 100 MG: 100 TABLET ORAL at 11:25

## 2017-01-01 RX ADMIN — METOPROLOL TARTRATE 50 MG: 50 TABLET ORAL at 09:00

## 2017-01-01 RX ADMIN — HYDROCODONE BITARTRATE AND ACETAMINOPHEN 1 TABLET: 5; 325 TABLET ORAL at 07:33

## 2017-01-01 RX ADMIN — ONDANSETRON 4 MG: 2 INJECTION INTRAMUSCULAR; INTRAVENOUS at 17:06

## 2017-01-01 RX ADMIN — LEVOFLOXACIN 250 MG: 500 TABLET, FILM COATED ORAL at 08:44

## 2017-01-01 RX ADMIN — SILVER SULFADIAZINE: 10 CREAM TOPICAL at 21:29

## 2017-01-01 RX ADMIN — GABAPENTIN 100 MG: 100 CAPSULE ORAL at 11:03

## 2017-01-01 RX ADMIN — HYDROCODONE BITARTRATE AND ACETAMINOPHEN 1 TABLET: 7.5; 325 TABLET ORAL at 12:21

## 2017-01-01 RX ADMIN — IPRATROPIUM BROMIDE AND ALBUTEROL SULFATE 1 AMPULE: .5; 3 SOLUTION RESPIRATORY (INHALATION) at 07:01

## 2017-01-01 RX ADMIN — METHYLPREDNISOLONE SODIUM SUCCINATE 20 MG: 40 INJECTION, POWDER, FOR SOLUTION INTRAMUSCULAR; INTRAVENOUS at 08:46

## 2017-01-01 RX ADMIN — MIDODRINE HYDROCHLORIDE 5 MG: 5 TABLET ORAL at 17:39

## 2017-01-01 RX ADMIN — ONDANSETRON 4 MG: 2 INJECTION INTRAMUSCULAR; INTRAVENOUS at 10:35

## 2017-01-01 RX ADMIN — INSULIN GLARGINE 10 UNITS: 100 INJECTION, SOLUTION SUBCUTANEOUS at 23:30

## 2017-01-01 RX ADMIN — METRONIDAZOLE 500 MG: 500 INJECTION, SOLUTION INTRAVENOUS at 17:17

## 2017-01-01 RX ADMIN — BUMETANIDE 2 MG: 1 TABLET ORAL at 12:40

## 2017-01-01 RX ADMIN — COLLAGENASE SANTYL: 250 OINTMENT TOPICAL at 20:01

## 2017-01-01 RX ADMIN — ALLOPURINOL 100 MG: 100 TABLET ORAL at 09:11

## 2017-01-01 RX ADMIN — SUCRALFATE 1 G: 1 TABLET ORAL at 15:47

## 2017-01-01 RX ADMIN — HEPARIN SODIUM 5000 UNITS: 5000 INJECTION INTRAVENOUS; SUBCUTANEOUS at 14:51

## 2017-01-01 RX ADMIN — INSULIN LISPRO 7 UNITS: 100 INJECTION, SOLUTION INTRAVENOUS; SUBCUTANEOUS at 12:10

## 2017-01-01 RX ADMIN — HYDROCODONE BITARTRATE AND ACETAMINOPHEN 1 TABLET: 7.5; 325 TABLET ORAL at 23:12

## 2017-01-01 RX ADMIN — ASPIRIN 81 MG CHEWABLE TABLET 81 MG: 81 TABLET CHEWABLE at 08:27

## 2017-01-01 RX ADMIN — INSULIN LISPRO 3 UNITS: 100 INJECTION, SOLUTION INTRAVENOUS; SUBCUTANEOUS at 09:15

## 2017-01-01 RX ADMIN — ATORVASTATIN CALCIUM 40 MG: 40 TABLET, FILM COATED ORAL at 10:38

## 2017-01-01 RX ADMIN — SUCRALFATE 1 G: 1 TABLET ORAL at 14:25

## 2017-01-01 RX ADMIN — METOPROLOL TARTRATE 100 MG: 50 TABLET ORAL at 09:19

## 2017-01-01 RX ADMIN — ALBUTEROL SULFATE 2.5 MG: 2.5 SOLUTION RESPIRATORY (INHALATION) at 18:51

## 2017-01-01 RX ADMIN — GABAPENTIN 100 MG: 100 CAPSULE ORAL at 21:57

## 2017-01-01 RX ADMIN — COLLAGENASE SANTYL: 250 OINTMENT TOPICAL at 10:27

## 2017-01-01 RX ADMIN — IPRATROPIUM BROMIDE AND ALBUTEROL SULFATE 1 AMPULE: .5; 3 SOLUTION RESPIRATORY (INHALATION) at 19:29

## 2017-01-01 RX ADMIN — LEVOTHYROXINE SODIUM 100 MCG: 100 TABLET ORAL at 05:55

## 2017-01-01 RX ADMIN — ASPIRIN 81 MG CHEWABLE TABLET 81 MG: 81 TABLET CHEWABLE at 08:54

## 2017-01-01 RX ADMIN — INSULIN GLARGINE 10 UNITS: 100 INJECTION, SOLUTION SUBCUTANEOUS at 21:32

## 2017-01-01 RX ADMIN — LINAGLIPTIN 5 MG: 5 TABLET, FILM COATED ORAL at 08:15

## 2017-01-01 RX ADMIN — HEPARIN SODIUM 5000 UNITS: 5000 INJECTION, SOLUTION INTRAVENOUS; SUBCUTANEOUS at 21:04

## 2017-01-01 RX ADMIN — HYDROCODONE BITARTRATE AND ACETAMINOPHEN 1 TABLET: 7.5; 325 TABLET ORAL at 06:19

## 2017-01-01 RX ADMIN — INSULIN LISPRO 4 UNITS: 100 INJECTION, SOLUTION INTRAVENOUS; SUBCUTANEOUS at 12:58

## 2017-01-01 RX ADMIN — CLONIDINE HYDROCHLORIDE 0.2 MG: 0.2 TABLET ORAL at 09:54

## 2017-01-01 RX ADMIN — MICONAZOLE NITRATE: 20.6 POWDER TOPICAL at 10:04

## 2017-01-01 RX ADMIN — BUMETANIDE 1 MG: 0.25 INJECTION, SOLUTION INTRAMUSCULAR; INTRAVENOUS at 20:34

## 2017-01-01 RX ADMIN — AMPICILLIN SODIUM AND SULBACTAM SODIUM 1.5 G: 1; .5 INJECTION, POWDER, FOR SOLUTION INTRAMUSCULAR; INTRAVENOUS at 18:35

## 2017-01-01 RX ADMIN — ASPIRIN 81 MG CHEWABLE TABLET 81 MG: 81 TABLET CHEWABLE at 11:03

## 2017-01-01 RX ADMIN — METOPROLOL TARTRATE 100 MG: 50 TABLET ORAL at 08:54

## 2017-01-01 RX ADMIN — COLLAGENASE SANTYL: 250 OINTMENT TOPICAL at 08:59

## 2017-01-01 RX ADMIN — CEFTRIAXONE 1 G: 1 INJECTION, POWDER, FOR SOLUTION INTRAMUSCULAR; INTRAVENOUS at 01:26

## 2017-01-01 RX ADMIN — IPRATROPIUM BROMIDE AND ALBUTEROL SULFATE 1 AMPULE: .5; 3 SOLUTION RESPIRATORY (INHALATION) at 10:57

## 2017-01-01 RX ADMIN — IPRATROPIUM BROMIDE AND ALBUTEROL SULFATE 1 AMPULE: .5; 3 SOLUTION RESPIRATORY (INHALATION) at 19:58

## 2017-01-01 RX ADMIN — ATORVASTATIN CALCIUM 40 MG: 40 TABLET, FILM COATED ORAL at 08:18

## 2017-01-01 RX ADMIN — DOCUSATE SODIUM 100 MG: 100 CAPSULE, LIQUID FILLED ORAL at 11:09

## 2017-01-01 RX ADMIN — SUCRALFATE 1 G: 1 SUSPENSION ORAL at 12:38

## 2017-01-01 RX ADMIN — METRONIDAZOLE 500 MG: 500 INJECTION, SOLUTION INTRAVENOUS at 10:36

## 2017-01-01 RX ADMIN — HYDROCODONE BITARTRATE AND ACETAMINOPHEN 2 TABLET: 5; 325 TABLET ORAL at 08:19

## 2017-01-01 RX ADMIN — MICONAZOLE NITRATE: 20.6 POWDER TOPICAL at 22:00

## 2017-01-01 RX ADMIN — LEVOTHYROXINE SODIUM 100 MCG: 100 TABLET ORAL at 06:42

## 2017-01-01 RX ADMIN — LORAZEPAM 0.5 MG: 0.5 TABLET ORAL at 20:24

## 2017-01-01 RX ADMIN — MIDODRINE HYDROCHLORIDE 5 MG: 5 TABLET ORAL at 18:38

## 2017-01-01 RX ADMIN — LORAZEPAM 0.5 MG: 0.5 TABLET ORAL at 20:16

## 2017-01-01 RX ADMIN — INSULIN LISPRO 2 UNITS: 100 INJECTION, SOLUTION INTRAVENOUS; SUBCUTANEOUS at 18:12

## 2017-01-01 RX ADMIN — IPRATROPIUM BROMIDE AND ALBUTEROL SULFATE 1 AMPULE: .5; 3 SOLUTION RESPIRATORY (INHALATION) at 07:39

## 2017-01-01 RX ADMIN — DOCUSATE SODIUM 100 MG: 100 CAPSULE, LIQUID FILLED ORAL at 11:29

## 2017-01-01 RX ADMIN — SILVER SULFADIAZINE: 10 CREAM TOPICAL at 16:22

## 2017-01-01 RX ADMIN — ASPIRIN 81 MG CHEWABLE TABLET 81 MG: 81 TABLET CHEWABLE at 07:53

## 2017-01-01 RX ADMIN — HYDROCODONE BITARTRATE AND ACETAMINOPHEN 1 TABLET: 7.5; 325 TABLET ORAL at 12:43

## 2017-01-01 RX ADMIN — INSULIN LISPRO 4 UNITS: 100 INJECTION, SOLUTION INTRAVENOUS; SUBCUTANEOUS at 16:53

## 2017-01-01 RX ADMIN — INSULIN LISPRO 1 UNITS: 100 INJECTION, SOLUTION INTRAVENOUS; SUBCUTANEOUS at 14:15

## 2017-01-01 RX ADMIN — PREDNISONE 20 MG: 20 TABLET ORAL at 08:13

## 2017-01-01 RX ADMIN — CLONIDINE HYDROCHLORIDE 0.1 MG: 0.1 TABLET ORAL at 09:49

## 2017-01-01 RX ADMIN — CLONIDINE HYDROCHLORIDE 0.2 MG: 0.2 TABLET ORAL at 20:34

## 2017-01-01 RX ADMIN — SUCRALFATE 1 G: 1 SUSPENSION ORAL at 06:35

## 2017-01-01 RX ADMIN — MICONAZOLE NITRATE: 20.6 POWDER TOPICAL at 21:23

## 2017-01-01 RX ADMIN — CALCITRIOL 0.25 MCG: 0.25 CAPSULE, LIQUID FILLED ORAL at 09:56

## 2017-01-01 RX ADMIN — INSULIN LISPRO 3 UNITS: 100 INJECTION, SOLUTION INTRAVENOUS; SUBCUTANEOUS at 13:29

## 2017-01-01 RX ADMIN — IPRATROPIUM BROMIDE AND ALBUTEROL SULFATE 1 AMPULE: .5; 3 SOLUTION RESPIRATORY (INHALATION) at 21:28

## 2017-01-01 RX ADMIN — PANTOPRAZOLE SODIUM 40 MG: 40 TABLET, DELAYED RELEASE ORAL at 06:35

## 2017-01-01 RX ADMIN — MICONAZOLE NITRATE: 20.6 POWDER TOPICAL at 20:26

## 2017-01-01 RX ADMIN — ALLOPURINOL 100 MG: 100 TABLET ORAL at 08:46

## 2017-01-01 RX ADMIN — SILVER SULFADIAZINE: 10 CREAM TOPICAL at 08:27

## 2017-01-01 RX ADMIN — METOPROLOL TARTRATE 50 MG: 50 TABLET ORAL at 20:57

## 2017-01-01 RX ADMIN — IPRATROPIUM BROMIDE AND ALBUTEROL SULFATE 1 AMPULE: .5; 3 SOLUTION RESPIRATORY (INHALATION) at 14:13

## 2017-01-01 RX ADMIN — INSULIN LISPRO 9 UNITS: 100 INJECTION, SOLUTION INTRAVENOUS; SUBCUTANEOUS at 17:32

## 2017-01-01 RX ADMIN — HEPARIN SODIUM 5000 UNITS: 5000 INJECTION INTRAVENOUS; SUBCUTANEOUS at 15:48

## 2017-01-01 RX ADMIN — DOCUSATE SODIUM 100 MG: 100 CAPSULE, LIQUID FILLED ORAL at 10:37

## 2017-01-01 RX ADMIN — ASPIRIN 81 MG CHEWABLE TABLET 81 MG: 81 TABLET CHEWABLE at 08:37

## 2017-01-01 RX ADMIN — INSULIN LISPRO 2 UNITS: 100 INJECTION, SOLUTION INTRAVENOUS; SUBCUTANEOUS at 20:40

## 2017-01-01 RX ADMIN — COLLAGENASE SANTYL: 250 OINTMENT TOPICAL at 10:46

## 2017-01-01 RX ADMIN — INSULIN LISPRO 8 UNITS: 100 INJECTION, SOLUTION INTRAVENOUS; SUBCUTANEOUS at 12:30

## 2017-01-01 RX ADMIN — INSULIN LISPRO 8 UNITS: 100 INJECTION, SOLUTION INTRAVENOUS; SUBCUTANEOUS at 13:45

## 2017-01-01 RX ADMIN — IPRATROPIUM BROMIDE AND ALBUTEROL SULFATE 1 AMPULE: .5; 3 SOLUTION RESPIRATORY (INHALATION) at 21:35

## 2017-01-01 RX ADMIN — BISACODYL 10 MG: 10 SUPPOSITORY RECTAL at 07:57

## 2017-01-01 RX ADMIN — BUMETANIDE 0.5 MG: 0.25 INJECTION, SOLUTION INTRAMUSCULAR; INTRAVENOUS at 08:16

## 2017-01-01 RX ADMIN — ENOXAPARIN SODIUM 30 MG: 30 INJECTION SUBCUTANEOUS at 17:00

## 2017-01-01 RX ADMIN — ATORVASTATIN CALCIUM 40 MG: 40 TABLET, FILM COATED ORAL at 08:08

## 2017-01-01 RX ADMIN — HYDROCODONE BITARTRATE AND ACETAMINOPHEN 1 TABLET: 7.5; 325 TABLET ORAL at 04:20

## 2017-01-01 RX ADMIN — DIPHENHYDRAMINE HCL 25 MG: 25 TABLET ORAL at 15:35

## 2017-01-01 RX ADMIN — HYDROCODONE BITARTRATE AND ACETAMINOPHEN 1 TABLET: 5; 325 TABLET ORAL at 10:04

## 2017-01-01 RX ADMIN — ASPIRIN 81 MG CHEWABLE TABLET 81 MG: 81 TABLET CHEWABLE at 09:33

## 2017-01-01 RX ADMIN — MIDODRINE HYDROCHLORIDE 5 MG: 5 TABLET ORAL at 07:57

## 2017-01-01 RX ADMIN — COLLAGENASE SANTYL: 250 OINTMENT TOPICAL at 22:45

## 2017-01-01 RX ADMIN — PANTOPRAZOLE SODIUM 40 MG: 40 TABLET, DELAYED RELEASE ORAL at 07:07

## 2017-01-01 RX ADMIN — ASPIRIN 81 MG CHEWABLE TABLET 81 MG: 81 TABLET CHEWABLE at 07:57

## 2017-01-01 RX ADMIN — CLONIDINE HYDROCHLORIDE 0.1 MG: 0.1 TABLET ORAL at 21:29

## 2017-01-01 RX ADMIN — LORAZEPAM 0.5 MG: 0.5 TABLET ORAL at 08:19

## 2017-01-01 RX ADMIN — BUMETANIDE 1 MG: 0.25 INJECTION, SOLUTION INTRAMUSCULAR; INTRAVENOUS at 08:13

## 2017-01-01 RX ADMIN — SILVER SULFADIAZINE: 10 CREAM TOPICAL at 11:16

## 2017-01-01 RX ADMIN — ALLOPURINOL 100 MG: 100 TABLET ORAL at 09:19

## 2017-01-01 RX ADMIN — METOPROLOL TARTRATE 100 MG: 50 TABLET ORAL at 10:44

## 2017-01-01 RX ADMIN — ALLOPURINOL 100 MG: 100 TABLET ORAL at 10:39

## 2017-01-01 RX ADMIN — IPRATROPIUM BROMIDE AND ALBUTEROL SULFATE 1 AMPULE: .5; 3 SOLUTION RESPIRATORY (INHALATION) at 06:50

## 2017-01-01 RX ADMIN — METOLAZONE 2.5 MG: 2.5 TABLET ORAL at 08:27

## 2017-01-01 RX ADMIN — MICONAZOLE NITRATE: 20.6 POWDER TOPICAL at 20:20

## 2017-01-01 RX ADMIN — CLONIDINE HYDROCHLORIDE 0.2 MG: 0.2 TABLET ORAL at 08:47

## 2017-01-01 RX ADMIN — METHYLPREDNISOLONE ACETATE 40 MG: 40 INJECTION, SUSPENSION INTRA-ARTICULAR; INTRALESIONAL; INTRAMUSCULAR; SOFT TISSUE at 17:18

## 2017-01-01 RX ADMIN — IPRATROPIUM BROMIDE AND ALBUTEROL SULFATE 1 AMPULE: .5; 3 SOLUTION RESPIRATORY (INHALATION) at 19:10

## 2017-01-01 RX ADMIN — LORAZEPAM 0.5 MG: 0.5 TABLET ORAL at 20:22

## 2017-01-01 RX ADMIN — METOLAZONE 2.5 MG: 5 TABLET ORAL at 09:54

## 2017-01-01 RX ADMIN — POLYETHYLENE GLYCOL 3350 17 G: 17 POWDER, FOR SOLUTION ORAL at 08:46

## 2017-01-01 RX ADMIN — ENOXAPARIN SODIUM 135 MG: 150 INJECTION SUBCUTANEOUS at 08:16

## 2017-01-01 RX ADMIN — SILVER SULFADIAZINE: 10 CREAM TOPICAL at 08:07

## 2017-01-01 RX ADMIN — INSULIN LISPRO 3 UNITS: 100 INJECTION, SOLUTION INTRAVENOUS; SUBCUTANEOUS at 21:56

## 2017-01-01 RX ADMIN — SILVER SULFADIAZINE: 10 CREAM TOPICAL at 10:35

## 2017-01-01 RX ADMIN — INSULIN LISPRO 20 UNITS: 100 INJECTION, SOLUTION INTRAVENOUS; SUBCUTANEOUS at 15:12

## 2017-01-01 RX ADMIN — CLONIDINE HYDROCHLORIDE 0.1 MG: 0.1 TABLET ORAL at 09:33

## 2017-01-01 RX ADMIN — SODIUM CHLORIDE 500 ML: 9 INJECTION, SOLUTION INTRAVENOUS at 20:30

## 2017-01-01 RX ADMIN — COLLAGENASE SANTYL: 250 OINTMENT TOPICAL at 21:37

## 2017-01-01 RX ADMIN — ENOXAPARIN SODIUM 40 MG: 40 INJECTION SUBCUTANEOUS at 08:39

## 2017-01-01 RX ADMIN — METOPROLOL TARTRATE 50 MG: 50 TABLET ORAL at 09:54

## 2017-01-01 RX ADMIN — IPRATROPIUM BROMIDE AND ALBUTEROL SULFATE 1 AMPULE: .5; 3 SOLUTION RESPIRATORY (INHALATION) at 10:53

## 2017-01-01 RX ADMIN — PROMETHAZINE HYDROCHLORIDE 12.5 MG: 25 TABLET ORAL at 06:45

## 2017-01-01 RX ADMIN — METOPROLOL TARTRATE 100 MG: 50 TABLET ORAL at 08:46

## 2017-01-01 RX ADMIN — DOCUSATE SODIUM 100 MG: 100 CAPSULE, LIQUID FILLED ORAL at 08:54

## 2017-01-01 RX ADMIN — ATORVASTATIN CALCIUM 40 MG: 40 TABLET, FILM COATED ORAL at 09:00

## 2017-01-01 RX ADMIN — INSULIN GLARGINE 30 UNITS: 100 INJECTION, SOLUTION SUBCUTANEOUS at 20:54

## 2017-01-01 RX ADMIN — HYDROCODONE BITARTRATE AND ACETAMINOPHEN 1 TABLET: 7.5; 325 TABLET ORAL at 20:55

## 2017-01-01 RX ADMIN — DOCUSATE SODIUM 100 MG: 100 CAPSULE, LIQUID FILLED ORAL at 21:57

## 2017-01-01 RX ADMIN — ATORVASTATIN CALCIUM 40 MG: 40 TABLET, FILM COATED ORAL at 08:16

## 2017-01-01 RX ADMIN — MICONAZOLE NITRATE: 20.6 POWDER TOPICAL at 08:12

## 2017-01-01 RX ADMIN — NEPHROCAP 1 MG: 1 CAP ORAL at 11:35

## 2017-01-01 RX ADMIN — PANTOPRAZOLE SODIUM 40 MG: 40 TABLET, DELAYED RELEASE ORAL at 06:38

## 2017-01-01 RX ADMIN — SUCRALFATE 1 G: 1 SUSPENSION ORAL at 21:02

## 2017-01-01 RX ADMIN — ENOXAPARIN SODIUM 30 MG: 30 INJECTION SUBCUTANEOUS at 16:44

## 2017-01-01 RX ADMIN — GABAPENTIN 100 MG: 100 CAPSULE ORAL at 21:03

## 2017-01-01 RX ADMIN — COLLAGENASE SANTYL: 250 OINTMENT TOPICAL at 21:02

## 2017-01-01 RX ADMIN — INSULIN GLARGINE 30 UNITS: 100 INJECTION, SOLUTION SUBCUTANEOUS at 22:43

## 2017-01-01 RX ADMIN — ENOXAPARIN SODIUM 40 MG: 40 INJECTION SUBCUTANEOUS at 09:25

## 2017-01-01 RX ADMIN — METOPROLOL TARTRATE 100 MG: 50 TABLET ORAL at 08:27

## 2017-01-01 RX ADMIN — ATORVASTATIN CALCIUM 40 MG: 40 TABLET, FILM COATED ORAL at 22:58

## 2017-01-01 RX ADMIN — SODIUM CHLORIDE: 9 INJECTION, SOLUTION INTRAVENOUS at 07:49

## 2017-01-01 RX ADMIN — LINAGLIPTIN 5 MG: 5 TABLET, FILM COATED ORAL at 10:36

## 2017-01-01 RX ADMIN — GABAPENTIN 300 MG: 300 CAPSULE ORAL at 14:04

## 2017-01-01 RX ADMIN — ALLOPURINOL 100 MG: 100 TABLET ORAL at 10:44

## 2017-01-01 RX ADMIN — ASPIRIN 81 MG CHEWABLE TABLET 81 MG: 81 TABLET CHEWABLE at 08:30

## 2017-01-01 RX ADMIN — INSULIN LISPRO 5 UNITS: 100 INJECTION, SOLUTION INTRAVENOUS; SUBCUTANEOUS at 20:59

## 2017-01-01 RX ADMIN — CLONIDINE HYDROCHLORIDE 0.2 MG: 0.2 TABLET ORAL at 09:10

## 2017-01-01 RX ADMIN — CLONIDINE HYDROCHLORIDE 0.2 MG: 0.2 TABLET ORAL at 08:39

## 2017-01-01 RX ADMIN — PROMETHAZINE HYDROCHLORIDE 12.5 MG: 25 TABLET ORAL at 16:04

## 2017-01-01 RX ADMIN — COLLAGENASE SANTYL: 250 OINTMENT TOPICAL at 16:19

## 2017-01-01 RX ADMIN — HYDROCODONE BITARTRATE AND ACETAMINOPHEN 1 TABLET: 7.5; 325 TABLET ORAL at 04:55

## 2017-01-01 RX ADMIN — ENOXAPARIN SODIUM 30 MG: 30 INJECTION SUBCUTANEOUS at 17:10

## 2017-01-01 RX ADMIN — GABAPENTIN 100 MG: 100 CAPSULE ORAL at 19:53

## 2017-01-01 RX ADMIN — ALLOPURINOL 100 MG: 100 TABLET ORAL at 09:24

## 2017-01-01 RX ADMIN — POLYETHYLENE GLYCOL 3350 17 G: 17 POWDER, FOR SOLUTION ORAL at 17:39

## 2017-01-01 RX ADMIN — IPRATROPIUM BROMIDE AND ALBUTEROL SULFATE 1 AMPULE: .5; 3 SOLUTION RESPIRATORY (INHALATION) at 14:54

## 2017-01-01 RX ADMIN — ASPIRIN 81 MG CHEWABLE TABLET 81 MG: 81 TABLET CHEWABLE at 22:57

## 2017-01-01 RX ADMIN — COLLAGENASE SANTYL: 250 OINTMENT TOPICAL at 08:48

## 2017-01-01 RX ADMIN — SODIUM CHLORIDE: 4.5 INJECTION, SOLUTION INTRAVENOUS at 07:00

## 2017-01-01 RX ADMIN — ALBUMIN (HUMAN) 12.5 G: 2.5 SOLUTION INTRAVENOUS at 08:50

## 2017-01-01 RX ADMIN — PANTOPRAZOLE SODIUM 40 MG: 40 TABLET, DELAYED RELEASE ORAL at 06:17

## 2017-01-01 RX ADMIN — CEFTRIAXONE 1 G: 1 INJECTION, SOLUTION INTRAVENOUS at 21:24

## 2017-01-01 RX ADMIN — MICONAZOLE NITRATE: 20.6 POWDER TOPICAL at 22:46

## 2017-01-01 RX ADMIN — HYDROCODONE BITARTRATE AND ACETAMINOPHEN 1 TABLET: 7.5; 325 TABLET ORAL at 09:31

## 2017-01-01 RX ADMIN — METOPROLOL TARTRATE 100 MG: 50 TABLET ORAL at 20:22

## 2017-01-01 RX ADMIN — ASPIRIN 81 MG CHEWABLE TABLET 81 MG: 81 TABLET CHEWABLE at 08:48

## 2017-01-01 RX ADMIN — METHYLPREDNISOLONE SODIUM SUCCINATE 40 MG: 40 INJECTION, POWDER, FOR SOLUTION INTRAMUSCULAR; INTRAVENOUS at 09:43

## 2017-01-01 RX ADMIN — AZITHROMYCIN 500 MG: 250 TABLET, FILM COATED ORAL at 08:07

## 2017-01-01 RX ADMIN — COLLAGENASE SANTYL: 250 OINTMENT TOPICAL at 09:16

## 2017-01-01 RX ADMIN — CHOLECALCIFEROL TAB 10 MCG (400 UNIT) 400 UNITS: 10 TAB at 10:06

## 2017-01-01 RX ADMIN — METOPROLOL TARTRATE 100 MG: 50 TABLET ORAL at 08:37

## 2017-01-01 RX ADMIN — COLLAGENASE SANTYL: 250 OINTMENT TOPICAL at 11:16

## 2017-01-01 RX ADMIN — SUCRALFATE 1 G: 1 SUSPENSION ORAL at 21:57

## 2017-01-01 RX ADMIN — COLLAGENASE SANTYL: 250 OINTMENT TOPICAL at 21:58

## 2017-01-01 RX ADMIN — METOPROLOL TARTRATE 100 MG: 50 TABLET ORAL at 08:45

## 2017-01-01 RX ADMIN — ATORVASTATIN CALCIUM 40 MG: 40 TABLET, FILM COATED ORAL at 09:02

## 2017-01-01 RX ADMIN — LORAZEPAM 0.5 MG: 0.5 TABLET ORAL at 21:36

## 2017-01-01 RX ADMIN — IPRATROPIUM BROMIDE AND ALBUTEROL SULFATE 1 AMPULE: .5; 3 SOLUTION RESPIRATORY (INHALATION) at 10:38

## 2017-01-01 RX ADMIN — INSULIN GLARGINE 30 UNITS: 100 INJECTION, SOLUTION SUBCUTANEOUS at 20:29

## 2017-01-01 RX ADMIN — LORAZEPAM 0.5 MG: 0.5 TABLET ORAL at 21:49

## 2017-01-01 RX ADMIN — ASPIRIN 81 MG CHEWABLE TABLET 81 MG: 81 TABLET CHEWABLE at 08:12

## 2017-01-01 RX ADMIN — INSULIN LISPRO 7 UNITS: 100 INJECTION, SOLUTION INTRAVENOUS; SUBCUTANEOUS at 21:06

## 2017-01-01 RX ADMIN — DOCUSATE SODIUM 100 MG: 100 CAPSULE, LIQUID FILLED ORAL at 20:45

## 2017-01-01 RX ADMIN — FLUTICASONE PROPIONATE 1 SPRAY: 50 SPRAY, METERED NASAL at 08:41

## 2017-01-01 RX ADMIN — BUPIVACAINE HYDROCHLORIDE AND EPINEPHRINE BITARTRATE 30 ML: 5; .0091 INJECTION, SOLUTION PERINEURAL at 12:06

## 2017-01-01 RX ADMIN — INSULIN LISPRO 3 UNITS: 100 INJECTION, SOLUTION INTRAVENOUS; SUBCUTANEOUS at 14:13

## 2017-01-01 RX ADMIN — SILVER SULFADIAZINE: 10 CREAM TOPICAL at 10:45

## 2017-01-01 RX ADMIN — SILVER SULFADIAZINE: 10 CREAM TOPICAL at 09:34

## 2017-01-01 RX ADMIN — METOPROLOL TARTRATE 100 MG: 50 TABLET ORAL at 20:52

## 2017-01-01 RX ADMIN — ATORVASTATIN CALCIUM 40 MG: 40 TABLET, FILM COATED ORAL at 11:36

## 2017-01-01 RX ADMIN — HYDROCODONE BITARTRATE AND ACETAMINOPHEN 1 TABLET: 7.5; 325 TABLET ORAL at 04:35

## 2017-01-01 RX ADMIN — HYDROCODONE BITARTRATE AND ACETAMINOPHEN 2 TABLET: 5; 325 TABLET ORAL at 14:35

## 2017-01-01 RX ADMIN — SUCRALFATE 1 G: 1 SUSPENSION ORAL at 06:18

## 2017-01-01 RX ADMIN — LEVOTHYROXINE SODIUM 100 MCG: 100 TABLET ORAL at 08:35

## 2017-01-01 RX ADMIN — ONDANSETRON 4 MG: 2 INJECTION INTRAMUSCULAR; INTRAVENOUS at 19:53

## 2017-01-01 RX ADMIN — COLLAGENASE SANTYL: 250 OINTMENT TOPICAL at 12:46

## 2017-01-01 RX ADMIN — GLIPIZIDE 5 MG: 5 TABLET ORAL at 10:08

## 2017-01-01 RX ADMIN — DOCUSATE SODIUM 100 MG: 100 CAPSULE, LIQUID FILLED ORAL at 20:52

## 2017-01-01 RX ADMIN — IPRATROPIUM BROMIDE AND ALBUTEROL SULFATE 1 AMPULE: .5; 3 SOLUTION RESPIRATORY (INHALATION) at 20:19

## 2017-01-01 RX ADMIN — HYDROCODONE BITARTRATE AND ACETAMINOPHEN 1 TABLET: 7.5; 325 TABLET ORAL at 23:11

## 2017-01-01 RX ADMIN — PANTOPRAZOLE SODIUM 40 MG: 40 TABLET, DELAYED RELEASE ORAL at 08:40

## 2017-01-01 RX ADMIN — MIDODRINE HYDROCHLORIDE 5 MG: 5 TABLET ORAL at 16:56

## 2017-01-01 RX ADMIN — LORAZEPAM 0.5 MG: 0.5 TABLET ORAL at 11:10

## 2017-01-01 RX ADMIN — INSULIN LISPRO 9 UNITS: 100 INJECTION, SOLUTION INTRAVENOUS; SUBCUTANEOUS at 16:57

## 2017-01-01 RX ADMIN — CEFTRIAXONE SODIUM 1 G: 1 INJECTION, POWDER, FOR SOLUTION INTRAMUSCULAR; INTRAVENOUS at 21:17

## 2017-01-01 RX ADMIN — MICONAZOLE NITRATE: 20.6 POWDER TOPICAL at 08:40

## 2017-01-01 RX ADMIN — ONDANSETRON HYDROCHLORIDE 4 MG: 2 SOLUTION INTRAMUSCULAR; INTRAVENOUS at 09:02

## 2017-01-01 RX ADMIN — HYDROCODONE BITARTRATE AND ACETAMINOPHEN 1 TABLET: 7.5; 325 TABLET ORAL at 21:17

## 2017-01-01 RX ADMIN — FLUTICASONE PROPIONATE 1 SPRAY: 50 SPRAY, METERED NASAL at 21:23

## 2017-01-01 RX ADMIN — GABAPENTIN 100 MG: 100 CAPSULE ORAL at 20:22

## 2017-01-01 RX ADMIN — GABAPENTIN 100 MG: 100 CAPSULE ORAL at 21:11

## 2017-01-01 RX ADMIN — LORAZEPAM 0.5 MG: 0.5 TABLET ORAL at 19:53

## 2017-01-01 RX ADMIN — INSULIN LISPRO 1 UNITS: 100 INJECTION, SOLUTION INTRAVENOUS; SUBCUTANEOUS at 14:57

## 2017-01-01 RX ADMIN — METOLAZONE 2.5 MG: 5 TABLET ORAL at 09:09

## 2017-01-01 RX ADMIN — ENOXAPARIN SODIUM 30 MG: 30 INJECTION SUBCUTANEOUS at 17:42

## 2017-01-01 RX ADMIN — CLONIDINE HYDROCHLORIDE 0.2 MG: 0.2 TABLET ORAL at 09:02

## 2017-01-01 RX ADMIN — SODIUM CHLORIDE 250 ML: 9 INJECTION, SOLUTION INTRAVENOUS at 21:13

## 2017-01-01 RX ADMIN — INSULIN LISPRO 18 UNITS: 100 INJECTION, SOLUTION INTRAVENOUS; SUBCUTANEOUS at 17:01

## 2017-01-01 RX ADMIN — ASPIRIN 81 MG CHEWABLE TABLET 81 MG: 81 TABLET CHEWABLE at 09:25

## 2017-01-01 RX ADMIN — LINAGLIPTIN 5 MG: 5 TABLET, FILM COATED ORAL at 09:11

## 2017-01-01 RX ADMIN — ISOSORBIDE MONONITRATE 30 MG: 30 TABLET, EXTENDED RELEASE ORAL at 09:55

## 2017-01-01 RX ADMIN — CLONIDINE HYDROCHLORIDE 0.2 MG: 0.2 TABLET ORAL at 08:12

## 2017-01-01 RX ADMIN — TETRAKIS(2-METHOXYISOBUTYLISOCYANIDE)COPPER(I) TETRAFLUOROBORATE 10 MILLICURIE: 1 INJECTION, POWDER, LYOPHILIZED, FOR SOLUTION INTRAVENOUS at 12:10

## 2017-01-01 RX ADMIN — SODIUM CHLORIDE: 9 INJECTION, SOLUTION INTRAVENOUS at 13:11

## 2017-01-01 RX ADMIN — IPRATROPIUM BROMIDE AND ALBUTEROL SULFATE 1 AMPULE: .5; 3 SOLUTION RESPIRATORY (INHALATION) at 14:36

## 2017-01-01 RX ADMIN — IPRATROPIUM BROMIDE AND ALBUTEROL SULFATE 1 AMPULE: .5; 3 SOLUTION RESPIRATORY (INHALATION) at 14:41

## 2017-01-01 RX ADMIN — INSULIN GLARGINE 20 UNITS: 100 INJECTION, SOLUTION SUBCUTANEOUS at 22:04

## 2017-01-01 RX ADMIN — ATORVASTATIN CALCIUM 40 MG: 40 TABLET, FILM COATED ORAL at 08:47

## 2017-01-01 RX ADMIN — METOPROLOL TARTRATE 100 MG: 50 TABLET ORAL at 09:02

## 2017-01-01 RX ADMIN — SUCRALFATE 1 G: 1 SUSPENSION ORAL at 17:38

## 2017-01-01 RX ADMIN — METOPROLOL TARTRATE 25 MG: 25 TABLET ORAL at 20:48

## 2017-01-01 RX ADMIN — GLIPIZIDE 10 MG: 10 TABLET ORAL at 05:48

## 2017-01-01 RX ADMIN — Medication 10 ML: at 09:12

## 2017-01-01 RX ADMIN — Medication 10 ML: at 11:23

## 2017-01-01 RX ADMIN — LINAGLIPTIN 5 MG: 5 TABLET, FILM COATED ORAL at 12:38

## 2017-01-01 RX ADMIN — POTASSIUM CHLORIDE: 2 INJECTION, SOLUTION, CONCENTRATE INTRAVENOUS at 10:17

## 2017-01-01 RX ADMIN — ALLOPURINOL 100 MG: 100 TABLET ORAL at 08:15

## 2017-01-01 RX ADMIN — LISINOPRIL 5 MG: 5 TABLET ORAL at 08:06

## 2017-01-01 RX ADMIN — MIDODRINE HYDROCHLORIDE 5 MG: 2.5 TABLET ORAL at 16:55

## 2017-01-01 RX ADMIN — METOPROLOL TARTRATE 50 MG: 50 TABLET ORAL at 11:35

## 2017-01-01 RX ADMIN — MICONAZOLE NITRATE: 20.6 POWDER TOPICAL at 14:53

## 2017-01-01 RX ADMIN — SODIUM CHLORIDE: 9 INJECTION, SOLUTION INTRAVENOUS at 05:24

## 2017-01-01 RX ADMIN — BUMETANIDE 1 MG: 1 TABLET ORAL at 09:13

## 2017-01-01 RX ADMIN — LISINOPRIL 5 MG: 5 TABLET ORAL at 08:12

## 2017-01-01 RX ADMIN — IPRATROPIUM BROMIDE AND ALBUTEROL SULFATE 1 AMPULE: .5; 3 SOLUTION RESPIRATORY (INHALATION) at 06:43

## 2017-01-01 RX ADMIN — ASPIRIN 81 MG CHEWABLE TABLET 81 MG: 81 TABLET CHEWABLE at 11:06

## 2017-01-01 RX ADMIN — ALLOPURINOL 100 MG: 100 TABLET ORAL at 09:02

## 2017-01-01 RX ADMIN — METOPROLOL TARTRATE 100 MG: 50 TABLET ORAL at 21:22

## 2017-01-01 RX ADMIN — POLYETHYLENE GLYCOL 3350 17 G: 17 POWDER, FOR SOLUTION ORAL at 10:36

## 2017-01-01 RX ADMIN — ENOXAPARIN SODIUM 40 MG: 40 INJECTION SUBCUTANEOUS at 09:01

## 2017-01-01 RX ADMIN — INSULIN LISPRO 12 UNITS: 100 INJECTION, SOLUTION INTRAVENOUS; SUBCUTANEOUS at 12:46

## 2017-01-01 RX ADMIN — BUMETANIDE 1 MG: 0.25 INJECTION INTRAMUSCULAR; INTRAVENOUS at 21:58

## 2017-01-01 RX ADMIN — IPRATROPIUM BROMIDE 0.5 MG: 0.5 SOLUTION RESPIRATORY (INHALATION) at 18:52

## 2017-01-01 RX ADMIN — PANTOPRAZOLE SODIUM 40 MG: 40 TABLET, DELAYED RELEASE ORAL at 05:36

## 2017-01-01 RX ADMIN — HYDROCODONE BITARTRATE AND ACETAMINOPHEN 2 TABLET: 5; 325 TABLET ORAL at 06:12

## 2017-01-01 RX ADMIN — SODIUM CHLORIDE: 9 INJECTION, SOLUTION INTRAVENOUS at 11:02

## 2017-01-01 RX ADMIN — IPRATROPIUM BROMIDE AND ALBUTEROL SULFATE 1 AMPULE: .5; 3 SOLUTION RESPIRATORY (INHALATION) at 15:24

## 2017-01-01 RX ADMIN — DOCUSATE SODIUM 200 MG: 100 CAPSULE, LIQUID FILLED ORAL at 10:07

## 2017-01-01 RX ADMIN — METOLAZONE 2.5 MG: 5 TABLET ORAL at 09:02

## 2017-01-01 RX ADMIN — ASPIRIN 81 MG CHEWABLE TABLET 81 MG: 81 TABLET CHEWABLE at 08:19

## 2017-01-01 RX ADMIN — SILVER SULFADIAZINE: 10 CREAM TOPICAL at 20:53

## 2017-01-01 RX ADMIN — METOLAZONE 2.5 MG: 5 TABLET ORAL at 12:17

## 2017-01-01 RX ADMIN — MICONAZOLE NITRATE: 20.6 POWDER TOPICAL at 00:51

## 2017-01-01 RX ADMIN — IPRATROPIUM BROMIDE AND ALBUTEROL SULFATE 1 AMPULE: .5; 2.5 SOLUTION RESPIRATORY (INHALATION) at 10:58

## 2017-01-01 ASSESSMENT — PAIN DESCRIPTION - PROGRESSION
CLINICAL_PROGRESSION: NOT CHANGED
CLINICAL_PROGRESSION: NOT CHANGED
CLINICAL_PROGRESSION: GRADUALLY WORSENING
CLINICAL_PROGRESSION: NOT CHANGED
CLINICAL_PROGRESSION: NOT CHANGED

## 2017-01-01 ASSESSMENT — ENCOUNTER SYMPTOMS
BACK PAIN: 0
SHORTNESS OF BREATH: 1
HEARTBURN: 0
SHORTNESS OF BREATH: 1
COUGH: 0
EYES NEGATIVE: 1
RHINORRHEA: 0
RHINORRHEA: 0
ORTHOPNEA: 1
RHINORRHEA: 0
HEARTBURN: 0
SHORTNESS OF BREATH: 1
ABDOMINAL PAIN: 0
BLOOD IN STOOL: 0
GASTROINTESTINAL NEGATIVE: 1
CHEST TIGHTNESS: 0
SHORTNESS OF BREATH: 1
BLURRED VISION: 0
EYE REDNESS: 0
HEMOPTYSIS: 0
SHORTNESS OF BREATH: 1
NAUSEA: 0
COUGH: 0
VOMITING: 0
EYE REDNESS: 0
ABDOMINAL PAIN: 0
HEARTBURN: 0
BLOOD IN STOOL: 0
BACK PAIN: 1
SHORTNESS OF BREATH: 1
COUGH: 1
WHEEZING: 1
WHEEZING: 0
BACK PAIN: 1
DIARRHEA: 0
SHORTNESS OF BREATH: 1
VOMITING: 0
SINUS PAIN: 0
VOICE CHANGE: 0
EYES NEGATIVE: 1
VOMITING: 0
VOMITING: 0
CONSTIPATION: 0
DOUBLE VISION: 0
ALLERGIC/IMMUNOLOGIC NEGATIVE: 1
NAUSEA: 0
NAUSEA: 0
ABDOMINAL PAIN: 1
DIARRHEA: 1
SWOLLEN GLANDS: 0
VOMITING: 1
GASTROINTESTINAL NEGATIVE: 1
SHORTNESS OF BREATH: 1
EYE DISCHARGE: 1
COUGH: 0
NAUSEA: 0
VOMITING: 1
TROUBLE SWALLOWING: 0
SHORTNESS OF BREATH: 1
DIARRHEA: 0
VOMITING: 1
ABDOMINAL PAIN: 0
SHORTNESS OF BREATH: 1
WHEEZING: 1
ABDOMINAL PAIN: 1
STRIDOR: 0
WHEEZING: 0
COUGH: 0
ORTHOPNEA: 0
SHORTNESS OF BREATH: 1
BLOOD IN STOOL: 0
ABDOMINAL PAIN: 0
SORE THROAT: 0
DIARRHEA: 0
BACK PAIN: 0
RHINORRHEA: 0
NAUSEA: 0
COUGH: 0
COUGH: 0
SHORTNESS OF BREATH: 1
COUGH: 0
COUGH: 1
COUGH: 0
RHINORRHEA: 0
NAUSEA: 1
HEMOPTYSIS: 0
CHEST TIGHTNESS: 0
DIARRHEA: 0
WHEEZING: 0
SHORTNESS OF BREATH: 0
VOMITING: 0
GASTROINTESTINAL NEGATIVE: 1
BLURRED VISION: 0
NAUSEA: 0
SORE THROAT: 0
VOMITING: 0
VOMITING: 0
SPUTUM PRODUCTION: 0
COUGH: 1
VOICE CHANGE: 0
ABDOMINAL PAIN: 0
BLURRED VISION: 0
EYES NEGATIVE: 1
APNEA: 1
BLURRED VISION: 0
NAUSEA: 0
ABDOMINAL PAIN: 0
SHORTNESS OF BREATH: 0
GASTROINTESTINAL NEGATIVE: 1
SHORTNESS OF BREATH: 1
VOMITING: 0
ABDOMINAL PAIN: 0
EYE PAIN: 0
NAUSEA: 1
HEMOPTYSIS: 0
ORTHOPNEA: 0
ALLERGIC/IMMUNOLOGIC NEGATIVE: 1
WHEEZING: 0
CHEST TIGHTNESS: 0
EYES NEGATIVE: 1
SHORTNESS OF BREATH: 0
SHORTNESS OF BREATH: 1
SHORTNESS OF BREATH: 1
NAUSEA: 1
SINUS PRESSURE: 0
GASTROINTESTINAL NEGATIVE: 1
HEARTBURN: 0
WHEEZING: 1
WHEEZING: 0
COUGH: 0
TROUBLE SWALLOWING: 0
BLOOD IN STOOL: 0
COUGH: 1
SHORTNESS OF BREATH: 1
SPUTUM PRODUCTION: 0
EYES NEGATIVE: 1
WHEEZING: 0
SORE THROAT: 0
SHORTNESS OF BREATH: 0
PHOTOPHOBIA: 0
EYES NEGATIVE: 1
CHEST TIGHTNESS: 0
SHORTNESS OF BREATH: 1
SHORTNESS OF BREATH: 1
ABDOMINAL PAIN: 1
ABDOMINAL DISTENTION: 0
DIARRHEA: 1
ABDOMINAL PAIN: 0
COUGH: 0
SPUTUM PRODUCTION: 0
CONSTIPATION: 0
VOMITING: 0
GASTROINTESTINAL NEGATIVE: 1
SPUTUM PRODUCTION: 0
ORTHOPNEA: 0

## 2017-01-01 ASSESSMENT — PULMONARY FUNCTION TESTS
PIF_VALUE: 11.3
PIF_VALUE: 9.2
PIF_VALUE: 11.5
PIF_VALUE: 11.5
PIF_VALUE: 11.4
PIF_VALUE: 11.3
PIF_VALUE: 10.7
PIF_VALUE: 11.5
PIF_VALUE: 11.5
PIF_VALUE: 11.3
PIF_VALUE: 31.6
PIF_VALUE: 11.5
PIF_VALUE: 7.3
PIF_VALUE: 11.3
PIF_VALUE: 33.2
PIF_VALUE: 11.3
PIF_VALUE: 10.6
PIF_VALUE: 11.5
PIF_VALUE: 11.5
PIF_VALUE: 9.2
PIF_VALUE: 11.5
PIF_VALUE: 36.6
PIF_VALUE: 11.4
PIF_VALUE: 11.6
PIF_VALUE: 7.6
PIF_VALUE: 11.3
PIF_VALUE: 11.4
PIF_VALUE: 11
PIF_VALUE: 11.4
PIF_VALUE: 11.5
PIF_VALUE: 11.2
PIF_VALUE: 32.7
PIF_VALUE: 49.2

## 2017-01-01 ASSESSMENT — PAIN DESCRIPTION - LOCATION
LOCATION: GENERALIZED
LOCATION: COCCYX
LOCATION: LEG;KNEE
LOCATION: BUTTOCKS
LOCATION: SHOULDER
LOCATION: KNEE;BACK
LOCATION: GENERALIZED
LOCATION: LEG
LOCATION: KNEE
LOCATION: ABDOMEN
LOCATION: COCCYX
LOCATION: BACK;BUTTOCKS
LOCATION: GENERALIZED

## 2017-01-01 ASSESSMENT — PAIN SCALES - GENERAL
PAINLEVEL_OUTOF10: 10
PAINLEVEL_OUTOF10: 9
PAINLEVEL_OUTOF10: 8
PAINLEVEL_OUTOF10: 8
PAINLEVEL_OUTOF10: 4
PAINLEVEL_OUTOF10: 0
PAINLEVEL_OUTOF10: 8
PAINLEVEL_OUTOF10: 0
PAINLEVEL_OUTOF10: 0
PAINLEVEL_OUTOF10: 10
PAINLEVEL_OUTOF10: 6
PAINLEVEL_OUTOF10: 0
PAINLEVEL_OUTOF10: 0
PAINLEVEL_OUTOF10: 8
PAINLEVEL_OUTOF10: 4
PAINLEVEL_OUTOF10: 9
PAINLEVEL_OUTOF10: 6
PAINLEVEL_OUTOF10: 0
PAINLEVEL_OUTOF10: 3
PAINLEVEL_OUTOF10: 0
PAINLEVEL_OUTOF10: 7
PAINLEVEL_OUTOF10: 9
PAINLEVEL_OUTOF10: 7
PAINLEVEL_OUTOF10: 9
PAINLEVEL_OUTOF10: 8
PAINLEVEL_OUTOF10: 6
PAINLEVEL_OUTOF10: 2
PAINLEVEL_OUTOF10: 0
PAINLEVEL_OUTOF10: 6
PAINLEVEL_OUTOF10: 0
PAINLEVEL_OUTOF10: 7
PAINLEVEL_OUTOF10: 9
PAINLEVEL_OUTOF10: 7
PAINLEVEL_OUTOF10: 7
PAINLEVEL_OUTOF10: 0
PAINLEVEL_OUTOF10: 9
PAINLEVEL_OUTOF10: 7
PAINLEVEL_OUTOF10: 9
PAINLEVEL_OUTOF10: 8
PAINLEVEL_OUTOF10: 9
PAINLEVEL_OUTOF10: 0
PAINLEVEL_OUTOF10: 3
PAINLEVEL_OUTOF10: 8
PAINLEVEL_OUTOF10: 2
PAINLEVEL_OUTOF10: 7
PAINLEVEL_OUTOF10: 7
PAINLEVEL_OUTOF10: 0
PAINLEVEL_OUTOF10: 8
PAINLEVEL_OUTOF10: 8
PAINLEVEL_OUTOF10: 0
PAINLEVEL_OUTOF10: 0
PAINLEVEL_OUTOF10: 7
PAINLEVEL_OUTOF10: 6
PAINLEVEL_OUTOF10: 3
PAINLEVEL_OUTOF10: 0
PAINLEVEL_OUTOF10: 9
PAINLEVEL_OUTOF10: 5
PAINLEVEL_OUTOF10: 8
PAINLEVEL_OUTOF10: 4
PAINLEVEL_OUTOF10: 6
PAINLEVEL_OUTOF10: 8
PAINLEVEL_OUTOF10: 6
PAINLEVEL_OUTOF10: 9
PAINLEVEL_OUTOF10: 0
PAINLEVEL_OUTOF10: 9
PAINLEVEL_OUTOF10: 0
PAINLEVEL_OUTOF10: 0
PAINLEVEL_OUTOF10: 6
PAINLEVEL_OUTOF10: 9
PAINLEVEL_OUTOF10: 5
PAINLEVEL_OUTOF10: 9
PAINLEVEL_OUTOF10: 10
PAINLEVEL_OUTOF10: 9
PAINLEVEL_OUTOF10: 3
PAINLEVEL_OUTOF10: 8
PAINLEVEL_OUTOF10: 10
PAINLEVEL_OUTOF10: 7
PAINLEVEL_OUTOF10: 5
PAINLEVEL_OUTOF10: 0
PAINLEVEL_OUTOF10: 10
PAINLEVEL_OUTOF10: 0
PAINLEVEL_OUTOF10: 9
PAINLEVEL_OUTOF10: 6
PAINLEVEL_OUTOF10: 2
PAINLEVEL_OUTOF10: 6
PAINLEVEL_OUTOF10: 0
PAINLEVEL_OUTOF10: 0
PAINLEVEL_OUTOF10: 9
PAINLEVEL_OUTOF10: 0
PAINLEVEL_OUTOF10: 8
PAINLEVEL_OUTOF10: 10
PAINLEVEL_OUTOF10: 6
PAINLEVEL_OUTOF10: 9
PAINLEVEL_OUTOF10: 2
PAINLEVEL_OUTOF10: 9
PAINLEVEL_OUTOF10: 3
PAINLEVEL_OUTOF10: 0
PAINLEVEL_OUTOF10: 7
PAINLEVEL_OUTOF10: 0
PAINLEVEL_OUTOF10: 6
PAINLEVEL_OUTOF10: 6
PAINLEVEL_OUTOF10: 0
PAINLEVEL_OUTOF10: 7
PAINLEVEL_OUTOF10: 10
PAINLEVEL_OUTOF10: 7
PAINLEVEL_OUTOF10: 0
PAINLEVEL_OUTOF10: 8
PAINLEVEL_OUTOF10: 0
PAINLEVEL_OUTOF10: 0
PAINLEVEL_OUTOF10: 9
PAINLEVEL_OUTOF10: 8
PAINLEVEL_OUTOF10: 7
PAINLEVEL_OUTOF10: 4
PAINLEVEL_OUTOF10: 9
PAINLEVEL_OUTOF10: 8
PAINLEVEL_OUTOF10: 10
PAINLEVEL_OUTOF10: 10
PAINLEVEL_OUTOF10: 3
PAINLEVEL_OUTOF10: 0
PAINLEVEL_OUTOF10: 0
PAINLEVEL_OUTOF10: 5
PAINLEVEL_OUTOF10: 4
PAINLEVEL_OUTOF10: 8
PAINLEVEL_OUTOF10: 0
PAINLEVEL_OUTOF10: 7
PAINLEVEL_OUTOF10: 9
PAINLEVEL_OUTOF10: 4
PAINLEVEL_OUTOF10: 0
PAINLEVEL_OUTOF10: 7
PAINLEVEL_OUTOF10: 0
PAINLEVEL_OUTOF10: 8
PAINLEVEL_OUTOF10: 0
PAINLEVEL_OUTOF10: 10
PAINLEVEL_OUTOF10: 8
PAINLEVEL_OUTOF10: 3
PAINLEVEL_OUTOF10: 0
PAINLEVEL_OUTOF10: 9
PAINLEVEL_OUTOF10: 9
PAINLEVEL_OUTOF10: 0

## 2017-01-01 ASSESSMENT — PAIN DESCRIPTION - FREQUENCY
FREQUENCY: INTERMITTENT
FREQUENCY: CONTINUOUS
FREQUENCY: INTERMITTENT
FREQUENCY: INTERMITTENT

## 2017-01-01 ASSESSMENT — PAIN DESCRIPTION - PAIN TYPE
TYPE: ACUTE PAIN
TYPE: CHRONIC PAIN;ACUTE PAIN
TYPE: ACUTE PAIN
TYPE: ACUTE PAIN
TYPE: CHRONIC PAIN
TYPE: ACUTE PAIN;CHRONIC PAIN
TYPE: CHRONIC PAIN

## 2017-01-01 ASSESSMENT — PAIN DESCRIPTION - ORIENTATION
ORIENTATION: RIGHT;UPPER
ORIENTATION: LOWER
ORIENTATION: RIGHT;LEFT
ORIENTATION: LEFT;RIGHT;LOWER
ORIENTATION: LEFT

## 2017-01-01 ASSESSMENT — PAIN DESCRIPTION - ONSET
ONSET: ON-GOING
ONSET: GRADUAL
ONSET: GRADUAL

## 2017-01-01 ASSESSMENT — PAIN SCALES - WONG BAKER
WONGBAKER_NUMERICALRESPONSE: 6
WONGBAKER_NUMERICALRESPONSE: 8

## 2017-01-01 ASSESSMENT — PAIN DESCRIPTION - DESCRIPTORS
DESCRIPTORS: CRAMPING;ACHING
DESCRIPTORS: ACHING
DESCRIPTORS: BURNING

## 2017-01-01 ASSESSMENT — PAIN - FUNCTIONAL ASSESSMENT: PAIN_FUNCTIONAL_ASSESSMENT: 0-10

## 2017-01-01 ASSESSMENT — LIFESTYLE VARIABLES: SMOKING_STATUS: 0

## 2017-01-17 PROBLEM — J96.21 ACUTE ON CHRONIC RESPIRATORY FAILURE WITH HYPOXIA (HCC): Status: ACTIVE | Noted: 2017-01-01

## 2017-01-17 PROBLEM — R79.89 ELEVATED BRAIN NATRIURETIC PEPTIDE (BNP) LEVEL: Status: ACTIVE | Noted: 2017-01-01

## 2017-01-17 PROBLEM — N30.00 ACUTE CYSTITIS WITHOUT HEMATURIA: Status: ACTIVE | Noted: 2017-01-01

## 2017-01-19 PROBLEM — L03.115 CELLULITIS OF RIGHT LOWER EXTREMITY: Status: ACTIVE | Noted: 2017-01-01

## 2017-02-24 PROBLEM — L97.512 SKIN ULCER OF RIGHT GREAT TOE WITH FAT LAYER EXPOSED (HCC): Chronic | Status: ACTIVE | Noted: 2017-01-01

## 2017-03-10 PROBLEM — N18.4 CKD (CHRONIC KIDNEY DISEASE) STAGE 4, GFR 15-29 ML/MIN (HCC): Status: ACTIVE | Noted: 2017-01-01

## 2017-03-10 PROBLEM — E87.5 HYPERKALEMIA: Status: ACTIVE | Noted: 2017-01-01

## 2017-03-10 PROBLEM — R06.02 SOB (SHORTNESS OF BREATH): Status: ACTIVE | Noted: 2017-01-01

## 2017-03-10 PROBLEM — N17.9 AKI (ACUTE KIDNEY INJURY) (HCC): Status: ACTIVE | Noted: 2017-01-01

## 2017-03-11 PROBLEM — N18.9 ACUTE KIDNEY INJURY SUPERIMPOSED ON CHRONIC KIDNEY DISEASE (HCC): Status: ACTIVE | Noted: 2017-01-01

## 2017-03-16 PROBLEM — E87.5 HYPERKALEMIA: Status: RESOLVED | Noted: 2017-01-01 | Resolved: 2017-01-01

## 2017-03-16 PROBLEM — E87.0 HYPERNATREMIA: Status: ACTIVE | Noted: 2017-01-01

## 2017-04-05 PROBLEM — I27.20 PULMONARY HYPERTENSION (HCC): Chronic | Status: ACTIVE | Noted: 2017-01-01

## 2017-04-05 PROBLEM — E11.29 TYPE 2 DIABETES MELLITUS WITH RENAL COMPLICATION (HCC): Chronic | Status: ACTIVE | Noted: 2017-01-01

## 2017-04-05 PROBLEM — L03.115 CELLULITIS OF RIGHT LOWER EXTREMITY: Status: RESOLVED | Noted: 2017-01-01 | Resolved: 2017-01-01

## 2017-04-05 PROBLEM — N17.9 ACUTE KIDNEY INJURY SUPERIMPOSED ON CHRONIC KIDNEY DISEASE (HCC): Status: RESOLVED | Noted: 2017-01-01 | Resolved: 2017-01-01

## 2017-04-05 PROBLEM — I50.812 CHRONIC RIGHT-SIDED CONGESTIVE HEART FAILURE (HCC): Chronic | Status: ACTIVE | Noted: 2017-01-01

## 2017-04-05 PROBLEM — G47.33 OBSTRUCTIVE SLEEP APNEA TREATED WITH BIPAP: Chronic | Status: ACTIVE | Noted: 2017-01-01

## 2017-04-05 PROBLEM — L97.511 DIABETIC ULCER OF TOE OF RIGHT FOOT ASSOCIATED WITH TYPE 2 DIABETES MELLITUS, LIMITED TO BREAKDOWN OF SKIN (HCC): Chronic | Status: ACTIVE | Noted: 2017-01-01

## 2017-04-05 PROBLEM — E11.621 DIABETIC ULCER OF TOE OF RIGHT FOOT ASSOCIATED WITH TYPE 2 DIABETES MELLITUS, LIMITED TO BREAKDOWN OF SKIN (HCC): Chronic | Status: ACTIVE | Noted: 2017-01-01

## 2017-04-05 PROBLEM — N18.30 CKD STAGE 3 DUE TO TYPE 1 DIABETES MELLITUS (HCC): Chronic | Status: ACTIVE | Noted: 2017-01-01

## 2017-04-05 PROBLEM — I27.81 COR PULMONALE (CHRONIC) (HCC): Chronic | Status: ACTIVE | Noted: 2017-01-01

## 2017-04-05 PROBLEM — N18.9 ACUTE KIDNEY INJURY SUPERIMPOSED ON CHRONIC KIDNEY DISEASE (HCC): Status: RESOLVED | Noted: 2017-01-01 | Resolved: 2017-01-01

## 2017-04-05 PROBLEM — N18.4 CKD (CHRONIC KIDNEY DISEASE) STAGE 4, GFR 15-29 ML/MIN (HCC): Status: RESOLVED | Noted: 2017-01-01 | Resolved: 2017-01-01

## 2017-04-05 PROBLEM — E10.22 CKD STAGE 3 DUE TO TYPE 1 DIABETES MELLITUS (HCC): Chronic | Status: ACTIVE | Noted: 2017-01-01

## 2017-04-05 PROBLEM — E11.9 TYPE 2 DIABETES MELLITUS (HCC): Chronic | Status: ACTIVE | Noted: 2017-01-01

## 2017-04-05 PROBLEM — D63.8 ANEMIA OF CHRONIC DISORDER: Chronic | Status: ACTIVE | Noted: 2017-01-01

## 2017-04-05 PROBLEM — E87.0 HYPERNATREMIA: Status: RESOLVED | Noted: 2017-01-01 | Resolved: 2017-01-01

## 2017-04-05 PROBLEM — J96.22 ACUTE ON CHRONIC RESPIRATORY FAILURE WITH HYPOXIA AND HYPERCAPNIA (HCC): Status: ACTIVE | Noted: 2017-01-01

## 2017-04-13 ENCOUNTER — LAB REQUISITION (OUTPATIENT)
Dept: LAB | Facility: HOSPITAL | Age: 67
End: 2017-04-13

## 2017-04-13 DIAGNOSIS — Z00.00 ENCOUNTER FOR GENERAL ADULT MEDICAL EXAMINATION WITHOUT ABNORMAL FINDINGS: ICD-10-CM

## 2017-04-13 LAB
ALBUMIN SERPL-MCNC: 3.3 G/DL (ref 3.5–5)
ALP SERPL-CCNC: 62 U/L (ref 24–120)
ALT SERPL W P-5'-P-CCNC: 27 U/L (ref 0–54)
ANION GAP SERPL CALCULATED.3IONS-SCNC: ABNORMAL MMOL/L (ref 4–13)
ARTICHOKE IGE QN: 35 MG/DL (ref 0–99)
AST SERPL-CCNC: 20 U/L (ref 7–45)
BILIRUB CONJ SERPL-MCNC: 0 MG/DL (ref 0–0.3)
BILIRUB INDIRECT SERPL-MCNC: 0.6 MG/DL (ref 0–1.1)
BILIRUB SERPL-MCNC: 1.1 MG/DL (ref 0.1–1)
BUN BLD-MCNC: 62 MG/DL (ref 5–21)
BUN/CREAT SERPL: 33.5 (ref 7–25)
CALCIUM SPEC-SCNC: 10.6 MG/DL (ref 8.4–10.4)
CHLORIDE SERPL-SCNC: 90 MMOL/L (ref 98–110)
CHOLEST SERPL-MCNC: 111 MG/DL (ref 130–200)
CO2 SERPL-SCNC: >40 MMOL/L (ref 24–31)
CREAT BLD-MCNC: 1.85 MG/DL (ref 0.5–1.4)
DEPRECATED RDW RBC AUTO: 66.3 FL (ref 40–54)
ERYTHROCYTE [DISTWIDTH] IN BLOOD BY AUTOMATED COUNT: 17.4 % (ref 12–15)
GFR SERPL CREATININE-BSD FRML MDRD: 27 ML/MIN/1.73
GLUCOSE BLD-MCNC: 164 MG/DL (ref 70–100)
HBA1C MFR BLD: 6.3 %
HCT VFR BLD AUTO: 32.3 % (ref 37–47)
HDLC SERPL-MCNC: 27 MG/DL
HGB BLD-MCNC: 9.7 G/DL (ref 12–16)
LDLC/HDLC SERPL: 1.31 {RATIO}
MCH RBC QN AUTO: 31.6 PG (ref 28–32)
MCHC RBC AUTO-ENTMCNC: 30 G/DL (ref 33–36)
MCV RBC AUTO: 105.2 FL (ref 82–98)
PLATELET # BLD AUTO: 176 10*3/MM3 (ref 130–400)
PMV BLD AUTO: 11 FL (ref 6–12)
POTASSIUM BLD-SCNC: 4.2 MMOL/L (ref 3.5–5.3)
PROT SERPL-MCNC: 6.7 G/DL (ref 6.3–8.7)
RBC # BLD AUTO: 3.07 10*6/MM3 (ref 4.2–5.4)
SODIUM BLD-SCNC: 139 MMOL/L (ref 135–145)
TRIGL SERPL-MCNC: 243 MG/DL (ref 0–149)
TSH SERPL DL<=0.05 MIU/L-ACNC: 2.26 MIU/ML (ref 0.47–4.68)
WBC NRBC COR # BLD: 8.03 10*3/MM3 (ref 4.8–10.8)

## 2017-04-13 PROCEDURE — 80061 LIPID PANEL: CPT | Performed by: FAMILY MEDICINE

## 2017-04-13 PROCEDURE — 36415 COLL VENOUS BLD VENIPUNCTURE: CPT | Performed by: FAMILY MEDICINE

## 2017-04-13 PROCEDURE — 84443 ASSAY THYROID STIM HORMONE: CPT | Performed by: FAMILY MEDICINE

## 2017-04-13 PROCEDURE — 85027 COMPLETE CBC AUTOMATED: CPT | Performed by: FAMILY MEDICINE

## 2017-04-13 PROCEDURE — 80048 BASIC METABOLIC PNL TOTAL CA: CPT | Performed by: FAMILY MEDICINE

## 2017-04-13 PROCEDURE — 80076 HEPATIC FUNCTION PANEL: CPT | Performed by: FAMILY MEDICINE

## 2017-04-13 PROCEDURE — 83036 HEMOGLOBIN GLYCOSYLATED A1C: CPT | Performed by: FAMILY MEDICINE

## 2017-04-25 PROBLEM — N18.30 TYPE 2 DIABETES MELLITUS WITH STAGE 3 CHRONIC KIDNEY DISEASE, WITH LONG-TERM CURRENT USE OF INSULIN (HCC): Chronic | Status: ACTIVE | Noted: 2017-01-01

## 2017-04-25 PROBLEM — E11.22 TYPE 2 DIABETES MELLITUS WITH STAGE 3 CHRONIC KIDNEY DISEASE, WITH LONG-TERM CURRENT USE OF INSULIN (HCC): Chronic | Status: ACTIVE | Noted: 2017-01-01

## 2017-04-25 PROBLEM — Z79.4 TYPE 2 DIABETES MELLITUS WITH STAGE 3 CHRONIC KIDNEY DISEASE, WITH LONG-TERM CURRENT USE OF INSULIN (HCC): Chronic | Status: ACTIVE | Noted: 2017-01-01

## 2017-04-26 PROBLEM — N18.4 CHRONIC KIDNEY DISEASE, STAGE IV (SEVERE) (HCC): Chronic | Status: ACTIVE | Noted: 2017-01-01

## 2017-05-02 PROBLEM — L97.422 DIABETIC ULCER OF LEFT HEEL WITH FAT LAYER EXPOSED (HCC): Chronic | Status: ACTIVE | Noted: 2017-01-01

## 2017-05-02 PROBLEM — E11.621 DIABETIC ULCER OF LEFT HEEL WITH FAT LAYER EXPOSED (HCC): Chronic | Status: ACTIVE | Noted: 2017-01-01

## 2017-06-10 PROBLEM — N39.0 UTI (URINARY TRACT INFECTION): Status: ACTIVE | Noted: 2017-01-01

## 2017-06-10 PROBLEM — D72.829 LEUKOCYTOSIS: Status: ACTIVE | Noted: 2017-01-01

## 2017-06-10 PROBLEM — G47.33 OBSTRUCTIVE SLEEP APNEA: Status: ACTIVE | Noted: 2017-01-01

## 2017-06-19 ENCOUNTER — HOSPITAL ENCOUNTER (OUTPATIENT)
Facility: HOSPITAL | Age: 67
Discharge: HOME | End: 2017-07-13
Attending: INTERNAL MEDICINE | Admitting: INTERNAL MEDICINE

## 2017-06-19 DIAGNOSIS — N13.2 HYDRONEPHROSIS WITH URINARY OBSTRUCTION DUE TO URETERAL CALCULUS: Primary | ICD-10-CM

## 2017-06-19 DIAGNOSIS — Z78.9 DECREASED ACTIVITIES OF DAILY LIVING (ADL): ICD-10-CM

## 2017-06-19 LAB — GLUCOSE BLDC GLUCOMTR-MCNC: 131 MG/DL (ref 70–130)

## 2017-06-19 PROCEDURE — 63710000001 INSULIN LISPRO (HUMAN) PER 5 UNITS: Performed by: INTERNAL MEDICINE

## 2017-06-19 PROCEDURE — 63710000001 INSULIN DETEMIR PER 5 UNITS: Performed by: INTERNAL MEDICINE

## 2017-06-19 PROCEDURE — 82962 GLUCOSE BLOOD TEST: CPT

## 2017-06-19 RX ORDER — PANTOPRAZOLE SODIUM 40 MG/1
40 TABLET, DELAYED RELEASE ORAL
Status: DISCONTINUED | OUTPATIENT
Start: 2017-06-20 | End: 2017-07-13 | Stop reason: HOSPADM

## 2017-06-19 RX ORDER — METOPROLOL TARTRATE 50 MG/1
50 TABLET, FILM COATED ORAL EVERY 12 HOURS SCHEDULED
Status: DISCONTINUED | OUTPATIENT
Start: 2017-06-19 | End: 2017-07-13 | Stop reason: HOSPADM

## 2017-06-19 RX ORDER — ALLOPURINOL 100 MG/1
100 TABLET ORAL DAILY
Status: DISCONTINUED | OUTPATIENT
Start: 2017-06-19 | End: 2017-07-13 | Stop reason: HOSPADM

## 2017-06-19 RX ORDER — HYDROCODONE BITARTRATE AND ACETAMINOPHEN 7.5; 325 MG/1; MG/1
1 TABLET ORAL EVERY 6 HOURS PRN
Status: DISPENSED | OUTPATIENT
Start: 2017-06-19 | End: 2017-06-29

## 2017-06-19 RX ORDER — LORAZEPAM 0.5 MG/1
0.5 TABLET ORAL 2 TIMES DAILY
Status: DISPENSED | OUTPATIENT
Start: 2017-06-19 | End: 2017-06-29

## 2017-06-19 RX ORDER — ALBUTEROL SULFATE 2.5 MG/3ML
2.5 SOLUTION RESPIRATORY (INHALATION) EVERY 4 HOURS PRN
Status: DISCONTINUED | OUTPATIENT
Start: 2017-06-19 | End: 2017-07-13 | Stop reason: HOSPADM

## 2017-06-19 RX ORDER — LEVOTHYROXINE SODIUM 0.1 MG/1
100 TABLET ORAL
Status: DISCONTINUED | OUTPATIENT
Start: 2017-06-20 | End: 2017-07-13 | Stop reason: HOSPADM

## 2017-06-19 RX ORDER — GABAPENTIN 100 MG/1
100 CAPSULE ORAL DAILY
Status: DISCONTINUED | OUTPATIENT
Start: 2017-06-19 | End: 2017-07-13 | Stop reason: HOSPADM

## 2017-06-19 RX ORDER — SUCRALFATE ORAL 1 G/10ML
1 SUSPENSION ORAL
Status: DISCONTINUED | OUTPATIENT
Start: 2017-06-19 | End: 2017-07-04 | Stop reason: ALTCHOICE

## 2017-06-19 RX ORDER — ASPIRIN 81 MG/1
81 TABLET ORAL DAILY
Status: DISCONTINUED | OUTPATIENT
Start: 2017-06-19 | End: 2017-07-13 | Stop reason: HOSPADM

## 2017-06-19 RX ORDER — ATORVASTATIN CALCIUM 40 MG/1
40 TABLET, FILM COATED ORAL DAILY
Status: DISCONTINUED | OUTPATIENT
Start: 2017-06-19 | End: 2017-07-13 | Stop reason: HOSPADM

## 2017-06-19 RX ORDER — MIDODRINE HYDROCHLORIDE 2.5 MG/1
5 TABLET ORAL
Status: DISCONTINUED | OUTPATIENT
Start: 2017-06-19 | End: 2017-07-13 | Stop reason: HOSPADM

## 2017-06-19 RX ORDER — ERGOCALCIFEROL 1.25 MG/1
50000 CAPSULE ORAL
Status: DISCONTINUED | OUTPATIENT
Start: 2017-06-22 | End: 2017-07-13 | Stop reason: HOSPADM

## 2017-06-20 LAB
ALBUMIN SERPL-MCNC: 2.7 G/DL (ref 3.5–5)
ALBUMIN/GLOB SERPL: 0.6 G/DL (ref 1.1–2.5)
ALP SERPL-CCNC: 97 U/L (ref 24–120)
ALT SERPL W P-5'-P-CCNC: 25 U/L (ref 0–54)
ANION GAP SERPL CALCULATED.3IONS-SCNC: 8 MMOL/L (ref 4–13)
AST SERPL-CCNC: 54 U/L (ref 7–45)
BASOPHILS # BLD AUTO: 0.04 10*3/MM3 (ref 0–0.2)
BASOPHILS NFR BLD AUTO: 0.4 % (ref 0–2)
BILIRUB SERPL-MCNC: 0.5 MG/DL (ref 0.1–1)
BUN BLD-MCNC: 25 MG/DL (ref 5–21)
BUN/CREAT SERPL: 5.3 (ref 7–25)
CALCIUM SPEC-SCNC: 8.6 MG/DL (ref 8.4–10.4)
CHLORIDE SERPL-SCNC: 98 MMOL/L (ref 98–110)
CO2 SERPL-SCNC: 29 MMOL/L (ref 24–31)
CREAT BLD-MCNC: 4.69 MG/DL (ref 0.5–1.4)
DEPRECATED RDW RBC AUTO: 61.1 FL (ref 40–54)
EOSINOPHIL # BLD AUTO: 0.28 10*3/MM3 (ref 0–0.7)
EOSINOPHIL NFR BLD AUTO: 2.5 % (ref 0–4)
ERYTHROCYTE [DISTWIDTH] IN BLOOD BY AUTOMATED COUNT: 17.2 % (ref 12–15)
GFR SERPL CREATININE-BSD FRML MDRD: 9 ML/MIN/1.73
GLOBULIN UR ELPH-MCNC: 4.7 GM/DL
GLUCOSE BLD-MCNC: 84 MG/DL (ref 70–100)
GLUCOSE BLDC GLUCOMTR-MCNC: 162 MG/DL (ref 70–130)
GLUCOSE BLDC GLUCOMTR-MCNC: 172 MG/DL (ref 70–130)
GLUCOSE BLDC GLUCOMTR-MCNC: 222 MG/DL (ref 70–130)
GLUCOSE BLDC GLUCOMTR-MCNC: 96 MG/DL (ref 70–130)
HCT VFR BLD AUTO: 27.6 % (ref 37–47)
HGB BLD-MCNC: 8.2 G/DL (ref 12–16)
IMM GRANULOCYTES # BLD: 0.18 10*3/MM3 (ref 0–0.03)
IMM GRANULOCYTES NFR BLD: 1.6 % (ref 0–5)
LYMPHOCYTES # BLD AUTO: 1.89 10*3/MM3 (ref 0.72–4.86)
LYMPHOCYTES NFR BLD AUTO: 17.1 % (ref 15–45)
MCH RBC QN AUTO: 29.9 PG (ref 28–32)
MCHC RBC AUTO-ENTMCNC: 29.7 G/DL (ref 33–36)
MCV RBC AUTO: 100.7 FL (ref 82–98)
MONOCYTES # BLD AUTO: 1.31 10*3/MM3 (ref 0.19–1.3)
MONOCYTES NFR BLD AUTO: 11.8 % (ref 4–12)
NEUTROPHILS # BLD AUTO: 7.37 10*3/MM3 (ref 1.87–8.4)
NEUTROPHILS NFR BLD AUTO: 66.6 % (ref 39–78)
PLATELET # BLD AUTO: 243 10*3/MM3 (ref 130–400)
PMV BLD AUTO: 9.9 FL (ref 6–12)
POTASSIUM BLD-SCNC: 4.8 MMOL/L (ref 3.5–5.3)
PREALB SERPL-MCNC: 8 MG/DL (ref 18–36)
PROT SERPL-MCNC: 7.4 G/DL (ref 6.3–8.7)
RBC # BLD AUTO: 2.74 10*6/MM3 (ref 4.2–5.4)
SODIUM BLD-SCNC: 135 MMOL/L (ref 135–145)
WBC NRBC COR # BLD: 11.07 10*3/MM3 (ref 4.8–10.8)

## 2017-06-20 PROCEDURE — 80053 COMPREHEN METABOLIC PANEL: CPT | Performed by: INTERNAL MEDICINE

## 2017-06-20 PROCEDURE — 82962 GLUCOSE BLOOD TEST: CPT

## 2017-06-20 PROCEDURE — 85025 COMPLETE CBC W/AUTO DIFF WBC: CPT | Performed by: INTERNAL MEDICINE

## 2017-06-20 PROCEDURE — 97535 SELF CARE MNGMENT TRAINING: CPT

## 2017-06-20 PROCEDURE — 97530 THERAPEUTIC ACTIVITIES: CPT

## 2017-06-20 PROCEDURE — 97166 OT EVAL MOD COMPLEX 45 MIN: CPT

## 2017-06-20 PROCEDURE — 97162 PT EVAL MOD COMPLEX 30 MIN: CPT

## 2017-06-20 PROCEDURE — 84134 ASSAY OF PREALBUMIN: CPT | Performed by: INTERNAL MEDICINE

## 2017-06-21 LAB
ANION GAP SERPL CALCULATED.3IONS-SCNC: 11 MMOL/L (ref 4–13)
BASOPHILS # BLD AUTO: 0.03 10*3/MM3 (ref 0–0.2)
BASOPHILS NFR BLD AUTO: 0.3 % (ref 0–2)
BUN BLD-MCNC: 35 MG/DL (ref 5–21)
BUN/CREAT SERPL: 5.5 (ref 7–25)
CALCIUM SPEC-SCNC: 8.7 MG/DL (ref 8.4–10.4)
CHLORIDE SERPL-SCNC: 99 MMOL/L (ref 98–110)
CO2 SERPL-SCNC: 28 MMOL/L (ref 24–31)
CREAT BLD-MCNC: 6.31 MG/DL (ref 0.5–1.4)
DEPRECATED RDW RBC AUTO: 62 FL (ref 40–54)
EOSINOPHIL # BLD AUTO: 0.32 10*3/MM3 (ref 0–0.7)
EOSINOPHIL NFR BLD AUTO: 2.8 % (ref 0–4)
ERYTHROCYTE [DISTWIDTH] IN BLOOD BY AUTOMATED COUNT: 17.2 % (ref 12–15)
GFR SERPL CREATININE-BSD FRML MDRD: 7 ML/MIN/1.73
GLUCOSE BLD-MCNC: 102 MG/DL (ref 70–100)
GLUCOSE BLDC GLUCOMTR-MCNC: 111 MG/DL (ref 70–130)
GLUCOSE BLDC GLUCOMTR-MCNC: 136 MG/DL (ref 70–130)
GLUCOSE BLDC GLUCOMTR-MCNC: 142 MG/DL (ref 70–130)
GLUCOSE BLDC GLUCOMTR-MCNC: 177 MG/DL (ref 70–130)
HCT VFR BLD AUTO: 27.6 % (ref 37–47)
HGB BLD-MCNC: 8 G/DL (ref 12–16)
IMM GRANULOCYTES # BLD: 0.19 10*3/MM3 (ref 0–0.03)
IMM GRANULOCYTES NFR BLD: 1.7 % (ref 0–5)
LYMPHOCYTES # BLD AUTO: 1.72 10*3/MM3 (ref 0.72–4.86)
LYMPHOCYTES NFR BLD AUTO: 15.2 % (ref 15–45)
MCH RBC QN AUTO: 29.2 PG (ref 28–32)
MCHC RBC AUTO-ENTMCNC: 29 G/DL (ref 33–36)
MCV RBC AUTO: 100.7 FL (ref 82–98)
MONOCYTES # BLD AUTO: 1.12 10*3/MM3 (ref 0.19–1.3)
MONOCYTES NFR BLD AUTO: 9.9 % (ref 4–12)
NEUTROPHILS # BLD AUTO: 7.94 10*3/MM3 (ref 1.87–8.4)
NEUTROPHILS NFR BLD AUTO: 70.1 % (ref 39–78)
PLATELET # BLD AUTO: 247 10*3/MM3 (ref 130–400)
PMV BLD AUTO: 9.8 FL (ref 6–12)
POTASSIUM BLD-SCNC: 4.9 MMOL/L (ref 3.5–5.3)
RBC # BLD AUTO: 2.74 10*6/MM3 (ref 4.2–5.4)
SODIUM BLD-SCNC: 138 MMOL/L (ref 135–145)
WBC NRBC COR # BLD: 11.32 10*3/MM3 (ref 4.8–10.8)

## 2017-06-21 PROCEDURE — 63710000001 INSULIN DETEMIR PER 5 UNITS: Performed by: NURSE PRACTITIONER

## 2017-06-21 PROCEDURE — 85025 COMPLETE CBC W/AUTO DIFF WBC: CPT | Performed by: INTERNAL MEDICINE

## 2017-06-21 PROCEDURE — 97530 THERAPEUTIC ACTIVITIES: CPT

## 2017-06-21 PROCEDURE — 80048 BASIC METABOLIC PNL TOTAL CA: CPT | Performed by: INTERNAL MEDICINE

## 2017-06-21 PROCEDURE — 82962 GLUCOSE BLOOD TEST: CPT

## 2017-06-21 PROCEDURE — 97535 SELF CARE MNGMENT TRAINING: CPT

## 2017-06-21 RX ORDER — ALBUTEROL SULFATE 2.5 MG/3ML
2.5 SOLUTION RESPIRATORY (INHALATION)
Status: DISCONTINUED | OUTPATIENT
Start: 2017-06-21 | End: 2017-07-13 | Stop reason: HOSPADM

## 2017-06-21 RX ORDER — MIDODRINE HYDROCHLORIDE 10 MG/1
10 TABLET ORAL AS NEEDED
Status: DISCONTINUED | OUTPATIENT
Start: 2017-06-21 | End: 2017-07-13 | Stop reason: HOSPADM

## 2017-06-22 LAB
ANION GAP SERPL CALCULATED.3IONS-SCNC: 10 MMOL/L (ref 4–13)
ANISOCYTOSIS BLD QL: ABNORMAL
BASOPHILS # BLD MANUAL: 0.22 10*3/MM3 (ref 0–0.2)
BASOPHILS NFR BLD AUTO: 2 % (ref 0–2)
BUN BLD-MCNC: 22 MG/DL (ref 5–21)
BUN/CREAT SERPL: 4.4 (ref 7–25)
CALCIUM SPEC-SCNC: 8.8 MG/DL (ref 8.4–10.4)
CHLORIDE SERPL-SCNC: 94 MMOL/L (ref 98–110)
CO2 SERPL-SCNC: 31 MMOL/L (ref 24–31)
CREAT BLD-MCNC: 4.98 MG/DL (ref 0.5–1.4)
DEPRECATED RDW RBC AUTO: 63.6 FL (ref 40–54)
EOSINOPHIL # BLD MANUAL: 0.22 10*3/MM3 (ref 0–0.7)
EOSINOPHIL NFR BLD MANUAL: 2 % (ref 0–4)
ERYTHROCYTE [DISTWIDTH] IN BLOOD BY AUTOMATED COUNT: 17.6 % (ref 12–15)
GFR SERPL CREATININE-BSD FRML MDRD: 9 ML/MIN/1.73
GLUCOSE BLD-MCNC: 93 MG/DL (ref 70–100)
GLUCOSE BLDC GLUCOMTR-MCNC: 172 MG/DL (ref 70–130)
GLUCOSE BLDC GLUCOMTR-MCNC: 187 MG/DL (ref 70–130)
GLUCOSE BLDC GLUCOMTR-MCNC: 188 MG/DL (ref 70–130)
GLUCOSE BLDC GLUCOMTR-MCNC: 89 MG/DL (ref 70–130)
HCT VFR BLD AUTO: 27.8 % (ref 37–47)
HGB BLD-MCNC: 8.3 G/DL (ref 12–16)
LYMPHOCYTES # BLD MANUAL: 1.32 10*3/MM3 (ref 0.72–4.86)
LYMPHOCYTES NFR BLD MANUAL: 12 % (ref 15–45)
LYMPHOCYTES NFR BLD MANUAL: 8 % (ref 4–12)
MCH RBC QN AUTO: 30 PG (ref 28–32)
MCHC RBC AUTO-ENTMCNC: 29.9 G/DL (ref 33–36)
MCV RBC AUTO: 100.4 FL (ref 82–98)
METAMYELOCYTES NFR BLD MANUAL: 1 % (ref 0–0)
MONOCYTES # BLD AUTO: 0.88 10*3/MM3 (ref 0.19–1.3)
NEUTROPHILS # BLD AUTO: 8.14 10*3/MM3 (ref 1.87–8.4)
NEUTROPHILS NFR BLD MANUAL: 56 % (ref 39–78)
NEUTS BAND NFR BLD MANUAL: 18 % (ref 0–10)
PLAT MORPH BLD: NORMAL
PLATELET # BLD AUTO: 271 10*3/MM3 (ref 130–400)
PMV BLD AUTO: 10 FL (ref 6–12)
POTASSIUM BLD-SCNC: 4.4 MMOL/L (ref 3.5–5.3)
RBC # BLD AUTO: 2.77 10*6/MM3 (ref 4.2–5.4)
SCAN SLIDE: NORMAL
SODIUM BLD-SCNC: 135 MMOL/L (ref 135–145)
VARIANT LYMPHS NFR BLD MANUAL: 1 % (ref 0–5)
WBC MORPH BLD: NORMAL
WBC NRBC COR # BLD: 11 10*3/MM3 (ref 4.8–10.8)

## 2017-06-22 PROCEDURE — 97110 THERAPEUTIC EXERCISES: CPT

## 2017-06-22 PROCEDURE — 80048 BASIC METABOLIC PNL TOTAL CA: CPT | Performed by: INTERNAL MEDICINE

## 2017-06-22 PROCEDURE — 85025 COMPLETE CBC W/AUTO DIFF WBC: CPT | Performed by: INTERNAL MEDICINE

## 2017-06-22 PROCEDURE — 85007 BL SMEAR W/DIFF WBC COUNT: CPT | Performed by: INTERNAL MEDICINE

## 2017-06-22 PROCEDURE — 97116 GAIT TRAINING THERAPY: CPT

## 2017-06-22 PROCEDURE — 97535 SELF CARE MNGMENT TRAINING: CPT

## 2017-06-22 PROCEDURE — 82962 GLUCOSE BLOOD TEST: CPT

## 2017-06-23 LAB
ANION GAP SERPL CALCULATED.3IONS-SCNC: 14 MMOL/L (ref 4–13)
BASOPHILS # BLD AUTO: 0.03 10*3/MM3 (ref 0–0.2)
BASOPHILS NFR BLD AUTO: 0.3 % (ref 0–2)
BUN BLD-MCNC: 33 MG/DL (ref 5–21)
BUN/CREAT SERPL: 5.2 (ref 7–25)
CALCIUM SPEC-SCNC: 8.9 MG/DL (ref 8.4–10.4)
CHLORIDE SERPL-SCNC: 94 MMOL/L (ref 98–110)
CO2 SERPL-SCNC: 25 MMOL/L (ref 24–31)
CREAT BLD-MCNC: 6.35 MG/DL (ref 0.5–1.4)
DEPRECATED RDW RBC AUTO: 64.3 FL (ref 40–54)
EOSINOPHIL # BLD AUTO: 0.35 10*3/MM3 (ref 0–0.7)
EOSINOPHIL NFR BLD AUTO: 3.4 % (ref 0–4)
ERYTHROCYTE [DISTWIDTH] IN BLOOD BY AUTOMATED COUNT: 17.7 % (ref 12–15)
GFR SERPL CREATININE-BSD FRML MDRD: 7 ML/MIN/1.73
GLUCOSE BLD-MCNC: 155 MG/DL (ref 70–100)
GLUCOSE BLDC GLUCOMTR-MCNC: 126 MG/DL (ref 70–130)
GLUCOSE BLDC GLUCOMTR-MCNC: 128 MG/DL (ref 70–130)
GLUCOSE BLDC GLUCOMTR-MCNC: 129 MG/DL (ref 70–130)
GLUCOSE BLDC GLUCOMTR-MCNC: 184 MG/DL (ref 70–130)
HCT VFR BLD AUTO: 26.5 % (ref 37–47)
HGB BLD-MCNC: 7.8 G/DL (ref 12–16)
IMM GRANULOCYTES # BLD: 0.19 10*3/MM3 (ref 0–0.03)
IMM GRANULOCYTES NFR BLD: 1.9 % (ref 0–5)
LYMPHOCYTES # BLD AUTO: 1.39 10*3/MM3 (ref 0.72–4.86)
LYMPHOCYTES NFR BLD AUTO: 13.7 % (ref 15–45)
MCH RBC QN AUTO: 29.5 PG (ref 28–32)
MCHC RBC AUTO-ENTMCNC: 29.4 G/DL (ref 33–36)
MCV RBC AUTO: 100.4 FL (ref 82–98)
MONOCYTES # BLD AUTO: 1.11 10*3/MM3 (ref 0.19–1.3)
MONOCYTES NFR BLD AUTO: 10.9 % (ref 4–12)
NEUTROPHILS # BLD AUTO: 7.09 10*3/MM3 (ref 1.87–8.4)
NEUTROPHILS NFR BLD AUTO: 69.8 % (ref 39–78)
PLATELET # BLD AUTO: 276 10*3/MM3 (ref 130–400)
PMV BLD AUTO: 9.6 FL (ref 6–12)
POTASSIUM BLD-SCNC: 5.1 MMOL/L (ref 3.5–5.3)
RBC # BLD AUTO: 2.64 10*6/MM3 (ref 4.2–5.4)
SODIUM BLD-SCNC: 133 MMOL/L (ref 135–145)
WBC NRBC COR # BLD: 10.16 10*3/MM3 (ref 4.8–10.8)

## 2017-06-23 PROCEDURE — 97535 SELF CARE MNGMENT TRAINING: CPT

## 2017-06-23 PROCEDURE — 82962 GLUCOSE BLOOD TEST: CPT

## 2017-06-23 PROCEDURE — 80048 BASIC METABOLIC PNL TOTAL CA: CPT | Performed by: INTERNAL MEDICINE

## 2017-06-23 PROCEDURE — 84165 PROTEIN E-PHORESIS SERUM: CPT | Performed by: INTERNAL MEDICINE

## 2017-06-23 PROCEDURE — 84155 ASSAY OF PROTEIN SERUM: CPT | Performed by: INTERNAL MEDICINE

## 2017-06-23 PROCEDURE — 25010000002 HEPARIN (PORCINE) PER 1000 UNITS: Performed by: INTERNAL MEDICINE

## 2017-06-23 PROCEDURE — 85025 COMPLETE CBC W/AUTO DIFF WBC: CPT | Performed by: INTERNAL MEDICINE

## 2017-06-23 PROCEDURE — 97110 THERAPEUTIC EXERCISES: CPT

## 2017-06-23 PROCEDURE — 97530 THERAPEUTIC ACTIVITIES: CPT

## 2017-06-23 PROCEDURE — 25010000002 HEPARIN (PORCINE) 1000 UNIT/ML SOLUTION: Performed by: INTERNAL MEDICINE

## 2017-06-23 RX ORDER — HEPARIN SODIUM 1000 [USP'U]/ML
1800 INJECTION, SOLUTION INTRAVENOUS; SUBCUTANEOUS AS NEEDED
Status: DISCONTINUED | OUTPATIENT
Start: 2017-06-23 | End: 2017-07-13 | Stop reason: HOSPADM

## 2017-06-23 RX ADMIN — HEPARIN SODIUM 1800 UNITS: 1000 INJECTION, SOLUTION INTRAVENOUS; SUBCUTANEOUS at 11:10

## 2017-06-23 RX ADMIN — HEPARIN SODIUM 1800 UNITS: 1000 INJECTION, SOLUTION INTRAVENOUS; SUBCUTANEOUS at 11:11

## 2017-06-24 ENCOUNTER — APPOINTMENT (OUTPATIENT)
Dept: ULTRASOUND IMAGING | Facility: HOSPITAL | Age: 67
End: 2017-06-24

## 2017-06-24 LAB
ANION GAP SERPL CALCULATED.3IONS-SCNC: 8 MMOL/L (ref 4–13)
BASOPHILS # BLD AUTO: 0.07 10*3/MM3 (ref 0–0.2)
BASOPHILS NFR BLD AUTO: 0.8 % (ref 0–2)
BUN BLD-MCNC: 25 MG/DL (ref 5–21)
BUN/CREAT SERPL: 4.8 (ref 7–25)
CALCIUM SPEC-SCNC: 8.7 MG/DL (ref 8.4–10.4)
CHLORIDE SERPL-SCNC: 99 MMOL/L (ref 98–110)
CO2 SERPL-SCNC: 29 MMOL/L (ref 24–31)
CREAT BLD-MCNC: 5.19 MG/DL (ref 0.5–1.4)
DEPRECATED RDW RBC AUTO: 63.7 FL (ref 40–54)
EOSINOPHIL # BLD AUTO: 0.38 10*3/MM3 (ref 0–0.7)
EOSINOPHIL NFR BLD AUTO: 4.1 % (ref 0–4)
ERYTHROCYTE [DISTWIDTH] IN BLOOD BY AUTOMATED COUNT: 17.4 % (ref 12–15)
GFR SERPL CREATININE-BSD FRML MDRD: 8 ML/MIN/1.73
GLUCOSE BLD-MCNC: 109 MG/DL (ref 70–100)
GLUCOSE BLDC GLUCOMTR-MCNC: 147 MG/DL (ref 70–130)
GLUCOSE BLDC GLUCOMTR-MCNC: 151 MG/DL (ref 70–130)
GLUCOSE BLDC GLUCOMTR-MCNC: 172 MG/DL (ref 70–130)
GLUCOSE BLDC GLUCOMTR-MCNC: 175 MG/DL (ref 70–130)
HCT VFR BLD AUTO: 25 % (ref 37–47)
HGB BLD-MCNC: 7.4 G/DL (ref 12–16)
IMM GRANULOCYTES # BLD: 0.15 10*3/MM3 (ref 0–0.03)
IMM GRANULOCYTES NFR BLD: 1.6 % (ref 0–5)
LYMPHOCYTES # BLD AUTO: 1.63 10*3/MM3 (ref 0.72–4.86)
LYMPHOCYTES NFR BLD AUTO: 17.5 % (ref 15–45)
MCH RBC QN AUTO: 29.8 PG (ref 28–32)
MCHC RBC AUTO-ENTMCNC: 29.6 G/DL (ref 33–36)
MCV RBC AUTO: 100.8 FL (ref 82–98)
MONOCYTES # BLD AUTO: 1.17 10*3/MM3 (ref 0.19–1.3)
MONOCYTES NFR BLD AUTO: 12.6 % (ref 4–12)
NEUTROPHILS # BLD AUTO: 5.9 10*3/MM3 (ref 1.87–8.4)
NEUTROPHILS NFR BLD AUTO: 63.4 % (ref 39–78)
PLATELET # BLD AUTO: 259 10*3/MM3 (ref 130–400)
PMV BLD AUTO: 9.6 FL (ref 6–12)
POTASSIUM BLD-SCNC: 4.5 MMOL/L (ref 3.5–5.3)
RBC # BLD AUTO: 2.48 10*6/MM3 (ref 4.2–5.4)
SODIUM BLD-SCNC: 136 MMOL/L (ref 135–145)
WBC NRBC COR # BLD: 9.3 10*3/MM3 (ref 4.8–10.8)

## 2017-06-24 PROCEDURE — 82962 GLUCOSE BLOOD TEST: CPT

## 2017-06-24 PROCEDURE — 97116 GAIT TRAINING THERAPY: CPT

## 2017-06-24 PROCEDURE — 25010000002 ENOXAPARIN PER 10 MG: Performed by: INTERNAL MEDICINE

## 2017-06-24 PROCEDURE — 97110 THERAPEUTIC EXERCISES: CPT

## 2017-06-24 PROCEDURE — 85025 COMPLETE CBC W/AUTO DIFF WBC: CPT | Performed by: INTERNAL MEDICINE

## 2017-06-24 PROCEDURE — 80048 BASIC METABOLIC PNL TOTAL CA: CPT | Performed by: INTERNAL MEDICINE

## 2017-06-25 LAB
ANION GAP SERPL CALCULATED.3IONS-SCNC: 11 MMOL/L (ref 4–13)
BASOPHILS # BLD AUTO: 0.04 10*3/MM3 (ref 0–0.2)
BASOPHILS NFR BLD AUTO: 0.4 % (ref 0–2)
BUN BLD-MCNC: 35 MG/DL (ref 5–21)
BUN/CREAT SERPL: 5.4 (ref 7–25)
CALCIUM SPEC-SCNC: 9.1 MG/DL (ref 8.4–10.4)
CHLORIDE SERPL-SCNC: 97 MMOL/L (ref 98–110)
CO2 SERPL-SCNC: 28 MMOL/L (ref 24–31)
CREAT BLD-MCNC: 6.53 MG/DL (ref 0.5–1.4)
DEPRECATED RDW RBC AUTO: 64.3 FL (ref 40–54)
EOSINOPHIL # BLD AUTO: 0.51 10*3/MM3 (ref 0–0.7)
EOSINOPHIL NFR BLD AUTO: 5.6 % (ref 0–4)
ERYTHROCYTE [DISTWIDTH] IN BLOOD BY AUTOMATED COUNT: 17.8 % (ref 12–15)
GFR SERPL CREATININE-BSD FRML MDRD: 6 ML/MIN/1.73
GLUCOSE BLD-MCNC: 111 MG/DL (ref 70–100)
GLUCOSE BLDC GLUCOMTR-MCNC: 101 MG/DL (ref 70–130)
GLUCOSE BLDC GLUCOMTR-MCNC: 133 MG/DL (ref 70–130)
GLUCOSE BLDC GLUCOMTR-MCNC: 180 MG/DL (ref 70–130)
GLUCOSE BLDC GLUCOMTR-MCNC: 195 MG/DL (ref 70–130)
HCT VFR BLD AUTO: 25.4 % (ref 37–47)
HGB BLD-MCNC: 7.4 G/DL (ref 12–16)
IMM GRANULOCYTES # BLD: 0.23 10*3/MM3 (ref 0–0.03)
IMM GRANULOCYTES NFR BLD: 2.5 % (ref 0–5)
LYMPHOCYTES # BLD AUTO: 1.37 10*3/MM3 (ref 0.72–4.86)
LYMPHOCYTES NFR BLD AUTO: 14.9 % (ref 15–45)
MCH RBC QN AUTO: 29.4 PG (ref 28–32)
MCHC RBC AUTO-ENTMCNC: 29.1 G/DL (ref 33–36)
MCV RBC AUTO: 100.8 FL (ref 82–98)
MONOCYTES # BLD AUTO: 1.2 10*3/MM3 (ref 0.19–1.3)
MONOCYTES NFR BLD AUTO: 13.1 % (ref 4–12)
NEUTROPHILS # BLD AUTO: 5.82 10*3/MM3 (ref 1.87–8.4)
NEUTROPHILS NFR BLD AUTO: 63.5 % (ref 39–78)
PLATELET # BLD AUTO: 278 10*3/MM3 (ref 130–400)
PMV BLD AUTO: 9.7 FL (ref 6–12)
POTASSIUM BLD-SCNC: 4.4 MMOL/L (ref 3.5–5.3)
RBC # BLD AUTO: 2.52 10*6/MM3 (ref 4.2–5.4)
SODIUM BLD-SCNC: 136 MMOL/L (ref 135–145)
WBC NRBC COR # BLD: 9.17 10*3/MM3 (ref 4.8–10.8)

## 2017-06-25 PROCEDURE — 97530 THERAPEUTIC ACTIVITIES: CPT

## 2017-06-25 PROCEDURE — 80048 BASIC METABOLIC PNL TOTAL CA: CPT | Performed by: INTERNAL MEDICINE

## 2017-06-25 PROCEDURE — 85025 COMPLETE CBC W/AUTO DIFF WBC: CPT | Performed by: INTERNAL MEDICINE

## 2017-06-25 PROCEDURE — 97110 THERAPEUTIC EXERCISES: CPT

## 2017-06-25 PROCEDURE — 25010000002 ENOXAPARIN PER 10 MG: Performed by: INTERNAL MEDICINE

## 2017-06-25 PROCEDURE — 82962 GLUCOSE BLOOD TEST: CPT

## 2017-06-26 ENCOUNTER — APPOINTMENT (OUTPATIENT)
Dept: ULTRASOUND IMAGING | Facility: HOSPITAL | Age: 67
End: 2017-06-26

## 2017-06-26 LAB
ALBUMIN SERPL-MCNC: 2.2 G/DL (ref 2.9–4.4)
ALBUMIN/GLOB SERPL: 0.4 {RATIO} (ref 0.7–1.7)
ALPHA1 GLOB FLD ELPH-MCNC: 0.4 G/DL (ref 0–0.4)
ALPHA2 GLOB SERPL ELPH-MCNC: 1.1 G/DL (ref 0.4–1)
ANION GAP SERPL CALCULATED.3IONS-SCNC: 12 MMOL/L (ref 4–13)
ANISOCYTOSIS BLD QL: ABNORMAL
B-GLOBULIN SERPL ELPH-MCNC: 1.1 G/DL (ref 0.7–1.3)
BASOPHILS # BLD MANUAL: 0.1 10*3/MM3 (ref 0–0.2)
BASOPHILS NFR BLD AUTO: 1 % (ref 0–2)
BUN BLD-MCNC: 43 MG/DL (ref 5–21)
BUN/CREAT SERPL: 5.7 (ref 7–25)
CALCIUM SPEC-SCNC: 8.8 MG/DL (ref 8.4–10.4)
CHLORIDE SERPL-SCNC: 96 MMOL/L (ref 98–110)
CO2 SERPL-SCNC: 26 MMOL/L (ref 24–31)
CREAT BLD-MCNC: 7.58 MG/DL (ref 0.5–1.4)
DEPRECATED RDW RBC AUTO: 63.6 FL (ref 40–54)
EOSINOPHIL # BLD MANUAL: 0.29 10*3/MM3 (ref 0–0.7)
EOSINOPHIL NFR BLD MANUAL: 3 % (ref 0–4)
ERYTHROCYTE [DISTWIDTH] IN BLOOD BY AUTOMATED COUNT: 17.9 % (ref 12–15)
GAMMA GLOB SERPL ELPH-MCNC: 2.7 G/DL (ref 0.4–1.8)
GFR SERPL CREATININE-BSD FRML MDRD: 5 ML/MIN/1.73
GLOBULIN SER CALC-MCNC: 5.3 G/DL (ref 2.2–3.9)
GLUCOSE BLD-MCNC: 92 MG/DL (ref 70–100)
GLUCOSE BLDC GLUCOMTR-MCNC: 115 MG/DL (ref 70–130)
GLUCOSE BLDC GLUCOMTR-MCNC: 167 MG/DL (ref 70–130)
GLUCOSE BLDC GLUCOMTR-MCNC: 86 MG/DL (ref 70–130)
HCT VFR BLD AUTO: 23.5 % (ref 37–47)
HGB BLD-MCNC: 7 G/DL (ref 12–16)
LYMPHOCYTES # BLD MANUAL: 1.43 10*3/MM3 (ref 0.72–4.86)
LYMPHOCYTES NFR BLD MANUAL: 10 % (ref 4–12)
LYMPHOCYTES NFR BLD MANUAL: 15 % (ref 15–45)
Lab: ABNORMAL
M-SPIKE: 1.1 G/DL
MCH RBC QN AUTO: 29.7 PG (ref 28–32)
MCHC RBC AUTO-ENTMCNC: 29.8 G/DL (ref 33–36)
MCV RBC AUTO: 99.6 FL (ref 82–98)
METAMYELOCYTES NFR BLD MANUAL: 3 % (ref 0–0)
MONOCYTES # BLD AUTO: 0.95 10*3/MM3 (ref 0.19–1.3)
MYELOCYTES NFR BLD MANUAL: 1 % (ref 0–0)
NEUTROPHILS # BLD AUTO: 6.39 10*3/MM3 (ref 1.87–8.4)
NEUTROPHILS NFR BLD MANUAL: 62 % (ref 39–78)
NEUTS BAND NFR BLD MANUAL: 5 % (ref 0–10)
NT-PROBNP SERPL-MCNC: ABNORMAL PG/ML (ref 0–900)
PLAT MORPH BLD: NORMAL
PLATELET # BLD AUTO: 305 10*3/MM3 (ref 130–400)
PMV BLD AUTO: 9.6 FL (ref 6–12)
POTASSIUM BLD-SCNC: 4.8 MMOL/L (ref 3.5–5.3)
PROT PATTERN SERPL ELPH-IMP: ABNORMAL
PROT SERPL-MCNC: 7.5 G/DL (ref 6–8.5)
RBC # BLD AUTO: 2.36 10*6/MM3 (ref 4.2–5.4)
SCAN SLIDE: NORMAL
SODIUM BLD-SCNC: 134 MMOL/L (ref 135–145)
WBC MORPH BLD: NORMAL
WBC NRBC COR # BLD: 9.54 10*3/MM3 (ref 4.8–10.8)

## 2017-06-26 PROCEDURE — 85025 COMPLETE CBC W/AUTO DIFF WBC: CPT | Performed by: INTERNAL MEDICINE

## 2017-06-26 PROCEDURE — 85007 BL SMEAR W/DIFF WBC COUNT: CPT | Performed by: INTERNAL MEDICINE

## 2017-06-26 PROCEDURE — 25010000002 ENOXAPARIN PER 10 MG: Performed by: INTERNAL MEDICINE

## 2017-06-26 PROCEDURE — 76775 US EXAM ABDO BACK WALL LIM: CPT

## 2017-06-26 PROCEDURE — 82962 GLUCOSE BLOOD TEST: CPT

## 2017-06-26 PROCEDURE — 80048 BASIC METABOLIC PNL TOTAL CA: CPT | Performed by: INTERNAL MEDICINE

## 2017-06-26 PROCEDURE — 97110 THERAPEUTIC EXERCISES: CPT

## 2017-06-26 PROCEDURE — 83880 ASSAY OF NATRIURETIC PEPTIDE: CPT | Performed by: INTERNAL MEDICINE

## 2017-06-26 PROCEDURE — 25010000002 HEPARIN (PORCINE) PER 1000 UNITS: Performed by: INTERNAL MEDICINE

## 2017-06-26 PROCEDURE — 25010000002 ALTEPLASE 2 MG RECONSTITUTED SOLUTION: Performed by: INTERNAL MEDICINE

## 2017-06-26 PROCEDURE — 25010000002 ONDANSETRON PER 1 MG: Performed by: INTERNAL MEDICINE

## 2017-06-26 RX ORDER — ONDANSETRON 2 MG/ML
4 INJECTION INTRAMUSCULAR; INTRAVENOUS AS NEEDED
Status: DISCONTINUED | OUTPATIENT
Start: 2017-06-26 | End: 2017-07-13 | Stop reason: HOSPADM

## 2017-06-26 RX ORDER — HEPARIN SODIUM 1000 [USP'U]/ML
5000 INJECTION, SOLUTION INTRAVENOUS; SUBCUTANEOUS ONCE
Status: DISCONTINUED | OUTPATIENT
Start: 2017-06-26 | End: 2017-07-11

## 2017-06-26 RX ADMIN — ALTEPLASE 2 MG: 2.2 INJECTION, POWDER, LYOPHILIZED, FOR SOLUTION INTRAVENOUS at 08:40

## 2017-06-26 RX ADMIN — ALTEPLASE 2 MG: 2.2 INJECTION, POWDER, LYOPHILIZED, FOR SOLUTION INTRAVENOUS at 13:10

## 2017-06-26 RX ADMIN — ALTEPLASE 2 MG: 2.2 INJECTION, POWDER, LYOPHILIZED, FOR SOLUTION INTRAVENOUS at 13:11

## 2017-06-26 RX ADMIN — MIDODRINE HYDROCHLORIDE 10 MG: 10 TABLET ORAL at 09:40

## 2017-06-27 LAB
ANION GAP SERPL CALCULATED.3IONS-SCNC: 13 MMOL/L (ref 4–13)
ANISOCYTOSIS BLD QL: NORMAL
BASOPHILS # BLD AUTO: 0.08 10*3/MM3 (ref 0–0.2)
BASOPHILS NFR BLD AUTO: 0.8 % (ref 0–2)
BUN BLD-MCNC: 33 MG/DL (ref 5–21)
BUN/CREAT SERPL: 5.7 (ref 7–25)
CALCIUM SPEC-SCNC: 8.8 MG/DL (ref 8.4–10.4)
CHLORIDE SERPL-SCNC: 97 MMOL/L (ref 98–110)
CO2 SERPL-SCNC: 28 MMOL/L (ref 24–31)
CREAT BLD-MCNC: 5.75 MG/DL (ref 0.5–1.4)
DEPRECATED RDW RBC AUTO: 64.1 FL (ref 40–54)
EOSINOPHIL # BLD AUTO: 0.47 10*3/MM3 (ref 0–0.7)
EOSINOPHIL NFR BLD AUTO: 4.6 % (ref 0–4)
ERYTHROCYTE [DISTWIDTH] IN BLOOD BY AUTOMATED COUNT: 17.7 % (ref 12–15)
GFR SERPL CREATININE-BSD FRML MDRD: 7 ML/MIN/1.73
GLUCOSE BLD-MCNC: 105 MG/DL (ref 70–100)
GLUCOSE BLDC GLUCOMTR-MCNC: 106 MG/DL (ref 70–130)
GLUCOSE BLDC GLUCOMTR-MCNC: 108 MG/DL (ref 70–130)
GLUCOSE BLDC GLUCOMTR-MCNC: 162 MG/DL (ref 70–130)
GLUCOSE BLDC GLUCOMTR-MCNC: 162 MG/DL (ref 70–130)
HCT VFR BLD AUTO: 24.9 % (ref 37–47)
HGB BLD-MCNC: 7.3 G/DL (ref 12–16)
IMM GRANULOCYTES # BLD: 0.5 10*3/MM3 (ref 0–0.03)
IMM GRANULOCYTES NFR BLD: 4.9 % (ref 0–5)
LYMPHOCYTES # BLD AUTO: 1.78 10*3/MM3 (ref 0.72–4.86)
LYMPHOCYTES NFR BLD AUTO: 17.5 % (ref 15–45)
MCH RBC QN AUTO: 29.2 PG (ref 28–32)
MCHC RBC AUTO-ENTMCNC: 29.3 G/DL (ref 33–36)
MCV RBC AUTO: 99.6 FL (ref 82–98)
MONOCYTES # BLD AUTO: 1.49 10*3/MM3 (ref 0.19–1.3)
MONOCYTES NFR BLD AUTO: 14.6 % (ref 4–12)
NEUTROPHILS # BLD AUTO: 5.88 10*3/MM3 (ref 1.87–8.4)
NEUTROPHILS NFR BLD AUTO: 57.6 % (ref 39–78)
NRBC BLD MANUAL-RTO: 0.4 /100 WBC (ref 0–0)
PLAT MORPH BLD: NORMAL
PLATELET # BLD AUTO: 288 10*3/MM3 (ref 130–400)
PMV BLD AUTO: 9.5 FL (ref 6–12)
POIKILOCYTOSIS BLD QL SMEAR: NORMAL
POTASSIUM BLD-SCNC: 5.1 MMOL/L (ref 3.5–5.3)
RBC # BLD AUTO: 2.5 10*6/MM3 (ref 4.2–5.4)
SODIUM BLD-SCNC: 138 MMOL/L (ref 135–145)
WBC MORPH BLD: NORMAL
WBC NRBC COR # BLD: 10.2 10*3/MM3 (ref 4.8–10.8)

## 2017-06-27 PROCEDURE — 25010000002 ONDANSETRON PER 1 MG: Performed by: INTERNAL MEDICINE

## 2017-06-27 PROCEDURE — 97116 GAIT TRAINING THERAPY: CPT

## 2017-06-27 PROCEDURE — 85007 BL SMEAR W/DIFF WBC COUNT: CPT | Performed by: INTERNAL MEDICINE

## 2017-06-27 PROCEDURE — 82962 GLUCOSE BLOOD TEST: CPT

## 2017-06-27 PROCEDURE — 25010000002 ENOXAPARIN PER 10 MG: Performed by: INTERNAL MEDICINE

## 2017-06-27 PROCEDURE — 97110 THERAPEUTIC EXERCISES: CPT

## 2017-06-27 PROCEDURE — 85025 COMPLETE CBC W/AUTO DIFF WBC: CPT | Performed by: INTERNAL MEDICINE

## 2017-06-27 PROCEDURE — 97535 SELF CARE MNGMENT TRAINING: CPT

## 2017-06-27 PROCEDURE — 80048 BASIC METABOLIC PNL TOTAL CA: CPT | Performed by: INTERNAL MEDICINE

## 2017-06-27 RX ORDER — ONDANSETRON 4 MG/1
4 TABLET, ORALLY DISINTEGRATING ORAL EVERY 8 HOURS PRN
Status: DISCONTINUED | OUTPATIENT
Start: 2017-06-27 | End: 2017-07-13 | Stop reason: HOSPADM

## 2017-06-28 ENCOUNTER — APPOINTMENT (OUTPATIENT)
Dept: CT IMAGING | Facility: HOSPITAL | Age: 67
End: 2017-06-28

## 2017-06-28 LAB
ANION GAP SERPL CALCULATED.3IONS-SCNC: 15 MMOL/L (ref 4–13)
BASOPHILS # BLD AUTO: 0.04 10*3/MM3 (ref 0–0.2)
BASOPHILS NFR BLD AUTO: 0.4 % (ref 0–2)
BUN BLD-MCNC: 41 MG/DL (ref 5–21)
BUN/CREAT SERPL: 6.1 (ref 7–25)
CALCIUM SPEC-SCNC: 8.9 MG/DL (ref 8.4–10.4)
CHLORIDE SERPL-SCNC: 96 MMOL/L (ref 98–110)
CO2 SERPL-SCNC: 24 MMOL/L (ref 24–31)
CREAT BLD-MCNC: 6.68 MG/DL (ref 0.5–1.4)
DEPRECATED RDW RBC AUTO: 63.9 FL (ref 40–54)
EOSINOPHIL # BLD AUTO: 0.59 10*3/MM3 (ref 0–0.7)
EOSINOPHIL NFR BLD AUTO: 5.4 % (ref 0–4)
ERYTHROCYTE [DISTWIDTH] IN BLOOD BY AUTOMATED COUNT: 17.9 % (ref 12–15)
GFR SERPL CREATININE-BSD FRML MDRD: 6 ML/MIN/1.73
GLUCOSE BLD-MCNC: 131 MG/DL (ref 70–100)
GLUCOSE BLDC GLUCOMTR-MCNC: 125 MG/DL (ref 70–130)
GLUCOSE BLDC GLUCOMTR-MCNC: 135 MG/DL (ref 70–130)
GLUCOSE BLDC GLUCOMTR-MCNC: 155 MG/DL (ref 70–130)
GLUCOSE BLDC GLUCOMTR-MCNC: 157 MG/DL (ref 70–130)
HCT VFR BLD AUTO: 24.5 % (ref 37–47)
HGB BLD-MCNC: 7.1 G/DL (ref 12–16)
IMM GRANULOCYTES # BLD: 0.36 10*3/MM3 (ref 0–0.03)
IMM GRANULOCYTES NFR BLD: 3.3 % (ref 0–5)
LYMPHOCYTES # BLD AUTO: 1.58 10*3/MM3 (ref 0.72–4.86)
LYMPHOCYTES NFR BLD AUTO: 14.5 % (ref 15–45)
MCH RBC QN AUTO: 28.7 PG (ref 28–32)
MCHC RBC AUTO-ENTMCNC: 29 G/DL (ref 33–36)
MCV RBC AUTO: 99.2 FL (ref 82–98)
MONOCYTES # BLD AUTO: 1.06 10*3/MM3 (ref 0.19–1.3)
MONOCYTES NFR BLD AUTO: 9.8 % (ref 4–12)
NEUTROPHILS # BLD AUTO: 7.23 10*3/MM3 (ref 1.87–8.4)
NEUTROPHILS NFR BLD AUTO: 66.6 % (ref 39–78)
PLATELET # BLD AUTO: 300 10*3/MM3 (ref 130–400)
PMV BLD AUTO: 9.5 FL (ref 6–12)
POTASSIUM BLD-SCNC: 5.5 MMOL/L (ref 3.5–5.3)
RBC # BLD AUTO: 2.47 10*6/MM3 (ref 4.2–5.4)
SODIUM BLD-SCNC: 135 MMOL/L (ref 135–145)
WBC NRBC COR # BLD: 10.86 10*3/MM3 (ref 4.8–10.8)

## 2017-06-28 PROCEDURE — 25010000002 ENOXAPARIN PER 10 MG: Performed by: INTERNAL MEDICINE

## 2017-06-28 PROCEDURE — 80048 BASIC METABOLIC PNL TOTAL CA: CPT | Performed by: INTERNAL MEDICINE

## 2017-06-28 PROCEDURE — 82962 GLUCOSE BLOOD TEST: CPT

## 2017-06-28 PROCEDURE — 11042 DBRDMT SUBQ TIS 1ST 20SQCM/<: CPT | Performed by: PODIATRIST

## 2017-06-28 PROCEDURE — 25010000002 HEPARIN (PORCINE) PER 1000 UNITS: Performed by: INTERNAL MEDICINE

## 2017-06-28 PROCEDURE — 97110 THERAPEUTIC EXERCISES: CPT

## 2017-06-28 PROCEDURE — 74176 CT ABD & PELVIS W/O CONTRAST: CPT

## 2017-06-28 PROCEDURE — 25010000002 ONDANSETRON PER 1 MG: Performed by: INTERNAL MEDICINE

## 2017-06-28 PROCEDURE — 97116 GAIT TRAINING THERAPY: CPT

## 2017-06-28 PROCEDURE — 99204 OFFICE O/P NEW MOD 45 MIN: CPT | Performed by: UROLOGY

## 2017-06-28 PROCEDURE — 99219 PR INITIAL OBSERVATION CARE/DAY 50 MINUTES: CPT | Performed by: PODIATRIST

## 2017-06-28 PROCEDURE — 85025 COMPLETE CBC W/AUTO DIFF WBC: CPT | Performed by: INTERNAL MEDICINE

## 2017-06-28 RX ORDER — HEPARIN SODIUM 1000 [USP'U]/ML
1800 INJECTION, SOLUTION INTRAVENOUS; SUBCUTANEOUS
Status: DISCONTINUED | OUTPATIENT
Start: 2017-06-28 | End: 2017-07-13 | Stop reason: HOSPADM

## 2017-06-28 RX ORDER — HEPARIN SODIUM 5000 [USP'U]/ML
4000 INJECTION, SOLUTION INTRAVENOUS; SUBCUTANEOUS ONCE
Status: DISCONTINUED | OUTPATIENT
Start: 2017-06-28 | End: 2017-07-11

## 2017-06-29 LAB
ABO GROUP BLD: NORMAL
ANION GAP SERPL CALCULATED.3IONS-SCNC: 12 MMOL/L (ref 4–13)
BASOPHILS # BLD AUTO: 0.05 10*3/MM3 (ref 0–0.2)
BASOPHILS NFR BLD AUTO: 0.5 % (ref 0–2)
BLD GP AB SCN SERPL QL: NEGATIVE
BUN BLD-MCNC: 24 MG/DL (ref 5–21)
BUN/CREAT SERPL: 5.7 (ref 7–25)
CALCIUM SPEC-SCNC: 8.4 MG/DL (ref 8.4–10.4)
CHLORIDE SERPL-SCNC: 94 MMOL/L (ref 98–110)
CO2 SERPL-SCNC: 29 MMOL/L (ref 24–31)
CREAT BLD-MCNC: 4.23 MG/DL (ref 0.5–1.4)
CRP SERPL-MCNC: 7.2 MG/DL (ref 0–0.99)
DEPRECATED RDW RBC AUTO: 63.2 FL (ref 40–54)
EOSINOPHIL # BLD AUTO: 0.41 10*3/MM3 (ref 0–0.7)
EOSINOPHIL NFR BLD AUTO: 3.9 % (ref 0–4)
ERYTHROCYTE [DISTWIDTH] IN BLOOD BY AUTOMATED COUNT: 17.8 % (ref 12–15)
ERYTHROCYTE [SEDIMENTATION RATE] IN BLOOD: 48 MM/HR (ref 0–20)
GFR SERPL CREATININE-BSD FRML MDRD: 10 ML/MIN/1.73
GLUCOSE BLD-MCNC: 81 MG/DL (ref 70–100)
GLUCOSE BLDC GLUCOMTR-MCNC: 108 MG/DL (ref 70–130)
GLUCOSE BLDC GLUCOMTR-MCNC: 114 MG/DL (ref 70–130)
GLUCOSE BLDC GLUCOMTR-MCNC: 115 MG/DL (ref 70–130)
GLUCOSE BLDC GLUCOMTR-MCNC: 164 MG/DL (ref 70–130)
HCT VFR BLD AUTO: 22.2 % (ref 37–47)
HGB BLD-MCNC: 6.8 G/DL (ref 12–16)
IMM GRANULOCYTES # BLD: 0.45 10*3/MM3 (ref 0–0.03)
IMM GRANULOCYTES NFR BLD: 4.3 % (ref 0–5)
IRON 24H UR-MRATE: 33 MCG/DL (ref 42–180)
IRON SATN MFR SERPL: 13 % (ref 20–45)
LYMPHOCYTES # BLD AUTO: 1.32 10*3/MM3 (ref 0.72–4.86)
LYMPHOCYTES NFR BLD AUTO: 12.6 % (ref 15–45)
MCH RBC QN AUTO: 29.8 PG (ref 28–32)
MCHC RBC AUTO-ENTMCNC: 30.6 G/DL (ref 33–36)
MCV RBC AUTO: 97.4 FL (ref 82–98)
MONOCYTES # BLD AUTO: 1.78 10*3/MM3 (ref 0.19–1.3)
MONOCYTES NFR BLD AUTO: 17 % (ref 4–12)
NEUTROPHILS # BLD AUTO: 6.47 10*3/MM3 (ref 1.87–8.4)
NEUTROPHILS NFR BLD AUTO: 61.7 % (ref 39–78)
NRBC BLD MANUAL-RTO: 0.8 /100 WBC (ref 0–0)
NT-PROBNP SERPL-MCNC: ABNORMAL PG/ML (ref 0–900)
PLATELET # BLD AUTO: 250 10*3/MM3 (ref 130–400)
PMV BLD AUTO: 9.3 FL (ref 6–12)
POTASSIUM BLD-SCNC: 4.3 MMOL/L (ref 3.5–5.3)
RBC # BLD AUTO: 2.28 10*6/MM3 (ref 4.2–5.4)
RH BLD: POSITIVE
SODIUM BLD-SCNC: 135 MMOL/L (ref 135–145)
TIBC SERPL-MCNC: 257 MCG/DL (ref 225–420)
VIT B12 BLD-MCNC: 645 PG/ML (ref 239–931)
WBC NRBC COR # BLD: 10.48 10*3/MM3 (ref 4.8–10.8)

## 2017-06-29 PROCEDURE — 83880 ASSAY OF NATRIURETIC PEPTIDE: CPT | Performed by: INTERNAL MEDICINE

## 2017-06-29 PROCEDURE — 80048 BASIC METABOLIC PNL TOTAL CA: CPT | Performed by: INTERNAL MEDICINE

## 2017-06-29 PROCEDURE — 83540 ASSAY OF IRON: CPT | Performed by: INTERNAL MEDICINE

## 2017-06-29 PROCEDURE — 86850 RBC ANTIBODY SCREEN: CPT | Performed by: INTERNAL MEDICINE

## 2017-06-29 PROCEDURE — 85025 COMPLETE CBC W/AUTO DIFF WBC: CPT | Performed by: INTERNAL MEDICINE

## 2017-06-29 PROCEDURE — 82962 GLUCOSE BLOOD TEST: CPT

## 2017-06-29 PROCEDURE — 97164 PT RE-EVAL EST PLAN CARE: CPT

## 2017-06-29 PROCEDURE — 86140 C-REACTIVE PROTEIN: CPT | Performed by: INTERNAL MEDICINE

## 2017-06-29 PROCEDURE — 85651 RBC SED RATE NONAUTOMATED: CPT | Performed by: INTERNAL MEDICINE

## 2017-06-29 PROCEDURE — 82607 VITAMIN B-12: CPT | Performed by: INTERNAL MEDICINE

## 2017-06-29 PROCEDURE — 86920 COMPATIBILITY TEST SPIN: CPT

## 2017-06-29 PROCEDURE — 86900 BLOOD TYPING SEROLOGIC ABO: CPT

## 2017-06-29 PROCEDURE — 25010000002 ENOXAPARIN PER 10 MG: Performed by: INTERNAL MEDICINE

## 2017-06-29 PROCEDURE — 97605 NEG PRS WND THER DME<=50SQCM: CPT

## 2017-06-29 PROCEDURE — 86900 BLOOD TYPING SEROLOGIC ABO: CPT | Performed by: INTERNAL MEDICINE

## 2017-06-29 PROCEDURE — 86901 BLOOD TYPING SEROLOGIC RH(D): CPT | Performed by: INTERNAL MEDICINE

## 2017-06-29 PROCEDURE — 83550 IRON BINDING TEST: CPT | Performed by: INTERNAL MEDICINE

## 2017-06-29 PROCEDURE — 97110 THERAPEUTIC EXERCISES: CPT

## 2017-06-29 PROCEDURE — P9016 RBC LEUKOCYTES REDUCED: HCPCS

## 2017-06-29 RX ORDER — LACTULOSE 20 G/30ML
20 SOLUTION ORAL 2 TIMES DAILY PRN
Status: DISCONTINUED | OUTPATIENT
Start: 2017-06-29 | End: 2017-07-13 | Stop reason: HOSPADM

## 2017-06-29 RX ORDER — DOCUSATE SODIUM 100 MG/1
200 CAPSULE, LIQUID FILLED ORAL 2 TIMES DAILY
Status: DISCONTINUED | OUTPATIENT
Start: 2017-06-29 | End: 2017-07-13 | Stop reason: HOSPADM

## 2017-06-29 RX ORDER — LACTULOSE 20 G/30ML
20 SOLUTION ORAL ONCE
Status: DISCONTINUED | OUTPATIENT
Start: 2017-06-29 | End: 2017-07-11

## 2017-06-30 ENCOUNTER — ANESTHESIA EVENT (OUTPATIENT)
Dept: PERIOP | Facility: HOSPITAL | Age: 67
End: 2017-06-30

## 2017-06-30 ENCOUNTER — APPOINTMENT (OUTPATIENT)
Dept: GENERAL RADIOLOGY | Facility: HOSPITAL | Age: 67
End: 2017-06-30

## 2017-06-30 ENCOUNTER — ANESTHESIA (OUTPATIENT)
Dept: PERIOP | Facility: HOSPITAL | Age: 67
End: 2017-06-30

## 2017-06-30 LAB
ABO + RH BLD: NORMAL
ABO + RH BLD: NORMAL
ANION GAP SERPL CALCULATED.3IONS-SCNC: 11 MMOL/L (ref 4–13)
BASOPHILS # BLD AUTO: 0.04 10*3/MM3 (ref 0–0.2)
BASOPHILS NFR BLD AUTO: 0.4 % (ref 0–2)
BH BB BLOOD EXPIRATION DATE: NORMAL
BH BB BLOOD EXPIRATION DATE: NORMAL
BH BB BLOOD TYPE BARCODE: 5100
BH BB BLOOD TYPE BARCODE: 5100
BH BB DISPENSE STATUS: NORMAL
BH BB DISPENSE STATUS: NORMAL
BH BB PRODUCT CODE: NORMAL
BH BB PRODUCT CODE: NORMAL
BH BB UNIT NUMBER: NORMAL
BH BB UNIT NUMBER: NORMAL
BUN BLD-MCNC: 35 MG/DL (ref 5–21)
BUN/CREAT SERPL: 6.3 (ref 7–25)
CALCIUM SPEC-SCNC: 8.6 MG/DL (ref 8.4–10.4)
CHLORIDE SERPL-SCNC: 96 MMOL/L (ref 98–110)
CO2 SERPL-SCNC: 28 MMOL/L (ref 24–31)
CREAT BLD-MCNC: 5.59 MG/DL (ref 0.5–1.4)
CROSSMATCH INTERPRETATION: NORMAL
CROSSMATCH INTERPRETATION: NORMAL
DEPRECATED RDW RBC AUTO: 69.9 FL (ref 40–54)
EOSINOPHIL # BLD AUTO: 0.44 10*3/MM3 (ref 0–0.7)
EOSINOPHIL NFR BLD AUTO: 3.9 % (ref 0–4)
ERYTHROCYTE [DISTWIDTH] IN BLOOD BY AUTOMATED COUNT: 20.4 % (ref 12–15)
GFR SERPL CREATININE-BSD FRML MDRD: 8 ML/MIN/1.73
GLUCOSE BLD-MCNC: 90 MG/DL (ref 70–100)
GLUCOSE BLDC GLUCOMTR-MCNC: 106 MG/DL (ref 70–130)
GLUCOSE BLDC GLUCOMTR-MCNC: 229 MG/DL (ref 70–130)
GLUCOSE BLDC GLUCOMTR-MCNC: 82 MG/DL (ref 70–130)
GLUCOSE BLDC GLUCOMTR-MCNC: 82 MG/DL (ref 70–130)
GLUCOSE BLDC GLUCOMTR-MCNC: 89 MG/DL (ref 70–130)
HCT VFR BLD AUTO: 26.3 % (ref 37–47)
HGB BLD-MCNC: 7.9 G/DL (ref 12–16)
IMM GRANULOCYTES # BLD: 0.52 10*3/MM3 (ref 0–0.03)
IMM GRANULOCYTES NFR BLD: 4.6 % (ref 0–5)
INR PPP: 0.99 (ref 0.91–1.09)
LYMPHOCYTES # BLD AUTO: 1.82 10*3/MM3 (ref 0.72–4.86)
LYMPHOCYTES NFR BLD AUTO: 16 % (ref 15–45)
MCH RBC QN AUTO: 28.4 PG (ref 28–32)
MCHC RBC AUTO-ENTMCNC: 30 G/DL (ref 33–36)
MCV RBC AUTO: 94.6 FL (ref 82–98)
MONOCYTES # BLD AUTO: 1.29 10*3/MM3 (ref 0.19–1.3)
MONOCYTES NFR BLD AUTO: 11.3 % (ref 4–12)
NEUTROPHILS # BLD AUTO: 7.3 10*3/MM3 (ref 1.87–8.4)
NEUTROPHILS NFR BLD AUTO: 63.8 % (ref 39–78)
PLATELET # BLD AUTO: 250 10*3/MM3 (ref 130–400)
PMV BLD AUTO: 9.1 FL (ref 6–12)
POTASSIUM BLD-SCNC: 4.5 MMOL/L (ref 3.5–5.3)
PROTHROMBIN TIME: 13.4 SECONDS (ref 11.9–14.6)
RBC # BLD AUTO: 2.78 10*6/MM3 (ref 4.2–5.4)
SODIUM BLD-SCNC: 135 MMOL/L (ref 135–145)
UNIT  ABO: NORMAL
UNIT  ABO: NORMAL
UNIT  RH: NORMAL
UNIT  RH: NORMAL
WBC NRBC COR # BLD: 11.41 10*3/MM3 (ref 4.8–10.8)

## 2017-06-30 PROCEDURE — 0 IOPAMIDOL 61 % SOLUTION: Performed by: UROLOGY

## 2017-06-30 PROCEDURE — 87086 URINE CULTURE/COLONY COUNT: CPT | Performed by: UROLOGY

## 2017-06-30 PROCEDURE — 25010000002 FERUMOXYTOL 510 MG/17ML SOLUTION 510 MG VIAL: Performed by: NURSE PRACTITIONER

## 2017-06-30 PROCEDURE — 74420 UROGRAPHY RTRGR +-KUB: CPT

## 2017-06-30 PROCEDURE — 25010000002 HEPARIN (PORCINE) PER 1000 UNITS: Performed by: INTERNAL MEDICINE

## 2017-06-30 PROCEDURE — C1769 GUIDE WIRE: HCPCS | Performed by: UROLOGY

## 2017-06-30 PROCEDURE — 80048 BASIC METABOLIC PNL TOTAL CA: CPT | Performed by: INTERNAL MEDICINE

## 2017-06-30 PROCEDURE — 52332 CYSTOSCOPY AND TREATMENT: CPT | Performed by: UROLOGY

## 2017-06-30 PROCEDURE — 25010000002 SUCCINYLCHOLINE PER 20 MG: Performed by: NURSE ANESTHETIST, CERTIFIED REGISTERED

## 2017-06-30 PROCEDURE — 85610 PROTHROMBIN TIME: CPT | Performed by: UROLOGY

## 2017-06-30 PROCEDURE — 97605 NEG PRS WND THER DME<=50SQCM: CPT

## 2017-06-30 PROCEDURE — 85025 COMPLETE CBC W/AUTO DIFF WBC: CPT | Performed by: INTERNAL MEDICINE

## 2017-06-30 PROCEDURE — 25010000002 ONDANSETRON PER 1 MG: Performed by: ANESTHESIOLOGY

## 2017-06-30 PROCEDURE — 25010000003 CEFAZOLIN PER 500 MG: Performed by: UROLOGY

## 2017-06-30 PROCEDURE — 25010000002 DEXAMETHASONE PER 1 MG: Performed by: ANESTHESIOLOGY

## 2017-06-30 PROCEDURE — C1758 CATHETER, URETERAL: HCPCS | Performed by: UROLOGY

## 2017-06-30 PROCEDURE — C2617 STENT, NON-COR, TEM W/O DEL: HCPCS | Performed by: UROLOGY

## 2017-06-30 PROCEDURE — C1894 INTRO/SHEATH, NON-LASER: HCPCS | Performed by: UROLOGY

## 2017-06-30 PROCEDURE — 25010000002 PROPOFOL 10 MG/ML EMULSION: Performed by: NURSE ANESTHETIST, CERTIFIED REGISTERED

## 2017-06-30 PROCEDURE — 82962 GLUCOSE BLOOD TEST: CPT

## 2017-06-30 PROCEDURE — 25010000002 FENTANYL CITRATE (PF) 100 MCG/2ML SOLUTION: Performed by: NURSE ANESTHETIST, CERTIFIED REGISTERED

## 2017-06-30 DEVICE — URETERAL STENT
Type: IMPLANTABLE DEVICE | Site: URETER | Status: FUNCTIONAL
Brand: PERCUFLEX™ PLUS

## 2017-06-30 RX ORDER — MAGNESIUM HYDROXIDE 1200 MG/15ML
LIQUID ORAL AS NEEDED
Status: DISCONTINUED | OUTPATIENT
Start: 2017-06-30 | End: 2017-06-30 | Stop reason: HOSPADM

## 2017-06-30 RX ORDER — MEPERIDINE HYDROCHLORIDE 25 MG/ML
12.5 INJECTION INTRAMUSCULAR; INTRAVENOUS; SUBCUTANEOUS
Status: CANCELLED | OUTPATIENT
Start: 2017-06-30 | End: 2017-07-01

## 2017-06-30 RX ORDER — SODIUM CHLORIDE, SODIUM LACTATE, POTASSIUM CHLORIDE, CALCIUM CHLORIDE 600; 310; 30; 20 MG/100ML; MG/100ML; MG/100ML; MG/100ML
100 INJECTION, SOLUTION INTRAVENOUS CONTINUOUS
Status: DISCONTINUED | OUTPATIENT
Start: 2017-06-30 | End: 2017-07-13 | Stop reason: HOSPADM

## 2017-06-30 RX ORDER — NALOXONE HCL 0.4 MG/ML
0.04 VIAL (ML) INJECTION AS NEEDED
Status: CANCELLED | OUTPATIENT
Start: 2017-06-30

## 2017-06-30 RX ORDER — IPRATROPIUM BROMIDE AND ALBUTEROL SULFATE 2.5; .5 MG/3ML; MG/3ML
3 SOLUTION RESPIRATORY (INHALATION) ONCE AS NEEDED
Status: CANCELLED | OUTPATIENT
Start: 2017-06-30

## 2017-06-30 RX ORDER — LIDOCAINE HYDROCHLORIDE 20 MG/ML
INJECTION, SOLUTION INFILTRATION; PERINEURAL AS NEEDED
Status: DISCONTINUED | OUTPATIENT
Start: 2017-06-30 | End: 2017-06-30 | Stop reason: SURG

## 2017-06-30 RX ORDER — FAMOTIDINE 20 MG/1
20 TABLET, FILM COATED ORAL
Status: DISCONTINUED | OUTPATIENT
Start: 2017-06-30 | End: 2017-06-30 | Stop reason: HOSPADM

## 2017-06-30 RX ORDER — FAMOTIDINE 10 MG/ML
20 INJECTION, SOLUTION INTRAVENOUS
Status: DISCONTINUED | OUTPATIENT
Start: 2017-06-30 | End: 2017-06-30 | Stop reason: HOSPADM

## 2017-06-30 RX ORDER — SODIUM CHLORIDE 0.9 % (FLUSH) 0.9 %
1-10 SYRINGE (ML) INJECTION AS NEEDED
Status: DISCONTINUED | OUTPATIENT
Start: 2017-06-30 | End: 2017-06-30 | Stop reason: HOSPADM

## 2017-06-30 RX ORDER — LABETALOL HYDROCHLORIDE 5 MG/ML
5 INJECTION, SOLUTION INTRAVENOUS
Status: CANCELLED | OUTPATIENT
Start: 2017-06-30

## 2017-06-30 RX ORDER — ONDANSETRON 2 MG/ML
4 INJECTION INTRAMUSCULAR; INTRAVENOUS AS NEEDED
Status: CANCELLED | OUTPATIENT
Start: 2017-06-30 | End: 2017-06-30

## 2017-06-30 RX ORDER — HYDROCODONE BITARTRATE AND ACETAMINOPHEN 7.5; 325 MG/1; MG/1
1 TABLET ORAL EVERY 6 HOURS PRN
Status: DISPENSED | OUTPATIENT
Start: 2017-06-30 | End: 2017-07-10

## 2017-06-30 RX ORDER — DEXAMETHASONE SODIUM PHOSPHATE 10 MG/ML
8 INJECTION INTRAMUSCULAR; INTRAVENOUS ONCE
Status: COMPLETED | OUTPATIENT
Start: 2017-06-30 | End: 2017-06-30

## 2017-06-30 RX ORDER — PROPOFOL 10 MG/ML
VIAL (ML) INTRAVENOUS AS NEEDED
Status: DISCONTINUED | OUTPATIENT
Start: 2017-06-30 | End: 2017-06-30 | Stop reason: SURG

## 2017-06-30 RX ORDER — HYDRALAZINE HYDROCHLORIDE 20 MG/ML
5 INJECTION INTRAMUSCULAR; INTRAVENOUS
Status: CANCELLED | OUTPATIENT
Start: 2017-06-30

## 2017-06-30 RX ORDER — ONDANSETRON 2 MG/ML
4 INJECTION INTRAMUSCULAR; INTRAVENOUS ONCE AS NEEDED
Status: COMPLETED | OUTPATIENT
Start: 2017-06-30 | End: 2017-06-30

## 2017-06-30 RX ORDER — METOCLOPRAMIDE HYDROCHLORIDE 5 MG/ML
5 INJECTION INTRAMUSCULAR; INTRAVENOUS
Status: CANCELLED | OUTPATIENT
Start: 2017-06-30

## 2017-06-30 RX ORDER — FLUMAZENIL 0.1 MG/ML
0.2 INJECTION INTRAVENOUS AS NEEDED
Status: CANCELLED | OUTPATIENT
Start: 2017-06-30

## 2017-06-30 RX ORDER — MORPHINE SULFATE 2 MG/ML
2 INJECTION, SOLUTION INTRAMUSCULAR; INTRAVENOUS AS NEEDED
Status: CANCELLED | OUTPATIENT
Start: 2017-06-30 | End: 2017-06-30

## 2017-06-30 RX ORDER — MIDAZOLAM HYDROCHLORIDE 1 MG/ML
1 INJECTION INTRAMUSCULAR; INTRAVENOUS
Status: DISCONTINUED | OUTPATIENT
Start: 2017-06-30 | End: 2017-06-30 | Stop reason: HOSPADM

## 2017-06-30 RX ORDER — SUCCINYLCHOLINE CHLORIDE 20 MG/ML
INJECTION INTRAMUSCULAR; INTRAVENOUS AS NEEDED
Status: DISCONTINUED | OUTPATIENT
Start: 2017-06-30 | End: 2017-06-30 | Stop reason: SURG

## 2017-06-30 RX ORDER — MIDAZOLAM HYDROCHLORIDE 1 MG/ML
2 INJECTION INTRAMUSCULAR; INTRAVENOUS
Status: DISCONTINUED | OUTPATIENT
Start: 2017-06-30 | End: 2017-06-30 | Stop reason: HOSPADM

## 2017-06-30 RX ORDER — LIDOCAINE HYDROCHLORIDE 40 MG/ML
SOLUTION TOPICAL AS NEEDED
Status: DISCONTINUED | OUTPATIENT
Start: 2017-06-30 | End: 2017-06-30 | Stop reason: SURG

## 2017-06-30 RX ORDER — SCOLOPAMINE TRANSDERMAL SYSTEM 1 MG/1
1 PATCH, EXTENDED RELEASE TRANSDERMAL ONCE
Status: DISCONTINUED | OUTPATIENT
Start: 2017-06-30 | End: 2017-06-30

## 2017-06-30 RX ORDER — FENTANYL CITRATE 50 UG/ML
INJECTION, SOLUTION INTRAMUSCULAR; INTRAVENOUS AS NEEDED
Status: DISCONTINUED | OUTPATIENT
Start: 2017-06-30 | End: 2017-06-30 | Stop reason: SURG

## 2017-06-30 RX ADMIN — FENTANYL CITRATE 50 MCG: 50 INJECTION, SOLUTION INTRAMUSCULAR; INTRAVENOUS at 16:45

## 2017-06-30 RX ADMIN — PROPOFOL 150 MG: 10 INJECTION, EMULSION INTRAVENOUS at 16:12

## 2017-06-30 RX ADMIN — FENTANYL CITRATE 100 MCG: 50 INJECTION, SOLUTION INTRAMUSCULAR; INTRAVENOUS at 16:12

## 2017-06-30 RX ADMIN — DEXAMETHASONE SODIUM PHOSPHATE 8 MG: 10 INJECTION, SOLUTION INTRAMUSCULAR; INTRAVENOUS at 15:13

## 2017-06-30 RX ADMIN — SUCCINYLCHOLINE CHLORIDE 100 MG: 20 INJECTION, SOLUTION INTRAMUSCULAR; INTRAVENOUS at 16:12

## 2017-06-30 RX ADMIN — SCOPOLAMINE 1 PATCH: 1 PATCH, EXTENDED RELEASE TRANSDERMAL at 15:13

## 2017-06-30 RX ADMIN — ONDANSETRON 4 MG: 2 SOLUTION INTRAMUSCULAR; INTRAVENOUS at 15:14

## 2017-06-30 RX ADMIN — LIDOCAINE HYDROCHLORIDE 1 EACH: 40 SOLUTION TOPICAL at 16:12

## 2017-06-30 RX ADMIN — FENTANYL CITRATE 50 MCG: 50 INJECTION, SOLUTION INTRAMUSCULAR; INTRAVENOUS at 17:02

## 2017-06-30 RX ADMIN — CEFAZOLIN 1 G: 1 INJECTION, POWDER, FOR SOLUTION INTRAVENOUS at 16:07

## 2017-06-30 RX ADMIN — LIDOCAINE HYDROCHLORIDE 60 MG: 20 INJECTION, SOLUTION INFILTRATION; PERINEURAL at 16:12

## 2017-06-30 RX ADMIN — SODIUM CHLORIDE, POTASSIUM CHLORIDE, SODIUM LACTATE AND CALCIUM CHLORIDE 100 ML/HR: 600; 310; 30; 20 INJECTION, SOLUTION INTRAVENOUS at 15:08

## 2017-06-30 RX ADMIN — FAMOTIDINE 20 MG: 10 INJECTION, SOLUTION INTRAVENOUS at 15:14

## 2017-07-01 LAB
ANION GAP SERPL CALCULATED.3IONS-SCNC: 11 MMOL/L (ref 4–13)
BUN BLD-MCNC: 29 MG/DL (ref 5–21)
BUN/CREAT SERPL: 6.9 (ref 7–25)
CALCIUM SPEC-SCNC: 8.9 MG/DL (ref 8.4–10.4)
CHLORIDE SERPL-SCNC: 98 MMOL/L (ref 98–110)
CO2 SERPL-SCNC: 28 MMOL/L (ref 24–31)
CREAT BLD-MCNC: 4.21 MG/DL (ref 0.5–1.4)
DEPRECATED RDW RBC AUTO: 66.4 FL (ref 40–54)
ERYTHROCYTE [DISTWIDTH] IN BLOOD BY AUTOMATED COUNT: 19.1 % (ref 12–15)
GFR SERPL CREATININE-BSD FRML MDRD: 11 ML/MIN/1.73
GLUCOSE BLD-MCNC: 237 MG/DL (ref 70–100)
GLUCOSE BLDC GLUCOMTR-MCNC: 197 MG/DL (ref 70–130)
GLUCOSE BLDC GLUCOMTR-MCNC: 220 MG/DL (ref 70–130)
GLUCOSE BLDC GLUCOMTR-MCNC: 223 MG/DL (ref 70–130)
HCT VFR BLD AUTO: 30.2 % (ref 37–47)
HGB BLD-MCNC: 9 G/DL (ref 12–16)
HYPOCHROMIA BLD QL: ABNORMAL
LYMPHOCYTES # BLD MANUAL: 1.26 10*3/MM3 (ref 0.72–4.86)
LYMPHOCYTES NFR BLD MANUAL: 4 % (ref 4–12)
LYMPHOCYTES NFR BLD MANUAL: 9 % (ref 15–45)
MCH RBC QN AUTO: 28.2 PG (ref 28–32)
MCHC RBC AUTO-ENTMCNC: 29.8 G/DL (ref 33–36)
MCV RBC AUTO: 94.7 FL (ref 82–98)
MONOCYTES # BLD AUTO: 0.56 10*3/MM3 (ref 0.19–1.3)
NEUTROPHILS # BLD AUTO: 12.21 10*3/MM3 (ref 1.87–8.4)
NEUTROPHILS NFR BLD MANUAL: 74 % (ref 39–78)
NEUTS BAND NFR BLD MANUAL: 13 % (ref 0–10)
PLAT MORPH BLD: NORMAL
PLATELET # BLD AUTO: 275 10*3/MM3 (ref 130–400)
PMV BLD AUTO: 9.8 FL (ref 6–12)
POTASSIUM BLD-SCNC: 4.9 MMOL/L (ref 3.5–5.3)
RBC # BLD AUTO: 3.19 10*6/MM3 (ref 4.2–5.4)
SCAN SLIDE: NORMAL
SODIUM BLD-SCNC: 137 MMOL/L (ref 135–145)
WBC MORPH BLD: NORMAL
WBC NRBC COR # BLD: 14.04 10*3/MM3 (ref 4.8–10.8)

## 2017-07-01 PROCEDURE — 97607 NEG PRS WND THR NDME<=50SQCM: CPT

## 2017-07-01 PROCEDURE — 99224 PR SBSQ OBSERVATION CARE/DAY 15 MINUTES: CPT | Performed by: UROLOGY

## 2017-07-01 PROCEDURE — 85025 COMPLETE CBC W/AUTO DIFF WBC: CPT | Performed by: NURSE PRACTITIONER

## 2017-07-01 PROCEDURE — 82962 GLUCOSE BLOOD TEST: CPT

## 2017-07-01 PROCEDURE — 85007 BL SMEAR W/DIFF WBC COUNT: CPT | Performed by: NURSE PRACTITIONER

## 2017-07-01 PROCEDURE — 25010000002 ERTAPENEM PER 500 MG: Performed by: UROLOGY

## 2017-07-01 PROCEDURE — 80048 BASIC METABOLIC PNL TOTAL CA: CPT | Performed by: INTERNAL MEDICINE

## 2017-07-01 PROCEDURE — 25010000002 ENOXAPARIN PER 10 MG: Performed by: INTERNAL MEDICINE

## 2017-07-01 PROCEDURE — 97116 GAIT TRAINING THERAPY: CPT

## 2017-07-01 NOTE — PROGRESS NOTES
Nephrology ([facility]) Progress Note      Patient:  Cecy Panchal  YOB: 1950  Date of Service: 7/1/2017  MRN: [id number]   Acct: [id number]   Primary Care Physician: JACK Fuetnes  Advance Directive: No Order  Admit Date: 6/19/2017       Hospital Day: 0  Referring Provider: Kolton Stewart MD      Patient personally seen and examined.  Complete chart including Consults, Notes, Operative Reports, Labs, Cardiology, and Radiology studies reviewed as able.        Subjective:  Cecy Panchal is a 66 y.o. female  whom we were consulted for AVTAR/CKD.  Admitted to [facility] 6/1/17 for soa/FREEMAN.  Started HD 6/9.  Admitted to LTAC 6/19.  No overnight issues.  Told she has pyelonephritis and unlikely to regain renal function on infected kidney.    T- 98.4  P- 69  R- 16  BP- 102/57  O2- 100          Allergies:  Dilaudid [hydromorphone hcl]    Home Meds:  No prescriptions prior to admission.       Medicines:  Current Facility-Administered Medications   Medication Dose Route Frequency Provider Last Rate Last Dose   • albuterol (PROVENTIL) nebulizer solution 0.083% 2.5 mg/3mL  2.5 mg Nebulization Q4H PRN Kolton Stewart MD       • albuterol (PROVENTIL) nebulizer solution 0.083% 2.5 mg/3mL  2.5 mg Nebulization BID - RT Kolton Stewart MD       • allopurinol (ZYLOPRIM) tablet 100 mg  100 mg Oral Daily Kolton Stewart MD       • aspirin EC tablet 81 mg  81 mg Oral Daily Kolton Stewart MD       • atorvastatin (LIPITOR) tablet 40 mg  40 mg Oral Daily Kolton Stewart MD       • docusate sodium (COLACE) capsule 200 mg  200 mg Oral BID JACK Gates       • enoxaparin (LOVENOX) syringe 30 mg  30 mg Subcutaneous Q24H Kamar Hardy MD       • epoetin jean marie (EPOGEN,PROCRIT) injection 6,000 Units  6,000 Units Subcutaneous PRN Kamar Hardy MD       • ferumoxytol (FERAHEME) 510 mg in sodium chloride 0.9 % 67 mL IVPB  510 mg Intravenous Once Shavonne Hdez  JACK Hernandez       • gabapentin (NEURONTIN) capsule 100 mg  100 mg Oral Daily Kolton Stewart MD       • heparin (porcine) 5000 UNIT/ML injection 4,000 Units  4,000 Units Subcutaneous Once Richard Bey MD       • heparin (porcine) injection 1,800 Units  1,800 Units Intracatheter PRN Kamar Hardy MD   1,800 Units at 06/23/17 1111   • heparin (porcine) injection 1,800 Units  1,800 Units Intracatheter PRN Kamar Hardy MD   1,800 Units at 06/23/17 1110   • heparin (porcine) injection 1,800 Units  1,800 Units Intravenous Once per day on Mon Wed Fri Richard Bey MD        And   • heparin (porcine) injection 1,800 Units  1,800 Units Intravenous Once per day on Mon Wed Fri Richard Bey MD       • heparin (porcine) injection 5,000 Units  5,000 Units Intravenous Once Kamar Hardy MD       • HYDROcodone-acetaminophen (NORCO) 7.5-325 MG per tablet 1 tablet  1 tablet Oral Q6H PRN Kolton Stewart MD       • insulin detemir (LEVEMIR) injection 35 Units  35 Units Subcutaneous Nightly JACK Gates       • insulin lispro (humaLOG) injection 0-14 Units  0-14 Units Subcutaneous 4x Daily AC & at Bedtime Kolton Stewart MD       • lactated ringers infusion  100 mL/hr Intravenous Continuous Gato Mccullough  mL/hr at 06/30/17 1508 100 mL/hr at 06/30/17 1508   • lactulose solution 20 g  20 g Oral Once JACK Gates       • lactulose solution 20 g  20 g Oral BID PRN JACK Gates       • levothyroxine (SYNTHROID, LEVOTHROID) tablet 100 mcg  100 mcg Oral Q AM Kolton Stewart MD       • Linaclotide capsule 145 mcg  145 mcg Oral Daily JACK Gates       • linagliptin (TRADJENTA) tablet 5 mg  5 mg Oral Daily Kolton Stewart MD       • metoprolol tartrate (LOPRESSOR) tablet 50 mg  50 mg Oral Q12H Kolton Stewart MD       • miconazole (MICOTIN) 2 % powder   Topical BID Kolton Stewart MD       • midodrine  (PROAMATINE) tablet 10 mg  10 mg Oral PRN Kamar Hardy MD   10 mg at 06/26/17 0940   • midodrine (PROAMATINE) tablet 5 mg  5 mg Oral TID AC Kolton Stewart MD       • ondansetron (ZOFRAN) injection 4 mg  4 mg Intravenous PRN Richard Bey MD       • ondansetron ODT (ZOFRAN-ODT) disintegrating tablet 4 mg  4 mg Oral Q8H PRN Kolton Stewart MD       • pantoprazole (PROTONIX) EC tablet 40 mg  40 mg Oral QAM AC Kolton Stewart MD       • sucralfate (CARAFATE) 1 GM/10ML suspension 1 g  1 g Oral 4x Daily With Meals & Nightly Kolton Stewart MD       • vitamin D (ERGOCALCIFEROL) capsule 50,000 Units  50,000 Units Oral Q7 Days Kolton Stewart MD           Past Medical History:  No past medical history on file.    Past Surgical History:  Past Surgical History:   Procedure Laterality Date   • CYSTOSCOPY RETROGRADE PYELOGRAM Right 6/30/2017    Procedure: CYSTOSCOPY, RETROGRADE PYELOGRAM AND STENT INSERTION, RIGHT;  Surgeon: Tony Glasgow MD;  Location: [facility] OR;  Service:        Family History  No family history on file.    Social History  Social History     Social History   • Marital status:      Spouse name: N/A   • Number of children: N/A   • Years of education: N/A     Occupational History   • Not on file.     Social History Main Topics   • Smoking status: Not on file   • Smokeless tobacco: Not on file   • Alcohol use Not on file   • Drug use: Not on file   • Sexual activity: Not on file     Other Topics Concern   • Not on file     Social History Narrative   • No narrative on file         Review of Systems:  History obtained from chart review and the patient  General ROS: No fever or chills  Respiratory ROS: + cough, shortness of breath  Cardiovascular ROS: no chest pain or dyspnea on exertion  Gastrointestinal ROS: No abdominal pain or melena  Genito-Urinary ROS: No dysuria or hematuria  14 point ROS reviewed with the patient and negative except as noted above and in  "the HPI unless unable to obtain.    Objective:  /69  Pulse 86  Temp 98 °F (36.7 °C) (Temporal Artery )   Resp 15  Ht 60\" (152.4 cm)  Wt 270 lb 4.8 oz (123 kg)  LMP Comment: menopause  SpO2 98%  BMI 52.79 kg/m2  No intake or output data in the 24 hours ending 07/01/17 1606  General: awake/alert   Chest:  clear to auscultation bilaterally without respiratory distress  CVS: regular rate and rhythm  Abdominal: soft, nontender, normal bowel sounds, morbid obesity  Extremities: ble edema  Skin: warm/dry      Labs:    Results from last 7 days  Lab Units 07/01/17  0641 06/30/17  0733 06/29/17  0344   WBC 10*3/mm3 14.04* 11.41* 10.48   HEMOGLOBIN g/dL 9.0* 7.9* 6.8*   HEMATOCRIT % 30.2* 26.3* 22.2*   PLATELETS 10*3/mm3 275 250 250           Results from last 7 days  Lab Units 07/01/17  0641 06/30/17  0733 06/29/17  0344   SODIUM mmol/L 137 135 135   POTASSIUM mmol/L 4.9 4.5 4.3   CHLORIDE mmol/L 98 96* 94*   CO2 mmol/L 28.0 28.0 29.0   BUN mg/dL 29* 35* 24*   CREATININE mg/dL 4.21* 5.59* 4.23*   CALCIUM mg/dL 8.9 8.6 8.4   GLUCOSE mg/dL 237* 90 81       Radiology:   Imaging Results (last 72 hours)     ** No results found for the last 72 hours. **          Culture:         Assessment     AVTAR/ATN  CKD 4- DM Nephropathy  Anemia  Acute/chronic hypoxic respiratory failure  Chronic diastolic CHF  DM foot wound  Hydronephrosis/pyelonephritis    Plan:  HD MWF prn, increase UF goal as tolerated  Appreciate Urology consult  Clarify with RN about abx, don't see any listed      Richard Bey MD  7/1/2017  4:06 PM      "

## 2017-07-01 NOTE — ANESTHESIA POSTPROCEDURE EVALUATION
Patient: Cecy Panchal    Procedure Summary     Date Anesthesia Start Anesthesia Stop Room / Location    06/30/17 1607 1704  [facility] OR 01 / [facility] OR       Procedure Diagnosis Surgeon Provider    CYSTOSCOPY, RETROGRADE PYELOGRAM AND STENT INSERTION, RIGHT (Right Bladder) Hydronephrosis with urinary obstruction due to ureteral calculus  (Hydronephrosis with urinary obstruction due to ureteral calculus [N13.2]) MD Star Ba CRNA          Anesthesia Type: general  Last vitals  BP      Temp      Pulse     Resp      SpO2        Post Anesthesia Care and Evaluation    Patient location during evaluation: PACU  Patient participation: complete - patient participated  Level of consciousness: awake and alert  Pain management: adequate  Airway patency: patent  Anesthetic complications: No anesthetic complications    Cardiovascular status: acceptable  Respiratory status: acceptable  Hydration status: acceptable

## 2017-07-01 NOTE — PROGRESS NOTES
Urology  Length of Stay: 0  Patient Care Team:  JACK Fuenets as PCP - General  JACK Fuentes as PCP - Family Medicine    Chief Complaint:  Had stent placed yesterday    Subjective     Interval History:   Some lower abdominal discomfort.   No flank pain    Review of Systems:   Review of Systems   Constitutional: Negative for fever.   Gastrointestinal: Negative for nausea and vomiting.   Genitourinary: Negative for flank pain.       Objective     Vital Signs  Temp:  [97.8 °F (36.6 °C)-98 °F (36.7 °C)] 98 °F (36.7 °C)  Heart Rate:  [82-91] 86  Resp:  [12-16] 15  BP: (104-153)/(45-77) 153/69    Physical Exam:  Physical Exam  Alert and oriented ×3  Not agitated or distressed  No obvious deformities  No respiratory distress  Skin without pallor or diaphoresis     Results Review:       I reviewed the patient's new clinical results.  Lab Results (last 24 hours)     Procedure Component Value Units Date/Time    POC Glucose Fingerstick [[id number]]  (Normal) Collected:  06/30/17 1132    Specimen:  Blood Updated:  06/30/17 1144     Glucose 82 mg/dL       : [email or web address] RamirezMeter ID: [id number]       POC Glucose Fingerstick [[id number]]  (Normal) Collected:  06/30/17 1454    Specimen:  Blood Updated:  06/30/17 1514     Glucose 82 mg/dL       : [id number] Madelyn Aviles RMeter ID: [id number]       Protime-INR [[id number]]  (Normal) Collected:  06/30/17 1434    Specimen:  Blood Updated:  06/30/17 1527     Protime 13.4 Seconds      INR 0.99    POC Glucose Fingerstick [[id number]]  (Normal) Collected:  06/30/17 1719    Specimen:  Blood Updated:  06/30/17 1733     Glucose 106 mg/dL       : [id number] Alycia Peoples  LMeter ID: [id number]       POC Glucose Fingerstick [[id number]]  (Abnormal) Collected:  06/30/17 2002    Specimen:  Blood Updated:  06/30/17 2032     Glucose 229 (H) mg/dL       : [email or web address] Dre SantizoneyMeter ID: [id number]       Urine Culture [[id number]]  (Normal)  Collected:  06/30/17 1654    Specimen:  Urine from Urine, Catheter Updated:  07/01/17 0658     Urine Culture No growth at 24 hours    Basic Metabolic Panel [[id number]]  (Abnormal) Collected:  07/01/17 0641    Specimen:  Blood Updated:  07/01/17 0708     Glucose 237 (H) mg/dL      BUN 29 (H) mg/dL      Creatinine 4.21 (H) mg/dL      Sodium 137 mmol/L      Potassium 4.9 mmol/L      Chloride 98 mmol/L      CO2 28.0 mmol/L      Calcium 8.9 mg/dL      eGFR Non African Amer 11 (L) mL/min/1.73      BUN/Creatinine Ratio 6.9 (L)     Anion Gap 11.0 mmol/L     Narrative:       GFR Normal >60  Chronic Kidney Disease <60  Kidney Failure <15    CBC & Differential [[id number]] Collected:  07/01/17 0641    Specimen:  Blood Updated:  07/01/17 0733    Narrative:       The following orders were created for panel order CBC & Differential.  Procedure                               Abnormality         Status                     ---------                               -----------         ------                     Manual Differential[[id number]]          Abnormal            Final result               Scan Slide[[id number]]                                       Final result               CBC Auto Differential[[id number]]        Abnormal            Final result                 Please view results for these tests on the individual orders.    CBC Auto Differential [[id number]]  (Abnormal) Collected:  07/01/17 0641    Specimen:  Blood Updated:  07/01/17 0733     WBC 14.04 (H) 10*3/mm3      RBC 3.19 (L) 10*6/mm3      Hemoglobin 9.0 (L) g/dL      Hematocrit 30.2 (L) %      MCV 94.7 fL      MCH 28.2 pg      MCHC 29.8 (L) g/dL      RDW 19.1 (H) %      RDW-SD 66.4 (H) fl      MPV 9.8 fL      Platelets 275 10*3/mm3     Scan Slide [[id number]] Collected:  07/01/17 0641    Specimen:  Blood Updated:  07/01/17 0733     Scan Slide --      See Manual Differential Results       Manual Differential [[id number]]  (Abnormal) Collected:  07/01/17 0641    Specimen:   Blood Updated:  07/01/17 0733     Neutrophil % 74.0 %      Lymphocyte % 9.0 (L) %      Monocyte % 4.0 %      Bands %  13.0 (H) %      Neutrophils Absolute 12.21 (H) 10*3/mm3      Lymphocytes Absolute 1.26 10*3/mm3      Monocytes Absolute 0.56 10*3/mm3      Hypochromia Slight/1+     WBC Morphology Normal     Platelet Morphology Normal    POC Glucose Fingerstick [[id number]]  (Abnormal) Collected:  07/01/17 0729    Specimen:  Blood Updated:  07/01/17 0741     Glucose 220 (H) mg/dL       : YOVANNY Magdaleno AshleyMeter ID: [id number]           Imaging Results (last 24 hours)     Procedure Component Value Units Date/Time    XR Pyelogram Retrograde [[id number]] Collected:  06/30/17 1719     Updated:  06/30/17 1724    Narrative:       EXAMINATION:  XR PYELOGRAM RETROGRADE-  6/30/2017 3:45 PM CDT     HISTORY: Prominent stone in the right ureteropelvic junction.      COMPARISON: CT performed on 06/28/2017.     TECHNIQUE: 3 fluoroscopic spot images were obtained.     FLUOROSCOPY TIME: 2.6 minutes.      FINDINGS: Image 1 demonstrates a catheter/wire extending up the right  ureter to the right renal collecting system through a cystoscope. The  second image demonstrates injection with the stone filling defect at the  right ureteropelvic junction. There is moderate dilatation of the right  renal collecting system. The third image demonstrates placement of a  double-J ureteral stent.       Impression:       Operative images, as described.   This report was finalized on 06/30/2017 17:21 by Dr. Tacho Mcarthur MD.          Medication Review:     Current Facility-Administered Medications:   •  albuterol (PROVENTIL) nebulizer solution 0.083% 2.5 mg/3mL, 2.5 mg, Nebulization, Q4H PRN, Kolton Stewart MD  •  albuterol (PROVENTIL) nebulizer solution 0.083% 2.5 mg/3mL, 2.5 mg, Nebulization, BID - RT, Kolton Stewart MD  •  allopurinol (ZYLOPRIM) tablet 100 mg, 100 mg, Oral, Daily, Kolton Stewart MD  •  aspirin EC  tablet 81 mg, 81 mg, Oral, Daily, Kolton Stewart MD  •  atorvastatin (LIPITOR) tablet 40 mg, 40 mg, Oral, Daily, Kolton Stewart MD  •  docusate sodium (COLACE) capsule 200 mg, 200 mg, Oral, BID, JACK Gates  •  enoxaparin (LOVENOX) syringe 30 mg, 30 mg, Subcutaneous, Q24H, Kamar Hardy MD  •  epoetin jean marie (EPOGEN,PROCRIT) injection 6,000 Units, 6,000 Units, Subcutaneous, PRN, Kamar Hardy MD  •  ferumoxytol (FERAHEME) 510 mg in sodium chloride 0.9 % 67 mL IVPB, 510 mg, Intravenous, Once, JACK Gates  •  gabapentin (NEURONTIN) capsule 100 mg, 100 mg, Oral, Daily, Kolton Stewart MD  •  heparin (porcine) 5000 UNIT/ML injection 4,000 Units, 4,000 Units, Subcutaneous, Once, Richard Bye MD  •  heparin (porcine) injection 1,800 Units, 1,800 Units, Intracatheter, PRN, Kamar Hardy MD, 1,800 Units at 06/23/17 1111  •  heparin (porcine) injection 1,800 Units, 1,800 Units, Intracatheter, PRN, Kamar Hardy MD, 1,800 Units at 06/23/17 1110  •  heparin (porcine) injection 1,800 Units, 1,800 Units, Intravenous, Once per day on Mon Wed Fri **AND** heparin (porcine) injection 1,800 Units, 1,800 Units, Intravenous, Once per day on Mon Wed Fri, Richard Bey MD  •  heparin (porcine) injection 5,000 Units, 5,000 Units, Intravenous, Once, Kamar Hardy MD  •  HYDROcodone-acetaminophen (NORCO) 7.5-325 MG per tablet 1 tablet, 1 tablet, Oral, Q6H PRN, Kolton Stewart MD  •  insulin detemir (LEVEMIR) injection 35 Units, 35 Units, Subcutaneous, Nightly, JACK Gates  •  insulin lispro (humaLOG) injection 0-14 Units, 0-14 Units, Subcutaneous, 4x Daily AC & at Bedtime, Kolton Stewart MD  •  lactated ringers infusion, 100 mL/hr, Intravenous, Continuous, Gato Mccullough MD, Last Rate: 100 mL/hr at 06/30/17 1508, 100 mL/hr at 06/30/17 1508  •  lactulose solution 20 g, 20 g, Oral, Once, JACK Gates  •  lactulose solution 20 g, 20  g, Oral, BID PRN, JACK Gates  •  levothyroxine (SYNTHROID, LEVOTHROID) tablet 100 mcg, 100 mcg, Oral, Q AM, Kolton Stewart MD  •  Linaclotide capsule 145 mcg, 145 mcg, Oral, Daily, JACK Gates  •  linagliptin (TRADJENTA) tablet 5 mg, 5 mg, Oral, Daily, Kolton Stewart MD  •  metoprolol tartrate (LOPRESSOR) tablet 50 mg, 50 mg, Oral, Q12H, Kolton Stewart MD  •  miconazole (MICOTIN) 2 % powder, , Topical, BID, Kolton Stewart MD  •  midodrine (PROAMATINE) tablet 10 mg, 10 mg, Oral, PRN, Kamar Hardy MD, 10 mg at 06/26/17 0940  •  midodrine (PROAMATINE) tablet 5 mg, 5 mg, Oral, TID AC, Kolton Stewart MD  •  ondansetron (ZOFRAN) injection 4 mg, 4 mg, Intravenous, PRN, Richard Bey MD  •  ondansetron ODT (ZOFRAN-ODT) disintegrating tablet 4 mg, 4 mg, Oral, Q8H PRN, Kolton Stewart MD  •  pantoprazole (PROTONIX) EC tablet 40 mg, 40 mg, Oral, QAM AC, Kolton Stewart MD  •  sucralfate (CARAFATE) 1 GM/10ML suspension 1 g, 1 g, Oral, 4x Daily With Meals & Nightly, Kolton Stewart MD  •  vitamin D (ERGOCALCIFEROL) capsule 50,000 Units, 50,000 Units, Oral, Q7 Days, Kolton Stewart MD    Assessment/Plan:   #1. Left proximal ureteral calculus  #2. Pyonephrosis  Await cultures  Eventual ureteroscopy  Doubt left kidney will recover its baseline function but possible  Discussed with patient.     Tony Glasgow MD  07/01/17  9:21 AM

## 2017-07-02 LAB
ANION GAP SERPL CALCULATED.3IONS-SCNC: 18 MMOL/L (ref 4–13)
BACTERIA SPEC AEROBE CULT: ABNORMAL
BASOPHILS # BLD AUTO: 0.04 10*3/MM3 (ref 0–0.2)
BASOPHILS NFR BLD AUTO: 0.3 % (ref 0–2)
BUN BLD-MCNC: 46 MG/DL (ref 5–21)
BUN/CREAT SERPL: 8.7 (ref 7–25)
CALCIUM SPEC-SCNC: 8.8 MG/DL (ref 8.4–10.4)
CHLORIDE SERPL-SCNC: 98 MMOL/L (ref 98–110)
CO2 SERPL-SCNC: 23 MMOL/L (ref 24–31)
CREAT BLD-MCNC: 5.28 MG/DL (ref 0.5–1.4)
DEPRECATED RDW RBC AUTO: 65 FL (ref 40–54)
EOSINOPHIL # BLD AUTO: 0.26 10*3/MM3 (ref 0–0.7)
EOSINOPHIL NFR BLD AUTO: 1.8 % (ref 0–4)
ERYTHROCYTE [DISTWIDTH] IN BLOOD BY AUTOMATED COUNT: 18.9 % (ref 12–15)
GFR SERPL CREATININE-BSD FRML MDRD: 8 ML/MIN/1.73
GLUCOSE BLD-MCNC: 145 MG/DL (ref 70–100)
GLUCOSE BLDC GLUCOMTR-MCNC: 123 MG/DL (ref 70–130)
GLUCOSE BLDC GLUCOMTR-MCNC: 137 MG/DL (ref 70–130)
GLUCOSE BLDC GLUCOMTR-MCNC: 155 MG/DL (ref 70–130)
GLUCOSE BLDC GLUCOMTR-MCNC: 168 MG/DL (ref 70–130)
HCT VFR BLD AUTO: 31.8 % (ref 37–47)
HGB BLD-MCNC: 9.5 G/DL (ref 12–16)
IMM GRANULOCYTES # BLD: 0.31 10*3/MM3 (ref 0–0.03)
IMM GRANULOCYTES NFR BLD: 2.2 % (ref 0–5)
LYMPHOCYTES # BLD AUTO: 1.3 10*3/MM3 (ref 0.72–4.86)
LYMPHOCYTES NFR BLD AUTO: 9.1 % (ref 15–45)
MCH RBC QN AUTO: 28.4 PG (ref 28–32)
MCHC RBC AUTO-ENTMCNC: 29.9 G/DL (ref 33–36)
MCV RBC AUTO: 94.9 FL (ref 82–98)
MONOCYTES # BLD AUTO: 1.34 10*3/MM3 (ref 0.19–1.3)
MONOCYTES NFR BLD AUTO: 9.4 % (ref 4–12)
NEUTROPHILS # BLD AUTO: 10.97 10*3/MM3 (ref 1.87–8.4)
NEUTROPHILS NFR BLD AUTO: 77.2 % (ref 39–78)
NRBC BLD MANUAL-RTO: 0 /100 WBC (ref 0–0)
PLATELET # BLD AUTO: 200 10*3/MM3 (ref 130–400)
PMV BLD AUTO: 9.6 FL (ref 6–12)
POTASSIUM BLD-SCNC: 4.6 MMOL/L (ref 3.5–5.3)
RBC # BLD AUTO: 3.35 10*6/MM3 (ref 4.2–5.4)
SODIUM BLD-SCNC: 139 MMOL/L (ref 135–145)
WBC NRBC COR # BLD: 14.22 10*3/MM3 (ref 4.8–10.8)

## 2017-07-02 PROCEDURE — 85025 COMPLETE CBC W/AUTO DIFF WBC: CPT | Performed by: INTERNAL MEDICINE

## 2017-07-02 PROCEDURE — 82962 GLUCOSE BLOOD TEST: CPT

## 2017-07-02 PROCEDURE — 97110 THERAPEUTIC EXERCISES: CPT

## 2017-07-02 PROCEDURE — 97535 SELF CARE MNGMENT TRAINING: CPT

## 2017-07-02 PROCEDURE — 25010000002 ERTAPENEM PER 500 MG: Performed by: UROLOGY

## 2017-07-02 PROCEDURE — 80048 BASIC METABOLIC PNL TOTAL CA: CPT | Performed by: INTERNAL MEDICINE

## 2017-07-02 NOTE — PROGRESS NOTES
Nephrology ([facility]) Progress Note      Patient:  Cecy Panchal  YOB: 1950  Date of Service: 7/2/2017  MRN: [id number]   Acct: [id number]   Primary Care Physician: JACK Fuentes  Advance Directive: No Order  Admit Date: 6/19/2017       Hospital Day: 0  Referring Provider: Kolton Stewart MD      Patient personally seen and examined.  Complete chart including Consults, Notes, Operative Reports, Labs, Cardiology, and Radiology studies reviewed as able.        Subjective:  Cecy Panchal is a 66 y.o. female  whom we were consulted for AVTAR/CKD.  Admitted to [facility] 6/1/17 for soa/FREEMAN.  Started HD 6/9.  Admitted to LTAC 6/19.  No overnight issues.  Told she has pyelonephritis and unlikely to regain renal function on infected kidney.  No new issues today.    T- 98.4  P- 72  R- 18  BP- 140/60  O2- 98          Allergies:  Dilaudid [hydromorphone hcl]    Home Meds:  No prescriptions prior to admission.       Medicines:  Current Facility-Administered Medications   Medication Dose Route Frequency Provider Last Rate Last Dose   • albuterol (PROVENTIL) nebulizer solution 0.083% 2.5 mg/3mL  2.5 mg Nebulization Q4H PRN Kolton Stewart MD       • albuterol (PROVENTIL) nebulizer solution 0.083% 2.5 mg/3mL  2.5 mg Nebulization BID - RT Kolton Stewart MD       • allopurinol (ZYLOPRIM) tablet 100 mg  100 mg Oral Daily Kolton Stewart MD       • aspirin EC tablet 81 mg  81 mg Oral Daily Kolton Stewart MD       • atorvastatin (LIPITOR) tablet 40 mg  40 mg Oral Daily Kolton Stewart MD       • docusate sodium (COLACE) capsule 200 mg  200 mg Oral BID JACK Gates       • enoxaparin (LOVENOX) syringe 30 mg  30 mg Subcutaneous Q24H Kamar Hardy MD       • epoetin jean marie (EPOGEN,PROCRIT) injection 6,000 Units  6,000 Units Subcutaneous PRN Kamar Hardy MD       • ertapenem (INVanz) 500 mg in sodium chloride 0.9 % 50 mL IVPB  500 mg  Intravenous Q24H Tony Glasgow MD       • ferumoxytol (FERAHEME) 510 mg in sodium chloride 0.9 % 67 mL IVPB  510 mg Intravenous Once JACK Gates       • gabapentin (NEURONTIN) capsule 100 mg  100 mg Oral Daily Kolton Stewart MD       • heparin (porcine) 5000 UNIT/ML injection 4,000 Units  4,000 Units Subcutaneous Once Richard Bey MD       • heparin (porcine) injection 1,800 Units  1,800 Units Intracatheter PRN Kamar Hardy MD   1,800 Units at 06/23/17 1111   • heparin (porcine) injection 1,800 Units  1,800 Units Intracatheter PRN Kamar Hardy MD   1,800 Units at 06/23/17 1110   • heparin (porcine) injection 1,800 Units  1,800 Units Intravenous Once per day on Mon Wed Fri Richard Bey MD        And   • heparin (porcine) injection 1,800 Units  1,800 Units Intravenous Once per day on Mon Wed Fri Richard Bey MD       • heparin (porcine) injection 5,000 Units  5,000 Units Intravenous Once Kamar Hardy MD       • HYDROcodone-acetaminophen (NORCO) 7.5-325 MG per tablet 1 tablet  1 tablet Oral Q6H PRN Kolton Stewart MD       • insulin detemir (LEVEMIR) injection 35 Units  35 Units Subcutaneous Nightly JACK Gates       • insulin lispro (humaLOG) injection 0-14 Units  0-14 Units Subcutaneous 4x Daily AC & at Bedtime Kolton Stewart MD       • lactated ringers infusion  100 mL/hr Intravenous Continuous Gato Mccullough  mL/hr at 06/30/17 1508 100 mL/hr at 06/30/17 1508   • lactulose solution 20 g  20 g Oral Once JACK Gates       • lactulose solution 20 g  20 g Oral BID PRN JACK Gates       • levothyroxine (SYNTHROID, LEVOTHROID) tablet 100 mcg  100 mcg Oral Q AM Kolton Stewart MD       • Linaclotide capsule 145 mcg  145 mcg Oral Daily JACK Gates       • linagliptin (TRADJENTA) tablet 5 mg  5 mg Oral Daily Kolton Stewart MD       • metoprolol tartrate (LOPRESSOR)  tablet 50 mg  50 mg Oral Q12H Kolton Stewart MD       • miconazole (MICOTIN) 2 % powder   Topical BID Kolton Stewart MD       • midodrine (PROAMATINE) tablet 10 mg  10 mg Oral PRN Kamar Hardy MD   10 mg at 06/26/17 0940   • midodrine (PROAMATINE) tablet 5 mg  5 mg Oral TID AC Kolton Stewart MD       • ondansetron (ZOFRAN) injection 4 mg  4 mg Intravenous PRN Richard Bey MD       • ondansetron ODT (ZOFRAN-ODT) disintegrating tablet 4 mg  4 mg Oral Q8H PRN Kolton Stewart MD       • pantoprazole (PROTONIX) EC tablet 40 mg  40 mg Oral QAM AC Kolton Stewart MD       • sucralfate (CARAFATE) 1 GM/10ML suspension 1 g  1 g Oral 4x Daily With Meals & Nightly Kolton Stewart MD       • vitamin D (ERGOCALCIFEROL) capsule 50,000 Units  50,000 Units Oral Q7 Days Kolton Stewart MD           Past Medical History:  No past medical history on file.    Past Surgical History:  Past Surgical History:   Procedure Laterality Date   • CYSTOSCOPY RETROGRADE PYELOGRAM Right 6/30/2017    Procedure: CYSTOSCOPY, RETROGRADE PYELOGRAM AND STENT INSERTION, RIGHT;  Surgeon: Tony Glasgow MD;  Location: [facility] OR;  Service:        Family History  No family history on file.    Social History  Social History     Social History   • Marital status:      Spouse name: N/A   • Number of children: N/A   • Years of education: N/A     Occupational History   • Not on file.     Social History Main Topics   • Smoking status: Not on file   • Smokeless tobacco: Not on file   • Alcohol use Not on file   • Drug use: Not on file   • Sexual activity: Not on file     Other Topics Concern   • Not on file     Social History Narrative   • No narrative on file         Review of Systems:  History obtained from chart review and the patient  General ROS: No fever or chills  Respiratory ROS: + cough, shortness of breath  Cardiovascular ROS: no chest pain or dyspnea on exertion  Gastrointestinal ROS: No  "abdominal pain or melena  Genito-Urinary ROS: No dysuria or hematuria  14 point ROS reviewed with the patient and negative except as noted above and in the HPI unless unable to obtain.    Objective:  /69  Pulse 86  Temp 98 °F (36.7 °C) (Temporal Artery )   Resp 15  Ht 60\" (152.4 cm)  Wt 270 lb 4.8 oz (123 kg)  LMP Comment: menopause  SpO2 98%  BMI 52.79 kg/m2  No intake or output data in the 24 hours ending 07/02/17 1134  General: awake/alert   Chest:  clear to auscultation bilaterally without respiratory distress  CVS: regular rate and rhythm  Abdominal: soft, nontender, normal bowel sounds, morbid obesity  Extremities: ble edema- improved  Skin: warm/dry      Labs:    Results from last 7 days  Lab Units 07/02/17  0631 07/01/17  0641 06/30/17  0733   WBC 10*3/mm3 14.22* 14.04* 11.41*   HEMOGLOBIN g/dL 9.5* 9.0* 7.9*   HEMATOCRIT % 31.8* 30.2* 26.3*   PLATELETS 10*3/mm3 200 275 250           Results from last 7 days  Lab Units 07/02/17  0431 07/01/17  0641 06/30/17  0733   SODIUM mmol/L 139 137 135   POTASSIUM mmol/L 4.6 4.9 4.5   CHLORIDE mmol/L 98 98 96*   CO2 mmol/L 23.0* 28.0 28.0   BUN mg/dL 46* 29* 35*   CREATININE mg/dL 5.28* 4.21* 5.59*   CALCIUM mg/dL 8.8 8.9 8.6   GLUCOSE mg/dL 145* 237* 90       Radiology:   Imaging Results (last 72 hours)     ** No results found for the last 72 hours. **          Culture:         Assessment     AVTAR/ATN  CKD 4- DM Nephropathy  Anemia  Acute/chronic hypoxic respiratory failure  Chronic diastolic CHF  DM foot wound  Hydronephrosis/pyelonephritis    Plan:  HD MWF prn, increase UF goal as tolerated  Appreciate Urology consult  Invanz after discussion with urology    Richard Bey MD  7/2/2017  11:34 AM      "

## 2017-07-03 LAB
ANION GAP SERPL CALCULATED.3IONS-SCNC: 13 MMOL/L (ref 4–13)
BASOPHILS # BLD AUTO: 0.05 10*3/MM3 (ref 0–0.2)
BASOPHILS NFR BLD AUTO: 0.4 % (ref 0–2)
BUN BLD-MCNC: 58 MG/DL (ref 5–21)
BUN/CREAT SERPL: 10.6 (ref 7–25)
CALCIUM SPEC-SCNC: 8.7 MG/DL (ref 8.4–10.4)
CHLORIDE SERPL-SCNC: 101 MMOL/L (ref 98–110)
CO2 SERPL-SCNC: 26 MMOL/L (ref 24–31)
CREAT BLD-MCNC: 5.48 MG/DL (ref 0.5–1.4)
CRP SERPL-MCNC: 2.29 MG/DL (ref 0–0.99)
DEPRECATED RDW RBC AUTO: 66.3 FL (ref 40–54)
EOSINOPHIL # BLD AUTO: 0.19 10*3/MM3 (ref 0–0.7)
EOSINOPHIL NFR BLD AUTO: 1.6 % (ref 0–4)
ERYTHROCYTE [DISTWIDTH] IN BLOOD BY AUTOMATED COUNT: 19 % (ref 12–15)
ERYTHROCYTE [SEDIMENTATION RATE] IN BLOOD: 25 MM/HR (ref 0–20)
GFR SERPL CREATININE-BSD FRML MDRD: 8 ML/MIN/1.73
GLUCOSE BLD-MCNC: 71 MG/DL (ref 70–100)
GLUCOSE BLDC GLUCOMTR-MCNC: 139 MG/DL (ref 70–130)
GLUCOSE BLDC GLUCOMTR-MCNC: 81 MG/DL (ref 70–130)
GLUCOSE BLDC GLUCOMTR-MCNC: 96 MG/DL (ref 70–130)
HCT VFR BLD AUTO: 29.4 % (ref 37–47)
HGB BLD-MCNC: 8.8 G/DL (ref 12–16)
IMM GRANULOCYTES # BLD: 0.26 10*3/MM3 (ref 0–0.03)
IMM GRANULOCYTES NFR BLD: 2.3 % (ref 0–5)
LYMPHOCYTES # BLD AUTO: 1.73 10*3/MM3 (ref 0.72–4.86)
LYMPHOCYTES NFR BLD AUTO: 15 % (ref 15–45)
MCH RBC QN AUTO: 28.7 PG (ref 28–32)
MCHC RBC AUTO-ENTMCNC: 29.9 G/DL (ref 33–36)
MCV RBC AUTO: 95.8 FL (ref 82–98)
MONOCYTES # BLD AUTO: 1.39 10*3/MM3 (ref 0.19–1.3)
MONOCYTES NFR BLD AUTO: 12 % (ref 4–12)
NEUTROPHILS # BLD AUTO: 7.93 10*3/MM3 (ref 1.87–8.4)
NEUTROPHILS NFR BLD AUTO: 68.7 % (ref 39–78)
NT-PROBNP SERPL-MCNC: ABNORMAL PG/ML (ref 0–900)
PLATELET # BLD AUTO: 309 10*3/MM3 (ref 130–400)
PMV BLD AUTO: 9.5 FL (ref 6–12)
POTASSIUM BLD-SCNC: 4.3 MMOL/L (ref 3.5–5.3)
RBC # BLD AUTO: 3.07 10*6/MM3 (ref 4.2–5.4)
SODIUM BLD-SCNC: 140 MMOL/L (ref 135–145)
WBC NRBC COR # BLD: 11.55 10*3/MM3 (ref 4.8–10.8)

## 2017-07-03 PROCEDURE — 97168 OT RE-EVAL EST PLAN CARE: CPT

## 2017-07-03 PROCEDURE — 85025 COMPLETE CBC W/AUTO DIFF WBC: CPT | Performed by: INTERNAL MEDICINE

## 2017-07-03 PROCEDURE — 97530 THERAPEUTIC ACTIVITIES: CPT

## 2017-07-03 PROCEDURE — 25010000002 ONDANSETRON PER 1 MG: Performed by: INTERNAL MEDICINE

## 2017-07-03 PROCEDURE — 25010000002 HEPARIN (PORCINE) PER 1000 UNITS: Performed by: INTERNAL MEDICINE

## 2017-07-03 PROCEDURE — 97605 NEG PRS WND THER DME<=50SQCM: CPT

## 2017-07-03 PROCEDURE — 97164 PT RE-EVAL EST PLAN CARE: CPT

## 2017-07-03 PROCEDURE — 25010000002 ERTAPENEM PER 500 MG: Performed by: UROLOGY

## 2017-07-03 PROCEDURE — 80048 BASIC METABOLIC PNL TOTAL CA: CPT | Performed by: INTERNAL MEDICINE

## 2017-07-03 PROCEDURE — 86140 C-REACTIVE PROTEIN: CPT | Performed by: INTERNAL MEDICINE

## 2017-07-03 PROCEDURE — 83880 ASSAY OF NATRIURETIC PEPTIDE: CPT | Performed by: INTERNAL MEDICINE

## 2017-07-03 PROCEDURE — 25010000002 ENOXAPARIN PER 10 MG: Performed by: INTERNAL MEDICINE

## 2017-07-03 PROCEDURE — 85651 RBC SED RATE NONAUTOMATED: CPT | Performed by: INTERNAL MEDICINE

## 2017-07-03 PROCEDURE — 82962 GLUCOSE BLOOD TEST: CPT

## 2017-07-03 RX ORDER — HEPARIN SODIUM 5000 [USP'U]/ML
4000 INJECTION, SOLUTION INTRAVENOUS; SUBCUTANEOUS ONCE
Status: DISCONTINUED | OUTPATIENT
Start: 2017-07-03 | End: 2017-07-11

## 2017-07-04 LAB
GLUCOSE BLDC GLUCOMTR-MCNC: 107 MG/DL (ref 70–130)
GLUCOSE BLDC GLUCOMTR-MCNC: 135 MG/DL (ref 70–130)
GLUCOSE BLDC GLUCOMTR-MCNC: 139 MG/DL (ref 70–130)
GLUCOSE BLDC GLUCOMTR-MCNC: 143 MG/DL (ref 70–130)
GLUCOSE BLDC GLUCOMTR-MCNC: 95 MG/DL (ref 70–130)

## 2017-07-04 PROCEDURE — 97607 NEG PRS WND THR NDME<=50SQCM: CPT

## 2017-07-04 PROCEDURE — 25010000002 ONDANSETRON PER 1 MG: Performed by: INTERNAL MEDICINE

## 2017-07-04 PROCEDURE — 25010000002 ERTAPENEM PER 500 MG: Performed by: UROLOGY

## 2017-07-04 PROCEDURE — 97535 SELF CARE MNGMENT TRAINING: CPT | Performed by: OCCUPATIONAL THERAPIST

## 2017-07-04 PROCEDURE — 97110 THERAPEUTIC EXERCISES: CPT | Performed by: OCCUPATIONAL THERAPIST

## 2017-07-04 PROCEDURE — 97110 THERAPEUTIC EXERCISES: CPT

## 2017-07-04 PROCEDURE — 82962 GLUCOSE BLOOD TEST: CPT

## 2017-07-04 PROCEDURE — 25010000002 ENOXAPARIN PER 10 MG: Performed by: INTERNAL MEDICINE

## 2017-07-04 PROCEDURE — 97530 THERAPEUTIC ACTIVITIES: CPT

## 2017-07-04 RX ORDER — PROMETHAZINE HYDROCHLORIDE 25 MG/ML
12.5 INJECTION, SOLUTION INTRAMUSCULAR; INTRAVENOUS EVERY 6 HOURS PRN
Status: DISCONTINUED | OUTPATIENT
Start: 2017-07-04 | End: 2017-07-13 | Stop reason: HOSPADM

## 2017-07-04 RX ORDER — SUCRALFATE 1 G/1
1 TABLET ORAL
Status: DISCONTINUED | OUTPATIENT
Start: 2017-07-04 | End: 2017-07-13 | Stop reason: HOSPADM

## 2017-07-04 NOTE — PROGRESS NOTES
"Adult Nutrition  Assessment/PES    Patient Name:  Cecy Panchal  YOB: 1950  MRN: [id number]  Admit Date:  6/19/2017    Assessment Date:  7/4/2017        Reason for Assessment       07/04/17 0849    Reason for Assessment    Reason For Assessment/Visit follow up protocol    Cardiac CHF;CAD;HTN    Endocrine DM Type 2    Gastrointestinal GERD/Reflux    Hematological Anemia    Ortho Osteoarthritis    Pulmonary/Critical Care COPD;Obstructive sleep apnea    Renal CKD;Hemodialysis   Stage IV    Skin Pressure ulcer   DM foot ulcer              Nutrition/Diet History       07/04/17 0901    Nutrition/Diet History    Patient Reported Diet/Restrictions/Preferences carbohydrate counting    Reported/Observed By Patient    Appetite Fair    Reported GI Symptoms Nausea   Nausea on 7/3/17 but improved today    Poor Intake of --   Salad    Best Intake of Fruit;Breakfast            Anthropometrics       07/04/17 0851    Anthropometrics    Height 152.4 cm (60\")    RD Documented Current Weight  119 kg (262 lb 5.6 oz)    Ideal Body Weight (IBW)    Ideal Body Weight (IBW), Female 46.26    Body Mass Index (BMI)    BMI Grade greater than 40 - obesity grade III            Labs/Tests/Procedures/Meds       07/04/17 0851    Labs/Tests/Procedures/Meds    Diagnostic Test/Procedure Review reviewed    Labs/Tests Review Reviewed;Glucose;BUN;Creat;GFR;Hgb Hct    Medication Review Reviewed, pertinent    Significant Vitals reviewed            Physical Findings       07/04/17 0852    Physical Findings/Assessment    Additional Documentation Physical Appearance (Group)    Physical Appearance    Overall Physical Appearance obese;loss of muscle mass    Skin non-healing wound(s)   DM foot ulcer            Estimated/Assessed Needs       07/04/17 0855    Estimated/Assessed Protein Needs    Weight Used for Protein Calculation 119 kg (262 lb 5.6 oz)    Protein (gm/kg) 0.8   Increased pro due to HD on MWF    0.8 Gm Protein (gm) 95.2    Estimated " Protein Range             Nutrition Prescription Ordered       07/04/17 0854    Nutrition Prescription PO    Common Modifiers Consistent Carbohydrate            Evaluation of Received Nutrient/Fluid Intake       07/04/17 0858    Evaluation of Received Nutrient/Fluid Intake    Nutrition Delivered --   N/A at time of rounds.     PO Evaluation    Number of Meals 2    % PO Intake 10              Problem/Interventions:        Problem 1       07/04/17 0856    Nutrition Diagnoses Problem 1    Problem 1 Increased Nutrient Needs    Macronutrient Kcal;Protein    Etiology (related to) Medical Diagnosis    Renal Hemodialysis;CKD   Stage IV    Skin Non healing wound   DM foot ulcer    Signs/Symptoms (evidenced by) PO Intake;Report of Mnimal PO Intake   To aid in wound healing and for HD on MWF    Percent (%) intake recorded 10 %    Over number of meals 2                    Intervention Goal       07/04/17 0858    Intervention Goal    General Reduce/improve symptoms;Meet nutritional needs for age/condition;Maintain nutrition    PO Increase intake;Establish PO;Meet estimated needs    Weight Appropriate weight loss            Nutrition Intervention       07/04/17 0858    Nutrition Intervention    RD/Tech Action Encourage intake;Follow Tx progress;Interview for preference            Nutrition Prescription       07/04/17 0903    Nutrition Prescription PO    PO Prescription Begin/change supplement    Supplement Presley    Supplement Frequency 2 times a day            Education/Evaluation       07/04/17 0858    Monitor/Evaluation    Monitor Per protocol   No D/C needs noted at this time. RD will continue to follow pt in [facility].        Comments:      Electronically signed by:  Mine Beckwith RD  07/04/17 9:04 AM

## 2017-07-05 LAB
ALBUMIN SERPL-MCNC: 3.3 G/DL (ref 3.5–5)
ANION GAP SERPL CALCULATED.3IONS-SCNC: 14 MMOL/L (ref 4–13)
BASOPHILS # BLD AUTO: 0.1 10*3/MM3 (ref 0–0.2)
BASOPHILS NFR BLD AUTO: 0.8 % (ref 0–2)
BUN BLD-MCNC: 43 MG/DL (ref 5–21)
BUN/CREAT SERPL: 9.1 (ref 7–25)
CALCIUM SPEC-SCNC: 9.1 MG/DL (ref 8.4–10.4)
CHLORIDE SERPL-SCNC: 98 MMOL/L (ref 98–110)
CO2 SERPL-SCNC: 25 MMOL/L (ref 24–31)
CREAT BLD-MCNC: 4.72 MG/DL (ref 0.5–1.4)
DEPRECATED RDW RBC AUTO: 63.6 FL (ref 40–54)
EOSINOPHIL # BLD AUTO: 0.37 10*3/MM3 (ref 0–0.7)
EOSINOPHIL NFR BLD AUTO: 2.8 % (ref 0–4)
ERYTHROCYTE [DISTWIDTH] IN BLOOD BY AUTOMATED COUNT: 18.5 % (ref 12–15)
GFR SERPL CREATININE-BSD FRML MDRD: 9 ML/MIN/1.73
GLUCOSE BLD-MCNC: 100 MG/DL (ref 70–100)
GLUCOSE BLDC GLUCOMTR-MCNC: 193 MG/DL (ref 70–130)
GLUCOSE BLDC GLUCOMTR-MCNC: 235 MG/DL (ref 70–130)
GLUCOSE BLDC GLUCOMTR-MCNC: 96 MG/DL (ref 70–130)
GLUCOSE BLDC GLUCOMTR-MCNC: 98 MG/DL (ref 70–130)
HCT VFR BLD AUTO: 29.4 % (ref 37–47)
HGB BLD-MCNC: 8.9 G/DL (ref 12–16)
IMM GRANULOCYTES # BLD: 0.41 10*3/MM3 (ref 0–0.03)
IMM GRANULOCYTES NFR BLD: 3.1 % (ref 0–5)
LYMPHOCYTES # BLD AUTO: 1.86 10*3/MM3 (ref 0.72–4.86)
LYMPHOCYTES NFR BLD AUTO: 14.1 % (ref 15–45)
MCH RBC QN AUTO: 28.6 PG (ref 28–32)
MCHC RBC AUTO-ENTMCNC: 30.3 G/DL (ref 33–36)
MCV RBC AUTO: 94.5 FL (ref 82–98)
MONOCYTES # BLD AUTO: 1.37 10*3/MM3 (ref 0.19–1.3)
MONOCYTES NFR BLD AUTO: 10.4 % (ref 4–12)
NEUTROPHILS # BLD AUTO: 9.1 10*3/MM3 (ref 1.87–8.4)
NEUTROPHILS NFR BLD AUTO: 68.8 % (ref 39–78)
PHOSPHATE SERPL-MCNC: 7.2 MG/DL (ref 2.5–4.5)
PLATELET # BLD AUTO: 298 10*3/MM3 (ref 130–400)
PMV BLD AUTO: 9.7 FL (ref 6–12)
POTASSIUM BLD-SCNC: 4.1 MMOL/L (ref 3.5–5.3)
RBC # BLD AUTO: 3.11 10*6/MM3 (ref 4.2–5.4)
SODIUM BLD-SCNC: 137 MMOL/L (ref 135–145)
WBC NRBC COR # BLD: 13.21 10*3/MM3 (ref 4.8–10.8)

## 2017-07-05 PROCEDURE — 97116 GAIT TRAINING THERAPY: CPT

## 2017-07-05 PROCEDURE — 82962 GLUCOSE BLOOD TEST: CPT

## 2017-07-05 PROCEDURE — 97110 THERAPEUTIC EXERCISES: CPT

## 2017-07-05 PROCEDURE — 25010000002 ERTAPENEM PER 500 MG: Performed by: UROLOGY

## 2017-07-05 PROCEDURE — 85025 COMPLETE CBC W/AUTO DIFF WBC: CPT | Performed by: INTERNAL MEDICINE

## 2017-07-05 PROCEDURE — 25010000002 HEPARIN (PORCINE) PER 1000 UNITS: Performed by: INTERNAL MEDICINE

## 2017-07-05 PROCEDURE — 25010000002 ONDANSETRON PER 1 MG: Performed by: INTERNAL MEDICINE

## 2017-07-05 PROCEDURE — 25010000002 MORPHINE PER 10 MG: Performed by: INTERNAL MEDICINE

## 2017-07-05 PROCEDURE — 80069 RENAL FUNCTION PANEL: CPT | Performed by: INTERNAL MEDICINE

## 2017-07-05 PROCEDURE — 97535 SELF CARE MNGMENT TRAINING: CPT

## 2017-07-05 PROCEDURE — 97605 NEG PRS WND THER DME<=50SQCM: CPT

## 2017-07-05 RX ORDER — MORPHINE SULFATE 4 MG/ML
4 INJECTION, SOLUTION INTRAMUSCULAR; INTRAVENOUS ONCE
Status: DISCONTINUED | OUTPATIENT
Start: 2017-07-05 | End: 2017-07-11

## 2017-07-06 LAB
GLUCOSE BLDC GLUCOMTR-MCNC: 120 MG/DL (ref 70–130)
GLUCOSE BLDC GLUCOMTR-MCNC: 121 MG/DL (ref 70–130)
GLUCOSE BLDC GLUCOMTR-MCNC: 144 MG/DL (ref 70–130)
GLUCOSE BLDC GLUCOMTR-MCNC: 176 MG/DL (ref 70–130)

## 2017-07-06 PROCEDURE — 25010000002 ONDANSETRON PER 1 MG: Performed by: INTERNAL MEDICINE

## 2017-07-06 PROCEDURE — 25010000002 ENOXAPARIN PER 10 MG: Performed by: INTERNAL MEDICINE

## 2017-07-06 PROCEDURE — 97116 GAIT TRAINING THERAPY: CPT

## 2017-07-06 PROCEDURE — 97535 SELF CARE MNGMENT TRAINING: CPT

## 2017-07-06 PROCEDURE — 97110 THERAPEUTIC EXERCISES: CPT

## 2017-07-06 PROCEDURE — 82962 GLUCOSE BLOOD TEST: CPT

## 2017-07-07 LAB
ANION GAP SERPL CALCULATED.3IONS-SCNC: 14 MMOL/L (ref 4–13)
BASOPHILS # BLD AUTO: 0.06 10*3/MM3 (ref 0–0.2)
BASOPHILS NFR BLD AUTO: 0.4 % (ref 0–2)
BUN BLD-MCNC: 34 MG/DL (ref 5–21)
BUN/CREAT SERPL: 6.9 (ref 7–25)
CALCIUM SPEC-SCNC: 9.5 MG/DL (ref 8.4–10.4)
CHLORIDE SERPL-SCNC: 99 MMOL/L (ref 98–110)
CO2 SERPL-SCNC: 25 MMOL/L (ref 24–31)
CREAT BLD-MCNC: 4.94 MG/DL (ref 0.5–1.4)
DEPRECATED RDW RBC AUTO: 65.1 FL (ref 40–54)
EOSINOPHIL # BLD AUTO: 0.31 10*3/MM3 (ref 0–0.7)
EOSINOPHIL NFR BLD AUTO: 1.8 % (ref 0–4)
ERYTHROCYTE [DISTWIDTH] IN BLOOD BY AUTOMATED COUNT: 18.6 % (ref 12–15)
GFR SERPL CREATININE-BSD FRML MDRD: 9 ML/MIN/1.73
GLUCOSE BLD-MCNC: 149 MG/DL (ref 70–100)
GLUCOSE BLDC GLUCOMTR-MCNC: 115 MG/DL (ref 70–130)
GLUCOSE BLDC GLUCOMTR-MCNC: 132 MG/DL (ref 70–130)
GLUCOSE BLDC GLUCOMTR-MCNC: 151 MG/DL (ref 70–130)
GLUCOSE BLDC GLUCOMTR-MCNC: 92 MG/DL (ref 70–130)
HBV CORE IGM SERPL QL IA: NEGATIVE
HBV SURFACE AB SER QL: <5
HBV SURFACE AB SER RIA-ACNC: ABNORMAL
HBV SURFACE AG SERPL QL IA: NEGATIVE
HCT VFR BLD AUTO: 30 % (ref 37–47)
HGB BLD-MCNC: 8.7 G/DL (ref 12–16)
IMM GRANULOCYTES # BLD: 0.21 10*3/MM3 (ref 0–0.03)
IMM GRANULOCYTES NFR BLD: 1.2 % (ref 0–5)
LYMPHOCYTES # BLD AUTO: 1.46 10*3/MM3 (ref 0.72–4.86)
LYMPHOCYTES NFR BLD AUTO: 8.6 % (ref 15–45)
MCH RBC QN AUTO: 28.1 PG (ref 28–32)
MCHC RBC AUTO-ENTMCNC: 29 G/DL (ref 33–36)
MCV RBC AUTO: 96.8 FL (ref 82–98)
MONOCYTES # BLD AUTO: 1.15 10*3/MM3 (ref 0.19–1.3)
MONOCYTES NFR BLD AUTO: 6.8 % (ref 4–12)
NEUTROPHILS # BLD AUTO: 13.71 10*3/MM3 (ref 1.87–8.4)
NEUTROPHILS NFR BLD AUTO: 81.2 % (ref 39–78)
PLATELET # BLD AUTO: 290 10*3/MM3 (ref 130–400)
PMV BLD AUTO: 9.7 FL (ref 6–12)
POTASSIUM BLD-SCNC: 4.3 MMOL/L (ref 3.5–5.3)
RBC # BLD AUTO: 3.1 10*6/MM3 (ref 4.2–5.4)
SODIUM BLD-SCNC: 138 MMOL/L (ref 135–145)
WBC NRBC COR # BLD: 16.9 10*3/MM3 (ref 4.8–10.8)

## 2017-07-07 PROCEDURE — 80048 BASIC METABOLIC PNL TOTAL CA: CPT | Performed by: INTERNAL MEDICINE

## 2017-07-07 PROCEDURE — 87340 HEPATITIS B SURFACE AG IA: CPT | Performed by: INTERNAL MEDICINE

## 2017-07-07 PROCEDURE — 82962 GLUCOSE BLOOD TEST: CPT

## 2017-07-07 PROCEDURE — 86705 HEP B CORE ANTIBODY IGM: CPT | Performed by: INTERNAL MEDICINE

## 2017-07-07 PROCEDURE — 97607 NEG PRS WND THR NDME<=50SQCM: CPT

## 2017-07-07 PROCEDURE — 25010000002 HEPARIN (PORCINE) PER 1000 UNITS: Performed by: INTERNAL MEDICINE

## 2017-07-07 PROCEDURE — 97110 THERAPEUTIC EXERCISES: CPT

## 2017-07-07 PROCEDURE — 25010000002 ENOXAPARIN PER 10 MG: Performed by: INTERNAL MEDICINE

## 2017-07-07 PROCEDURE — 86706 HEP B SURFACE ANTIBODY: CPT | Performed by: INTERNAL MEDICINE

## 2017-07-07 PROCEDURE — 97530 THERAPEUTIC ACTIVITIES: CPT

## 2017-07-07 PROCEDURE — 97116 GAIT TRAINING THERAPY: CPT

## 2017-07-07 PROCEDURE — 99224 PR SBSQ OBSERVATION CARE/DAY 15 MINUTES: CPT | Performed by: UROLOGY

## 2017-07-07 PROCEDURE — 85025 COMPLETE CBC W/AUTO DIFF WBC: CPT | Performed by: INTERNAL MEDICINE

## 2017-07-07 NOTE — PROGRESS NOTES
Urology  Length of Stay: 0  Patient Care Team:  JACK Fuentes as PCP - General  JACK Fuentes as PCP - Family Medicine    Chief Complaint:  Kidney stone    Subjective     Interval History:   No flank pain. Urine is clearing but output poor.     Review of Systems:   Review of Systems   Constitutional: Negative for chills and fever.   Respiratory: Positive for shortness of breath.    Gastrointestinal: Negative for abdominal pain, anal bleeding and blood in stool.   Genitourinary: Negative for flank pain and hematuria.       Objective     Vital Signs       Physical Exam:  Physical Exam   Constitutional: She is oriented to person, place, and time. She appears well-developed. No distress.   Pulmonary/Chest: Effort normal.   Abdominal: Soft. She exhibits no distension and no mass. There is no tenderness. There is no rebound and no guarding. No hernia.   Neurological: She is alert and oriented to person, place, and time.   Skin: Skin is warm and dry. She is not diaphoretic.   Psychiatric: She has a normal mood and affect.   Vitals reviewed.       Results Review:       I reviewed the patient's new clinical results.  Lab Results (last 24 hours)     Procedure Component Value Units Date/Time    POC Glucose Fingerstick [[id number]]  (Normal) Collected:  07/06/17 1711    Specimen:  Blood Updated:  07/06/17 1725     Glucose 120 mg/dL       : ALIX Reyna TIffanyMeter ID: [id number]       POC Glucose Fingerstick [[id number]]  (Normal) Collected:  07/06/17 2130    Specimen:  Blood Updated:  07/06/17 2141     Glucose 121 mg/dL       : SNILOFF Niloff SeanMeter ID: [id number]       CBC & Differential [[id number]] Collected:  07/07/17 0513    Specimen:  Blood Updated:  07/07/17 0549    Narrative:       The following orders were created for panel order CBC & Differential.  Procedure                               Abnormality         Status                     ---------                                -----------         ------                     CBC Auto Differential[[id number]]        Abnormal            Final result                 Please view results for these tests on the individual orders.    CBC Auto Differential [[id number]]  (Abnormal) Collected:  07/07/17 0513    Specimen:  Blood Updated:  07/07/17 0549     WBC 16.90 (H) 10*3/mm3      RBC 3.10 (L) 10*6/mm3      Hemoglobin 8.7 (L) g/dL      Hematocrit 30.0 (L) %      MCV 96.8 fL      MCH 28.1 pg      MCHC 29.0 (L) g/dL      RDW 18.6 (H) %      RDW-SD 65.1 (H) fl      MPV 9.7 fL      Platelets 290 10*3/mm3      Neutrophil % 81.2 (H) %      Lymphocyte % 8.6 (L) %      Monocyte % 6.8 %      Eosinophil % 1.8 %      Basophil % 0.4 %      Immature Grans % 1.2 %      Neutrophils, Absolute 13.71 (H) 10*3/mm3      Lymphocytes, Absolute 1.46 10*3/mm3      Monocytes, Absolute 1.15 10*3/mm3      Eosinophils, Absolute 0.31 10*3/mm3      Basophils, Absolute 0.06 10*3/mm3      Immature Grans, Absolute 0.21 (H) 10*3/mm3     Basic Metabolic Panel [[id number]]  (Abnormal) Collected:  07/07/17 0513    Specimen:  Blood Updated:  07/07/17 0552     Glucose 149 (H) mg/dL      BUN 34 (H) mg/dL      Creatinine 4.94 (H) mg/dL      Sodium 138 mmol/L      Potassium 4.3 mmol/L      Chloride 99 mmol/L      CO2 25.0 mmol/L      Calcium 9.5 mg/dL      eGFR Non African Amer 9 (L) mL/min/1.73      BUN/Creatinine Ratio 6.9 (L)     Anion Gap 14.0 (H) mmol/L     Narrative:       GFR Normal >60  Chronic Kidney Disease <60  Kidney Failure <15    POC Glucose Fingerstick [[id number]]  (Normal) Collected:  07/07/17 0742    Specimen:  Blood Updated:  07/07/17 0753     Glucose 115 mg/dL       : RADHA Argueta ErbalwinderMeter ID: [id number]       Hepatitis B Surface Antigen [[id number]]  (Normal) Collected:  07/07/17 0513    Specimen:  Blood Updated:  07/07/17 0959     Hepatitis B Surface Ag Negative    Hepatitis B Core Antibody, IgM [[id number]]  (Normal) Collected:  07/07/17 0513    Specimen:   Blood Updated:  07/07/17 1004     Hep B C IgM Negative    Hepatitis B Surface Antibody [[id number]]  (Abnormal) Collected:  07/07/17 0513    Specimen:  Blood Updated:  07/07/17 1017     Hepatitis Bs Ab Index <5.00     Hep B S Ab Non-Immune (A)    POC Glucose Fingerstick [[id number]]  (Normal) Collected:  07/07/17 1142    Specimen:  Blood Updated:  07/07/17 1156     Glucose 92 mg/dL       : AMOSREI Kendall EricaMeter ID: [id number]           Imaging Results (last 24 hours)     ** No results found for the last 24 hours. **          Medication Review:     Current Facility-Administered Medications:   •  albuterol (PROVENTIL) nebulizer solution 0.083% 2.5 mg/3mL, 2.5 mg, Nebulization, Q4H PRN, Kolton Stewart MD  •  albuterol (PROVENTIL) nebulizer solution 0.083% 2.5 mg/3mL, 2.5 mg, Nebulization, BID - RT, Kolton Stewart MD  •  allopurinol (ZYLOPRIM) tablet 100 mg, 100 mg, Oral, Daily, Kolton Stewart MD  •  aspirin EC tablet 81 mg, 81 mg, Oral, Daily, Kolton Stewart MD  •  atorvastatin (LIPITOR) tablet 40 mg, 40 mg, Oral, Daily, Kolton Stewart MD  •  docusate sodium (COLACE) capsule 200 mg, 200 mg, Oral, BID, JACK Gates  •  enoxaparin (LOVENOX) syringe 30 mg, 30 mg, Subcutaneous, Q24H, Kamar Hardy MD  •  epoetin jean marie (EPOGEN,PROCRIT) injection 6,000 Units, 6,000 Units, Subcutaneous, PRN, Kamar Hardy MD  •  ferumoxytol (FERAHEME) 510 mg in sodium chloride 0.9 % 67 mL IVPB, 510 mg, Intravenous, Once, JACK Gates  •  gabapentin (NEURONTIN) capsule 100 mg, 100 mg, Oral, Daily, Kolton Stewart MD  •  heparin (porcine) 5000 UNIT/ML injection 4,000 Units, 4,000 Units, Subcutaneous, Once, Richard Bey MD  •  heparin (porcine) 5000 UNIT/ML injection 4,000 Units, 4,000 Units, Subcutaneous, Once, Osvaldo Dominique MD  •  heparin (porcine) injection 1,800 Units, 1,800 Units, Intracatheter, PRN, Kamar Hardy MD, 1,800 Units at 06/23/17  1111  •  heparin (porcine) injection 1,800 Units, 1,800 Units, Intracatheter, PRN, Kamar Hardy MD, 1,800 Units at 06/23/17 1110  •  heparin (porcine) injection 1,800 Units, 1,800 Units, Intravenous, Once per day on Mon Wed Fri **AND** heparin (porcine) injection 1,800 Units, 1,800 Units, Intravenous, Once per day on Mon Wed Fri, Richard Bey MD  •  heparin (porcine) injection 5,000 Units, 5,000 Units, Intravenous, Once, Kamar Hardy MD  •  HYDROcodone-acetaminophen (NORCO) 7.5-325 MG per tablet 1 tablet, 1 tablet, Oral, Q6H PRN, Kolton Stewart MD  •  insulin detemir (LEVEMIR) injection 35 Units, 35 Units, Subcutaneous, Nightly, JACK Gates  •  insulin lispro (humaLOG) injection 0-14 Units, 0-14 Units, Subcutaneous, 4x Daily AC & at Bedtime, Kolton Stewart MD  •  lactated ringers infusion, 100 mL/hr, Intravenous, Continuous, Gato Mccullough MD, Last Rate: 100 mL/hr at 06/30/17 1508, 100 mL/hr at 06/30/17 1508  •  lactulose solution 20 g, 20 g, Oral, Once, Shavonne Hernandez APRN  •  lactulose solution 20 g, 20 g, Oral, BID PRN, Shavonne Hernandez APRN  •  levothyroxine (SYNTHROID, LEVOTHROID) tablet 100 mcg, 100 mcg, Oral, Q AM, Kolton Stewart MD  •  Linaclotide capsule 145 mcg, 145 mcg, Oral, Daily, Shavonne Hernandez APRN  •  linagliptin (TRADJENTA) tablet 5 mg, 5 mg, Oral, Daily, Kolton Stewart MD  •  metoprolol tartrate (LOPRESSOR) tablet 50 mg, 50 mg, Oral, Q12H, Kolton Stewart MD  •  miconazole (MICOTIN) 2 % powder, , Topical, BID, Kolton Stewart MD  •  midodrine (PROAMATINE) tablet 10 mg, 10 mg, Oral, PRN, Kamar Hardy MD, 10 mg at 06/26/17 0940  •  midodrine (PROAMATINE) tablet 5 mg, 5 mg, Oral, TID AC, Kolton Stewart MD  •  Morphine sulfate (PF) injection 4 mg, 4 mg, Intravenous, Once, Kolton Stewart MD  •  ondansetron (ZOFRAN) injection 4 mg, 4 mg, Intravenous, PRN, Richard Bey MD  •  ondansetron  ODT (ZOFRAN-ODT) disintegrating tablet 4 mg, 4 mg, Oral, Q8H PRN, Kolton Stewart MD  •  pantoprazole (PROTONIX) EC tablet 40 mg, 40 mg, Oral, QAM AC, Kolton Stewart MD  •  promethazine (PHENERGAN) injection 12.5 mg, 12.5 mg, Intravenous, Q6H PRN, Kolton Stewart MD  •  sucralfate (CARAFATE) tablet 1 g, 1 g, Oral, 4x Daily AC & at Bedtime, Kolton Stewart MD  •  vitamin D (ERGOCALCIFEROL) capsule 50,000 Units, 50,000 Units, Oral, Q7 Days, Kolton Stewart MD    Assessment/Plan:   #1. Right Renal Calculus with obstruction  #2. Pyonephrosis - grew yeast on culture  Would recommend no further antibiotics.   Give fluconazole iv on Monday after dialysis.   OR Tuesday for stone.     Tony Glasgow MD  07/07/17  5:11 PM

## 2017-07-08 LAB
GLUCOSE BLDC GLUCOMTR-MCNC: 100 MG/DL (ref 70–130)
GLUCOSE BLDC GLUCOMTR-MCNC: 148 MG/DL (ref 70–130)
GLUCOSE BLDC GLUCOMTR-MCNC: 158 MG/DL (ref 70–130)
GLUCOSE BLDC GLUCOMTR-MCNC: 158 MG/DL (ref 70–130)

## 2017-07-08 PROCEDURE — 25010000002 ONDANSETRON PER 1 MG: Performed by: INTERNAL MEDICINE

## 2017-07-08 PROCEDURE — 25010000002 ENOXAPARIN PER 10 MG: Performed by: INTERNAL MEDICINE

## 2017-07-08 PROCEDURE — 82962 GLUCOSE BLOOD TEST: CPT

## 2017-07-08 PROCEDURE — 97110 THERAPEUTIC EXERCISES: CPT

## 2017-07-09 LAB
GLUCOSE BLDC GLUCOMTR-MCNC: 137 MG/DL (ref 70–130)
GLUCOSE BLDC GLUCOMTR-MCNC: 148 MG/DL (ref 70–130)
GLUCOSE BLDC GLUCOMTR-MCNC: 162 MG/DL (ref 70–130)
GLUCOSE BLDC GLUCOMTR-MCNC: 184 MG/DL (ref 70–130)

## 2017-07-09 PROCEDURE — 97607 NEG PRS WND THR NDME<=50SQCM: CPT

## 2017-07-09 PROCEDURE — 97116 GAIT TRAINING THERAPY: CPT

## 2017-07-09 PROCEDURE — 87086 URINE CULTURE/COLONY COUNT: CPT | Performed by: UROLOGY

## 2017-07-09 PROCEDURE — 97110 THERAPEUTIC EXERCISES: CPT

## 2017-07-09 PROCEDURE — 25010000002 ENOXAPARIN PER 10 MG: Performed by: INTERNAL MEDICINE

## 2017-07-09 PROCEDURE — 82962 GLUCOSE BLOOD TEST: CPT

## 2017-07-10 ENCOUNTER — TELEPHONE (OUTPATIENT)
Dept: VASCULAR SURGERY | Facility: CLINIC | Age: 67
End: 2017-07-10

## 2017-07-10 LAB
ALBUMIN SERPL-MCNC: 3.4 G/DL (ref 3.5–5)
ANION GAP SERPL CALCULATED.3IONS-SCNC: 14 MMOL/L (ref 4–13)
BASOPHILS # BLD AUTO: 0.06 10*3/MM3 (ref 0–0.2)
BASOPHILS NFR BLD AUTO: 0.5 % (ref 0–2)
BUN BLD-MCNC: 45 MG/DL (ref 5–21)
BUN/CREAT SERPL: 8.8 (ref 7–25)
CALCIUM SPEC-SCNC: 9.9 MG/DL (ref 8.4–10.4)
CHLORIDE SERPL-SCNC: 103 MMOL/L (ref 98–110)
CO2 SERPL-SCNC: 23 MMOL/L (ref 24–31)
CREAT BLD-MCNC: 5.14 MG/DL (ref 0.5–1.4)
DEPRECATED RDW RBC AUTO: 63.5 FL (ref 40–54)
EOSINOPHIL # BLD AUTO: 0.36 10*3/MM3 (ref 0–0.7)
EOSINOPHIL NFR BLD AUTO: 3.2 % (ref 0–4)
ERYTHROCYTE [DISTWIDTH] IN BLOOD BY AUTOMATED COUNT: 18.2 % (ref 12–15)
GFR SERPL CREATININE-BSD FRML MDRD: 8 ML/MIN/1.73
GLUCOSE BLD-MCNC: 156 MG/DL (ref 70–100)
GLUCOSE BLDC GLUCOMTR-MCNC: 142 MG/DL (ref 70–130)
GLUCOSE BLDC GLUCOMTR-MCNC: 151 MG/DL (ref 70–130)
GLUCOSE BLDC GLUCOMTR-MCNC: 173 MG/DL (ref 70–130)
GLUCOSE BLDC GLUCOMTR-MCNC: 205 MG/DL (ref 70–130)
HCT VFR BLD AUTO: 28.9 % (ref 37–47)
HGB BLD-MCNC: 8.6 G/DL (ref 12–16)
IMM GRANULOCYTES # BLD: 0.18 10*3/MM3 (ref 0–0.03)
IMM GRANULOCYTES NFR BLD: 1.6 % (ref 0–5)
LYMPHOCYTES # BLD AUTO: 1.3 10*3/MM3 (ref 0.72–4.86)
LYMPHOCYTES NFR BLD AUTO: 11.6 % (ref 15–45)
MCH RBC QN AUTO: 28.4 PG (ref 28–32)
MCHC RBC AUTO-ENTMCNC: 29.8 G/DL (ref 33–36)
MCV RBC AUTO: 95.4 FL (ref 82–98)
MONOCYTES # BLD AUTO: 1.01 10*3/MM3 (ref 0.19–1.3)
MONOCYTES NFR BLD AUTO: 9 % (ref 4–12)
NEUTROPHILS # BLD AUTO: 8.29 10*3/MM3 (ref 1.87–8.4)
NEUTROPHILS NFR BLD AUTO: 74.1 % (ref 39–78)
PHOSPHATE SERPL-MCNC: 6 MG/DL (ref 2.5–4.5)
PLATELET # BLD AUTO: 310 10*3/MM3 (ref 130–400)
PMV BLD AUTO: 9.9 FL (ref 6–12)
POTASSIUM BLD-SCNC: 4.3 MMOL/L (ref 3.5–5.3)
RBC # BLD AUTO: 3.03 10*6/MM3 (ref 4.2–5.4)
SODIUM BLD-SCNC: 140 MMOL/L (ref 135–145)
WBC NRBC COR # BLD: 11.2 10*3/MM3 (ref 4.8–10.8)

## 2017-07-10 PROCEDURE — 85025 COMPLETE CBC W/AUTO DIFF WBC: CPT | Performed by: INTERNAL MEDICINE

## 2017-07-10 PROCEDURE — 80069 RENAL FUNCTION PANEL: CPT | Performed by: INTERNAL MEDICINE

## 2017-07-10 PROCEDURE — 25010000002 HEPARIN (PORCINE) PER 1000 UNITS: Performed by: INTERNAL MEDICINE

## 2017-07-10 PROCEDURE — 25010000002 ENOXAPARIN PER 10 MG: Performed by: INTERNAL MEDICINE

## 2017-07-10 PROCEDURE — 97605 NEG PRS WND THER DME<=50SQCM: CPT

## 2017-07-10 PROCEDURE — 97535 SELF CARE MNGMENT TRAINING: CPT

## 2017-07-10 PROCEDURE — 82962 GLUCOSE BLOOD TEST: CPT

## 2017-07-10 PROCEDURE — 97110 THERAPEUTIC EXERCISES: CPT

## 2017-07-10 RX ORDER — FLUCONAZOLE 150 MG/1
150 TABLET ORAL EVERY 24 HOURS
Status: DISCONTINUED | OUTPATIENT
Start: 2017-07-10 | End: 2017-07-13 | Stop reason: HOSPADM

## 2017-07-10 NOTE — TELEPHONE ENCOUNTER
[facility]  [facility] WANTS TO KNOW IF DR COLLIER WILL TAKE ON PATIENT HERE IN THE OFFICE AFTER SHE IS DISCHARGED? CALL BACK # [phone]

## 2017-07-11 ENCOUNTER — ANESTHESIA (OUTPATIENT)
Dept: PERIOP | Facility: HOSPITAL | Age: 67
End: 2017-07-11

## 2017-07-11 ENCOUNTER — ANESTHESIA EVENT (OUTPATIENT)
Dept: PERIOP | Facility: HOSPITAL | Age: 67
End: 2017-07-11

## 2017-07-11 ENCOUNTER — APPOINTMENT (OUTPATIENT)
Dept: GENERAL RADIOLOGY | Facility: HOSPITAL | Age: 67
End: 2017-07-11

## 2017-07-11 VITALS
WEIGHT: 256.6 LBS | TEMPERATURE: 98 F | SYSTOLIC BLOOD PRESSURE: 107 MMHG | OXYGEN SATURATION: 94 % | HEART RATE: 104 BPM | HEIGHT: 60 IN | BODY MASS INDEX: 50.38 KG/M2 | DIASTOLIC BLOOD PRESSURE: 56 MMHG | RESPIRATION RATE: 16 BRPM

## 2017-07-11 LAB
BACTERIA SPEC AEROBE CULT: ABNORMAL
BACTERIA SPEC AEROBE CULT: ABNORMAL
GLUCOSE BLDC GLUCOMTR-MCNC: 137 MG/DL (ref 70–130)
GLUCOSE BLDC GLUCOMTR-MCNC: 204 MG/DL (ref 70–130)
GLUCOSE BLDC GLUCOMTR-MCNC: 91 MG/DL (ref 70–130)
GLUCOSE BLDC GLUCOMTR-MCNC: 95 MG/DL (ref 70–130)

## 2017-07-11 PROCEDURE — 25010000002 PHENYLEPHRINE PER 1 ML: Performed by: NURSE ANESTHETIST, CERTIFIED REGISTERED

## 2017-07-11 PROCEDURE — C1769 GUIDE WIRE: HCPCS | Performed by: UROLOGY

## 2017-07-11 PROCEDURE — 52352 CYSTOURETERO W/STONE REMOVE: CPT | Performed by: UROLOGY

## 2017-07-11 PROCEDURE — 52356 CYSTO/URETERO W/LITHOTRIPSY: CPT | Performed by: UROLOGY

## 2017-07-11 PROCEDURE — 97110 THERAPEUTIC EXERCISES: CPT

## 2017-07-11 PROCEDURE — 0 IOPAMIDOL 61 % SOLUTION: Performed by: UROLOGY

## 2017-07-11 PROCEDURE — C1894 INTRO/SHEATH, NON-LASER: HCPCS | Performed by: UROLOGY

## 2017-07-11 PROCEDURE — 25010000002 SUCCINYLCHOLINE PER 20 MG: Performed by: NURSE ANESTHETIST, CERTIFIED REGISTERED

## 2017-07-11 PROCEDURE — 25010000003 CEFAZOLIN PER 500 MG: Performed by: NURSE ANESTHETIST, CERTIFIED REGISTERED

## 2017-07-11 PROCEDURE — 97535 SELF CARE MNGMENT TRAINING: CPT

## 2017-07-11 PROCEDURE — 25010000002 ONDANSETRON PER 1 MG: Performed by: INTERNAL MEDICINE

## 2017-07-11 PROCEDURE — C2617 STENT, NON-COR, TEM W/O DEL: HCPCS | Performed by: UROLOGY

## 2017-07-11 PROCEDURE — 25010000002 PROPOFOL 10 MG/ML EMULSION: Performed by: NURSE ANESTHETIST, CERTIFIED REGISTERED

## 2017-07-11 PROCEDURE — 25010000002 MIDAZOLAM PER 1 MG: Performed by: ANESTHESIOLOGY

## 2017-07-11 PROCEDURE — C1758 CATHETER, URETERAL: HCPCS | Performed by: UROLOGY

## 2017-07-11 PROCEDURE — 74000 HC ABDOMEN KUB: CPT

## 2017-07-11 PROCEDURE — 82962 GLUCOSE BLOOD TEST: CPT

## 2017-07-11 PROCEDURE — 25010000002 MORPHINE PER 10 MG: Performed by: INTERNAL MEDICINE

## 2017-07-11 DEVICE — URETERAL STENT
Type: IMPLANTABLE DEVICE | Site: URETER | Status: FUNCTIONAL
Brand: PERCUFLEX™ PLUS

## 2017-07-11 RX ORDER — NALOXONE HCL 0.4 MG/ML
0.04 VIAL (ML) INJECTION AS NEEDED
Status: DISCONTINUED | OUTPATIENT
Start: 2017-07-11 | End: 2017-07-11 | Stop reason: HOSPADM

## 2017-07-11 RX ORDER — PHENYLEPHRINE HCL IN 0.9% NACL 0.8MG/10ML
SYRINGE (ML) INTRAVENOUS AS NEEDED
Status: DISCONTINUED | OUTPATIENT
Start: 2017-07-11 | End: 2017-07-11 | Stop reason: SURG

## 2017-07-11 RX ORDER — IPRATROPIUM BROMIDE AND ALBUTEROL SULFATE 2.5; .5 MG/3ML; MG/3ML
3 SOLUTION RESPIRATORY (INHALATION) ONCE AS NEEDED
Status: DISCONTINUED | OUTPATIENT
Start: 2017-07-11 | End: 2017-07-11 | Stop reason: HOSPADM

## 2017-07-11 RX ORDER — SUCCINYLCHOLINE CHLORIDE 20 MG/ML
INJECTION INTRAMUSCULAR; INTRAVENOUS AS NEEDED
Status: DISCONTINUED | OUTPATIENT
Start: 2017-07-11 | End: 2017-07-11 | Stop reason: SURG

## 2017-07-11 RX ORDER — VASOPRESSIN 20 U/ML
INJECTION PARENTERAL AS NEEDED
Status: DISCONTINUED | OUTPATIENT
Start: 2017-07-11 | End: 2017-07-11 | Stop reason: SURG

## 2017-07-11 RX ORDER — SODIUM CHLORIDE 0.9 % (FLUSH) 0.9 %
1-10 SYRINGE (ML) INJECTION AS NEEDED
Status: DISCONTINUED | OUTPATIENT
Start: 2017-07-11 | End: 2017-07-11 | Stop reason: HOSPADM

## 2017-07-11 RX ORDER — SODIUM CHLORIDE 9 MG/ML
50 INJECTION, SOLUTION INTRAVENOUS CONTINUOUS
Status: DISCONTINUED | OUTPATIENT
Start: 2017-07-11 | End: 2017-07-11

## 2017-07-11 RX ORDER — MIDAZOLAM HYDROCHLORIDE 1 MG/ML
1 INJECTION INTRAMUSCULAR; INTRAVENOUS
Status: DISCONTINUED | OUTPATIENT
Start: 2017-07-11 | End: 2017-07-11 | Stop reason: HOSPADM

## 2017-07-11 RX ORDER — MAGNESIUM HYDROXIDE 1200 MG/15ML
LIQUID ORAL AS NEEDED
Status: DISCONTINUED | OUTPATIENT
Start: 2017-07-11 | End: 2017-07-11 | Stop reason: HOSPADM

## 2017-07-11 RX ORDER — METOCLOPRAMIDE HYDROCHLORIDE 5 MG/ML
5 INJECTION INTRAMUSCULAR; INTRAVENOUS
Status: DISCONTINUED | OUTPATIENT
Start: 2017-07-11 | End: 2017-07-11 | Stop reason: HOSPADM

## 2017-07-11 RX ORDER — MEPERIDINE HYDROCHLORIDE 25 MG/ML
12.5 INJECTION INTRAMUSCULAR; INTRAVENOUS; SUBCUTANEOUS
Status: DISCONTINUED | OUTPATIENT
Start: 2017-07-11 | End: 2017-07-11 | Stop reason: HOSPADM

## 2017-07-11 RX ORDER — LIDOCAINE HYDROCHLORIDE 20 MG/ML
INJECTION, SOLUTION INFILTRATION; PERINEURAL AS NEEDED
Status: DISCONTINUED | OUTPATIENT
Start: 2017-07-11 | End: 2017-07-11 | Stop reason: SURG

## 2017-07-11 RX ORDER — MIDAZOLAM HYDROCHLORIDE 1 MG/ML
2 INJECTION INTRAMUSCULAR; INTRAVENOUS
Status: DISCONTINUED | OUTPATIENT
Start: 2017-07-11 | End: 2017-07-11 | Stop reason: HOSPADM

## 2017-07-11 RX ORDER — HYDROCODONE BITARTRATE AND ACETAMINOPHEN 7.5; 325 MG/1; MG/1
1 TABLET ORAL EVERY 6 HOURS PRN
Status: DISCONTINUED | OUTPATIENT
Start: 2017-07-11 | End: 2017-07-13 | Stop reason: HOSPADM

## 2017-07-11 RX ORDER — CEFAZOLIN SODIUM 1 G/3ML
INJECTION, POWDER, FOR SOLUTION INTRAMUSCULAR; INTRAVENOUS AS NEEDED
Status: DISCONTINUED | OUTPATIENT
Start: 2017-07-11 | End: 2017-07-11 | Stop reason: SURG

## 2017-07-11 RX ORDER — ONDANSETRON 2 MG/ML
4 INJECTION INTRAMUSCULAR; INTRAVENOUS AS NEEDED
Status: DISCONTINUED | OUTPATIENT
Start: 2017-07-11 | End: 2017-07-11 | Stop reason: HOSPADM

## 2017-07-11 RX ORDER — MORPHINE SULFATE 4 MG/ML
4 INJECTION, SOLUTION INTRAMUSCULAR; INTRAVENOUS EVERY 4 HOURS PRN
Status: DISPENSED | OUTPATIENT
Start: 2017-07-11 | End: 2017-07-12

## 2017-07-11 RX ORDER — PROPOFOL 10 MG/ML
VIAL (ML) INTRAVENOUS AS NEEDED
Status: DISCONTINUED | OUTPATIENT
Start: 2017-07-11 | End: 2017-07-11 | Stop reason: SURG

## 2017-07-11 RX ORDER — FLUMAZENIL 0.1 MG/ML
0.2 INJECTION INTRAVENOUS AS NEEDED
Status: DISCONTINUED | OUTPATIENT
Start: 2017-07-11 | End: 2017-07-11 | Stop reason: HOSPADM

## 2017-07-11 RX ORDER — SUFENTANIL CITRATE 50 UG/ML
INJECTION EPIDURAL; INTRAVENOUS AS NEEDED
Status: DISCONTINUED | OUTPATIENT
Start: 2017-07-11 | End: 2017-07-11 | Stop reason: SURG

## 2017-07-11 RX ORDER — SODIUM CHLORIDE, SODIUM LACTATE, POTASSIUM CHLORIDE, CALCIUM CHLORIDE 600; 310; 30; 20 MG/100ML; MG/100ML; MG/100ML; MG/100ML
100 INJECTION, SOLUTION INTRAVENOUS CONTINUOUS
Status: DISCONTINUED | OUTPATIENT
Start: 2017-07-11 | End: 2017-07-11

## 2017-07-11 RX ORDER — HYDRALAZINE HYDROCHLORIDE 20 MG/ML
5 INJECTION INTRAMUSCULAR; INTRAVENOUS
Status: DISCONTINUED | OUTPATIENT
Start: 2017-07-11 | End: 2017-07-11 | Stop reason: HOSPADM

## 2017-07-11 RX ORDER — LABETALOL HYDROCHLORIDE 5 MG/ML
5 INJECTION, SOLUTION INTRAVENOUS
Status: DISCONTINUED | OUTPATIENT
Start: 2017-07-11 | End: 2017-07-11 | Stop reason: HOSPADM

## 2017-07-11 RX ADMIN — Medication 160 MCG: at 16:13

## 2017-07-11 RX ADMIN — SUCCINYLCHOLINE CHLORIDE 100 MG: 20 INJECTION, SOLUTION INTRAMUSCULAR; INTRAVENOUS at 15:45

## 2017-07-11 RX ADMIN — SODIUM CHLORIDE 50 ML/HR: 9 INJECTION, SOLUTION INTRAVENOUS at 14:38

## 2017-07-11 RX ADMIN — SUFENTANIL CITRATE 10 MCG: 50 INJECTION, SOLUTION EPIDURAL; INTRAVENOUS at 15:51

## 2017-07-11 RX ADMIN — SUFENTANIL CITRATE 30 MCG: 50 INJECTION, SOLUTION EPIDURAL; INTRAVENOUS at 16:11

## 2017-07-11 RX ADMIN — LIDOCAINE HYDROCHLORIDE 100 MG: 20 INJECTION, SOLUTION INFILTRATION; PERINEURAL at 15:45

## 2017-07-11 RX ADMIN — CEFAZOLIN 2 G: 1 INJECTION, POWDER, FOR SOLUTION INTRAVENOUS at 15:52

## 2017-07-11 RX ADMIN — VASOPRESSIN 2 UNITS: 20 INJECTION INTRAVENOUS at 16:18

## 2017-07-11 RX ADMIN — PROPOFOL 100 MG: 10 INJECTION, EMULSION INTRAVENOUS at 15:45

## 2017-07-11 RX ADMIN — MIDAZOLAM HYDROCHLORIDE 2 MG: 1 INJECTION, SOLUTION INTRAMUSCULAR; INTRAVENOUS at 14:38

## 2017-07-11 RX ADMIN — PHENYLEPHRINE HYDROCHLORIDE 0.5 MG: 10 INJECTION INTRAVENOUS at 16:26

## 2017-07-11 RX ADMIN — Medication 160 MCG: at 16:18

## 2017-07-11 RX ADMIN — SUFENTANIL CITRATE 10 MCG: 50 INJECTION, SOLUTION EPIDURAL; INTRAVENOUS at 15:44

## 2017-07-11 RX ADMIN — Medication 160 MCG: at 16:15

## 2017-07-11 RX ADMIN — VASOPRESSIN 1 UNITS: 20 INJECTION INTRAVENOUS at 16:21

## 2017-07-11 NOTE — PLAN OF CARE
Problem: Patient Care Overview (Adult)  Goal: Plan of Care Review  Outcome: Ongoing (interventions implemented as appropriate)    07/11/17 1421   Coping/Psychosocial Response Interventions   Plan Of Care Reviewed With patient   Patient Care Overview   Progress no change         Problem: Perioperative Period (Adult)  Goal: Signs and Symptoms of Listed Potential Problems Will be Absent or Manageable (Perioperative Period)  Outcome: Ongoing (interventions implemented as appropriate)

## 2017-07-11 NOTE — OP NOTE
Operative Summary    Cecy Panchal  Date of Procedure: 6/19/2017 - 7/11/2017    Pre-op Diagnosis:   Hydronephrosis with urinary obstruction due to ureteral calculus [N13.2]    Post-op Diagnosis:     Post-Op Diagnosis Codes:     * Hydronephrosis with urinary obstruction due to ureteral calculus [N13.2]    Procedure/CPT® Codes:      Procedure(s):  CYSTOSCOPY, URETEROSCOPY, URETERAL CATHETER/STENT INSERTION, LASER LITHOTRIPSY, RIGHT    Surgeon(s):  Tony Glasgow MD    Anesthesia: General    Staff:   Circulator: Radha Dozier RN  Scrub Person: Kingston JADON Stewart; Marysol Hand  Other: Briana Verduzco    Indications for procedure:  Patient has a right proximal ureteral calculus that was associated with pyonephrosis approximately 2 weeks ago.  She underwent cystoscopy with ureteral stent placement.  Her cultures grew yeast and she has been on fluconazole   Procedure details:  The patient is identified in the preoperative holding area. Informed consent process had been obtained On the maldonado  and the patient has a good recollection of that discussion. No additional questions were voiced.     The patient is taken to the operating room suite and placed on the cystoscopy table. Effective general anesthesia is given and the patient is placed in the dorsal lithotomy position. Josef stirrups are used to support the legs with attention being paid to their postioning to avoid compression on the peroneal nerve or other pressure points.. The usual prep and drape is carried out with Betadine. At this point the appropriate timeout protocol was observed.     A 22 Bangladeshi cystoscope sheath with a 30° lens is inserted.  On introduction of the cystoscope findings include A normal appearing urethra.  The bladder is without evidence of mucosal lesion glomerulation or mass.  The ureteral orificesare orthotopic in position..     The right ureteral orifice is identified.  I used the alligator grasping forceps to pull the stent to the  external urethral meatus.  I cut the tail off.  At this point I took a 0.038 Sensor guide wire and passed this under direct fluoroscopic vision past the ureteral calculus with some manipulation and then got this into the renal pelvis.  At this point a dual-lumen catheter is used to pass a second wire of the same type which is excluded as a safety wire throughout the case.  I then passed a 12 Stateless/14 Stateless x 28 cm ureteral access sheath is passed over the working wire under fluoroscopic vision below the stone.  I then took the Science flexible ureteroscope negotiated this through the access sheath and ureter to where the stone was located.  A 200 µ holmium laser fiber is used with initial settings of 0.8 J and a repetition rate of 8 that were titrated upward for efficacy.  After the stone was felt to be fragmented adequately I took a 0 tip basket and remove fragments that would be concerning for ureteral obstruction as well as stone analysis.  The ureteroscope was then reintroduced all the way to the ureteropelvic junction and no other obstructing stones are identified.  Ureteroscope was then removed.     The wire is then back loaded through the cystoscope and the orifice visualized with the wire appropriately positioned. I passed a ureteral stent over the guidewire into the right renal pelvis and pulled the wire back seeing a good curl in the renal pelvis on fluoroscopy. The wire is removed in its entirety after the string is removed and I could see a good curl of the stent in the bladder. The bladder is then drained. The patient tolerated the procedure without difficulty.      Patient is now transferred to recovery room in stable condition.    Estimated Blood Loss: Less than 30 mL    Specimens:                * No specimens in log *      Drains:    6 Stateless by 22 cm double-J ureteral stent        Complications: none    Plan:     Tony Glasgow MD     Date: 7/11/2017  Time: 4:39 PM

## 2017-07-11 NOTE — ANESTHESIA PREPROCEDURE EVALUATION
Anesthesia Evaluation     Patient summary reviewed   NPO Solid Status: > 8 hours  NPO Liquid Status: > 8 hours     Airway   Mallampati: II  TM distance: >3 FB  Neck ROM: full  Dental    (+) poor dentition    Pulmonary - normal exam   (+) a smoker Former,   Cardiovascular - normal exam  Exercise tolerance: poor (<4 METS)    (+) hypertension well controlled,       Neuro/Psych- negative ROS  GI/Hepatic/Renal/Endo    (+) obesity, morbid obesity, renal disease ESRD, diabetes mellitus type 2 well controlled,     Musculoskeletal (-) negative ROS    Abdominal  - normal exam   Substance History      OB/GYN negative ob/gyn ROS         Other                                  Anesthesia Plan    ASA 4     general     intravenous induction   Anesthetic plan and risks discussed with patient.    Plan discussed with CRNA.

## 2017-07-11 NOTE — H&P (VIEW-ONLY)
Urology    Ms. Panchal is 66 y.o. female    CHIEF COMPLAINT: They say I have a stone    HPI  The patient presents withright hydronephrosis.  This problem is New. The level of obstruction is probably Ureteropelvic junction. The patient was made of aware of this 3 day(s). This was identified on a Renal US done in context of evaluation of worsening renal function requiring renal replacement therapy for ARF on chronic kidney disease - stage IV. The course is worsening. There are no associated symptoms such as dyrsuria or flank pain. . Significant or and possibly contributing factors include Recurrent urinary tract infections and morbid obesity. Management has hemodialysis None. which did provide partial relief.       The following portions of the patient's history were reviewed and updated as appropriate: allergies, current medications, past family history, past medical history, past social history, past surgical history and problem list.    Review of Systems   Constitutional: Negative for appetite change, diaphoresis and fever.   HENT: Negative for facial swelling and sore throat.    Eyes: Negative for discharge and visual disturbance.   Respiratory: Positive for cough and shortness of breath.    Cardiovascular: Negative for chest pain and leg swelling.   Gastrointestinal: Positive for abdominal pain (post prandial). Negative for anal bleeding and vomiting.   Endocrine: Negative for cold intolerance and heat intolerance.   Genitourinary: Negative for flank pain, hematuria and pelvic pain.   Musculoskeletal: Negative for back pain and gait problem.   Skin: Negative for pallor and rash.   Allergic/Immunologic: Negative for food allergies and immunocompromised state.   Neurological: Negative for seizures and headaches.   Hematological: Negative for adenopathy. Does not bruise/bleed easily.   Psychiatric/Behavioral: Negative for dysphoric mood, self-injury and suicidal ideas.       No prescriptions prior to admission.          Current Facility-Administered Medications:   •  albuterol (PROVENTIL) nebulizer solution 0.083% 2.5 mg/3mL, 2.5 mg, Nebulization, Q4H PRN, Kolton Stewart MD  •  albuterol (PROVENTIL) nebulizer solution 0.083% 2.5 mg/3mL, 2.5 mg, Nebulization, BID - RT, Kolton Stewart MD  •  allopurinol (ZYLOPRIM) tablet 100 mg, 100 mg, Oral, Daily, Kolton Stewart MD  •  aspirin EC tablet 81 mg, 81 mg, Oral, Daily, Kolton Stewart MD  •  atorvastatin (LIPITOR) tablet 40 mg, 40 mg, Oral, Daily, Kolton Stewart MD  •  collagenase ointment, , Topical, Daily, Kolton Stewart MD  •  enoxaparin (LOVENOX) syringe 30 mg, 30 mg, Subcutaneous, Q24H, Kamar Hardy MD  •  epoetin jean marie (EPOGEN,PROCRIT) injection 6,000 Units, 6,000 Units, Subcutaneous, PRN, Kamar Hardy MD  •  gabapentin (NEURONTIN) capsule 100 mg, 100 mg, Oral, Daily, Kolton Stewart MD  •  heparin (porcine) 5000 UNIT/ML injection 4,000 Units, 4,000 Units, Subcutaneous, Once, Richard Bey MD  •  heparin (porcine) injection 1,800 Units, 1,800 Units, Intracatheter, PRN, Kamar Hardy MD, 1,800 Units at 06/23/17 1111  •  heparin (porcine) injection 1,800 Units, 1,800 Units, Intracatheter, PRN, Kamar Hardy MD, 1,800 Units at 06/23/17 1110  •  heparin (porcine) injection 1,800 Units, 1,800 Units, Intravenous, Once per day on Mon Wed Fri **AND** heparin (porcine) injection 1,800 Units, 1,800 Units, Intravenous, Once per day on Mon Wed Fri, Richard Bey MD  •  heparin (porcine) injection 5,000 Units, 5,000 Units, Intravenous, Once, Kamar Hardy MD  •  HYDROcodone-acetaminophen (NORCO) 7.5-325 MG per tablet 1 tablet, 1 tablet, Oral, Q6H PRN, Kolton Stewart MD  •  insulin detemir (LEVEMIR) injection 35 Units, 35 Units, Subcutaneous, Nightly, JACK Gates  •  insulin lispro (humaLOG) injection 0-14 Units, 0-14 Units, Subcutaneous, 4x Daily AC & at Bedtime, Kolton Stewart MD  •   "levothyroxine (SYNTHROID, LEVOTHROID) tablet 100 mcg, 100 mcg, Oral, Q AM, Kolton Stewart MD  •  linagliptin (TRADJENTA) tablet 5 mg, 5 mg, Oral, Daily, Kolton Stewart MD  •  LORazepam (ATIVAN) tablet 0.5 mg, 0.5 mg, Oral, BID, Kolton Stewart MD  •  metoprolol tartrate (LOPRESSOR) tablet 50 mg, 50 mg, Oral, Q12H, Kolton Stewart MD  •  miconazole (MICOTIN) 2 % powder, , Topical, BID, Kolton Stewart MD  •  midodrine (PROAMATINE) tablet 10 mg, 10 mg, Oral, PRN, Kamar Hardy MD, 10 mg at 06/26/17 0940  •  midodrine (PROAMATINE) tablet 5 mg, 5 mg, Oral, TID AC, Kolton Stewart MD  •  ondansetron (ZOFRAN) injection 4 mg, 4 mg, Intravenous, PRN, Richard Bey MD  •  ondansetron ODT (ZOFRAN-ODT) disintegrating tablet 4 mg, 4 mg, Oral, Q8H PRN, Kolton Stewart MD  •  pantoprazole (PROTONIX) EC tablet 40 mg, 40 mg, Oral, QAM AC, Kolton Stewart MD  •  silver sulfadiazine (SILVADENE, SSD) 1 % cream, , Topical, Nightly, Kolton Stewart MD  •  sucralfate (CARAFATE) 1 GM/10ML suspension 1 g, 1 g, Oral, 4x Daily With Meals & Nightly, Kolton Stewart MD  •  vitamin D (ERGOCALCIFEROL) capsule 50,000 Units, 50,000 Units, Oral, Q7 Days, Kolton Stewart MD    No past medical history on file.    No past surgical history on file.    Social History     Social History   • Marital status:      Spouse name: N/A   • Number of children: N/A   • Years of education: N/A     Social History Main Topics   • Smoking status: Not on file   • Smokeless tobacco: Not on file   • Alcohol use Not on file   • Drug use: Not on file   • Sexual activity: Not on file     Other Topics Concern   • Not on file     Social History Narrative       No family history on file.    Ht 60\" (152.4 cm)  Wt 270 lb 4.8 oz (123 kg)  BMI 52.79 kg/m2    Physical Exam   Constitutional: She is oriented to person, place, and time. She appears well-developed. No distress.   Morbid obesity "   HENT:   Head: Normocephalic and atraumatic.   Right Ear: External ear and ear canal normal.   Left Ear: External ear and ear canal normal.   Nose: No nasal deformity. No epistaxis.   Mouth/Throat: Oropharynx is clear and moist. Mucous membranes are not pale, not dry and not cyanotic. Normal dentition. No oropharyngeal exudate.   Neck: Trachea normal. No tracheal tenderness present. No tracheal deviation present. No thyroid mass and no thyromegaly present.   Pulmonary/Chest: Accessory muscle usage present. Tachypnea noted. She is in respiratory distress. Chest wall is not dull to percussion (No flatness or hyperresonance). She exhibits no mass and no tenderness.   On palpation, no tactile fremitus. All movements are symmetric. No intercostal retraction noted.    Abdominal: Soft. Normal appearance. She exhibits no distension and no mass. There is no hepatosplenomegaly. There is no tenderness. No hernia.   Rectal examination or stool specimen is not indicated.    Musculoskeletal:   Normal gait and station. The spine, ribs, and pelvis are examined. No obvious misalignment or asymmetry. ROM is reasonable for age. No instability. No obvious atrophy, flaccidity or spasticity.    Lymphadenopathy:     She has no cervical adenopathy.        Right: No inguinal adenopathy present.        Left: No inguinal adenopathy present.   Neurological: She is alert and oriented to person, place, and time.   Skin: Skin is warm, dry and intact. No lesion and no rash noted. She is not diaphoretic. No cyanosis. No pallor. Nails show no clubbing.   On palpation, there were no induration, subcutaneous nodules, or tightening   Psychiatric: Her speech is normal and behavior is normal. Judgment and thought content normal. Her mood appears not anxious. Her affect is not labile. She does not exhibit a depressed mood.   Vitals reviewed.      Lab Results   Component Value Date    GLUCOSE 131 (H) 06/28/2017    BUN 41 (H) 06/28/2017    CREATININE 6.68  (H) 06/28/2017    EGFRIFNONA 6 (L) 06/28/2017    BCR 6.1 (L) 06/28/2017    CO2 24.0 06/28/2017    CALCIUM 8.9 06/28/2017    PROTENTOTREF 7.5 06/23/2017    ALBUMIN 2.2 (L) 06/23/2017    LABIL2 0.4 (L) 06/23/2017    AST 54 (H) 06/20/2017    ALT 25 06/20/2017     Lab Results   Component Value Date    GLUCOSE 131 (H) 06/28/2017    CALCIUM 8.9 06/28/2017     06/28/2017    K 5.5 (H) 06/28/2017    CO2 24.0 06/28/2017    CL 96 (L) 06/28/2017    BUN 41 (H) 06/28/2017    CREATININE 6.68 (H) 06/28/2017    EGFRIFNONA 6 (L) 06/28/2017    BCR 6.1 (L) 06/28/2017    ANIONGAP 15.0 (H) 06/28/2017     Lab Results   Component Value Date    WBC 10.86 (H) 06/28/2017    HGB 7.1 (L) 06/28/2017    HCT 24.5 (L) 06/28/2017    MCV 99.2 (H) 06/28/2017     06/28/2017     No results found for: PSA  No results found for: URINECX  Brief Urine Lab Results     None          Imaging Results (last 7 days)     Procedure Component Value Units Date/Time    US Renal Bilateral [[id number]] Collected:  06/26/17 1528     Updated:  06/26/17 1534    Narrative:       EXAMINATION: US RENAL BILATERAL- 6/26/2017 3:28 PM CDT     HISTORY: Renal failure.     REPORT: Sonographic images of the kidneys were obtained bilaterally, the  technologist indicates that the study is very difficult technically due  to the patient's obesity. There are no comparison studies.     The right kidney measures approximately 10.9 x 7.1 x 6.1 cm, there is  mild right-sided hydronephrosis. No gross evidence for a mass is seen on  the right. Mildly increased cortical echogenicity of the right kidney.  The left kidney measures approximately 11.4 x 5.6 x 6.1 cm and is  grossly normal. There is no hydronephrosis on the left. Bladder is  unremarkable.       Impression:       Mild right-sided hydronephrosis, mildly increased cortical  echogenicity on the right. Limited study due to the patient's body  habitus.  This report was finalized on 06/26/2017 15:31 by Dr. Rick Sandoval MD.     CT Abdomen Pelvis Without Contrast [[id number]] Collected:  06/28/17 0742     Updated:  06/28/17 0752    Narrative:       EXAMINATION: CT ABDOMEN PELVIS WO CONTRAST- 6/28/2017 7:42 AM CDT     HISTORY: Kidney stones, flank pain.     DOSE: 1007 mGycm (Automatic exposure control technique was implemented  in an effort to keep the radiation dose as low as possible without  compromising image quality)     REPORT: Spiral CT of the abdomen and pelvis was performed without  intravenous contrast from the lung bases through the pubic symphysis.  Reconstructed coronal and sagittal images are also reviewed.     Comparison: Ultrasound of the kidneys 06/26/2017.     Review of lung windows demonstrates mild dependent atelectasis  bilaterally. There is mild interstitial thickening in the lung bases. No  pleural effusion is identified. Pacemaker wires are present. Mild  coronary artery calcifications present. The liver and spleen are  homogeneous, no biliary ductal dilatation is identified. Small  gallstones are noted at the gallbladder neck and the gallbladder is  partially contracted. There is moderate amount of ingested material in  the stomach. There are small calcifications within the right adrenal  gland, which has a nodular shape. This measures approximately 1.8 cm.  This is most likely benign. There is a fat-containing nodule in the left  adrenal gland that measures 2.1 cm. This is compatible with a  myelolipoma. The pancreas is unremarkable. There is moderate right-sided  hydronephrosis related to an obstructing calculus at the UPJ that  measures 11 mm. Several other small nephrolithiasis identified within  the right kidney. There are small calcifications in the inferior pole  the left kidney and vascular calcifications are seen at the hilum of the  left kidney. There is no obstruction of the left kidney. The right  kidney is enlarged compared to the left. There is mild stranding of  perirenal fat planes bilaterally, greater  on the right. The right ureter  is decompressed beyond the obstructing stone. The bladder is not well  distended but appears unremarkable. Bowel loops are normal in caliber  and appear unremarkable. No free fluid or free air is identified. The  appendix appears normal. There is moderate atherosclerotic calcification  within the aorta, hepatic splenic arteries, as well as the mid and  distal superior mesenteric artery and inferior mesenteric artery and  both iliac arteries.       Impression:       Moderate obstructive uropathy on the right related to an 11  mm calculus at the UPJ level. There are other small nonobstructing  nephrolithiasis bilaterally.        This report was finalized on 06/28/2017 07:49 by Dr. Rick Sandoval MD.      Independent review of a CT scan of abdomen and pelvis without contrast  The CT scan of the abdomen/pelvis done without contrast is available for me to review.  Treatment recommendations require an independent review.  First I scanned the liver, spleen, and bowel pattern.  The retroperitoneum including the major vessels and lymphatic packages are briefly reviewed.  This film as been reviewed by the radiologist to determine any non urologic abnormalities that are present.  The kidneys are closely inspected for size, symmetry, contour, parenchymal thickness, perinephric reaction, presence of calcifications, and intrarenal dilation of the collecting system.  The ureters are inspected for their course, caliber, and any calcifications.  The bladder is inspected for its thickness, size, and presence of any calcifications.  This scan shows moderate hydronephrosis associated with a 1 cm stone in the right UPJ with obstruction.     Assessment and Plan  #1. Right UPJ stone with obstruction of the kidney.   Given deterioration of renal function, decompression of the upper tract is indicated. I would recommend a stent for initial decompression. If stone is radiodense, it would be possible to do  ESWL. Ureteroscopy is also a reasonable option. She is a very high operative risk given her comorbidities of CoPD, morbid obesity, Diabetes. She would like to proceed with decompression by ureteral stent. Risks of the procedure were discussed.     Tony Glasgow MD  06/28/17  6:36 PM

## 2017-07-11 NOTE — INTERVAL H&P NOTE
H&P updated. The patient was examined and the following changes are noted:  Pito returns today for definitive management of the stone at the right UPJ. She was obstructed from the stone. I placed a urteral stent one week. ago.

## 2017-07-11 NOTE — ANESTHESIA PROCEDURE NOTES
Airway  Urgency: elective    Airway not difficult    General Information and Staff    Patient location during procedure: OR  CRNA: NATHALY TILLEY    Indications and Patient Condition  Indications for airway management: airway protection    Preoxygenated: yes  Mask difficulty assessment: 1 - vent by mask    Final Airway Details  Final airway type: endotracheal airway      Successful airway: ETT  Cuffed: yes   Successful intubation technique: direct laryngoscopy  Endotracheal tube insertion site: oral  Blade: Zachery  Blade size: #3  ETT size: 7.5 mm  Cormack-Lehane Classification: grade I - full view of glottis  Placement verified by: chest auscultation and capnometry   Cuff volume (mL): 8  Measured from: teeth  ETT to teeth (cm): 21  Number of attempts at approach: 1

## 2017-07-12 LAB
GLUCOSE BLDC GLUCOMTR-MCNC: 118 MG/DL (ref 70–130)
GLUCOSE BLDC GLUCOMTR-MCNC: 158 MG/DL (ref 70–130)
GLUCOSE BLDC GLUCOMTR-MCNC: 169 MG/DL (ref 70–130)
GLUCOSE BLDC GLUCOMTR-MCNC: 233 MG/DL (ref 70–130)

## 2017-07-12 PROCEDURE — 25010000002 ENOXAPARIN PER 10 MG: Performed by: INTERNAL MEDICINE

## 2017-07-12 PROCEDURE — 25010000002 ONDANSETRON PER 1 MG: Performed by: INTERNAL MEDICINE

## 2017-07-12 PROCEDURE — 82962 GLUCOSE BLOOD TEST: CPT

## 2017-07-12 PROCEDURE — 25010000002 MORPHINE PER 10 MG: Performed by: INTERNAL MEDICINE

## 2017-07-12 PROCEDURE — 97605 NEG PRS WND THER DME<=50SQCM: CPT

## 2017-07-12 PROCEDURE — 97535 SELF CARE MNGMENT TRAINING: CPT

## 2017-07-12 PROCEDURE — 97110 THERAPEUTIC EXERCISES: CPT

## 2017-07-12 PROCEDURE — 25010000002 HEPARIN (PORCINE) PER 1000 UNITS: Performed by: INTERNAL MEDICINE

## 2017-07-12 PROCEDURE — 99224 PR SBSQ OBSERVATION CARE/DAY 15 MINUTES: CPT | Performed by: UROLOGY

## 2017-07-12 NOTE — ANESTHESIA POSTPROCEDURE EVALUATION
Patient: Cecy Panchal    Procedure Summary     Date Anesthesia Start Anesthesia Stop Room / Location    07/11/17 5618 0637  [facility] OR 01 / [facility]       Procedure Diagnosis Surgeon Provider    CYSTOSCOPY, URETEROSCOPY, URETERAL CATHETER/STENT INSERTION, LASER LITHOTRIPSY, RIGHT (Right Ureter) Hydronephrosis with urinary obstruction due to ureteral calculus  (Hydronephrosis with urinary obstruction due to ureteral calculus [N13.2]) MD La Ba CRNA          Anesthesia Type: general  Last vitals  BP      Temp      Pulse     Resp      SpO2        Post Anesthesia Care and Evaluation    Patient location during evaluation: PHASE II  Patient participation: complete - patient participated  Level of consciousness: awake and alert  Pain score: 0  Pain management: adequate  Airway patency: patent  Anesthetic complications: No anesthetic complications    Cardiovascular status: acceptable, hemodynamically stable and stable  Respiratory status: acceptable and room air  Hydration status: acceptable

## 2017-07-12 NOTE — ANESTHESIA POSTPROCEDURE EVALUATION
"Patient: Cecy Panchal    Procedure Summary     Date Anesthesia Start Anesthesia Stop Room / Location    07/11/17 6787 [id number]  [facility] OR 01 / [facility]       Procedure Diagnosis Surgeon Provider    CYSTOSCOPY, URETEROSCOPY, URETERAL CATHETER/STENT INSERTION, LASER LITHOTRIPSY, RIGHT (Right Ureter) Hydronephrosis with urinary obstruction due to ureteral calculus  (Hydronephrosis with urinary obstruction due to ureteral calculus [N13.2]) MD La Ba, CRNA          Anesthesia Type: general  Last vitals  BP      Temp      Pulse     Resp      SpO2        Post Anesthesia Care and Evaluation      Comments: Blood pressure 107/56, pulse 104, temperature 98 °F (36.7 °C), temperature source Temporal Artery , resp. rate 16, height 60\" (152.4 cm), weight 256 lb 9.6 oz (116 kg), SpO2 94 %.        "

## 2017-07-12 NOTE — PROGRESS NOTES
Urology  Length of Stay: 0  Patient Care Team:  JACK Fuentes as PCP - General  JACK Fuentes as PCP - Family Medicine    Chief Complaint:  Got stone out yesterday    Subjective     Interval History:   Some suprapubic and vaginal discomfort with stent. No flank pain.     Review of Systems:   Review of Systems   Constitutional: Negative for chills and fever.   Gastrointestinal: Positive for nausea. Negative for abdominal pain, anal bleeding and blood in stool.   Genitourinary: Negative for flank pain and hematuria.       Objective     Vital Signs  Temp:  [97.8 °F (36.6 °C)-98 °F (36.7 °C)] 98 °F (36.7 °C)  Heart Rate:  [] 104  Resp:  [12-17] 16  BP: (107-157)/() 107/56    Physical Exam:  Physical Exam  Alert and oriented ×3  Not agitated or distressed  No obvious deformities  No respiratory distress  Skin without pallor or diaphoresis'     Results Review:       I reviewed the patient's new clinical results.  Lab Results (last 24 hours)     Procedure Component Value Units Date/Time    Urine Culture [[id number]]  (Abnormal) Collected:  07/09/17 0949    Specimen:  Urine from Cath In/out Updated:  07/11/17 0712     Urine Culture 70,000-80,000 CFU/mL Yeast isolated (A)      30,000-40,000 CFU/mL Mixed Gram Positive Kathleen (A)      Probable Contaminant         POC Glucose Fingerstick [[id number]]  (Normal) Collected:  07/11/17 0725    Specimen:  Blood Updated:  07/11/17 0736     Glucose 95 mg/dL       : ALIX Reyna TIffanyMeter ID: [id number]       POC Glucose Fingerstick [[id number]]  (Normal) Collected:  07/11/17 1411    Specimen:  Blood Updated:  07/11/17 1427     Glucose 91 mg/dL       : [id number] Jeramy Winters  DMeter ID: [id number]       POC Glucose Fingerstick [[id number]]  (Abnormal) Collected:  07/11/17 1648    Specimen:  Blood Updated:  07/11/17 1705     Glucose 137 (H) mg/dL       : [id number] Darwin SmileyhyMeter ID: [id number]       POC Glucose Fingerstick  [[id number]]  (Abnormal) Collected:  07/11/17 2139    Specimen:  Blood Updated:  07/11/17 2157     Glucose 204 (H) mg/dL       : GELA Brown ID: [id number]           Imaging Results (last 24 hours)     Procedure Component Value Units Date/Time    XR Abdomen KUB [[id number]] Collected:  07/11/17 1652     Updated:  07/11/17 1656    Narrative:       INTRAOPERATIVE FLUOROSCOPIC GUIDANCE 07/11/2017      INDICATION: Intraoperative fluoroscopic guidance. Right double-J stent  placement.      TECHNIQUE: 9 fluoroscopic images from the operating room were submitted  for evaluation. Please note, no radiologist was in attendance for  acquisition of these images. These images are available for future  reference to the attending surgeon. Total fluoroscopic time was 0.5  minutes.      FINDINGS: Intraoperative images related to right double-J stent  placement.        Impression:       1. Intraoperative fluoroscopic guidance as described.   2. Please refer to real-time fluoroscopy and operative report for full  details.  This report was finalized on 07/11/2017 16:53 by Dr. Evelyn Gomez MD.          Medication Review:     Current Facility-Administered Medications:   •  albuterol (PROVENTIL) nebulizer solution 0.083% 2.5 mg/3mL, 2.5 mg, Nebulization, Q4H PRN, Kolton Stewart MD  •  albuterol (PROVENTIL) nebulizer solution 0.083% 2.5 mg/3mL, 2.5 mg, Nebulization, BID - RT, Kolton Stewart MD  •  allopurinol (ZYLOPRIM) tablet 100 mg, 100 mg, Oral, Daily, Kolton Stewart MD  •  aspirin EC tablet 81 mg, 81 mg, Oral, Daily, Kolton Stewart MD  •  atorvastatin (LIPITOR) tablet 40 mg, 40 mg, Oral, Daily, Kolton Stewart MD  •  docusate sodium (COLACE) capsule 200 mg, 200 mg, Oral, BID, JACK Gates  •  enoxaparin (LOVENOX) syringe 30 mg, 30 mg, Subcutaneous, Q24H, Kamar Hardy MD  •  epoetin jean marie (EPOGEN,PROCRIT) injection 6,000 Units, 6,000 Units, Subcutaneous, PRN, Kamar  MD Antony  •  fluconazole (DIFLUCAN) tablet 150 mg, 150 mg, Oral, Q24H, JACK Gates  •  gabapentin (NEURONTIN) capsule 100 mg, 100 mg, Oral, Daily, Kolton Stewart MD  •  heparin (porcine) injection 1,800 Units, 1,800 Units, Intracatheter, PRN, Kamar Hardy MD, 1,800 Units at 06/23/17 1111  •  heparin (porcine) injection 1,800 Units, 1,800 Units, Intracatheter, PRN, Kamar Hardy MD, 1,800 Units at 06/23/17 1110  •  heparin (porcine) injection 1,800 Units, 1,800 Units, Intravenous, Once per day on Mon Wed Fri **AND** heparin (porcine) injection 1,800 Units, 1,800 Units, Intravenous, Once per day on Mon Wed Fri, Richard Bey MD  •  HYDROcodone-acetaminophen (NORCO) 7.5-325 MG per tablet 1 tablet, 1 tablet, Oral, Q6H PRN, Kolton Stewart MD  •  insulin detemir (LEVEMIR) injection 35 Units, 35 Units, Subcutaneous, Nightly, JACK Gates  •  insulin lispro (humaLOG) injection 0-14 Units, 0-14 Units, Subcutaneous, 4x Daily AC & at Bedtime, Kolton Stewart MD  •  lactated ringers infusion, 100 mL/hr, Intravenous, Continuous, Gato Mccullough MD, Last Rate: 100 mL/hr at 06/30/17 1508, 100 mL/hr at 06/30/17 1508  •  lactulose solution 20 g, 20 g, Oral, BID PRN, Shavonne Hernandez APRN  •  levothyroxine (SYNTHROID, LEVOTHROID) tablet 100 mcg, 100 mcg, Oral, Q AM, Kolton Stewart MD  •  linaclotide (LINZESS) capsule 145 mcg, 145 mcg, Oral, Daily, JACK Gates  •  linagliptin (TRADJENTA) tablet 5 mg, 5 mg, Oral, Daily, Kolton Stewart MD  •  metoprolol tartrate (LOPRESSOR) tablet 50 mg, 50 mg, Oral, Q12H, Kolton Stewart MD  •  miconazole (MICOTIN) 2 % powder, , Topical, BID, Kolton Stewart MD  •  midodrine (PROAMATINE) tablet 10 mg, 10 mg, Oral, PRN, Kamar Hardy MD, 10 mg at 06/26/17 0940  •  midodrine (PROAMATINE) tablet 5 mg, 5 mg, Oral, TID AC, Kolton Stewart MD  •  Morphine sulfate (PF) injection 4 mg, 4 mg,  Intravenous, Q4H PRN, Kolton Stewart MD  •  ondansetron (ZOFRAN) injection 4 mg, 4 mg, Intravenous, PRN, Richard Bey MD  •  ondansetron ODT (ZOFRAN-ODT) disintegrating tablet 4 mg, 4 mg, Oral, Q8H PRN, Kolton Stewart MD  •  pantoprazole (PROTONIX) EC tablet 40 mg, 40 mg, Oral, QAM AC, Kolton Stewart MD  •  promethazine (PHENERGAN) injection 12.5 mg, 12.5 mg, Intravenous, Q6H PRN, Kolton Stewart MD  •  sucralfate (CARAFATE) tablet 1 g, 1 g, Oral, 4x Daily AC & at Bedtime, Kolton Stweart MD  •  vitamin D (ERGOCALCIFEROL) capsule 50,000 Units, 50,000 Units, Oral, Q7 Days, Kolton Stewart MD    Assessment/Plan:   #1. Right Renal calculus with obstruction  -s/p URS laser lithotripsy.  -Will remove stent at bedside.  -Would continue antifungal until stent removed.   -Anticipate no further intervention        Tony Glasgow MD  07/12/17  7:00 AM

## 2017-07-13 LAB — GLUCOSE BLDC GLUCOMTR-MCNC: 114 MG/DL (ref 70–130)

## 2017-07-13 PROCEDURE — 25010000002 ONDANSETRON PER 1 MG: Performed by: INTERNAL MEDICINE

## 2017-07-13 PROCEDURE — 82962 GLUCOSE BLOOD TEST: CPT

## 2017-07-13 PROCEDURE — 97116 GAIT TRAINING THERAPY: CPT

## 2017-07-13 PROCEDURE — 97535 SELF CARE MNGMENT TRAINING: CPT

## 2017-07-13 PROCEDURE — 97110 THERAPEUTIC EXERCISES: CPT

## 2017-07-13 RX ORDER — ONDANSETRON 4 MG/1
4 TABLET, ORALLY DISINTEGRATING ORAL ONCE
Status: DISCONTINUED | OUTPATIENT
Start: 2017-07-13 | End: 2017-07-13 | Stop reason: HOSPADM

## 2017-07-18 ENCOUNTER — TRANSCRIBE ORDERS (OUTPATIENT)
Dept: ADMINISTRATIVE | Facility: HOSPITAL | Age: 67
End: 2017-07-18

## 2017-07-18 ENCOUNTER — HOSPITAL ENCOUNTER (OUTPATIENT)
Dept: GENERAL RADIOLOGY | Facility: HOSPITAL | Age: 67
Discharge: HOME | End: 2017-07-18
Admitting: SURGERY

## 2017-07-18 ENCOUNTER — APPOINTMENT (OUTPATIENT)
Dept: WOUND CARE | Facility: HOSPITAL | Age: 67
End: 2017-07-18

## 2017-07-18 ENCOUNTER — LAB REQUISITION (OUTPATIENT)
Dept: LAB | Facility: HOSPITAL | Age: 67
End: 2017-07-18

## 2017-07-18 ENCOUNTER — APPOINTMENT (OUTPATIENT)
Dept: LAB | Facility: HOSPITAL | Age: 67
End: 2017-07-18

## 2017-07-18 DIAGNOSIS — E11.40 DIABETIC NEUROPATHY WITH NEUROLOGIC COMPLICATION (HCC): ICD-10-CM

## 2017-07-18 DIAGNOSIS — E11.49 DIABETIC NEUROPATHY WITH NEUROLOGIC COMPLICATION (HCC): ICD-10-CM

## 2017-07-18 DIAGNOSIS — J44.9 OBSTRUCTIVE CHRONIC BRONCHITIS WITHOUT EXACERBATION (HCC): ICD-10-CM

## 2017-07-18 DIAGNOSIS — M86.9 DIABETIC FOOT ULCER WITH OSTEOMYELITIS (HCC): ICD-10-CM

## 2017-07-18 DIAGNOSIS — L97.422 ULCER OF LEFT HEEL, WITH FAT LAYER EXPOSED (HCC): ICD-10-CM

## 2017-07-18 DIAGNOSIS — E11.621 TYPE 2 DIABETES MELLITUS WITH FOOT ULCER (CODE) (HCC): ICD-10-CM

## 2017-07-18 DIAGNOSIS — E11.69 DIABETIC FOOT ULCER WITH OSTEOMYELITIS (HCC): ICD-10-CM

## 2017-07-18 DIAGNOSIS — L97.509 DIABETIC FOOT ULCER WITH OSTEOMYELITIS (HCC): Primary | ICD-10-CM

## 2017-07-18 DIAGNOSIS — M86.9 DIABETIC FOOT ULCER WITH OSTEOMYELITIS (HCC): Primary | ICD-10-CM

## 2017-07-18 DIAGNOSIS — L97.509 DIABETIC FOOT ULCER WITH OSTEOMYELITIS (HCC): ICD-10-CM

## 2017-07-18 DIAGNOSIS — N18.6 END STAGE RENAL DISEASE (HCC): ICD-10-CM

## 2017-07-18 DIAGNOSIS — E11.621 DIABETIC FOOT ULCER WITH OSTEOMYELITIS (HCC): ICD-10-CM

## 2017-07-18 DIAGNOSIS — E11.621 DIABETIC FOOT ULCER WITH OSTEOMYELITIS (HCC): Primary | ICD-10-CM

## 2017-07-18 DIAGNOSIS — E11.69 DIABETIC FOOT ULCER WITH OSTEOMYELITIS (HCC): Primary | ICD-10-CM

## 2017-07-18 LAB
HBA1C MFR BLD: 6.5 %
PREALB SERPL-MCNC: 12.7 MG/DL (ref 18–36)

## 2017-07-18 PROCEDURE — 84134 ASSAY OF PREALBUMIN: CPT | Performed by: SURGERY

## 2017-07-18 PROCEDURE — 73620 X-RAY EXAM OF FOOT: CPT

## 2017-07-18 PROCEDURE — 87176 TISSUE HOMOGENIZATION CULTR: CPT | Performed by: SURGERY

## 2017-07-18 PROCEDURE — 36415 COLL VENOUS BLD VENIPUNCTURE: CPT | Performed by: SURGERY

## 2017-07-18 PROCEDURE — 87075 CULTR BACTERIA EXCEPT BLOOD: CPT | Performed by: SURGERY

## 2017-07-18 PROCEDURE — 83036 HEMOGLOBIN GLYCOSYLATED A1C: CPT | Performed by: SURGERY

## 2017-07-18 PROCEDURE — 87070 CULTURE OTHR SPECIMN AEROBIC: CPT | Performed by: SURGERY

## 2017-07-18 PROCEDURE — 87205 SMEAR GRAM STAIN: CPT | Performed by: SURGERY

## 2017-07-18 PROCEDURE — G0463 HOSPITAL OUTPT CLINIC VISIT: HCPCS

## 2017-07-20 PROBLEM — G89.29 CHRONIC PAIN: Status: ACTIVE | Noted: 2017-01-01

## 2017-07-20 PROBLEM — F41.9 ANXIETY: Status: ACTIVE | Noted: 2017-01-01

## 2017-07-20 LAB
BACTERIA SPEC AEROBE CULT: ABNORMAL
BACTERIA SPEC AEROBE CULT: ABNORMAL
GRAM STN SPEC: ABNORMAL

## 2017-07-23 LAB — BACTERIA SPEC ANAEROBE CULT: NORMAL

## 2017-07-24 ENCOUNTER — APPOINTMENT (OUTPATIENT)
Dept: WOUND CARE | Facility: HOSPITAL | Age: 67
End: 2017-07-24

## 2017-07-31 ENCOUNTER — APPOINTMENT (OUTPATIENT)
Dept: WOUND CARE | Facility: HOSPITAL | Age: 67
End: 2017-07-31

## 2017-07-31 PROBLEM — L89.153 DECUBITUS ULCER OF COCCYGEAL REGION, STAGE 3 (HCC): Chronic | Status: ACTIVE | Noted: 2017-01-01

## 2017-08-07 ENCOUNTER — PROCEDURE VISIT (OUTPATIENT)
Dept: UROLOGY | Facility: CLINIC | Age: 67
End: 2017-08-07

## 2017-08-07 DIAGNOSIS — N20.1 URETERAL CALCULUS: Primary | ICD-10-CM

## 2017-08-07 PROCEDURE — 99211 OFF/OP EST MAY X REQ PHY/QHP: CPT | Performed by: UROLOGY

## 2017-08-07 NOTE — PROGRESS NOTES
Patient of Dr. Glasgow states she is here today to get her stent removed SP Ureteroscopy Laser Litho with stent placement on 7/11/17. Patient denies any fever, chills, N&V or hematuria. The tape was removed and using the string stent was pulled with no complications. The patient was advised to continue any medications prescribed in the hospital and to call if he has any questions or concerns. The patient verbalized understanding. No follow up needed per     reviewed

## 2017-08-14 PROBLEM — L97.211 NON-PRESSURE CHRONIC ULCER OF RIGHT CALF, LIMITED TO BREAKDOWN OF SKIN (HCC): Status: ACTIVE | Noted: 2017-01-01

## 2017-09-15 NOTE — CARE COORDINATION
CHECKED THE MEDICARE WEBSITE. PATIENT DOES NOT HAVE EXTRA HELP. NEED TO APPLY FOR  ASSISTANCE. CALLED THE PATIENT. SHE STATED SHE DID NOT RECALL IF A LETTER WAS RECEIVED ABOUT HER EXTRA HELP APPLICATION OR NOT. SHE SAID HER DAUGHTER, KAMLESH, HANDLES HER FINANCIAL AFFAIRS. TOLD MS. KIM THAT I WOULD TRY TO CONTACT KAMLESH NEXT WEEK. SHE VERBALIZED UNDERSTANDING.     Submitted by Willi/MARIALUISA

## 2017-10-04 NOTE — ANESTHESIA PRE PROCEDURE
daily  Patient taking differently: Take 40 mg by mouth daily Indications: Changes in Cholesterol  8/23/17   Anthony Gee DO   midodrine (PROAMATINE) 5 MG tablet Take 1 tablet by mouth 3 times daily (with meals) 8/23/17   Anthony Gee DO   glipiZIDE (GLUCOTROL) 5 MG tablet Take 1 tablet by mouth Daily  Patient taking differently: Take 5 mg by mouth Daily Indications: Diabetes  8/23/17 9/29/17  MALICK Garrido   linagliptin (TRADJENTA) 5 MG tablet Take 1 tablet by mouth daily  Patient taking differently: Take 5 mg by mouth daily Indications: Diabetes  8/15/17   MALICK Do   pantoprazole (PROTONIX) 40 MG tablet Take 1 tablet by mouth every morning (before breakfast)  Patient taking differently: Take 40 mg by mouth every morning (before breakfast) Indications: Gastroesophageal Reflux Disease  8/15/17   MALICK Do   docusate sodium (COLACE) 100 MG capsule Take 200 mg by mouth 2 times daily as needed Indications: Constipation     Historical Provider, MD   silver sulfADIAZINE (SILVADENE) 1 % cream Apply topically daily. 4/28/17   MALICK Reyes   OXYGEN Inhale 6 L into the lungs continuous May titrate to effect - Patient also placed on Trilogy  Patient taking differently: Inhale 2 L into the lungs continuous  4/28/17   MALICK Reyes   miconazole (MICOTIN) 2 % powder Apply topically 2 times daily.  4/12/17   Pb Jansen PA-C   aspirin 81 MG tablet Take 81 mg by mouth daily    Historical Provider, MD       Current medications:    Current Facility-Administered Medications   Medication Dose Route Frequency Provider Last Rate Last Dose    sodium chloride flush 0.9 % injection 10 mL  10 mL Intravenous 2 times per day Felicia Gracia MD        sodium chloride flush 0.9 % injection 10 mL  10 mL Intravenous PRN Felicia Gracia MD        vancomycin (VANCOCIN) 1000 mg in dextrose 5% 200 mL IVPB  1,000 mg Intravenous On Call to Masood Nolan  mL/hr at 10/04/17 0640 1,000 mg at 10/04/17 0640    aspirin EC tablet 81 mg  81 mg Oral Once Traci Henry MD        0.45 % sodium chloride infusion   Intravenous Continuous Traci Henry MD        lidocaine PF 1 % injection 1 mL  1 mL Intradermal Once Traci Henry MD        midazolam (VERSED) 2 MG/2ML injection                Allergies:     Allergies   Allergen Reactions    Dilaudid [Hydromorphone] Shortness Of Breath       Problem List:    Patient Active Problem List   Diagnosis Code    Essential hypertension I10    Morbid obesity due to excess calories ([facility]) E66.01    Acquired hypothyroidism E03.9    Diabetic ulcer of left foot associated with type 2 diabetes mellitus ([facility] E11.621, L97.529    COPD exacerbation ([facility] J44.1    YANNI (acute kidney injury) ([facility] N17.9    Hypercalcemia E83.52    Chronic kidney disease, stage IV (severe) ([facility]) N18.4    Type 2 diabetes mellitus with stage 3 chronic kidney disease, with long-term current use of insulin ([facility]) E11.22, N18.3, Z79.4    Cor pulmonale (chronic) ([facility]) I27.81    Anemia of chronic disorder D63.8    Obstructive sleep apnea G47.33    Pulmonary hypertension I27.20    Diabetic ulcer of toe of right foot associated with type 2 diabetes mellitus, limited to breakdown of skin ([facility]) E11.621, L97.511    Acute pulmonary edema ([facility]) J81.0    Acute respiratory failure with hypoxia ([facility]) J96.01    Diabetic ulcer of left heel with fat layer exposed ([facility] E11.621, L97.422    Uremia N19    Leukocytosis D72.829    UTI (urinary tract infection) N39.0    Slow transit constipation K59.01    Chronic pain G89.29    Anxiety F41.9    Decubitus ulcer of coccygeal region, stage 3 ([facility]) L89.153    Non-pressure chronic ulcer of right calf, limited to breakdown of skin ([facility] L97.211       Past Medical History:        Diagnosis Date    Arthritis     CAD (coronary artery disease)     hospitalized first week of March with low O2 sat, low BS, was in critical care weight on file to calculate BMI.    CBC:   Lab Results   Component Value Date    WBC 12.8 06/19/2017    RBC 2.82 06/19/2017    HGB 8.6 06/19/2017    HCT 30.0 06/19/2017    .4 06/19/2017    RDW 16.6 06/19/2017     06/19/2017       CMP:   Lab Results   Component Value Date     06/19/2017    K 4.3 10/04/2017    K 5.4 06/19/2017    CL 93 06/19/2017    CO2 24 06/19/2017    BUN 35 06/19/2017    CREATININE 6.4 06/19/2017    LABGLOM 6 06/19/2017    GLUCOSE 145 06/19/2017    PROT 7.50 06/19/2017    PROT 7.1 02/20/2013    CALCIUM 9.0 06/19/2017    BILITOT 1.0 06/01/2017    ALKPHOS 62 06/01/2017    AST 14 06/01/2017    ALT 14 06/01/2017       POC Tests: No results for input(s): POCGLU, POCNA, POCK, POCCL, POCBUN, POCHEMO, POCHCT in the last 72 hours.     Coags:   Lab Results   Component Value Date    PROTIME 13.3 06/09/2017    INR 1.02 06/09/2017    APTT 27.0 01/28/2017       HCG (If Applicable): No results found for: PREGTESTUR, PREGSERUM, HCG, HCGQUANT     ABGs:   Lab Results   Component Value Date    PHART 7.390 10/04/2017    PO2ART 52.0 10/04/2017    XMQ4LEA 47.0 10/04/2017    IAN1HOX 28.5 10/04/2017    BEART 2.9 10/04/2017    G7ZXIKUL 91.9 06/02/2017        Type & Screen (If Applicable):  No results found for: LABABO, [address]    Anesthesia Evaluation  Patient summary reviewed and Nursing notes reviewed history of anesthetic complications:   Airway: Mallampati: II  TM distance: >3 FB   Neck ROM: full  Mouth opening: > = 3 FB Dental: normal exam   (+) edentulous      Pulmonary: breath sounds clear to auscultation  (+) COPD:  shortness of breath:  sleep apnea: on CPAP,  decreased breath sounds,  rales (up half way bilat),      ROS comment: Home oxygen 2l   Cardiovascular:    (+) hypertension:, CAD:, CHF:, hyperlipidemia        Rhythm: regular  Rate: normal           ROS comment: Cor Pulmonale  Conclusions      Summary   there is an echo dense area on the lateral aspect of the tricupsid valve   and a

## 2017-10-06 NOTE — PROGRESS NOTES
erythema  Head: normocephalic and atraumatic  Eyes: pupils equal, round, and reactive to light, extraocular eye movements intact, conjunctivae normal  ENT: tympanic membrane, external ear and ear canal normal bilaterally, nose without deformity, nasal mucosa and turbinates normal without polyps  Neck: supple and non-tender without mass, no thyromegaly or thyroid nodules, no cervical lymphadenopathy  Pulmonary/Chest: clear to auscultation bilaterally- no wheezes, rales or rhonchi, normal air movement, no respiratory distress  Cardiovascular: normal rate, regular rhythm, normal S1 and S2, no murmurs, rubs, clicks, or gallops, distal pulses intact, no carotid bruits  Abdomen: soft, non-tender, non-distended, normal bowel sounds, no masses or organomegaly  Extremities: no cyanosis, clubbing or edema  Musculoskeletal: normal range of motion, no joint swelling, deformity or tenderness  Neurologic: reflexes normal and symmetric, no cranial nerve deficit, gait, coordination and speech normal      Assessment:      Patient Active Problem List   Diagnosis Code    Essential hypertension I10    Morbid obesity due to excess calories ([facility]) E66.01    Acquired hypothyroidism E03.9    Diabetic ulcer of left foot associated with type 2 diabetes mellitus [facility] E11.621, L97.529    COPD exacerbation ([facility]) J44.1    YANNI (acute kidney injury) ([facility] N17.9    Hypercalcemia E83.52    Chronic kidney disease, stage IV (severe) ([facility]) N18.4    Type 2 diabetes mellitus with stage 3 chronic kidney disease, with long-term current use of insulin ([facility]) E11.22, N18.3, Z79.4    Cor pulmonale (chronic) ([facility]) I27.81    Anemia of chronic disorder D63.8    Obstructive sleep apnea G47.33    Pulmonary hypertension I27.20    Diabetic ulcer of toe of right foot associated with type 2 diabetes mellitus, limited to breakdown of skin ([facility]) P87.358, L97.511    Acute pulmonary edema ([facility]) J81.0    Acute respiratory failure with hypoxia ([facility]) J96.01  Diabetic ulcer of left heel with fat layer exposed ([facility] E11.621, L97.422    Uremia N19    Leukocytosis D72.829    UTI (urinary tract infection) N39.0    Slow transit constipation K59.01    Chronic pain G89.29    Anxiety F41.9    Decubitus ulcer of coccygeal region, stage 3 ([facility]) L89.153    Non-pressure chronic ulcer of right calf, limited to breakdown of skin [facility]) L97.211        Procedure Note  Indications:  Based on my examination of this patient's wound(s)/ulcer(s) today, debridement is required to promote healing and evaluate the wound base. Performed by: Arlene Villegas MD    Consent obtained:  Yes    Time out taken:  Yes    Pain Control: Anesthetic  Anesthetic: 2% Xylocaine Gel       Debridement:Excisional Debridement    Using curette the wound(s)/ulcer(s) was/were sharply debrided down through and including the removal of viable and non-viable epidermis, dermis and subcutaneous tissue.         Devitalized Tissue Debrided:  fibrin, biofilm, slough, exudate and callus    Pre Debridement Measurements:  Are located in the [place]  Documentation Flow Sheet    Wound/Ulcer #: 9    Percent of Wound/Ulcer Debrided: 100%    Total Surface Area Debrided:  0.36 sq cm       Pressure Ulcer 07/31/17 Coccyx Medial #10 coccyx (Active)   Magali-wound Assessment Clean;Dry 9/1/2017 10:25 AM   Magali-Wound Texture Scarring 9/1/2017 10:25 AM   Magali-Wound Moisture Dry 9/1/2017 10:25 AM   Magali-Wound Color Purple 8/14/2017 10:23 AM   Pressure Ulcer Staging Stage III 8/14/2017 10:23 AM   Non-staged Wound Description Not applicable 8/98/9764 77:87 AM   Wound Assessment Epithelialization 9/1/2017 10:25 AM   Shape irregular 7/31/2017 10:37 AM   Wound Length (cm) 0 cm 9/1/2017 10:25 AM   Wound Width (cm) 0 cm 9/1/2017 10:25 AM   Wound Depth (cm)  0 9/1/2017 10:25 AM   Calculated Wound Size (cm^2) (l*w) 0 cm^2 9/1/2017 10:25 AM   Change in Wound Size % (l*w) 100 9/1/2017 10:25 AM   Closure None 8/14/2017 10:23 AM   Dressing Status Other (Comment) 8/14/2017 10:23 AM   Dressing Changed Changed/New 8/14/2017 12:32 PM   Dressing/Treatment Wound gel;4x4 8/14/2017 12:32 PM   Wound Cleansed Other (Comment) 8/14/2017 10:23 AM   Necrotic Type Yellow Fibrin/Slough 8/14/2017 10:23 AM   Necrotic Amount Small: 1-33% 8/14/2017 10:23 AM   Granulation Quality Pink 8/14/2017 10:23 AM   Drainage Amount Small 8/14/2017 10:23 AM   Drainage Description Serosanguinous 8/14/2017 10:23 AM   Odor None 8/14/2017 10:23 AM   Epithelialization Large:% 9/1/2017 10:25 AM   Distance Tunneling (cm) 0 cm 9/1/2017 10:25 AM   Tunneling Position ___ O'Clock 0 9/1/2017 10:25 AM   Tunneling Maxium Distance (cm) 0 9/1/2017 10:25 AM   Undermining Starts ___ O'Clock 0 9/1/2017 10:25 AM   Undermining Ends___ O'Clock 0 9/1/2017 10:25 AM   Undermining Maxium Distance (cm) 0 9/1/2017 10:25 AM   [place]%Wound Bed 0 9/1/2017 10:25 AM   Red%Wound Bed 0 9/1/2017 10:25 AM   Yellow%Wound Bed 0 9/1/2017 10:25 AM   Black%Wound Bed 0 9/1/2017 10:25 AM   Purple%Wound Bed 0 9/1/2017 10:25 AM   Margins Attached edges 9/1/2017 10:25 AM   Debridement per physician Full thickness 8/14/2017 11:36 AM   Time out Yes 8/14/2017 11:36 AM   Procedural Pain 0 8/14/2017 11:36 AM   Post procedural Pain 0 8/14/2017 11:36 AM   Number of days:67       Wound 04/19/17 Other (Comment) Heel Left (Active)   Number of days:169       Wound 04/19/17 Other (Comment) Heel Left;Posterior WOUND #7  ([place] [address]) LEFT HEEL (Active)   Number of days:169       Wound 04/19/17 Venous ulcer Leg Right # 8 (Active)   Number of days:169       Wound 08/14/17 Venous ulcer Leg Right; Lower; Anterior WOUND 11, VENOUS, R. ANTERIOR LOWER LEG (Active)   Wound Type Wound 9/1/2017 10:25 AM   Wound Venous 9/1/2017 10:25 AM   Dressing Status Other (Comment) 8/14/2017 10:23 AM   Dressing Changed Changed/New 9/1/2017 11:30 AM   Dressing/Treatment Wound gel;4x4 9/1/2017 11:30 AM   Wound Cleansed Other (Comment) 9/1/2017 10:25 AM   Wound Length 10/6/2017 11:03 AM   Closure None 10/6/2017 10:55 AM   Dressing Status Old drainage 10/6/2017 10:55 AM   Dressing Changed Changed/New 9/29/2017 10:44 AM   Dressing/Treatment Wound gel 9/29/2017 10:44 AM   Wound Cleansed Rinsed/Irrigated with saline 10/6/2017 10:55 AM   Necrotic Type Yellow Fibrin/Slough 10/6/2017 10:55 AM   Necrotic Amount Small: 1-33% 10/6/2017 10:55 AM   Granulation Quality Pink 10/6/2017 10:55 AM   Drainage Amount Moderate 10/6/2017 10:55 AM   Drainage Description Yellow;Serosanguinous 10/6/2017 10:55 AM   Odor None 10/6/2017 10:55 AM   Epithelialization None present 10/6/2017 10:55 AM   Distance Tunneling (cm) 0 cm 10/6/2017 10:55 AM   Tunneling Position ___ O'Clock 0 10/6/2017 10:55 AM   Tunneling Maxium Distance (cm) 0 10/6/2017 10:55 AM   Undermining Starts ___ O'Clock 5 10/6/2017 10:55 AM   Undermining Ends___ O'Clock 12 10/6/2017 10:55 AM   Undermining Maxium Distance (cm) 0.3 10/6/2017 10:55 AM   [place]%Wound Bed 50 10/6/2017 10:55 AM   Red%Wound Bed 0 10/6/2017 10:55 AM   Yellow%Wound Bed 50 10/6/2017 10:55 AM   Black%Wound Bed 0 10/6/2017 10:55 AM   Purple%Wound Bed 0 10/6/2017 10:55 AM   Margins Unattached edges 10/6/2017 10:55 AM   Debridement per physician Subcutaneous 10/6/2017 11:03 AM   Time out Yes 10/6/2017 11:03 AM   Procedural Pain 0 10/6/2017 11:03 AM   Post procedural Pain 0 10/6/2017 11:03 AM   Number of days:67       Diabetic/Pressure/Non Pressure Ulcers only:  Ulcer: Diabetic ulcer, fat layer exposed      Estimated Blood Loss:  Minimal    Hemostasis Achieved:  by pressure    Procedural Pain:  0  / 10     Post Procedural Pain:  0 / 10     Response to treatment:  Well tolerated by patient. Plan:     Treatment Note please see attached Discharge Instructions    In my professional opinion this patient would benefit from HBO Therapy: No    Written patient dismissal instructions given to patient and signed by patient or POA.          Discharge Instructions       Visit Discharge/Physician Orders    Discharge condition: Stable    Discharge to: Home    Left via:Private automobile    Accompanied by:  daughter    ECF/HHA:     Dressing Orders:Dressing Orders: Left Heel Ulcer  Santyl Ointment, Dry Gauze, Roll Gauze, Twice Daily  Metatarsal Pad with cutout for wound, change every 3-4 days (send extra) wear off load shoe when walking and Heel Lift Boot when in bed  Use wheel chair when possible  Elevate feet to level or above heart 3-4 times daily and as needed for 30 minutes to reduce swelling         [facility] followup visit _________2 weeks____________________  (Please note your next appointment above and if you are unable to keep, kindly give a 24 hour notice. Thank you.)          If you experience any of the following, please call the "[employer] during business hours:    * Increase in Pain  * Temperature over 101  * Increase in drainage from your wound  * Drainage with a foul odor  * Bleeding  * Increase in swelling  * Need for compression bandage changes due to slippage, breakthrough drainage. If you need medical attention outside of the business hours of the "[employer] please contact your PCP or go to the nearest emergency room.         Electronically signed by Bertin Ching MD on 10/6/2017 at 11:09 AM

## 2017-10-06 NOTE — IP AVS SNAPSHOT
linagliptin 5 MG tablet   Commonly known as:  TRADJENTA   Take 1 tablet by mouth daily                                         LORazepam 0.5 MG tablet   Commonly known as:  ATIVAN   Take 1 tablet by mouth 2 times daily as needed for Anxiety                                         metoprolol tartrate 50 MG tablet   Commonly known as:  LOPRESSOR   Take 1 tablet by mouth 2 times daily                                         miconazole 2 % powder   Commonly known as:  MICOTIN   Apply topically 2 times daily. midodrine 5 MG tablet   Commonly known as:  PROAMATINE   Take 1 tablet by mouth 3 times daily (with meals)                                         OXYGEN   Inhale 6 L into the lungs continuous May titrate to effect - Patient also placed on Trilogy                                         pantoprazole 40 MG tablet   Commonly known as:  PROTONIX   Take 1 tablet by mouth every morning (before breakfast)                                         polyethylene glycol packet   Commonly known as:  GLYCOLAX   Take 17 g by mouth daily as needed for Constipation                                         silver sulfADIAZINE 1 % cream   Commonly known as:  SILVADENE   Apply topically daily.                                          sucralfate 1 GM tablet   Commonly known as:  CARAFATE   Take 1 g by mouth 4 times daily Indications: Gastroesophageal Reflux Disease                                                 Allergies as of 10/6/2017        Reactions    Dilaudid [Hydromorphone] Shortness Of Breath      Immunizations as of 10/6/2017     Name Date Dose VIS Date Route    Influenza Vaccine, unspecified formulation 12/1/2016 -- -- --    External: Patient reported    Pneumococcal 13-valent Conjugate Daysi Farrar) 12/16/2015 -- -- --    External: Patient reported      Last Vitals          Most Recent Value    Temp  97.2 °F (36.2 °C)    Pulse  74    Resp  20    BP  (!)  80/49         After Visit Summary This summary was created for you. Thank you for entrusting your care to us. The following information includes details about your hospital/visit stay along with steps you should take to help with your recovery once you leave the hospital.  In this packet, you will find information about the topics listed below:    · Instructions about your medications including a list of your home medications  · A summary of your hospital visit  · Follow-up appointments once you have left the hospital  · Your care plan at home      You may receive a survey regarding the care you received during your stay. Your input is valuable to us. We encourage you to complete and return your survey in the envelope provided. We hope you will choose us in the future for your healthcare needs. Patient Information     Patient Name KATHY Boyd 1950      Care Provided at:     Name Address Phone       [facility] [address] [phone]            Your Visit    Here you will find information about your visit, including the reason for your visit. Please take this sheet with you when you visit your doctor or other health care provider in the future. It will help determine the best possible medical care for you at that time. If you have any questions once you leave the hospital, please call the department phone number listed below. Why you were here     Your primary diagnosis was:  Diabetic Ulcer Of Left Heel With Fat Layer Exposed (Hcc)    Your diagnoses also included: Morbid Obesity Due To Excess Calories (Hcc)      Visit Information     Date & Time Department Dept. Phone    10/6/2017 [facility] [address] [facility]       Follow-up Appointments    Below is a list of your follow-up and future appointments.  This may not be a complete list as you may have made appointments directly with providers that we are not aware of or your providers may have made some for you. Please call your providers to confirm appointments. It is important to keep your appointments. Please bring your current insurance card, photo ID, co-pay, and all medication bottles to your appointment. If self-pay, payment is expected at the time of service. Future Appointments     10/20/2017 10:45 AM     Appointment with Arun Soler MD at [address] [facility] ([phone])   Please arrive 15 minutes prior to appointment time, bring insurance card and photo ID. [address] [facility]       11/27/2017 9:15 AM     Appointment with MALICK Vaughn at [facility] ([phone])   Please arrive 15 minutes prior to appointment, bring photo ID and insurance card. [address] [facility]         Preventive Care        Date Due    Diabetic Foot Exam 10/13/1960    Mammograms are recommended every 2 years for low/average risk patients aged 48 - 69, and every year for high risk patients per updated national guidelines. However these guidelines can be individualized by your provider. 5/18/2017    Yearly Flu Vaccine (1) 9/1/2017    Cholesterol Screening 9/23/2017    Osteoporosis screening or a bone density scan (Dexa) is recommended once at age 72. Based upon the results and risk factors for bone loss, your provider will recommend whether this needs to be repeated.  10/19/2017 (Originally 10/13/2014)    Tetanus Combination Vaccine (1 - Tdap) 10/19/2017 (Originally 10/13/1968)    Colonoscopy 10/20/2017 (Originally 10/13/1999)    Eye Exam By An Eye Doctor 11/17/2017 (Originally 10/13/1960)    Zoster Vaccine 1/23/2018 (Originally 10/13/2009)    Pneumococcal Vaccines (two) for age 72 years & over with: cerebrospinal fluid leaks, cochlear implants, hemoglobinopathies,  asplenia, immunodeficiencies, HIV infection, or chronic renal failure (2 of 2 - PPSV23) 1/25/2018 (Originally 2/10/2016)    Hemoglobin A1C (Test For Long-Term Glucose Control) 6/2/2018                 Care Plan Once You Return Home    This section includes instructions you will need to follow once you leave the hospital.  Your care team will discuss these with you, so you and those caring for you know how to best care for your health needs at home. This section may also include educational information about certain health topics that may be of help to you. Discharge Instructions       Visit Discharge/Physician Orders    Discharge condition: Stable    Discharge to: Home    Left via:Private automobile    Accompanied by:  daughter    ECF/HHA:     Dressing Orders:Dressing Orders: Left Heel Ulcer  Santyl Ointment, Dry Gauze, Roll Gauze, Twice Daily  Metatarsal Pad with cutout for wound, change every 3-4 days (send extra) wear off load shoe when walking and Heel Lift Boot when in bed  Use wheel chair when possible  Elevate feet to level or above heart 3-4 times daily and as needed for 30 minutes to reduce swelling         [facility] followup visit _________2 weeks____________________  (Please note your next appointment above and if you are unable to keep, kindly give a 24 hour notice. Thank you.)          If you experience any of the following, please call the [facility] during business hours:    * Increase in Pain  * Temperature over 101  * Increase in drainage from your wound  * Drainage with a foul odor  * Bleeding  * Increase in swelling  * Need for compression bandage changes due to slippage, breakthrough drainage. If you need medical attention outside of the business hours of the [facility] please contact your PCP or go to the nearest emergency room. Important information for a smoker       SMOKING: QUIT SMOKING. THIS IS THE MOST IMPORTANT ACTION YOU CAN TAKE TO IMPROVE YOUR CURRENT AND FUTURE HEALTH. If you have questions, please contact the physician practice where you receive care. Remember, MyChart is NOT to be used for urgent needs. For medical emergencies, dial 911. For questions regarding your MyChart account call [phone]. If you have a clinical question, please call your doctor's office. View your information online  ? Review your current list of  medications, immunization, and allergies. ? Review your future test results online . ? Review your discharge instructions provided by your caregivers at discharge    Certain functionality such as prescription refills, scheduling appointments or sending messages to your provider are not activated if your provider does not use [facility] in his/her office    For questions regarding your MyChart account call [phone]. If you have a clinical question, please call your doctor's office. The information on all pages of the After Visit Summary has been reviewed with me, the patient and/or responsible adult, by my health care provider(s). I had the opportunity to ask questions regarding this information. I understand I should dispose of my armband safely at home to protect my health information. A complete copy of the After Visit Summary has been given to me, the patient and/or responsible adult.            Patient Signature/Responsible Adult:____________________    Clinician Signature:_____________________    Date:_____________________    Time:_____________________

## 2017-10-20 NOTE — PROGRESS NOTES
Diabetic neuropathy ([facility] DM II (diabetes mellitus, type II), controlled ([facility] Hemodialysis patient [facility]); Hypertension; Hypertensive cardiovascular disease; Hypothyroidism; Morbid obesity with BMI of 40.0-44.9, adult ([facility] KASHIF on CPAP; Post-menopausal; Pulmonary hypertension; Sarcoidosis ([facility] and Wounds and injuries. She has a past surgical history that includes Foot Debridement; Kidney stone surgery; and Tunneled venous catheter placement. Her family history includes Arthritis in her father and mother; Heart Disease in her father and mother. Ms. Robert reports that she has quit smoking. She has a 35.00 pack-year smoking history. She has never used smokeless tobacco. She reports that she does not drink alcohol or use drugs. Her current medication list consists of     Current Outpatient Prescriptions on File Prior to Encounter   Medication Sig Dispense Refill    metoprolol tartrate (LOPRESSOR) 50 MG tablet Take 1 tablet by mouth 2 times daily 60 tablet 11    polyethylene glycol (GLYCOLAX) packet Take 17 g by mouth daily as needed for Constipation      gabapentin (NEURONTIN) 100 MG capsule Take 3 capsules by mouth 3 times daily (Patient taking differently: Take 300 mg by mouth 3 times daily Indications: Nerve Disease ) 90 capsule 2    LORazepam (ATIVAN) 0.5 MG tablet Take 1 tablet by mouth 2 times daily as needed for Anxiety 60 tablet 2    HYDROcodone-acetaminophen (NORCO) 7.5-325 MG per tablet Take 1 tablet by mouth every 6 hours as needed for Pain .  Earliest Fill Date: 8/23/17 100 tablet 0    levothyroxine (SYNTHROID) 100 MCG tablet Take 1 tablet by mouth Daily (Patient taking differently: Take 100 mcg by mouth Daily Indications: Underactive Thyroid ) 30 tablet 5    allopurinol (ZYLOPRIM) 100 MG tablet Take 1 tablet by mouth daily (Patient taking differently: Take 100 mg by mouth daily Indications: Arthritis ) 30 tablet 5    albuterol (PROVENTIL) (2.5 MG/3ML) 0.083% nebulizer solution Take Number of days: 67       Diabetic Ulcer 07/31/17 Heel Left #9 left heel (Active)   Magali-wound Assessment Calloused 10/20/2017 11:26 AM   Magali-Wound Texture Callus 10/20/2017 11:26 AM   Magali-Wound Moisture Dry 10/20/2017 11:26 AM   Magali-Wound Color Other 10/20/2017 11:26 AM   Diabetic Wound - Jose Sa 1 10/20/2017 11:26 AM   Non-staged Wound Description Not applicable 40/93/6068 90:03 AM   Wound Assessment Pink;Yellow 10/20/2017 11:26 AM   Shape round 7/31/2017 10:37 AM   Wound Length (cm) 0.1 cm 10/20/2017 11:26 AM   Wound Width (cm) 0.1 cm 10/20/2017 11:26 AM   Wound Depth (cm)  0.1 10/20/2017 11:26 AM   Calculated Wound Size (cm^2) (l*w) 0.01 cm^2 10/20/2017 11:26 AM   Change in Wound Size % (l*w) 99.17 10/20/2017 11:26 AM   Closure None 10/20/2017 11:26 AM   Dressing Status Old drainage 10/20/2017 11:26 AM   Dressing Changed Changed/New 9/29/2017 10:44 AM   Dressing/Treatment Wound gel 9/29/2017 10:44 AM   Wound Cleansed Rinsed/Irrigated with saline 10/20/2017 11:26 AM   Necrotic Type Yellow Fibrin/Slough 10/20/2017 11:26 AM   Necrotic Amount Small: 1-33% 10/20/2017 11:26 AM   Granulation Quality Pink 10/20/2017 11:26 AM   Drainage Amount Scant 10/20/2017 11:26 AM   Drainage Description Serosanguinous 10/20/2017 11:26 AM   Odor None 10/20/2017 11:26 AM   Epithelialization None present 10/20/2017 11:26 AM   Distance Tunneling (cm) 0 cm 10/20/2017 11:26 AM   Tunneling Position ___ O'Clock 0 10/20/2017 11:26 AM   Tunneling Maxium Distance (cm) 0 10/20/2017 11:26 AM   Undermining Starts ___ O'Clock 0 10/20/2017 11:26 AM   Undermining Ends___ O'Clock 0 10/20/2017 11:26 AM   Undermining Maxium Distance (cm) 0 10/20/2017 11:26 AM   [place]%Wound Bed 25 10/20/2017 11:26 AM   Red%Wound Bed 0 10/20/2017 11:26 AM   Yellow%Wound Bed 75 10/20/2017 11:26 AM   Black%Wound Bed 0 10/20/2017 11:26 AM   Purple%Wound Bed 0 10/20/2017 11:26 AM   Margins Unattached edges 10/6/2017 10:55 AM   Debridement per physician None 10/20/2017 11:26 AM   Time out N/A 10/20/2017 11:26 AM   Procedural Pain 0 10/6/2017 11:03 AM   Post procedural Pain 0 10/6/2017 11:03 AM   Number of days: 81        Wound is has significantly improved. Please refer to nursing documentation for wound measurements. Assessment:      Patient Active Problem List   Diagnosis    Essential hypertension    Morbid obesity due to excess calories ([address]    Acquired hypothyroidism    Diabetic ulcer of left foot associated with type 2 diabetes mellitus ([address]    COPD exacerbation ([address]    YANNI (acute kidney injury) ([address]    Hypercalcemia    Chronic kidney disease, stage IV (severe) (HCC)    Type 2 diabetes mellitus with stage 3 chronic kidney disease, with long-term current use of insulin (HCC)    Cor pulmonale, chronic (HCC)    Anemia of chronic disorder    Obstructive sleep apnea    Pulmonary hypertension    Diabetic ulcer of toe of right foot associated with type 2 diabetes mellitus, limited to breakdown of skin (HCC)    Acute pulmonary edema (HCC)    Acute respiratory failure with hypoxia (HCC)    Diabetic ulcer of left heel with fat layer exposed (HCC)    Uremia    Leukocytosis    UTI (urinary tract infection)    Slow transit constipation    Chronic pain    Anxiety    Decubitus ulcer of coccygeal region, stage 3 (HCC)    Non-pressure chronic ulcer of right calf, limited to breakdown of skin ([address]      Plan:     Compliance issues: No    Plan for wound - Dress per physician order  Treatment:     Compression : No   Offloading : Yes   Dressing : See AVS   Additional Therapy : 0     1. Discussed appropriate home care of this wound. Wound redressed. 2. Patient instructions were given. 3. Follow up: 2 week(s).      Discharge Instructions       Visit Discharge/Physician Orders    Discharge condition: Stable    Discharge to: Home    Left via:Private automobile    Accompanied by:  Daughter     ECF/HHA: None     Dressing Orders:Dressing Orders: Left Heel Ulcer  Santyl Ointment, Dry Gauze, Roll Gauze, Twice Daily  Metatarsal Pad with cutout for wound, change every 3-4 days (send extra) wear off load shoe when walking and Heel Lift Boot when in bed  Use wheel chair when possible  Elevate feet to level or above heart 3-4 times daily and as needed for 30 minutes to reduce swelling      [facility] followup visit ______________2 weeks_______________  (Please note your next appointment above and if you are unable to keep, kindly give a 24 hour notice. Thank you.)          If you experience any of the following, please call the [facility] during business hours:    * Increase in Pain  * Temperature over 101  * Increase in drainage from your wound  * Drainage with a foul odor  * Bleeding  * Increase in swelling  * Need for compression bandage changes due to slippage, breakthrough drainage. If you need medical attention outside of the business hours of the [facility] please contact your PCP or go to the nearest emergency room.         Electronically signed by Phoebe Katz MD on 10/20/2017 at 11:42 AM

## 2017-10-25 NOTE — PROGRESS NOTES
Subjective:      Patient ID: Erin Levy is a 75 y.o. female. Chief Complaint   Patient presents with    Nausea    Emesis    Pain     Ride side discomfort      HPI     Patient here for nausea, vomiting,diarrhea fever, and abdominal pain since last night. She does report that she's had the diarrhea on and off for a few days. She did have dialysis yesterday. She states she felt fine at dialysis. She last vomited at 3am this am.He reports that she has not been feeling well for the past couple of days. The pain is intermittent and patient feels like it's similar to her previous history of kidney stones, but not exactly. She states it always burns when she urinates. She has not eaten today. She is currently afebrile and does have significant abdominal girth thickening difficult to examine her., Unfortunately we cannot get an outpatient CT or ultrasound this evening due to the departments and closed at this hour. Patient has dialysis in the morning we will order lab work and an ultrasound for tomorrow after her treatment. Urine has been collected and sent to lab. Review of Systems   Constitutional: Positive for appetite change, chills and fever. Eyes: Negative. Respiratory: Positive for shortness of breath. Cardiovascular: Negative. Gastrointestinal: Positive for abdominal pain, nausea and vomiting. Pain in rlq. Endocrine: Negative. Genitourinary: Positive for dysuria. Does not urinate much due to dialysis. She has urinated once today. Musculoskeletal: Negative. Skin: Negative. Allergic/Immunologic: Negative. Neurological: Negative. Hematological: Negative. Psychiatric/Behavioral: Negative. Objective:   Physical Exam   Constitutional: She is oriented to person, place, and time. She appears well-developed and well-nourished. HENT:   Head: Normocephalic and atraumatic.    Right Ear: External ear normal.   Eyes: Pupils are equal, round, and reactive to her symptoms I do not believe this is an acute appendicitis, differential diagnosis would be viral gastroenteritis, nephrolithiasis, urinary tract infection, labs and urinalysis have been ordered as well as ultrasound of the abdomen after her dialysis treatment tomorrow. Augusto December was seen today for nausea, emesis and pain. Diagnoses and all orders for this visit:    Right lower quadrant abdominal pain  Comments:  Very difficult to examine the patient due to morbid obesity, does not appear to be appendix, a previous history of kidney stones, pain is described as same  Orders:  -     US Abdomen Complete; Future  -     Cancel: POCT Urinalysis no Micro  -     Cancel: Urine Culture  -     Urinalysis    Morbid obesity due to excess calories (HCC)  -     CBC; Future  -     Comprehensive Metabolic Panel; Future    Chronic kidney disease, stage IV (severe) ([facility]  Comments:  Dialysis T-Th-Sat. Orders:  -     Cancel: POCT Urinalysis no Micro  -     Cancel: Urine Culture    Nausea and vomiting, intractability of vomiting not specified, unspecified vomiting type  Comments: We will treat symptomatically, advised to go to the emergency room if symptoms worsen before diagnostic test to complete in AM!    Referral of patient  Comments:  Patient needing a referral for fistula placement  Orders:  -     [facility]- Krystina Nuno MD    Frequency of urination  Comments:  Patient is end-stage renal disease and does not make much urine is complaining of frequency and urgency.   Orders:  -     Urinalysis    Other orders  -     Urinalysis  -     Microscopic Urinalysis  -     Urine Culture

## 2017-10-26 PROBLEM — E11.628 TYPE 2 DIABETES MELLITUS WITH SKIN COMPLICATION (HCC): Status: ACTIVE | Noted: 2017-01-01

## 2017-10-26 PROBLEM — K52.9 COLITIS: Status: ACTIVE | Noted: 2017-01-01

## 2017-10-26 PROBLEM — N30.00 ACUTE CYSTITIS WITHOUT HEMATURIA: Status: ACTIVE | Noted: 2017-01-01

## 2017-10-26 NOTE — PROGRESS NOTES
______________________________________________________________________  I have seen and evaluated the patient and reviewed her most recent test results.     Review of Systems:   CV: no chest pain or palpitations    Respiratory: no cough  GI: still having diarrhea and (mostly right) lower abdominal pain  : dysuria  Skin: sore on left heel nearly healed      VITALS:  BP 93/60   Pulse 76   Temp 98.7 °F (37.1 °C) (Temporal)   Resp 18   Ht 5' 2\" (1.575 m)   Wt 250 lb 6.4 oz (113.6 kg)   LMP  (LMP Unknown)   SpO2 92%   BMI 45.80 kg/m²   24HR INTAKE/OUTPUT:  No intake or output data in the 24 hours ending 10/26/17 0757    General appearance: no acute distress; sleeping; easily awakened  Lungs: NL RR and pattern at rest  Heart: RR with NL rate  Abdomen: thick fatty panniculus  Extremities: no edema  Joints: no synovitis signs  Skin: L heel draining callous  Neurologic: no focal motor deficits    EKG (monitor): NSR     Principal Problem:    Colitis: check for C diff and add metronidazole  Active Problems:    Essential hypertension (now relatively hypotensive)    Acquired hypothyroidism: check TSH    COPD (stable at present)    Obstructive sleep apnea: BiPAP/CPAP    Chronic pain: cont current Rx    Hemodialysis patient: nephrology consult to continue HD (M,W,F)    Type 2 diabetes mellitus with skin complication (nearly healed left heel tip ulcer - now callous with slight drainage)    Acute cystitis without hematuria: cont ceftriaxone    Disposition: transfer to 3rd Floor    Electronically signed by Susanna Henry MD on 10/26/17 at 7:57 AM  30\" spent on rounding

## 2017-10-26 NOTE — PLAN OF CARE
Problem: Breathing Pattern - Ineffective:  Goal: Ability to achieve and maintain a regular respiratory rate will improve  Ability to achieve and maintain a regular respiratory rate will improve  Outcome: Ongoing

## 2017-10-26 NOTE — CONSULTS
Drug use: No              Sexual activity: No                   Other Topics            Concern    None on file    Social History Narrative    None on file          Review of Systems:  History obtained from chart review and the patient  General ROS: No fever or chills  Respiratory ROS: No cough, shortness of breath, wheezing  Cardiovascular ROS: no chest pain or dyspnea on exertion  Gastrointestinal ROS: positive for - abdominal pain and nausea/vomiting  Genito-Urinary ROS: No dysuria or hematuria  Musculoskeletal ROS: No joint pain or swelling   14 point ROS reviewed with the patient and negative except as noted above and in the HPI unless unable to obtain. Objective:  Blood pressure (!) 96/58, pulse 84, temperature 99 °F (37.2 °C), temperature source Temporal, resp. rate 18, height 5' 2\" (1.575 m), weight 250 lb 6.4 oz (113.6 kg), SpO2 90 %, not currently breastfeeding. No intake or output data in the 24 hours ending 10/26/17 1140    General: awake/alert   HEENT: Normocephalic atraumatic head  Neck: Supple with no JVD or carotid bruits.   Chest:  clear to auscultation bilaterally without respiratory distress  CVS: regular rate and rhythm  Abdominal: soft, nontender, normal bowel sounds  Extremities: no cyanosis or edema  Skin: warm and dry without rash      Labs:  BMP:                 10/25/17     10/25/17     10/26/17                       2117          2217          0204          NA           132*          --          135*          K            6.8*         5.7          5.4*          CL           87*           --          91*           CO2          21*           --          26            BUN          36*           --          40*           CREATININE   4.7*          --          4.9*          CALCIUM      10.3*         --          10.5*         CBC:                 10/25/17     10/26/17                       2039          0204          WBC          20.2*        14.9*         HGB          12.7         11.4* obtained. 2-D sagittal and coronal reconstruction images were generated. Intravenous contrast was not administered. Oral contrast was not ingested. Radiation dose equals DLP 1/5/2001 mGy cm. AUTOMATED EXPOSURE CONTROL dose reduction technique was implemented. FINDINGS: There is reticular nodular interstitial prominence identified in the lung bases with peribronchial thickening. Acute inflammatory changes not excluded. There are no consolidating parenchymal infiltrates. There is cardiomegaly with coronary artery calcifications. There is a gallstone appreciated in the gallbladder without gallbladder wall thickening or pericholecystic fluid. There are no focal hepatic lesions identified. The spleen is within the upper limits of normal in size. There is a fatty mass identified in the left adrenal gland compatible with a mild lipoma, benign lesion. Mass measures approximately 2 cm. There is low attenuation mass with calcifications identified in the right adrenal gland measuring nearly 2 cm. Differential considerations include an adenoma. There are nonobstructing calculi appreciated in the left kidney. There are calculi also identified in the right kidney there are staghorn like particularly in the upper pole region. The renal pelvis calyces are poorly imaged with increased density centrally with induration of the surrounding fat. Acute inflammatory process considered. An underlying malignancy difficult to exclude. Further imaging with iodinated contrast, renal mass protocol may be helpful. Follow-up recommended urology consultation suggested. There is no hydroureter or ureterolithiasis. Urinary bladder is decompressed without bladder wall abnormality. GYN structures are unremarkable without dominant adnexal masses. Stool and gas identified throughout the nondilated colon. There is no CT evidence of significant diverticular disease.  There is circumferential thickening diffusely in the right colon wall with mild induration of the pericolonic fat worrisome for diffuse colitis. There is also some circumferential thickening of the proximal transverse colon additional colitis involvement suspected. The appendix is not clearly identified. There are no convincing changes of acute appendicitis. Small bowel is not dilated. The duodenal sweep imaged appropriately. The stomach is not distended. There is no ascites or pneumoperitoneum. Fat-containing left inguinal hernias noted. There are atherosclerotic aortoiliac iliac calcifications. There are no pathologically enlarged nodes with multiple small mesenteric nodes appreciated particularly along the celiac axis and SMA as well as in the periaortic regions. Diffuse spondylosis changes noted in the thoracolumbar spine. 1. Bilateral nonobstructing renal calcifications. 2. Soft tissue prominence centrally in the right kidney involving the right renal pelvis and calyces without significant dilatation. Etiology uncertain, infectious or inflammatory changes considered. Malignancy also differential consideration. Follow-up recommended. 3. Diffuse circumferential thickening of the right colon and proximal transverse colon, with mild pericolonic fat induration, correlate for colitis. 4. Diffuse reticulonodular changes in the lower lobes, acute inflammatory process not excluded. 5. Cholelithiasis. 6. Bilateral probable benign adrenal masses, described above. 7. Additional nonacute findings, described above. Signed by Dr Janie Rouse on 10/25/2017 10:09 PM    Xr Chest Portable    Result Date: 10/25/2017  XR CHEST PORTABLE 10/25/2017 8:15 PM HISTORY:   Fever  Single view. COMPARISONS:  10/4/2017, 9/11/2017 and 6/8/2017 FINDINGS: Right-sided dialysis catheter again identified. There is cardiomegaly with diffuse bronchovascular interstitial prominence observed similarly on previous studies. There are no consolidating parenchymal infiltrates. Bony structures are intact.     1. Cardiomegaly with bilateral diffuse bronchovascular interstitial prominence, observes similarly on previous examinations, no parenchymal infiltration. Signed by Dr Akosua Saavedra on 10/25/2017 9:42 PM       Assessment  1. End-stage renal disease secondary to diverticular nephropathy. She is currently seen on dialysis  2. Acute colitis. 3. Type II diabetes. 4. Clinically volume overloaded. 5. Abdominal obesity. Plan:   1. Tolerating hemodialysis very well. 2. Continue IV antibiotics. 3. Dialysis with large ultrafiltration. Thank you for the consult, we appreciate the opportunity to provide care to your patients. Feel free to contact me if I can be of any further assistance.       William Matthew MD  10/26/17  11:40 AM

## 2017-10-26 NOTE — PLAN OF CARE
Problem: Nutrition  Goal: Optimal nutrition therapy  Nutrition Problem: Inadequate oral intake  Intervention: Food and/or Nutrient Delivery: Continue NPO  Nutritional Goals: when diet advanced, po intake 50% or greater    Outcome: Ongoing

## 2017-10-26 NOTE — H&P
[facility] - History & Physical    [id number]  PCP: MALICK Fishman  Date of Admission: 10/25/2017   Date of Service: Pt seen/examined on10/26/2017 and Admitted to Inpatient with expected LOS greater than two midnights due to medical therapy. Chief Complaint:  Nausea and vomiting    History Of Present Illness: The patient is a 75 y.o. female who presented complaining of Nausea and vomiting for about 1-2 days. Pt also had several episodes of nonbloody diarrhea which has now resolved. Her last episode of diarrhea was about 1 day ago. Pt also having associated fevers. Of note, pt states she makes very little urine and has been having some dysuria with purulent urine. Review of Systems   Constitutional: Positive for fever. Negative for chills and malaise/fatigue. HENT: Negative for congestion and sinus pain. Eyes: Negative for blurred vision and double vision. Respiratory: Negative for cough, sputum production and shortness of breath. Cardiovascular: Negative for chest pain, palpitations and leg swelling. Gastrointestinal: Positive for abdominal pain, diarrhea, nausea and vomiting. Negative for blood in stool and heartburn. Genitourinary: Positive for dysuria. Negative for frequency and urgency. Musculoskeletal: Negative for back pain. Skin: Negative for rash. Neurological: Negative for dizziness, sensory change, focal weakness and seizures. Endo/Heme/Allergies: Does not bruise/bleed easily. Psychiatric/Behavioral: Negative for depression. Past Medical History:        Diagnosis Date    Arthritis     CAD (coronary artery disease)     hospitalized first week of March with low O2 sat, low BS, was in critical care for 2-3 days.   was in the hospiral for 1 1/2 weeks total.    Chronic diastolic CHF (congestive heart failure) (HCC)     CKD (chronic kidney disease), stage IV ([facility] 3/2/16    COPD (chronic obstructive pulmonary disease) ([facility]     se[facility] her dx.; has seen amelia in the past    Diabetic neuropathy ([facility]     DM II (diabetes mellitus, type II), controlled ([facility] [facility]     Hemodialysis patient ([facility] [facility]     ajya gross sat and [place]    Hypertension     Hypertensive cardiovascular disease 2/28/2013 2/20/2013  Echo  Normal LVFX, concentric LVH    Hypothyroidism 2/28/2013    Morbid obesity with BMI of 40.0-44.9, adult ([facility] 4/13/2016    BMI 44.7   (was 60+ in 2013)    KASHIF on CPAP     Post-menopausal     Pulmonary hypertension     RVSP 72 on 1/17/2017 echo    [facility]     Wounds and injuries right toe    Mat Kael       Past Surgical History:        Procedure Laterality Date    FOOT DEBRIDEMENT      multiple    KIDNEY STONE SURGERY      TUNNELED VENOUS CATHETER PLACEMENT      permacath       Home Medications:  Prior to Admission medications    Medication Sig Start Date End Date Taking? Authorizing Provider   metoprolol tartrate (LOPRESSOR) 50 MG tablet Take 1 tablet by mouth 2 times daily 9/19/17  Yes MALICK Bull   polyethylene glycol (GLYCOLAX) packet Take 17 g by mouth daily as needed for Constipation   Yes Historical Provider, MD   gabapentin (NEURONTIN) 100 MG capsule Take 3 capsules by mouth 3 times daily  Patient taking differently: Take 300 mg by mouth 3 times daily Indications: Nerve Disease  8/23/17  Yes José Miguel Nunez DO   LORazepam (ATIVAN) 0.5 MG tablet Take 1 tablet by mouth 2 times daily as needed for Anxiety 8/23/17  Yes José Miguel Nunez DO   HYDROcodone-acetaminophen (NORCO) 7.5-325 MG per tablet Take 1 tablet by mouth every 6 hours as needed for Pain .  Earliest Fill Date: 8/23/17 8/23/17  Yes José Miguel Nunez DO   levothyroxine (SYNTHROID) 100 MCG tablet Take 1 tablet by mouth Daily  Patient taking differently: Take 100 mcg by mouth Daily Indications: Underactive Thyroid  8/23/17  Yes José Miguel Nunez DO   allopurinol (ZYLOPRIM) 100 MG tablet Take 1 tablet by mouth aspirin 81 MG tablet Take 81 mg by mouth daily   Yes Historical Provider, MD       Allergies:    Dilaudid [hydromorphone]    Social History:    The patient currently lives at home  Tobacco:   reports that she has quit smoking. She has a 35.00 pack-year smoking history. She has never used smokeless tobacco.  Alcohol:   reports that she does not drink alcohol. Illicit Drugs: denies    Family History:      Problem Relation Age of Onset    Arthritis Mother     Heart Disease Mother     Arthritis Father     Heart Disease Father          Physical Examination:  /62   Pulse 81   Temp 97.9 °F (36.6 °C) (Temporal)   Resp 18   Ht 5' 2\" (1.575 m)   Wt 250 lb 6.4 oz (113.6 kg)   LMP  (LMP Unknown)   SpO2 95%   BMI 45.80 kg/m²   General appearance: alert, cooperative and no distress  Head: Normocephalic, without obvious abnormality, atraumatic  Eyes: conjunctivae/corneas clear. PERRL, EOM's intact. Ears: normal external ears  Neck: no adenopathy, no carotid bruit, no JVD, supple, symmetrical, trachea midline and thyroid not enlarged, symmetric, no tenderness/mass/nodules  Lungs: clear to auscultation bilaterally,no rales or wheezes   Heart: regular rate and rhythm, S1, S2 normal, no murmur, R chest permacath in place  Abdomen:soft, mild diffuse tenderness to palpation; distended, normal bowel sounds no masses, no organomegaly  Extremities:Pedal edema none  Homans sign is negative, no sign of DVT  Skin: Skin color, texture, turgor normal. No rashes or lesions  Lymphatic: No palpable lymph node enlargment  Neurologic: Alert and oriented X 3.  Cranial nerves:II-XII Grossly intact Sensory: normal Motor:grossly normal  Psychiatric:        Diagnostic Data:    CBC:  Recent Labs      10/25/17   2039  10/26/17   0204   WBC  20.2*  14.9*   HGB  12.7  11.4*   HCT  42.4  37.5   PLT  184  150     BMP:  Recent Labs      10/25/17   2117  10/25/17   2217  10/26/17   0204   NA  132*   --   135*   K  6.8*  5.7  5.4*   CL  87*

## 2017-10-26 NOTE — PROGRESS NOTES
Report called to [facility] on the 3rd floor, patient is going to room 309 and maybe dialysis, will speak to Dr. Citlalli Del Cid. Transportation will transport patient.

## 2017-10-26 NOTE — ED PROVIDER NOTES
systems was performed and is negative except as noted above in the HPI. PAST MEDICAL HISTORY     Past Medical History:   Diagnosis Date    Arthritis     CAD (coronary artery disease)     hospitalized first week of March with low O2 sat, low BS, was in critical care for 2-3 days. was in the hospiral for 1 1/2 weeks total.    Chronic diastolic [facility]congest[facility] failure) [facility]     CKD (chronic kidney disease), stage IV ([address] 3/2/16    COPD (chronic obstructive pulmonary disease) ([facility]     sees [facility] for her dx.; has seen amelia in the past    Diabetic neuropathy ([facility]     DM II (diabetes mellitus, type II), controlled ([facility]     Hemodialysis patient ([facility] [address] [facility]     jaya gross sat and [place]    Hypertension     Hypertensive cardiovascular disease 2/28/2013 2/20/2013  Echo  Normal LVFX, concentric LVH    Hypothyroidism 2/28/2013    Morbid obesity with BMI of 40.0-44.9, adult ([address] 4/13/2016    BMI 44.7   (was 60+ in 2013)    KASHIF on CPAP     Post-menopausal     Pulmonary hypertension     RVSP 72 on 1/17/2017 echo    Sarcoidosis ([facility] [facility]     Wounds and injuries right toe    Shahriar Days         SURGICAL HISTORY       Past Surgical History:   Procedure Laterality Date    FOOT DEBRIDEMENT      multiple    KIDNEY STONE SURGERY      TUNNELED VENOUS CATHETER PLACEMENT      permacath         CURRENT MEDICATIONS       Current Discharge Medication List      CONTINUE these medications which have NOT CHANGED    Details   metoprolol tartrate (LOPRESSOR) 50 MG tablet Take 1 tablet by mouth 2 times daily  Qty: 60 tablet, Refills: 11    Associated Diagnoses: Essential hypertension      polyethylene glycol (GLYCOLAX) packet Take 17 g by mouth daily as needed for Constipation      gabapentin (NEURONTIN) 100 MG capsule Take 3 capsules by mouth 3 times daily  Qty: 90 capsule, Refills: 2    Associated Diagnoses: Other chronic pain;  Anxiety      LORazepam (ATIVAN) 0.5 MG tablet Take 1 tablet by mouth 2 g, Refills: 1      OXYGEN Inhale 6 L into the lungs continuous May titrate to effect - Patient also placed on Trilogy  Qty: 1 Units, Refills: 2      miconazole (MICOTIN) 2 % powder Apply topically 2 times daily. Qty: 45 g, Refills: 1      aspirin 81 MG tablet Take 81 mg by mouth daily             ALLERGIES     Dilaudid [hydromorphone]    FAMILY HISTORY       Family History   Problem Relation Age of Onset    Arthritis Mother     Heart Disease Mother     Arthritis Father     Heart Disease Father           SOCIAL HISTORY       Social History     Social History    Marital status:      Spouse name: N/A    Number of children: 2    Years of education: N/A     Social History Main Topics    Smoking status: Former Smoker     Packs/day: 1.00     Years: 35.00    Smokeless tobacco: Never Used      Comment: quit smoking \"years ago\"    Alcohol use No    Drug use: No    Sexual activity: No     Other Topics Concern    None     Social History Narrative    None       SCREENINGS    [place] Coma Scale  Eye Opening: Spontaneous  Best Verbal Response: Oriented  Best Motor Response: Obeys commands  [place] Coma Scale Score: 15        PHYSICAL EXAM    (up to 7 for level 4, 8 or more for level 5)     ED Triage Vitals [10/25/17 2023]   BP Temp Temp Source Pulse Resp SpO2 Height Weight   (!) 79/38 98.7 °F (37.1 °C) Oral 73 24 (!) 83 % 5' 2\" (1.575 m) 255 lb (115.7 kg)       Physical Exam   Constitutional: She is oriented to person, place, and time. She appears well-developed and well-nourished. HENT:   Head: Normocephalic and atraumatic. Neck: Normal range of motion. Neck supple. Cardiovascular: Normal rate, regular rhythm, normal heart sounds and intact distal pulses. Pulmonary/Chest: Effort normal and breath sounds normal.   Abdominal: Soft. Bowel sounds are normal. There is tenderness (generalized). Neurological: She is alert and oriented to person, place, and time. Skin: Skin is warm and dry. Psychiatric: She has a normal mood and affect. Her behavior is normal. Judgment and thought content normal.       DIAGNOSTIC RESULTS     EKG: All EKG's are interpreted by the Emergency Department Physician who either signs or Co-signs this chart in the absence of a cardiologist.    2251 - sinus rhythm with T-wave inversions in V3 through V6 these appear to be new from previous EKG dated 09/11/2017. Rate 78    RADIOLOGY:   Non-plain film images such as CT, Ultrasound and MRI are read by the radiologist. Plain radiographic images are visualized and preliminarily interpreted by the emergency physician with the below findings:        Interpretation per the Radiologist below, if available at the time of this note:    CT ABDOMEN PELVIS WO IV CONTRAST Additional Contrast? None   Final Result   1. Bilateral nonobstructing renal calcifications. 2. Soft tissue prominence centrally in the right kidney involving the   right renal pelvis and calyces without significant dilatation. Etiology uncertain, infectious or inflammatory changes considered. Malignancy also differential consideration. Follow-up recommended. 3. Diffuse circumferential thickening of the right colon and proximal   transverse colon, with mild pericolonic fat induration, correlate for   colitis. 4. Diffuse reticulonodular changes in the lower lobes, acute   inflammatory process not excluded. 5. Cholelithiasis. 6. Bilateral probable benign adrenal masses, described above. 7. Additional nonacute findings, described above. Signed by Dr Michaela Whitaker on 10/25/2017 10:09 PM      XR Chest Portable   Final Result   1. Cardiomegaly with bilateral diffuse bronchovascular interstitial   prominence, observes similarly on previous examinations, no   parenchymal infiltration.    Signed by Dr Michaela Whitaker on 10/25/2017 9:42 PM            ED BEDSIDE ULTRASOUND:   Performed by ED Physician - none    LABS:  Labs Reviewed   CBC WITH AUTO DIFFERENTIAL - COURSE and DIFFERENTIAL DIAGNOSIS/MDM:   Vitals:    Vitals:    10/25/17 2304 10/25/17 2332 10/25/17 2355 10/25/17 2358   BP: (!) 102/42 (!) 106/48 (!) 107/58    Pulse: 78 77 78    Resp: 20 16 18    Temp:  98.9 °F (37.2 °C) 98.8 °F (37.1 °C)    TempSrc:  Oral Temporal    SpO2: (!) 86% 92% 94%    Weight:    250 lb 6.4 oz (113.6 kg)   Height:           MDM  Metabolic panel shows potassium 6.8, glucose 367, BUN 36 creatinine 4.7, lipase is normal at 31, WBC counts 20.2, neutrophils 86.6, platelets 415, urinalysis too numerous to count white blood cells. CT abdomen pelvis without contrast shows colitis the right and proximal transverse colon. Chest x-ray is normal. Discussed case with Dr Doyle Mclaughlin the hospitalist is willing to admit to a telemetry bed.    [phone] - notified hospitalist Dr Doyle Mclaughlin about EKG changes. CONSULTS:  None    PROCEDURES:  Unless otherwise noted below, none     Procedures    FINAL IMPRESSION      1. Colitis    2. Stage 5 chronic kidney disease on chronic dialysis ([facility]    3. Hyperkalemia    4. Type 2 diabetes mellitus with chronic kidney disease on chronic dialysis, without long-term current use of insulin ([address]    5.  KASHIF on CPAP          DISPOSITION/PLAN   DISPOSITION     PATIENT REFERRED TO:  Itzel Mckeon, [address] [place], [place] [id number]            DISCHARGE MEDICATIONS:  Current Discharge Medication List             (Please note that portions of this note were completed with a voice recognition program.  Efforts were made to edit the dictations but occasionally words are mis-transcribed.)    MALICK Ontiveros (electronically signed)  Attending Emergency Physician         MALICK Ontiveros  10/26/17 0021

## 2017-10-26 NOTE — PROGRESS NOTES
Patient  Has been having frequent diarrhea, I have asked for a C-Diff culture to be ordered and we have sent down. She has history of MRSA in the nares last month and also has a healed pressure ulcer on her coccyx, it is red and excoriated with an old scar but is not open. Patient does have a deep tissue injury to the Left heel that needs to be elevated at all times. Patient has skin folds that are being treated with Nystatin powder. Patient is alert and oriented. She has not complained of any N/V this AM. She has her own Trilogy machine at bedside that needs to be used at night and when sleeping. She is on 4 liters N/C and O2 sat is around 91-92% she does have COPD.

## 2017-10-27 PROBLEM — I95.3 HEMODIALYSIS-ASSOCIATED HYPOTENSION: Chronic | Status: ACTIVE | Noted: 2017-01-01

## 2017-10-27 PROBLEM — N18.6 ESRD (END STAGE RENAL DISEASE) (HCC): Chronic | Status: ACTIVE | Noted: 2017-01-01

## 2017-10-27 NOTE — PROGRESS NOTES
Patient visited by Spiritual Care volunteer KADIE Davenport. Sarah Oran. Contact completed. Prayer/emotional support.

## 2017-10-27 NOTE — PROGRESS NOTES
(friend)  Referring Practitioner: Dr. Ollie Kumar  Referral Date : 10/26/17  Diagnosis: Colitis, RUQ abd pain  Follows Commands: Within Functional Limits  General Comment  Comments: RNMary PT and states pt. was up to chair with PCA. Subjective  Subjective: Pt. states she has been having bad diarrhea and her bottom is raw. Pain Screening  Patient Currently in Pain: No  Pain Assessment  Pain Assessment: 0-10  Pain Level: 8  Pain Type: Chronic pain;Acute pain  Pain Location: Back;Buttocks  Pain Orientation: Lower  Pain Descriptors: Burning  Pain Onset: On-going  Pain Intervention(s): Repositioned; Ambulation/Increased activity  Response to Pain Intervention: Patient Satisfied  Oxygen Therapy  O2 Device: Nasal cannula  O2 Flow Rate (L/min): 4 L/min  Pre Treatment Pain Screening  Intervention List: Patient able to continue with treatment    Orientation  Orientation  Overall Orientation Status: Within Functional Limits    Social/Functional History  Social/Functional History  Lives With:  (daughter works, so pt.  is on her own through the day)  Type of Home: House  Home Layout: One level  Home Access: Ramped entrance  Bathroom Toilet: Bedside commode  Home Equipment: Rolling walker  Receives Help From: Family  ADL Assistance: Needs assistance (states she needs A with washing her hair)  Ambulation Assistance: Independent (short distances with RW)  Transfer Assistance: Independent  Objective     Observation/Palpation  Posture: Good    AROM RLE (degrees)  RLE AROM: WFL  AROM LLE (degrees)  LLE AROM : WFL  Strength RLE  Comment: grossly 4-/5  Strength LLE  Comment: grossly 4-/5     Sensation  Overall Sensation Status: Impaired (peripheral neuropathy)  Bed mobility  Rolling to Left: Minimal assistance  Supine to Sit: Moderate assistance  Sit to Supine: Contact guard assistance  Comment: pt. sat on EOB x 5 mins prior to standing  Transfers  Sit to Stand: Minimal Assistance (with RW)  Stand to sit: Contact guard Mobility: Walking and moving around  Mobility: Walking and Moving Around Current Status (): At least 40 percent but less than 60 percent impaired, limited or restricted  Mobility: Walking and Moving Around Goal Status ():  At least 1 percent but less than 20 percent impaired, limited or restricted  OutComes Score                                           Goals  Short term goals  Time Frame for Short term goals: 2 wks  Short term goal 1: supine to sit indep  Short term goal 2: sit to stand indep  Short term goal 3: amb. 76' with RW SBA  Short term goal 4: sit to supine indep  Patient Goals   Patient goals : go home       Therapy Time   Individual Concurrent Group Co-treatment   Time In           Time Out           Minutes                   Watson Hawkins PT    Electronically signed by Watson Hawkins PT on 10/27/2017 at 2:27 PM

## 2017-10-27 NOTE — PROGRESS NOTES
Nutrition Assessment    Type and Reason for Visit: Reassess    Nutrition Recommendations: continue with current diet and advance when able. Start ONS as needed    Malnutrition Assessment:  · Malnutrition Status: At risk for malnutrition    Nutrition Diagnosis:   · Problem: Inadequate oral intake  · Etiology: related to Acute injury/trauma, Nausea, Vomiting, Diarrhea     Signs and symptoms:  as evidenced by Intake 0-25%    Nutrition Assessment:  · Subjective Assessment: Diet has been advanced to clear liquids. Po intake still decreased with intake 0-25%  New weight NA. · Nutrition-Focused Physical Findings: well nourished appearing female  · Wound Type: Deep Tissue Injury (redness, bruising, excoriation)  · Current Nutrition Therapies:  · Oral Diet Orders: Clear Liquid   · Oral Diet intake: 1-25%  · Oral Nutrition Supplement (ONS) Orders: None    · Anthropometric Measures:  · Ht: 5' 2\" (157.5 cm)   · Current Body Wt: 250 lb 6 oz (113.6 kg)  · Admission Body Wt: 255 lb (115.7 kg)  · Usual Body Wt: 258 lb (117 kg)  · Ideal Body Wt: 110 lb (49.9 kg), BMI Classification: BMI > or equal to 40.0 Obese Class III    Estimated Intake vs Estimated Needs: Intake Less Than Needs    Nutrition Risk Level: High    Nutrition Interventions:   Continue current diet  Continued Inpatient Monitoring    Nutrition Evaluation:   · Evaluation: Progressing toward goals   · Goals: when diet advanced, po intake 50% or greater    · Monitoring: Meal Intake, Diet Progression, Diet Tolerance, Skin Integrity, Wound Healing, Weight, Pertinent Labs    See Adult Nutrition Doc Flowsheet for more detail.      Electronically signed by Tano Tapia MS, RD, LD on 10/27/17 at 9:50 AM    Contact Number: [phone]

## 2017-10-27 NOTE — PLAN OF CARE
Problem: Risk for Impaired Skin Integrity  Goal: Tissue integrity - skin and mucous membranes  Structural intactness and normal physiological function of skin and  mucous membranes.    Outcome: Ongoing      Problem: Falls - Risk of  Goal: Absence of falls  Outcome: Ongoing      Problem: Pain:  Goal: Pain level will decrease  Pain level will decrease   Outcome: Ongoing    Goal: Control of acute pain  Control of acute pain   Outcome: Ongoing    Goal: Control of chronic pain  Control of chronic pain   Outcome: Ongoing      Problem: Tissue Perfusion - Cardiopulmonary, Altered:  Goal: Absence of angina  Absence of angina   Outcome: Ongoing    Goal: Hemodynamic stability will improve  Hemodynamic stability will improve   Outcome: Ongoing      Problem: Discharge Planning:  Goal: Discharged to appropriate level of care  Discharged to appropriate level of care   Outcome: Ongoing      Problem: Breathing Pattern - Ineffective:  Goal: Ability to achieve and maintain a regular respiratory rate will improve  Ability to achieve and maintain a regular respiratory rate will improve   Outcome: Ongoing      Problem: Nausea/Vomiting:  Goal: Absence of nausea/vomiting  Absence of nausea/vomiting   Outcome: Ongoing    Goal: Able to drink  Able to drink   Outcome: Ongoing    Goal: Able to eat  Able to eat   Outcome: Ongoing    Goal: Ability to achieve adequate nutritional intake will improve  Ability to achieve adequate nutritional intake will improve   Outcome: Ongoing      Problem: Nutrition  Goal: Optimal nutrition therapy  Nutrition Problem: Inadequate oral intake  Intervention: Food and/or Nutrient Delivery: Continue NPO  Nutritional Goals: when diet advanced, po intake 50% or greater     Outcome: Ongoing

## 2017-10-27 NOTE — PLAN OF CARE
Problem: Nutrition  Goal: Optimal nutrition therapy  Nutrition Problem: Inadequate oral intake  Intervention: Food and/or Nutrient Delivery: Continue current diet  Nutritional Goals: when diet advanced, po intake 50% or greater     Outcome: Ongoing

## 2017-10-27 NOTE — PROGRESS NOTES
 acetaminophen (TYLENOL) tablet 650 mg  650 mg Oral Q4H PRN Ibis Mai MD        ondansetron [facility]) injection 4 mg  4 mg Intravenous Q6H PRN Ibis Mai MD        enoxaparin (LOVENOX) injection 30 mg  30 mg Subcutaneous Daily Ibis Mai MD   30 mg at 10/26/17 0848    glucose (GLUTOSE) 40 % oral gel 15 g  15 g Oral PRN Ibis Mai MD        dextrose 50 % solution 12.5 g  12.5 g Intravenous PRN Ibis Mai MD        glucagon (rDNA) injection 1 mg  1 mg Intramuscular PRN Ibis Mai MD        dextrose 5 % solution  100 mL/hr Intravenous PRN Ibis Mai MD        insulin glargine (LANTUS) injection vial 17 Units  0.15 Units/kg Subcutaneous Nightly Ibis Mai MD   17 Units at 10/26/17 2124    insulin lispro (HUMALOG) injection vial 6 Units  0.05 Units/kg Subcutaneous TID  Ibis Mai MD        insulin lispro (HUMALOG) injection vial 0-6 Units  0-6 Units Subcutaneous TID  Ibis Mai MD        insulin lispro (HUMALOG) injection vial 0-3 Units  0-3 Units Subcutaneous Nightly Ibis Mai MD   2 Units at 10/26/17 2123    cefTRIAXone (ROCEPHIN) 1 g in 50 mL IVPB (premix)  1 g Intravenous Q24H Ibis Mai MD   Stopped at 10/26/17 2154    metronidazole (FLAGYL) 500 mg in NaCl 100 mL IVPB premix  500 mg Intravenous Q8H Meaghan Goldstein MD   Stopped at 10/27/17 0300    allopurinol (ZYLOPRIM) tablet 100 mg  100 mg Oral Daily Mera Galaviz MD   100 mg at 10/26/17 2122    HYDROcodone-acetaminophen (Karole Dakins) 7.5-325 MG per tablet 1 tablet  1 tablet Oral Q6H PRN Mera Galaviz MD   1 tablet at 10/26/17 2122    midodrine (PROAMATINE) tablet 5 mg  5 mg Oral TID  Mera Galaviz MD   5 mg at 10/26/17 2122       Past Medical History:  Past Medical History:   Diagnosis Date    Arthritis     CAD (coronary artery disease)     hospitalized first week of March with low O2 sat, low BS, was in critical care for 2-3 days. was in the hospiral for 1 1/2 weeks total.    Chronic diastolic CHF (congestive heart failure) (HCC)     CKD (chronic kidney disease), stage IV ([facility] 3/2/16    COPD (chronic obstructive pulmonary disease) ([facility]     se[facility] her dx.; has seen amelia in the past    Diabetic neuropathy ([facility]     DM II (diabetes mellitus, type II), controlled ([facility]     Hemodialysis patient ([facility]     jaya gross sat and [place]    Hypertension     Hypertensive cardiovascular disease 2/28/2013 2/20/2013  Echo  Normal LVFX, concentric LVH    Hypothyroidism 2/28/2013    Morbid obesity with BMI of 40.0-44.9, adult ([facility] 4/13/2016    BMI 44.7   (was 60+ in 2013)    KASHIF on CPAP     Post-menopausal     Pulmonary hypertension     RVSP 72 on 1/17/2017 echo    [facility]     Wounds and injuries right toe    [facility]       Past Surgical History:  Past Surgical History:   Procedure Laterality Date    FOOT DEBRIDEMENT      multiple    KIDNEY STONE SURGERY      TUNNELED VENOUS CATHETER PLACEMENT      permacath       Family History  Family History   Problem Relation Age of Onset    Arthritis Mother     Heart Disease Mother     Arthritis Father     Heart Disease Father        Social History  Social History     Social History    Marital status:      Spouse name: N/A    Number of children: 2    Years of education: N/A     Occupational History    Not on file.      Social History Main Topics    Smoking status: Former Smoker     Packs/day: 1.00     Years: 35.00    Smokeless tobacco: Never Used      Comment: quit smoking \"years ago\"    Alcohol use No    Drug use: No    Sexual activity: No     Other Topics Concern    Not on file     Social History Narrative    No narrative on file         Review of Systems:  History obtained from chart review and the patient  General ROS: No fever or chills  Respiratory ROS: No cough, shortness of breath, wheezing  Cardiovascular ROS: no chest pain or dyspnea on exertion  Gastrointestinal ROS: Complaining of abdominal pain and diarrhea. Genito-Urinary ROS: No dysuria or hematuria  Musculoskeletal ROS: No joint pain or swelling   14 point ROS reviewed with the patient and negative except as noted above and in the HPI unless unable to obtain. Objective:  Blood pressure 95/60, pulse 85, temperature 97.5 °F (36.4 °C), temperature source Temporal, resp. rate 16, height 5' 2\" (1.575 m), weight 250 lb 6.4 oz (113.6 kg), SpO2 93 %, not currently breastfeeding. Intake/Output Summary (Last 24 hours) at 10/27/17 0834  Last data filed at 10/27/17 0455   Gross per 24 hour   Intake              750 ml   Output             3000 ml   Net            -2250 ml     General: awake/alert    HEENT: Normocephalic atraumatic head  Neck: Supple with no JVD or carotid bruits. Chest:  clear to auscultation bilaterally without respiratory distress  CVS: regular rate and rhythm  Abdominal: soft, nontender, normal bowel sounds and abdominal obesity. Extremities: no cyanosis or edema  Skin: warm and dry without rash      Labs:  BMP: Recent Labs      10/25/17   2117  10/25/17   2217  10/26/17   0204  10/27/17   0402   NA  132*   --   135*  137   K  6.8*  5.7  5.4*  4.1   CL  87*   --   91*  93*   CO2  21*   --   26  28   BUN  36*   --   40*  28*   CREATININE  4.7*   --   4.9*  4.2*   CALCIUM  10.3*   --   10.5*  9.6     CBC: Recent Labs      10/25/17   2039  10/26/17   0204  10/27/17   0402   WBC  20.2*  14.9*  16.8*   HGB  12.7  11.4*  11.2*   HCT  42.4  37.5  37.3   MCV  106.5*  106.5*  106.9*   PLT  184  150  186     LIVER PROFILE:   Recent Labs      10/25/17   2117  10/27/17   0402   AST  42*  25   ALT  24  15   LIPASE  31   --    BILITOT  1.2  0.8   ALKPHOS  79  84     PT/INR: No results for input(s): PROTIME, INR in the last 72 hours. APTT: No results for input(s): APTT in the last 72 hours. BNP:  No results for input(s): BNP in the last 72 hours.   Ionized Calcium:No results for input(s): IONCA in the last 72 hours. Magnesium:No results for input(s): MG in the last 72 hours. Phosphorus:No results for input(s): PHOS in the last 72 hours. HgbA1C:   Recent Labs      10/26/17   0204   LABA1C  6.7*     Hepatic: Recent Labs      10/25/17   2117  10/27/17   0402   ALKPHOS  79  84   ALT  24  15   AST  42*  25   PROT  8.1  7.5   BILITOT  1.2  0.8   LABALBU  3.4*  3.2*     Lactic Acid:   Recent Labs      10/25/17   2301   LACTA  4.4*     Troponin: No results for input(s): CKTOTAL, CKMB, TROPONINT in the last 72 hours. ABGs: No results for input(s): PH, PCO2, PO2, HCO3, O2SAT in the last 72 hours. CRP:  No results for input(s): CRP in the last 72 hours. Sed Rate:  No results for input(s): SEDRATE in the last 72 hours. Cultures:   No results for input(s): CULTURE in the last 72 hours. Recent Labs      10/25/17   2105  10/25/17   2117   BC  No Growth to date. Any change in status will be called. --    BLOODCULT2   --   No Growth to date. Any change in status will be called. No results for input(s): CXSURG in the last 72 hours. Radiology reports as per the Radiologist  Radiology: Ct Abdomen Pelvis Wo Iv Contrast Additional Contrast? None    Result Date: 10/25/2017  CT ABDOMEN PELVIS WO IV CONTRAST 10/25/2017 7:45 PM HISTORY: Right lower quadrant abdominal pain COMPARISON: None. TECHNIQUE:  Thin section sequential transaxial images were obtained. 2-D sagittal and coronal reconstruction images were generated. Intravenous contrast was not administered. Oral contrast was not ingested. Radiation dose equals DLP 1/5/2001 mGy cm. AUTOMATED EXPOSURE CONTROL dose reduction technique was implemented. FINDINGS: There is reticular nodular interstitial prominence identified in the lung bases with peribronchial thickening. Acute inflammatory changes not excluded. There are no consolidating parenchymal infiltrates. There is cardiomegaly with coronary artery calcifications.  There is a gallstone

## 2017-10-27 NOTE — PROGRESS NOTES
[facility] Hospitalists      Patient:  Jonathan Gutiérrez  YOB: 1950  Date of Service: 10/27/2017  MRN: [id number]   Acct: [de-identified]   Primary Care Physician: Ressie Krabbe, APRN  Advance Directive: Full Code  Admit Date: 10/25/2017       Hospital Day: 2    CHIEF COMPLAINT : Nausea    SUBJECTIVE: Ms. Ceballos Favor complains of abdominal pain, nausea, vomiting and diarrhea stating no improvement since admission. She denies worsening dyspnea. Denies chest pain. Denies hematemesis, hematochezia or melena. Cumulative Hospital Course: The patient is a 75 y.o. female who presented complaining of Nausea and vomiting for about 1-2 days. Pt also had several episodes of nonbloody diarrhea which has now resolved. Her last episode of diarrhea was about 1 day ago. Pt also having associated fevers. Of note, pt states she makes very little urine and has been having some dysuria with purulent urine. She was admitted to Hospitalist service, placed on Rocephin for concerns UTI + GNR, and Flagyl for colitis. Nephrology was consulted to continue routine hemodialysis. She was noted to be hypotensive on a non dialysis day, Lopressor was decreased to 25 mg BID and hold parameters placed.      Review of Systems:   Constitutional / general:  Denies fever / chills / sweats  Head:  Denies headache / neck stiffness / trauma / visual change  Eyes:  Denies blurry vision / acute visual change or loss / itching / redness  ENT: Denies sore throat / hoarseness / nasal drainage / ear pain  CV:  Denies chest pain / palpitations/ orthopnea   Respiratory:  Denies cough / shortness of breath / sputum / hemoptysis  GI: + nausea /+vomiting /+ abdominal pain / +diarrhea / Denies constipation  :  + dysuria /Denies  hesitancy / urgency / hematuria   Neuro: Denies paralysis / syncope / seizure / dysphagia / headache / paresthesias  Musculoskeletal:  Denies muscle weakness /joint stiffness / pain  Vascular: Denies edema / claudication / varicosities  Heme / endocrine: Denies easy bruising / bleeding / excessive sweating / heat or cold intolerance  Psychiatric:  Denies depression / anxiety / insomnia / mood changes  Skin:  Denies new rashes / lesions / skin hair or nail changes    14 point review of systems is negative except as specifically addressed above. Objective:   VITALS:  BP 95/60   Pulse 85   Temp 97.5 °F (36.4 °C) (Temporal)   Resp 16   Ht 5' 2\" (1.575 m)   Wt 250 lb 6.4 oz (113.6 kg)   LMP  (LMP Unknown)   SpO2 93%   BMI 45.80 kg/m²   24HR INTAKE/OUTPUT:      Intake/Output Summary (Last 24 hours) at 10/27/17 1000  Last data filed at 10/27/17 0455   Gross per 24 hour   Intake              750 ml   Output             3000 ml   Net            -2250 ml     General appearance: alert, morbidly obese appears stated age, cooperative and no distress, sitting comfortably in chair  Head: Normocephalic, without obvious abnormality, atraumatic  Eyes: conjunctivae/corneas clear. PERRL, EOM's intact. Ears: normal external ears and nose, throat without exudate  Neck: no adenopathy, no carotid bruit, no JVD, supple, symmetrical, trachea midline and thyroid not enlarged, symmetric, no tenderness/mass/nodules  Lungs: Diminished air exchange/coarse breath sounds otherwise clear to auscultation bilaterally,no rales or wheezes   Heart: regular rate and rhythm, S1, S2 normal, no murmur  Abdomen:soft, obese, mildly tender throughout; non-distended, hyperactive bowel sounds no masses, no organomegaly  Extremities:No lower extremity edema,  No erythema, no tenderness to palpation  Skin: Skin color, texture, turgor normal. No rashes or lesions  Lymphatic: No palpable lymph node enlargment  Neurologic: Alert and oriented X 3, normal strength and tone. Normal symmetric reflexes.  Mental status: Alert, oriented, thought content appropriate  Cranial nerves:II-XII Grossly intact Sensory: normal Motor:grossly normal  Psychiatric: Alert and oriented, thought Bilateral nonobstructing renal calcifications. 2. Soft tissue prominence centrally in the right kidney involving the  right renal pelvis and calyces without significant dilatation. Etiology uncertain, infectious or inflammatory changes considered. Malignancy also differential consideration. Follow-up recommended. 3. Diffuse circumferential thickening of the right colon and proximal  transverse colon, with mild pericolonic fat induration, correlate for  colitis. 4. Diffuse reticulonodular changes in the lower lobes, acute  inflammatory process not excluded. 5. Cholelithiasis. 6. Bilateral probable benign adrenal masses, described above. 7. Additional nonacute findings, described above. CXR 10/25/2017  1. Cardiomegaly with bilateral diffuse bronchovascular interstitial  prominence, observes similarly on previous examinations, no  parenchymal infiltration.     Micro: Blood culture collected 10/25/2017 no growth to date  Urine culture collected 10/25/2017 + GNR  Stool for C diff collected 10/26/2017 neg     Assessment/Plan   Principal Problem:    Colitis-continue Flagyl  Active Problems:    Acute cystitis without hematuria-await urine culture, continue Rocephin    COPD without exacerbation ([facility])-noted    ESRD (end stage renal disease) ([facility])-nephrology following    Hemodialysis-associated hypotension- Midodrine continued    Hypertension hx-decrease Lopressor and place hold parameters    Morbid obesity due to excess calories ([facility])-dietary following    Acquired hypothyroidism-synthroid continued    Anemia of chronic disorder-monitor    Obstructive sleep apnea-CPAP    Chronic pain    Hemodialysis patient ([facility]    Chronic diastolic CHF (congestive heart failure) ([facility])-strict I/O's, daily weights    Type 2 diabetes mellitus with skin complication ([facility])-SSI, lantus     Await finalized urine culture, continue Rocephin/Flagyl for now, repeat AM labs, HD per nephro, likely home in 1-2 days, monitor potassium, BP Antibiotic: Rocephin started 10/26/2017; Flagyl started 10/26/2017    DVT Prophylaxis:Heparin    GI prophylaxis:  Protonix    Aleisha Johnson PA-C     ------------------------------------------------------------------------  I have independently interviewed and examined Connie Gee. I have discussed cagle elements of the care plan with the PA or MALICK and I agree with the findings and care plan as stated above unless otherwise noted. Additional Comments:     Chief complaint:  Still with abdominal pain and cramping.   Nausea but no vomiting    Objective:  RRR  Lungs clear  +BS soft tender inferiorly  Extr warm and dry  CN intact nonfocal    Impression / Plan:  Continue antiboitics  Await urine culture  Pain control  IVF held for CHF history    Electronically signed by Matthew Jorgensen DO on 10/27/2017 at 6:21 PM

## 2017-10-28 NOTE — PROGRESS NOTES
pain  Vascular: + LE edema. Denies claudication / varicosities  Heme / endocrine: Denies easy bruising / bleeding / excessive sweating / heat or cold intolerance  Psychiatric:  Denies depression / anxiety / insomnia / mood changes  Skin:  Denies new rashes / lesions / skin hair or nail changes    14 point review of systems is negative except as specifically addressed above. Objective:   VITALS:  /70   Pulse 77   Temp 97.1 °F (36.2 °C) (Temporal)   Resp 18   Ht 5' 2\" (1.575 m)   Wt 253 lb 6 oz (114.9 kg)   LMP  (LMP Unknown)   SpO2 94%   BMI 46.34 kg/m²   24HR INTAKE/OUTPUT:    Intake/Output Summary (Last 24 hours) at 10/28/17 1032  Last data filed at 10/28/17 0855   Gross per 24 hour   Intake             1600 ml   Output                0 ml   Net             1600 ml     General appearance: alert, appears stated age, cooperative and no distress  Head: Normocephalic, without obvious abnormality, atraumatic  Eyes: conjunctivae/corneas clear. PERRL, EOM's intact. Ears: normal external ears and nose, throat without exudate  Neck: no adenopathy, no carotid bruit, no JVD, supple, symmetrical, trachea midline and thyroid not enlarged, symmetric, no tenderness/mass/nodules  Lungs: Normal respiratory effort. Diminished breath sounds at bases, No wheezes, rales, or rhonchi. Heart: regular rate and rhythm, S1, S2 normal, no murmur  Abdomen:soft, obese, mild TTP diffusely. No rebound or guarding. No focal deficits. Extremities: Trace edema BLE's. Moves all extremities. Skin: Chronic venostasis changes BLE's. Lymphatic: No palpable lymph node enlargment  Neurologic: CN II-XII grossly intact. No focal deficits.     Psychiatric: Alert and oriented, thought content appropriate, normal insight, mood appropriate      Medications:      dextrose        heparin (porcine)  5,000 Units Subcutaneous 3 times per day    aspirin  81 mg Oral Daily    atorvastatin  40 mg Oral Daily    gabapentin  300 mg Oral TID    levothyroxine  100 mcg Oral Daily    miconazole   Topical BID    pantoprazole  40 mg Oral QAM AC    sodium chloride flush  10 mL Intravenous 2 times per day    insulin glargine  0.15 Units/kg Subcutaneous Nightly    insulin lispro  0.05 Units/kg Subcutaneous TID WC    insulin lispro  0-6 Units Subcutaneous TID WC    insulin lispro  0-3 Units Subcutaneous Nightly    cefTRIAXone (ROCEPHIN) IV  1 g Intravenous Q24H    metroNIDAZOLE  500 mg Intravenous Q8H    allopurinol  100 mg Oral Daily    midodrine  5 mg Oral TID WC     sodium chloride flush, acetaminophen, ondansetron, glucose, dextrose, glucagon (rDNA), dextrose, HYDROcodone-acetaminophen  DIET CARB CONTROL; Cardiac     Lab and other Data:     Recent Labs      10/26/17   0204  10/27/17   0402  10/28/17   0419   WBC  14.9*  16.8*  12.7*   HGB  11.4*  11.2*  10.2*   PLT  150  186  204     Recent Labs      10/26/17   0204  10/27/17   0402  10/28/17   0419   NA  135*  137  135*   K  5.4*  4.1  4.2   CL  91*  93*  93*   CO2  26  28  23   BUN  40*  28*  41*   CREATININE  4.9*  4.2*  5.4*   GLUCOSE  235*  145*  74     Recent Labs      10/25/17   2117  10/27/17   0402  10/28/17   0419   AST  42*  25  18   ALT  24  15  13   BILITOT  1.2  0.8  0.3   ALKPHOS  79  84  73     Troponin T: No results for input(s): TROPONINI in the last 72 hours. Pro-BNP: No results for input(s): BNP in the last 72 hours. INR: No results for input(s): INR in the last 72 hours. ABGs: Lab Results   Component Value Date    PHART 7.360 10/25/2017    PO2ART 55.0 10/25/2017    QPA1ZAZ 43.0 10/25/2017     UA:  Recent Labs      10/25/17   2046   COLORU  DK YELLOW   PHUR  6.0   WBCUA  TNTC*   BACTERIA  3+   CLARITYU  TURBID*   SPECGRAV  1.020   LEUKOCYTESUR  LARGE*   UROBILINOGEN  0.2   BILIRUBINUR  SMALL*   BLOODU  LARGE*   GLUCOSEU  Negative       Micro:   Blood cultures - negative to date.    Urine culture -   Urine Culture, Routine  (Abnormal) 10/25/2017  8:46 PM  - [facility] limited to breakdown of skin ([address] 08/14/2017    Decubitus ulcer of coccygeal region, stage 3 (HCC) 07/31/2017    Chronic pain 07/20/2017    Anxiety 07/20/2017    Slow transit constipation     Leukocytosis 06/10/2017    UTI (urinary tract infection) 06/10/2017    Uremia     Diabetic ulcer of left heel with fat layer exposed ([address] 05/02/2017    Acute respiratory failure with hypoxia (HCC)     Acute pulmonary edema (HCC)     Type 2 diabetes mellitus with stage 3 chronic kidney disease, with long-term current use of insulin ([address] 04/05/2017    Cor pulmonale, chronic ([address] 04/05/2017     Updating Deprecated Diagnoses      Anemia of chronic disorder 04/05/2017    Obstructive sleep apnea 04/05/2017    Pulmonary hypertension 04/05/2017    Diabetic ulcer of toe of right foot associated with type 2 diabetes mellitus, limited to breakdown of skin ([address] 04/05/2017    Chronic kidney disease, stage IV (severe) ([address] 03/10/2017    COPD exacerbation ([address] 03/02/2016    YANNI (acute kidney injury) ([address] 03/02/2016    Diabetic ulcer of left foot associated with type 2 diabetes mellitus ([address] 01/18/2016    Essential hypertension 02/28/2013    Morbid obesity due to excess calories ([address] 02/28/2013 04/26/2017  BMI 56      Acquired hypothyroidism 02/28/2013    Hypothyroidism 02/28/2013       Assessment and Plan:     Principal Problem:    Colitis - Continue flagyl. Advance diet. Active Problems: Morbid obesity due to excess calories ([address] - Noted. Carb control diet. Acquired hypothyroidism - Synthroid. COPD - Duoneb prn. Anemia of chronic disorder - H/H stable. Obstructive sleep apnea - CPAP    Chronic pain - Norco.    Chronic diastolic CHF (congestive heart failure) ([address] - Euvolemic currently. Type 2 diabetes mellitus with skin complication (HCC) - Hypoglycemic episodes. Decrease lantus, meal time insulin. Continue SSI and accuchecks.     Acute cystitis without hematuria - +

## 2017-10-28 NOTE — PLAN OF CARE
Problem: Risk for Impaired Skin Integrity  Goal: Tissue integrity - skin and mucous membranes  Structural intactness and normal physiological function of skin and  mucous membranes.    Outcome: Ongoing      Problem: Falls - Risk of  Goal: Absence of falls  Outcome: Met This Shift      Problem: Pain:  Goal: Pain level will decrease  Pain level will decrease   Outcome: Met This Shift      Problem: Breathing Pattern - Ineffective:  Goal: Ability to achieve and maintain a regular respiratory rate will improve  Ability to achieve and maintain a regular respiratory rate will improve   Outcome: Met This Shift      Problem: Nausea/Vomiting:  Goal: Absence of nausea/vomiting  Absence of nausea/vomiting   Outcome: Met This Shift

## 2017-10-28 NOTE — PROGRESS NOTES
Nephrology ([facility]) Progress Note      Patient:  Jonathan Gutiérrez  YOB: 1950  Date of Service: 10/28/2017  MRN: [id number]   Acct: [de-identified]   Primary Care Physician: Ressie Krabbe, APRN  Advance Directive: Full Code  Admit Date: 10/25/2017       Hospital Day: 3  Referring Provider: Davey Kayser, MD    Patient independently seen and examined, Chart, Consults, Notes, Operative notes, Labs, Cardiology, and Radiology studies reviewed as able. Subjective:  Jnoathan Gutiérrez is a 75 y.o. female  whom we were consulted for end-stage renal disease. 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 Presenting with severe abdominal pain and diarrhea. She is diagnosed with colitis. Patient is currently receiving IV antibiotics. She continues to improve and has no diarrhea now. Currently seen on hemodialysis  Hemodialysis access: AV fistula  Hemodialysis: 3-1/2 hour  Ultrafiltration 3000 mL  2K bath  Blood flow rate 450 mL.     Allergies:  Dilaudid [hydromorphone]    Medicines:  Current Facility-Administered Medications   Medication Dose Route Frequency Provider Last Rate Last Dose    heparin (porcine) injection 5,000 Units  5,000 Units Subcutaneous 3 times per day Cecy Page PA-C   5,000 Units at 10/28/17 1593    aspirin chewable tablet 81 mg  81 mg Oral Daily Davey Kayser, MD   81 mg at 10/28/17 0841    atorvastatin (LIPITOR) tablet 40 mg  40 mg Oral Daily Davey Kayser, MD   40 mg at 10/28/17 0849    gabapentin (NEURONTIN) capsule 300 mg  300 mg Oral TID Davey Kayser, MD   300 mg at 10/28/17 9720    levothyroxine (SYNTHROID) tablet 100 mcg  100 mcg Oral Daily Davey Kayser, MD   100 mcg at 10/28/17 4592    miconazole (MICOTIN) 2 % powder   Topical BID Davey Kayser, MD        pantoprazole (PROTONIX) tablet 40 mg  40 mg Oral QAM AC Davey Kayser, MD   40 mg at 10/28/17 0614    sodium chloride flush 0.9 % disease)     hospitalized first week of March with low O2 sat, low BS, was in critical care for 2-3 days. was in the hospiral for 1 1/2 weeks total.    Chronic diastolic CHF (congestive heart failure) (HCC)     CKD (chronic kidney disease), stage IV ([address] 3/2/16    COPD (chronic obstructive pulmonary disease) ([address]     sees [facility] for her dx.; has seen amelia in the past    Diabetic neuropathy ([address]     DM II (diabetes mellitus, type II), controlled ([address]     Hemodialysis patient ([address]     jaya gross sat and [place]    Hypertension     Hypertensive cardiovascular disease 2/28/2013 2/20/2013  Echo  Normal LVFX, concentric LVH    Hypothyroidism 2/28/2013    Morbid obesity with BMI of 40.0-44.9, adult ([address] 4/13/2016    BMI 44.7   (was 60+ in 2013)    KASHIF on CPAP     Post-menopausal     Pulmonary hypertension     RVSP 72 on 1/17/2017 echo    Sarcoidosis ([address]     Wounds and injuries right toe    Jessica Galindo       Past Surgical History:  Past Surgical History:   Procedure Laterality Date    FOOT DEBRIDEMENT      multiple    KIDNEY STONE SURGERY      TUNNELED VENOUS CATHETER PLACEMENT      permacath       Family History  Family History   Problem Relation Age of Onset    Arthritis Mother     Heart Disease Mother     Arthritis Father     Heart Disease Father        Social History  Social History     Social History    Marital status:      Spouse name: N/A    Number of children: 2    Years of education: N/A     Occupational History    Not on file.      Social History Main Topics    Smoking status: Former Smoker     Packs/day: 1.00     Years: 35.00    Smokeless tobacco: Never Used      Comment: quit smoking \"years ago\"    Alcohol use No    Drug use: No    Sexual activity: No     Other Topics Concern    Not on file     Social History Narrative    No narrative on file         Review of Systems:  History obtained from chart review and the patient  General ROS: No fever or chills  Respiratory ROS: No cough, shortness of breath, wheezing  Cardiovascular ROS: no chest pain or dyspnea on exertion  Gastrointestinal ROS: Complaining of abdominal pain and diarrhea. Genito-Urinary ROS: No dysuria or hematuria  Musculoskeletal ROS: No joint pain or swelling   14 point ROS reviewed with the patient and negative except as noted above and in the HPI unless unable to obtain. Objective:  Blood pressure 115/70, pulse 77, temperature 97.1 °F (36.2 °C), temperature source Temporal, resp. rate 18, height 5' 2\" (1.575 m), weight 253 lb 6 oz (114.9 kg), SpO2 94 %, not currently breastfeeding. Intake/Output Summary (Last 24 hours) at 10/28/17 0973  Last data filed at 10/28/17 0870   Gross per 24 hour   Intake             1600 ml   Output                0 ml   Net             1600 ml     General: awake/alert    HEENT: Normocephalic atraumatic head  Neck: Supple with no JVD or carotid bruits. Chest:  clear to auscultation bilaterally without respiratory distress  CVS: regular rate and rhythm  Abdominal: soft, nontender, normal bowel sounds and abdominal obesity. Extremities: no cyanosis or edema  Skin: warm and dry without rash      Labs:  BMP:   Recent Labs      10/26/17   0204  10/27/17   0402  10/28/17   0419   NA  135*  137  135*   K  5.4*  4.1  4.2   CL  91*  93*  93*   CO2  26  28  23   BUN  40*  28*  41*   CREATININE  4.9*  4.2*  5.4*   CALCIUM  10.5*  9.6  9.5     CBC:   Recent Labs      10/26/17   0204  10/27/17   0402  10/28/17   0419   WBC  14.9*  16.8*  12.7*   HGB  11.4*  11.2*  10.2*   HCT  37.5  37.3  33.7*   MCV  106.5*  106.9*  105.3*   PLT  150  186  204     LIVER PROFILE:   Recent Labs      10/25/17   2117  10/27/17   0402  10/28/17   0419   AST  42*  25  18   ALT  24  15  13   LIPASE  31   --    --    BILITOT  1.2  0.8  0.3   ALKPHOS  79  84  73     PT/INR: No results for input(s): PROTIME, INR in the last 72 hours. APTT: No results for input(s):  APTT in the last 72 Fat-containing left inguinal hernias noted. There are atherosclerotic aortoiliac iliac calcifications. There are no pathologically enlarged nodes with multiple small mesenteric nodes appreciated particularly along the celiac axis and SMA as well as in the periaortic regions. Diffuse spondylosis changes noted in the thoracolumbar spine. 1. Bilateral nonobstructing renal calcifications. 2. Soft tissue prominence centrally in the right kidney involving the right renal pelvis and calyces without significant dilatation. Etiology uncertain, infectious or inflammatory changes considered. Malignancy also differential consideration. Follow-up recommended. 3. Diffuse circumferential thickening of the right colon and proximal transverse colon, with mild pericolonic fat induration, correlate for colitis. 4. Diffuse reticulonodular changes in the lower lobes, acute inflammatory process not excluded. 5. Cholelithiasis. 6. Bilateral probable benign adrenal masses, described above. 7. Additional nonacute findings, described above. Signed by Dr Jeffery Simons on 10/25/2017 10:09 PM    Xr Chest Portable    Result Date: 10/25/2017  XR CHEST PORTABLE 10/25/2017 8:15 PM HISTORY:   Fever  Single view. COMPARISONS:  10/4/2017, 9/11/2017 and 6/8/2017 FINDINGS: Right-sided dialysis catheter again identified. There is cardiomegaly with diffuse bronchovascular interstitial prominence observed similarly on previous studies. There are no consolidating parenchymal infiltrates. Bony structures are intact. 1. Cardiomegaly with bilateral diffuse bronchovascular interstitial prominence, observes similarly on previous examinations, no parenchymal infiltration. Signed by Dr Jeffery Simons on 10/25/2017 9:42 PM       Assessment   1. End-stage renal disease secondary to diabetic nephropathy, Currently seen on hemodialysis. 2. Acute colitis. 3. Anemia of chronic kidney disease. 4. Obstructive sleep apnea. 5. Abdominal obesity.   6. Type II

## 2017-10-28 NOTE — PROGRESS NOTES
10/28/17 1154   General Comment   Comments Patient out to HD in AM   Physical Therapy  Facility/Department: [facility] ITZ/VAS/MED  Daily Treatment Note  NAME: Jennifer Fam  : 1950  MRN: [id number]    Date of Service: 10/28/2017    Patient Diagnosis(es):   Patient Active Problem List    Diagnosis Date Noted    Hypercalcemia 2016     Priority: High     Class: Acute    ESRD (end stage renal disease) ([address] 10/27/2017    Hemodialysis-associated hypotension 10/27/2017    Colitis 10/26/2017    Type 2 diabetes mellitus with skin complication ([address]     Acute cystitis without hematuria 10/26/2017    KASHIF on CPAP     Hypertension     Hemodialysis patient ([address]     DM II (diabetes mellitus, type II), controlled ([address]     COPD (chronic obstructive pulmonary disease) (HCC)     Chronic diastolic CH[facility]congestive heart failure) ([facility]     Non-pressure chronic ulcer of right calf, limited to breakdown of skin ([address] 2017    Decubitus ulcer of coccygeal region, stage 3 ([address] 2017    Chronic pain 2017    Anxiety 2017    Slow transit constipation     Leukocytosis 06/10/2017    UTI (urinary tract infection) 06/10/2017    Uremia     Diabetic ulcer of left heel with fat layer exposed ([address] 2017    Acute respiratory failure with hypoxia (HCC)     Acute pulmonary edema (HCC)     Type 2 diabetes mellitus with stage 3 chronic kidney disease, with long-term current use of insulin ([address] 2017    Cor pulmonale, chronic (HCC) 2017    Anemia of chronic disorder 2017    Obstructive sleep apnea 2017    Pulmonary hypertension 2017    Diabetic ulcer of toe of right foot associated with type 2 diabetes mellitus, limited to breakdown of skin ([address] 2017    Chronic kidney disease, stage IV (severe) ([address] 03/10/2017    YANNI (acute kidney injury) ([address] 2016    Diabetic ulcer of left foot associated with type 2 diabetes mellitus ([address]

## 2017-10-29 NOTE — PROGRESS NOTES
Patient has been friendly and cooperative with care. no c/o pain or discomfort voiced or noted.  Patient has had 1 episode of loose stool

## 2017-10-29 NOTE — PROGRESS NOTES
100 mcg  100 mcg Oral Daily Davey Kayser, MD   100 mcg at 10/29/17 0558    miconazole (MICOTIN) 2 % powder   Topical BID Davey Kayser, MD        pantoprazole (PROTONIX) tablet 40 mg  40 mg Oral QAM AC Davey Kayser, MD   40 mg at 10/29/17 0558    sodium chloride flush 0.9 % injection 10 mL  10 mL Intravenous 2 times per day Davey Kayser, MD   10 mL at 10/28/17 0846    sodium chloride flush 0.9 % injection 10 mL  10 mL Intravenous PRN Davey Kayser, MD        acetaminophen (TYLENOL) tablet 650 mg  650 mg Oral Q4H PRN Davey Kayser, MD        ondansetron [facility]) injection 4 mg  4 mg Intravenous Q6H PRN Davey Kayser, MD   4 mg at 10/27/17 1044    glucose (GLUTOSE) 40 % oral gel 15 g  15 g Oral PRN Davey Kayser, MD        dextrose 50 % solution 12.5 g  12.5 g Intravenous PRN Davey Kayser, MD        glucagon (rDNA) injection 1 mg  1 mg Intramuscular PRN Davey Kayser, MD        dextrose 5 % solution  100 mL/hr Intravenous PRN Davey Kayser, MD        insulin lispro (HUMALOG) injection vial 0-6 Units  0-6 Units Subcutaneous TID WC Davey Kayser, MD   1 Units at 10/29/17 0814    insulin lispro (HUMALOG) injection vial 0-3 Units  0-3 Units Subcutaneous Nightly Davey Kayser, MD   1 Units at 10/27/17 2048    cefTRIAXone (ROCEPHIN) 1 g in 50 mL IVPB (premix)  1 g Intravenous Q24H Davey Kayser, MD   Stopped at 10/28/17 2205    metronidazole (FLAGYL) 500 mg in NaCl 100 mL IVPB premix  500 mg Intravenous Q8H Ara Pastrana MD   Stopped at 10/29/17 0400    allopurinol (ZYLOPRIM) tablet 100 mg  100 mg Oral Daily Libertad Stark MD   100 mg at 10/28/17 0842    HYDROcodone-acetaminophen ([facility]) 7.5-325 MG per tablet 1 tablet  1 tablet Oral Q6H PRN Libertad Stark MD   1 tablet at 10/26/17 2122    midodrine (PROAMATINE) tablet 5 mg  5 mg Oral TID  Libertad Stark MD   5 mg at 10/28/17 1801       Past Medical History:  Past Medical History: from chart review and the patient  General ROS: No fever or chills  Respiratory ROS: No cough, shortness of breath, wheezing  Cardiovascular ROS: no chest pain or dyspnea on exertion  Gastrointestinal ROS: Complaining of abdominal pain and diarrhea. Genito-Urinary ROS: No dysuria or hematuria  Musculoskeletal ROS: No joint pain or swelling   14 point ROS reviewed with the patient and negative except as noted above and in the HPI unless unable to obtain. Objective:  Blood pressure (!) 109/58, pulse 76, temperature 97.1 °F (36.2 °C), temperature source Temporal, resp. rate 16, height 5' 2\" (1.575 m), weight 241 lb 9.6 oz (109.6 kg), SpO2 94 %, not currently breastfeeding. Intake/Output Summary (Last 24 hours) at 10/29/17 1000  Last data filed at 10/29/17 0307   Gross per 24 hour   Intake              390 ml   Output                0 ml   Net              390 ml     General: awake/alert    HEENT: Normocephalic atraumatic head  Neck: Supple with no JVD or carotid bruits. Chest:  clear to auscultation bilaterally without respiratory distress  CVS: regular rate and rhythm  Abdominal: soft, nontender, normal bowel sounds and abdominal obesity. Extremities: no cyanosis or edema  Skin: warm and dry without rash      Labs:  BMP:   Recent Labs      10/27/17   0402  10/28/17   0419  10/29/17   0353   NA  137  135*  138   K  4.1  4.2  3.8   CL  93*  93*  97*   CO2  28  23  23   BUN  28*  41*  25*   CREATININE  4.2*  5.4*  4.3*   CALCIUM  9.6  9.5  9.4     CBC:   Recent Labs      10/27/17   0402  10/28/17   0419  10/29/17   0353   WBC  16.8*  12.7*  9.9   HGB  11.2*  10.2*  10.7*   HCT  37.3  33.7*  37.0   MCV  106.9*  105.3*  109.8*   PLT  186  204  207     LIVER PROFILE:   Recent Labs      10/27/17   0402  10/28/17   0419  10/29/17   0353   AST  25  18  13   ALT  15  13  10   BILITOT  0.8  0.3  0.4   ALKPHOS  84  73  80     PT/INR: No results for input(s): PROTIME, INR in the last 72 hours.   APTT: No results for input(s): APTT in the last 72 hours. BNP:  No results for input(s): BNP in the last 72 hours. Ionized Calcium:No results for input(s): IONCA in the last 72 hours. Magnesium:No results for input(s): MG in the last 72 hours. Phosphorus:No results for input(s): PHOS in the last 72 hours. HgbA1C:   No results for input(s): LABA1C in the last 72 hours. Hepatic:   Recent Labs      10/27/17   0402  10/28/17   0419  10/29/17   0353   ALKPHOS  84  73  80   ALT  15  13  10   AST  25  18  13   PROT  7.5  7.1  7.2   BILITOT  0.8  0.3  0.4   LABALBU  3.2*  2.8*  2.4*     Lactic Acid:   No results for input(s): LACTA in the last 72 hours. Troponin: No results for input(s): CKTOTAL, CKMB, TROPONINT in the last 72 hours. ABGs: No results for input(s): PH, PCO2, PO2, HCO3, O2SAT in the last 72 hours. CRP:  No results for input(s): CRP in the last 72 hours. Sed Rate:  No results for input(s): SEDRATE in the last 72 hours. Cultures:   No results for input(s): CULTURE in the last 72 hours. No results for input(s): BC, Rhonda Hung in the last 72 hours. No results for input(s): CXSURG in the last 72 hours. Radiology reports as per the Radiologist  Radiology: Ct Abdomen Pelvis Wo Iv Contrast Additional Contrast? None    Result Date: 10/25/2017  CT ABDOMEN PELVIS WO IV CONTRAST 10/25/2017 7:45 PM HISTORY: Right lower quadrant abdominal pain COMPARISON: None. TECHNIQUE:  Thin section sequential transaxial images were obtained. 2-D sagittal and coronal reconstruction images were generated. Intravenous contrast was not administered. Oral contrast was not ingested. Radiation dose equals DLP 1/5/2001 mGy cm. AUTOMATED EXPOSURE CONTROL dose reduction technique was implemented. FINDINGS: There is reticular nodular interstitial prominence identified in the lung bases with peribronchial thickening. Acute inflammatory changes not excluded. There are no consolidating parenchymal infiltrates.  There is cardiomegaly with coronary artery calcifications. There is a gallstone appreciated in the gallbladder without gallbladder wall thickening or pericholecystic fluid. There are no focal hepatic lesions identified. The spleen is within the upper limits of normal in size. There is a fatty mass identified in the left adrenal gland compatible with a mild lipoma, benign lesion. Mass measures approximately 2 cm. There is low attenuation mass with calcifications identified in the right adrenal gland measuring nearly 2 cm. Differential considerations include an adenoma. There are nonobstructing calculi appreciated in the left kidney. There are calculi also identified in the right kidney there are staghorn like particularly in the upper pole region. The renal pelvis calyces are poorly imaged with increased density centrally with induration of the surrounding fat. Acute inflammatory process considered. An underlying malignancy difficult to exclude. Further imaging with iodinated contrast, renal mass protocol may be helpful. Follow-up recommended urology consultation suggested. There is no hydroureter or ureterolithiasis. Urinary bladder is decompressed without bladder wall abnormality. GYN structures are unremarkable without dominant adnexal masses. Stool and gas identified throughout the nondilated colon. There is no CT evidence of significant diverticular disease. There is circumferential thickening diffusely in the right colon wall with mild induration of the pericolonic fat worrisome for diffuse colitis. There is also some circumferential thickening of the proximal transverse colon additional colitis involvement suspected. The appendix is not clearly identified. There are no convincing changes of acute appendicitis. Small bowel is not dilated. The duodenal sweep imaged appropriately. The stomach is not distended. There is no ascites or pneumoperitoneum. Fat-containing left inguinal hernias noted.  There are atherosclerotic aortoiliac iliac calcifications. There are no pathologically enlarged nodes with multiple small mesenteric nodes appreciated particularly along the celiac axis and SMA as well as in the periaortic regions. Diffuse spondylosis changes noted in the thoracolumbar spine. 1. Bilateral nonobstructing renal calcifications. 2. Soft tissue prominence centrally in the right kidney involving the right renal pelvis and calyces without significant dilatation. Etiology uncertain, infectious or inflammatory changes considered. Malignancy also differential consideration. Follow-up recommended. 3. Diffuse circumferential thickening of the right colon and proximal transverse colon, with mild pericolonic fat induration, correlate for colitis. 4. Diffuse reticulonodular changes in the lower lobes, acute inflammatory process not excluded. 5. Cholelithiasis. 6. Bilateral probable benign adrenal masses, described above. 7. Additional nonacute findings, described above. Signed by Dr Siddharth Carrillo on 10/25/2017 10:09 PM    Xr Chest Portable    Result Date: 10/25/2017  XR CHEST PORTABLE 10/25/2017 8:15 PM HISTORY:   Fever  Single view. COMPARISONS:  10/4/2017, 9/11/2017 and 6/8/2017 FINDINGS: Right-sided dialysis catheter again identified. There is cardiomegaly with diffuse bronchovascular interstitial prominence observed similarly on previous studies. There are no consolidating parenchymal infiltrates. Bony structures are intact. 1. Cardiomegaly with bilateral diffuse bronchovascular interstitial prominence, observes similarly on previous examinations, no parenchymal infiltration. Signed by Dr Siddharth Carrillo on 10/25/2017 9:42 PM       Assessment   1. End-stage renal disease secondary to diabetic nephropathy. 2. Acute colitis. 3. Anemia of chronic kidney disease. 4. Obstructive sleep apnea. 5. Abdominal obesity. 6. Type II diabetes. Plan:  1. Pain control. 2. Continue IV antibiotics.           Cheryl Kang MD  10/29/17  10:00 AM

## 2017-10-29 NOTE — DISCHARGE SUMMARY
No oropharyngeal exudate. Eyes: Conjunctivae and EOM are normal. Pupils are equal, round, and reactive to light. No scleral icterus. Neck: Normal range of motion. Neck supple. No JVD present. No tracheal deviation present. No thyromegaly present. Cardiovascular: Normal rate, regular rhythm and normal heart sounds.  Exam reveals no gallop and no friction rub.   No murmur heard. Pulmonary/Chest: Effort normal and breath sounds normal. No stridor. No respiratory distress. No wheezes. No rales. Abdominal: Soft. Bowel sounds are normal. No distension and no mass. There is no tenderness. There is no rebound and no guarding. Musculoskeletal: Moves all extremities. There is no edema or tenderness. Lymphadenopathy: No cervical adenopathy. Neurological: CN II-XII grossly intact. No focal deficits. Skin: Skin is warm and dry. Normal color and turgor. Psychiatric: Normal mood and affect. AAO x3. Significant Diagnostic Studies:    Chest Xray -   1. Cardiomegaly with bilateral diffuse bronchovascular interstitial   prominence, observes similarly on previous examinations, no   parenchymal infiltration. CT Abdomen / Pelvis -   1. Bilateral nonobstructing renal calcifications. 2. Soft tissue prominence centrally in the right kidney involving the   right renal pelvis and calyces without significant dilatation. Etiology uncertain, infectious or inflammatory changes considered. Malignancy also differential consideration. Follow-up recommended. 3. Diffuse circumferential thickening of the right colon and proximal   transverse colon, with mild pericolonic fat induration, correlate for   colitis. 4. Diffuse reticulonodular changes in the lower lobes, acute   inflammatory process not excluded. 5. Cholelithiasis. 6. Bilateral probable benign adrenal masses, described above. 7. Additional nonacute findings, described above.      Lab Results   Component Value Date    WBC 9.9 10/29/2017    HGB 10.7 (L) 10/29/2017    HCT 37.0 10/29/2017    .8 (H) 10/29/2017     10/29/2017     Lab Results   Component Value Date     10/29/2017    K 3.8 10/29/2017    CL 97 10/29/2017    CO2 23 10/29/2017    BUN 25 10/29/2017    CREATININE 4.3 10/29/2017    GLUCOSE 183 10/29/2017    CALCIUM 9.4 10/29/2017        Discharge Medications:       Earl Mckenna   Home Medication Instructions GIB:[id number]    Printed on:10/29/17 0774   Medication Information                      albuterol (PROVENTIL) (2.5 MG/3ML) 0.083% nebulizer solution  Take 3 mLs by nebulization every 4 hours as needed for Wheezing or Shortness of Breath             allopurinol (ZYLOPRIM) 100 MG tablet  Take 1 tablet by mouth daily             aspirin 81 MG tablet  Take 81 mg by mouth daily             atorvastatin (LIPITOR) 40 MG tablet  Take 1 tablet by mouth daily             ciprofloxacin (CIPRO) 250 MG tablet  Take 1 tablet by mouth 2 times daily for 7 days             docusate sodium (COLACE) 100 MG capsule  Take 200 mg by mouth 2 times daily as needed Indications: Constipation              gabapentin (NEURONTIN) 100 MG capsule  Take 3 capsules by mouth 3 times daily             glipiZIDE (GLUCOTROL) 5 MG tablet  Take 1 tablet by mouth Daily             HYDROcodone-acetaminophen (NORCO) 7.5-325 MG per tablet  Take 1 tablet by mouth every 6 hours as needed for Pain . Earliest Fill Date: 8/23/17             levothyroxine (SYNTHROID) 100 MCG tablet  Take 1 tablet by mouth Daily             linagliptin (TRADJENTA) 5 MG tablet  Take 1 tablet by mouth daily             LORazepam (ATIVAN) 0.5 MG tablet  Take 1 tablet by mouth 2 times daily as needed for Anxiety             metoprolol tartrate (LOPRESSOR) 50 MG tablet  Take 1 tablet by mouth 2 times daily             metroNIDAZOLE (FLAGYL) 500 MG tablet  Take 1 tablet by mouth 3 times daily for 7 days             miconazole (MICOTIN) 2 % powder  Apply topically 2 times daily.              midodrine (PROAMATINE) 5 MG tablet  Take 1 tablet by mouth 3 times daily (with meals)             OXYGEN  Inhale 6 L into the lungs continuous May titrate to effect - Patient also placed on Trilogy             pantoprazole (PROTONIX) 40 MG tablet  Take 1 tablet by mouth every morning (before breakfast)             polyethylene glycol (GLYCOLAX) packet  Take 17 g by mouth daily as needed for Constipation             silver sulfADIAZINE (SILVADENE) 1 % cream  Apply topically daily. sucralfate (CARAFATE) 1 GM tablet  Take 1 g by mouth 4 times daily Indications: Gastroesophageal Reflux Disease                  Disposition:   Home with home health    Diet:  Renal ADA    Follow Up Instructions: Follow up with MALICK Kaplan in 2-3 days. Time spent on discharge: 40 minutes. Signed:  Joshua Pack PA-C  10/29/2017     Addendum: 10/30/2017 Ms. Fadia Ferreira was kept overnight in order to have CT abdomen/Pelvis with contrast performed with findings listed below. No changes made. Patient will be discharged in stable condition. -Ming Conley PA-C    CT Abdomen/pelvis 10/30/2017 w/ IV contrast  1. There is increased attenuation in the right renal pelvis without  any definitive mass. There is enhancement of the uroepithelium in the  right renal collecting system and right ureter. This is likely  infectious or inflammatory. Uroepithelial neoplasm is not ruled out. Consider ureteroscopy on the right. There is also thickening of the  wall of the urinary bladder. The urinary bladder is not very well  distended. 2. Bilateral nonobstructive renal stones. 3. Bilateral adrenal masses. 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 The right-sided mass is probably also a myolipoma versus  an adenoma. 4. Cardiomegaly. Coronary artery calcification. Dependent atelectasis  in both lung bases. Mild bronchial wall thickening with interstitial  infiltrates in both lung bases, likely chronic. 5. Gallstones in the gallbladder.   6. Mild splenomegaly. 7. There is a 3.7 cm fat-containing left inguinal hernia. 8. Other nonacute findings, as discussed above.    ------------------------------------------------------------------------  I have independently interviewed and examined Darcy Martell. I have discussed cagle elements of the care plan with the PA or APRN and I agree with the findings and care plan as stated above unless otherwise noted. Additional Comments:     Chief complaint:  CT completed and results reviewed. She is ready for DC for outpatient follow up.     Objective:  RRR  Lungs clear  +BS soft nontender  Extr warm and dry  CN intact nonfocal    Impression / Plan:  DC home with outpatient follow up    Electronically signed by Sina Holland DO on 10/30/2017 at 4:25 PM

## 2017-10-29 NOTE — PLAN OF CARE
Problem: Risk for Impaired Skin Integrity  Goal: Tissue integrity - skin and mucous membranes  Structural intactness and normal physiological function of skin and  mucous membranes.    Outcome: Ongoing      Problem: Falls - Risk of  Goal: Absence of falls  Outcome: Ongoing      Problem: Pain:  Goal: Pain level will decrease  Pain level will decrease   Outcome: Ongoing    Goal: Control of acute pain  Control of acute pain   Outcome: Ongoing    Goal: Control of chronic pain  Control of chronic pain   Outcome: Ongoing      Problem: Tissue Perfusion - Cardiopulmonary, Altered:  Goal: Absence of angina  Absence of angina   Outcome: Ongoing    Goal: Hemodynamic stability will improve  Hemodynamic stability will improve   Outcome: Ongoing      Problem: Discharge Planning:  Goal: Discharged to appropriate level of care  Discharged to appropriate level of care   Outcome: Ongoing      Problem: Breathing Pattern - Ineffective:  Goal: Ability to achieve and maintain a regular respiratory rate will improve  Ability to achieve and maintain a regular respiratory rate will improve   Outcome: Ongoing      Problem: Nausea/Vomiting:  Goal: Able to drink  Able to drink   Outcome: Ongoing    Goal: Able to eat  Able to eat   Outcome: Ongoing    Goal: Ability to achieve adequate nutritional intake will improve  Ability to achieve adequate nutritional intake will improve   Outcome: Ongoing      Problem: Nutrition  Goal: Optimal nutrition therapy  Nutrition Problem: Inadequate oral intake  Intervention: Food and/or Nutrient Delivery: Continue current diet  Nutritional Goals: when diet advanced, po intake 50% or greater      Outcome: Ongoing      Problem: Tissue Perfusion - Renal, Altered:  Goal: Electrolytes within specified parameters  Electrolytes within specified parameters  Outcome: Ongoing    Goal: Urine creatinine clearance will be within specified parameters  Urine creatinine clearance will be within specified parameters  Outcome:

## 2017-10-30 NOTE — CARE COORDINATION
Pt and family indicate Pt has [address] for home oxygen; she stated her family member (present in the room) has the portable oxygen tank in the car and will bring up if/when she is d/c'd.       Electronically signed by FUAD Gilbert Arm on 10/30/2017 at 12:09 PM

## 2017-10-30 NOTE — DISCHARGE INSTR - ACTIVITY
Increase activity as tolerated. Take medications as prescribed. Complete entire course of antibiotics.

## 2017-10-30 NOTE — PROGRESS NOTES
Nephrology ([facility]) Progress Note    Patient:  Connie Gee  YOB: 1950  Date of Service: 10/30/2017  MRN: [id number]   Acct: [de-identified]   Primary Care Physician: MALICK Del Castillo  Advance Directive: Full Code  Admit Date: 10/25/2017       Hospital Day: 5  Referring Provider: Rachel Hyatt MD    Patient independently seen and examined, Chart, Consults, Notes, Operative notes, Labs, Cardiology, and Radiology studies reviewed as able. Subjective:  Connie Gee is a 75 y.o. female  whom we were consulted for end-stage renal disease. Patient has end-stage renal disease secondary to diabetic nephropathy. Presented with severe abdominal pain and diarrhea and diagnosed with colitis. Planning CT with contrast today for renal mass evaluation. Unable to give with current IV. \"nurse on gets one chance for a new IV\".       Allergies:  Dilaudid [hydromorphone]    Medicines:  Current Facility-Administered Medications   Medication Dose Route Frequency Provider Last Rate Last Dose    ipratropium-albuterol (DUONEB) nebulizer solution 1 ampule  1 ampule Inhalation Q4H PRN Yanely Sebastian PA-C        insulin glargine (LANTUS) injection vial 10 Units  10 Units Subcutaneous Nightly Yanely Sebastian PA-C   10 Units at 10/29/17 2330    insulin lispro (HUMALOG) injection vial 3 Units  3 Units Subcutaneous TID WC Yanely Sebastian PA-C   3 Units at 10/30/17 3211    heparin (porcine) injection 5,000 Units  5,000 Units Subcutaneous 3 times per day Aleisha Johnson PA-C   5,000 Units at 10/30/17 5513    aspirin chewable tablet 81 mg  81 mg Oral Daily Rachel Hyatt MD   81 mg at 10/30/17 0830    atorvastatin (LIPITOR) tablet 40 mg  40 mg Oral Daily Rachel Hyatt MD   40 mg at 10/30/17 0830    gabapentin (NEURONTIN) capsule 300 mg  300 mg Oral TID Rachel Hyatt MD   300 mg at 10/30/17 0830    levothyroxine (SYNTHROID) tablet 100 mcg  100 mcg Oral Daily Dinah Elena Date    Arthritis     CAD (coronary artery disease)     hospitalized first week of March with low O2 sat, low BS, was in critical care for 2-3 days. was in the hospiral for 1 1/2 weeks total.    Chronic diastolic CHF (congestive heart failure) (HCC)     CKD (chronic kidney disease), stage IV ([address] 3/2/16    COPD (chronic obstructive pulmonary disease) ([facility]     sees [facility] for her dx.; has seen amelia in the past    Diabetic neuropathy ([facility]     DM II (diabetes mellitus, type II), controlled ([address]     Hemodialysis patient ([facility]     jaya gross sat and [place]    Hypertension     Hypertensive cardiovascular disease 2/28/2013 2/20/2013  Echo  Normal LVFX, concentric LVH    Hypothyroidism 2/28/2013    Morbid obesity with BMI of 40.0-44.9, adult ([facility] 4/13/2016    BMI 44.7   (was 60+ in 2013)    KASHIF on CPAP     Post-menopausal     Pulmonary hypertension     RVSP 72 on 1/17/2017 echo    Sarcoidosis ([facility]     Wounds and injuries right toe    [place]       Past Surgical History:  Past Surgical History:   Procedure Laterality Date    FOOT DEBRIDEMENT      multiple    KIDNEY STONE SURGERY      TUNNELED VENOUS CATHETER PLACEMENT      permacath       Family History  Family History   Problem Relation Age of Onset    Arthritis Mother     Heart Disease Mother     Arthritis Father     Heart Disease Father        Social History  Social History     Social History    Marital status:      Spouse name: N/A    Number of children: 2    Years of education: N/A     Occupational History    Not on file.      Social History Main Topics    Smoking status: Former Smoker     Packs/day: 1.00     Years: 35.00    Smokeless tobacco: Never Used      Comment: quit smoking \"years ago\"    Alcohol use No    Drug use: No    Sexual activity: No     Other Topics Concern    Not on file     Social History Narrative    No narrative on file         Review of Systems:  History obtained from chart the last 72 hours. HgbA1C: No results for input(s): LABA1C in the last 72 hours. Hepatic: Recent Labs      10/28/17   0419  10/29/17   0353  10/30/17   0312   ALKPHOS  73  80  81   ALT  13  10  8   AST  18  13  11   PROT  7.1  7.2  7.0   BILITOT  0.3  0.4  0.3   LABALBU  2.8*  2.4*  2.9*     Lactic Acid: No results for input(s): LACTA in the last 72 hours. Troponin: No results for input(s): CKTOTAL, CKMB, TROPONINT in the last 72 hours. ABGs: Lab Results   Component Value Date    PHART 7.360 10/25/2017    PO2ART 55.0 10/25/2017    UBD4IBK 43.0 10/25/2017     CRP:  No results for input(s): CRP in the last 72 hours. Sed Rate:  No results for input(s): SEDRATE in the last 72 hours. Culture Results:   Blood Culture Recent:   Recent Labs      10/25/17   2105   BC  No Growth to date. Any change in status will be called. Urine Culture Recent : Recent Labs      10/25/17   2046   [place]  >100,000 CFU/ml*  Heavy growth       Radiology reports as per the Radiologist  Radiology: Ct Abdomen Pelvis Wo Iv Contrast Additional Contrast? None    Result Date: 10/25/2017  CT ABDOMEN PELVIS WO IV CONTRAST 10/25/2017 7:45 PM HISTORY: Right lower quadrant abdominal pain COMPARISON: None. TECHNIQUE:  Thin section sequential transaxial images were obtained. 2-D sagittal and coronal reconstruction images were generated. Intravenous contrast was not administered. Oral contrast was not ingested. Radiation dose equals DLP 1/5/2001 mGy cm. AUTOMATED EXPOSURE CONTROL dose reduction technique was implemented. FINDINGS: There is reticular nodular interstitial prominence identified in the lung bases with peribronchial thickening. Acute inflammatory changes not excluded. There are no consolidating parenchymal infiltrates. There is cardiomegaly with coronary artery calcifications. There is a gallstone appreciated in the gallbladder without gallbladder wall thickening or pericholecystic fluid.  There are no focal hepatic lesions identified. The spleen is within the upper limits of normal in size. There is a fatty mass identified in the left adrenal gland compatible with a mild lipoma, benign lesion. Mass measures approximately 2 cm. There is low attenuation mass with calcifications identified in the right adrenal gland measuring nearly 2 cm. Differential considerations include an adenoma. There are nonobstructing calculi appreciated in the left kidney. There are calculi also identified in the right kidney there are staghorn like particularly in the upper pole region. The renal pelvis calyces are poorly imaged with increased density centrally with induration of the surrounding fat. Acute inflammatory process considered. An underlying malignancy difficult to exclude. Further imaging with iodinated contrast, renal mass protocol may be helpful. Follow-up recommended urology consultation suggested. There is no hydroureter or ureterolithiasis. Urinary bladder is decompressed without bladder wall abnormality. GYN structures are unremarkable without dominant adnexal masses. Stool and gas identified throughout the nondilated colon. There is no CT evidence of significant diverticular disease. There is circumferential thickening diffusely in the right colon wall with mild induration of the pericolonic fat worrisome for diffuse colitis. There is also some circumferential thickening of the proximal transverse colon additional colitis involvement suspected. The appendix is not clearly identified. There are no convincing changes of acute appendicitis. Small bowel is not dilated. The duodenal sweep imaged appropriately. The stomach is not distended. There is no ascites or pneumoperitoneum. Fat-containing left inguinal hernias noted. There are atherosclerotic aortoiliac iliac calcifications.  There are no pathologically enlarged nodes with multiple small mesenteric nodes appreciated particularly along the celiac axis and SMA as well as in the periaortic infiltrates. Bony structures are intact. 1. Cardiomegaly with bilateral diffuse bronchovascular interstitial prominence, observes similarly on previous examinations, no parenchymal infiltration. Signed by Dr Sara Dominguez on 10/25/2017 9:42 PM       Assessment   ESRD  DM Nephropathy  Anemia CKD  KASHIF  Morbid obesity  Colitis  Secondary Hyperparathyroidism  Low vitamin D      Plan:  HD TTS  D/w pt/RN, will access permcath if needed, reviewed risks of infection.   Monitor labs    Cristin Tamayo MD  10/30/17  9:50 AM

## 2017-10-30 NOTE — PROGRESS NOTES
Several attempts made to place 18 gauge iv for cta . All attempts unsuccessful. will notify oncoming shift

## 2017-10-30 NOTE — PLAN OF CARE
Problem: Risk for Impaired Skin Integrity  Goal: Tissue integrity - skin and mucous membranes  Structural intactness and normal physiological function of skin and  mucous membranes.    Outcome: Ongoing      Problem: Falls - Risk of  Goal: Absence of falls  Outcome: Ongoing      Problem: Pain:  Goal: Pain level will decrease  Pain level will decrease   Outcome: Ongoing    Goal: Control of acute pain  Control of acute pain   Outcome: Ongoing    Goal: Control of chronic pain  Control of chronic pain   Outcome: Ongoing      Problem: Tissue Perfusion - Cardiopulmonary, Altered:  Goal: Absence of angina  Absence of angina   Outcome: Ongoing    Goal: Hemodynamic stability will improve  Hemodynamic stability will improve   Outcome: Ongoing      Problem: Discharge Planning:  Goal: Discharged to appropriate level of care  Discharged to appropriate level of care   Outcome: Ongoing      Problem: Breathing Pattern - Ineffective:  Goal: Ability to achieve and maintain a regular respiratory rate will improve  Ability to achieve and maintain a regular respiratory rate will improve   Outcome: Ongoing      Problem: Nausea/Vomiting:  Goal: Absence of nausea/vomiting  Absence of nausea/vomiting   Outcome: Ongoing    Goal: Able to drink  Able to drink   Outcome: Ongoing    Goal: Able to eat  Able to eat   Outcome: Ongoing    Goal: Ability to achieve adequate nutritional intake will improve  Ability to achieve adequate nutritional intake will improve   Outcome: Ongoing      Problem: Nutrition  Goal: Optimal nutrition therapy  Nutrition Problem: Inadequate oral intake  Intervention: Food and/or Nutrient Delivery: Continue current diet  Nutritional Goals: when diet advanced, po intake 50% or greater      Outcome: Ongoing      Problem: Tissue Perfusion - Renal, Altered:  Goal: Electrolytes within specified parameters  Electrolytes within specified parameters   Outcome: Ongoing    Goal: Urine creatinine clearance will be within specified parameters  Urine creatinine clearance will be within specified parameters   Outcome: Ongoing    Goal: Serum creatinine will be within specified parameters  Serum creatinine will be within specified parameters   Outcome: Ongoing    Goal: Ability to achieve a balanced intake and output will improve  Ability to achieve a balanced intake and output will improve   Outcome: Ongoing

## 2017-10-30 NOTE — CARE COORDINATION
Home Health Referral received over weekend by [facility] Intake. Patient set up with [facility]. [address] [place] [address]  [phone]  Fax  [phone].   Electronically signed by Marlene Guaman RN on 10/30/17 at 10:00 AM

## 2017-10-30 NOTE — CARE COORDINATION
[facility] notified of patient discharge today. DC Summary from yesterday and DC med list faxed.   Electronically signed by Ten Song RN on 10/30/17 at 5:09 PM

## 2017-10-30 NOTE — PROGRESS NOTES
Palliative Care:      Palliative Care  made an initial visit to introduce himself and offer support to Patient who presented with complaints of Nausea and vomiting for about 1-2 days. Patient also had several episodes of nonbloody diarrhea which has now resolved. Past Medical History:      Patient has a history of Arthritis, CAD, Chronic Diastolic CHF, CKD, COPD, Diabetic Neuropathy, DM II, Hypertension, Hypertensive Cardiovascular Disease, Hypothyroidism, Morbid Obesity, KASHIF, Post-Menopausal, Pulmonary Hypertension, Sarcoidosis, Wounds and Injuries. Advance Directives:      Patient does not have an Advanced Directive and is a Full Code. Activity:         Patient is resting at the time of the visit. Psychological/Spiritual:       Patient has good Spiritual Support. Patient/Family Discussion:      Patient has good Family Support. Patient has Family present at the time of the visit. Plan/expectations:  Patient's Family and Patient's Nurse report that Patient is scheduled to discharge home this afternoon.       Electronically signed by Nathalie Zaragoza on 10/30/2017 at 4:01 PM

## 2017-11-03 NOTE — PROGRESS NOTES
Wound Care Center  Progress Note       Darcy Bower  AGE: 75 y.o. GENDER: female  : 1950  TODAY'S DATE:  11/3/2017    Subjective:        HISTORY of PRESENT ILLNESS HPI   Vladislav Alonzo is a 75 y.o. female who presents today for wound evaluation.     Wound Type:pressure  Wound Location:L HEEL  Modifying factors:chronic pressure, shear force and obesity    Patient Active Problem List   Diagnosis Code    Essential hypertension I10    Morbid obesity due to excess calories ([facility]) E66.01    Acquired hypothyroidism E03.9    Diabetic ulcer of left foot associated with type 2 diabetes mellitus ([address] E11.621, L97.529    YANNI (acute kidney injury) ([address] N17.9    Hypercalcemia E83.52    Chronic kidney disease, stage IV (severe) ([facility]) N18.4    Type 2 diabetes mellitus with stage 3 chronic kidney disease, with long-term current use of insulin ([facility]) E11.22, N18.3, Z79.4    Cor pulmonale, chronic ([facility]) I27.81    Anemia of chronic disorder D63.8    Obstructive sleep apnea G47.33    Pulmonary hypertension I27.20    Diabetic ulcer of toe of right foot associated with type 2 diabetes mellitus, limited to breakdown of skin ([facility]) E11.621, L97.511    Acute pulmonary edema ([facility]) J81.0    Acute respiratory failure with hypoxia ([facility]) J96.01    Diabetic ulcer of left heel with fat layer exposed ([address] E11.621, L97.422    Uremia N19    Leukocytosis D72.829    UTI (urinary tract infection) N39.0    Slow transit constipation K59.01    Chronic pain G89.29    Anxiety F41.9    Decubitus ulcer of coccygeal region, stage 3 ([facility]) L89.153    Non-pressure chronic ulcer of right calf, limited to breakdown of skin ([address] L97.211    KASHIF on CPAP G47.33, Z99.89    Hypothyroidism E03.9    Hypertension I10    Hemodialysis patient ([address] Z99.2    DM II (diabetes mellitus, type II), controlled ([address] E11.9    COPD (chronic obstructive pulmonary disease) ([facility]) J44.9    Chronic diastolic CHF (congestive heart failure) ([address] I50.32    Colitis K52.9    Type 2 diabetes mellitus with skin complication ([facility]) C86.770    Acute cystitis without hematuria N30.00    ESRD (end stage renal disease) ([facility]) N18.6    Hemodialysis-associated hypotension I95.3       Ms. Isabella Camacho has a past medical history of Arthritis; CAD (coronary artery disease); Chronic diastolic CHF (congestive heart failure) ([facility]); CKD (chronic kidney disease), stage IV ([facility]); COPD (chronic obstructive pulmonary disease) ([facility] Diabetic neuropathy ([facility] DM II (diabetes mellitus, type II), controlled ([facility] Hemodialysis patient [facility]); Hypertension; Hypertensive cardiovascular disease; Hypothyroidism; Morbid obesity with BMI of 40.0-44.9, adult ([facility] KASHIF on CPAP; Palliative care encounter; Post-menopausal; Pulmonary hypertension; Sarcoidosis ([facility] and Wounds and injuries. She has a past surgical history that includes Foot Debridement; Kidney stone surgery; and Tunneled venous catheter placement. Her family history includes Arthritis in her father and mother; Heart Disease in her father and mother. Ms. Isabella Camacho reports that she has quit smoking. She has a 35.00 pack-year smoking history. She has never used smokeless tobacco. She reports that she does not drink alcohol or use drugs.     Her current medication list consists of     Current Outpatient Prescriptions on File Prior to Encounter   Medication Sig Dispense Refill    metroNIDAZOLE (FLAGYL) 500 MG tablet Take 1 tablet by mouth 3 times daily for 7 days 21 tablet 0    polyethylene glycol (GLYCOLAX) packet Take 17 g by mouth daily as needed for Constipation      gabapentin (NEURONTIN) 100 MG capsule Take 3 capsules by mouth 3 times daily (Patient taking differently: Take 300 mg by mouth 3 times daily Indications: Nerve Disease ) 90 capsule 2    LORazepam (ATIVAN) 0.5 MG tablet Take 1 tablet by mouth 2 times daily as needed for Anxiety 60 tablet 2    HYDROcodone-acetaminophen (NORCO) 7.5-325 MG per tablet Take 1 room.        Electronically signed by Elke Bueno MD on 11/3/2017 at 11:47 AM

## 2017-11-08 NOTE — PROGRESS NOTES
07/31/2017    Chronic pain 07/20/2017    Anxiety 07/20/2017    Slow transit constipation     Leukocytosis 06/10/2017    UTI (urinary tract infection) 06/10/2017    Uremia     Diabetic ulcer of left heel with fat layer exposed ([address] 05/02/2017    Acute respiratory failure with hypoxia (HCC)     Acute pulmonary edema (HCC)     Type 2 diabetes mellitus with stage 3 chronic kidney disease, with long-term current use of insulin ([address] 04/05/2017    Cor pulmonale, chronic (HCC) 04/05/2017    Anemia of chronic disorder 04/05/2017    Obstructive sleep apnea 04/05/2017    Pulmonary hypertension 04/05/2017    Diabetic ulcer of toe of right foot associated with type 2 diabetes mellitus, limited to breakdown of skin ([address] 04/05/2017    Chronic kidney disease, stage IV (severe) ([address] 03/10/2017    YANNI (acute kidney injury) ([address] 03/02/2016    Diabetic ulcer of left foot associated with type 2 diabetes mellitus ([address] 01/18/2016    Essential hypertension 02/28/2013    Morbid obesity due to excess calories ([address] 02/28/2013    Acquired hypothyroidism 02/28/2013    Hypothyroidism 02/28/2013     Past Medical History:   Diagnosis Date    Arthritis     CAD (coronary artery disease)     hospitalized first week of March with low O2 sat, low BS, was in critical care for 2-3 days.   was in the hospiral for 1 1/2 weeks total.    Chronic diastolic CHF (congestive heart failure) (HCC)     CKD (chronic kidney disease), stage IV ([address] 3/2/16    COPD (chronic obstructive pulmonary disease) ([address]     sees [facility] for her dx.; has seen amelia in the past    Diabetic neuropathy ([address]     DM II (diabetes mellitus, type II), controlled ([address]     Hemodialysis patient ([address]     [facility] sat and [place]    Hypertension     Hypertensive cardiovascular disease 2/28/2013 2/20/2013  Echo  Normal LVFX, concentric LVH    Hypothyroidism 2/28/2013    Mixed hyperlipidemia 11/9/2017    Morbid obesity with BMI midodrine (PROAMATINE) 5 MG tablet Take 1 tablet by mouth 3 times daily (with meals) 90 tablet 3    glipiZIDE (GLUCOTROL) 5 MG tablet Take 1 tablet by mouth Daily (Patient taking differently: Take 5 mg by mouth Daily Indications: Diabetes ) 30 tablet 2    pantoprazole (PROTONIX) 40 MG tablet Take 1 tablet by mouth every morning (before breakfast) (Patient taking differently: Take 40 mg by mouth every morning (before breakfast) Indications: Gastroesophageal Reflux Disease ) 30 tablet 3    docusate sodium (COLACE) 100 MG capsule Take 200 mg by mouth 2 times daily as needed Indications: Constipation       silver sulfADIAZINE (SILVADENE) 1 % cream Apply topically daily. 50 g 1    OXYGEN Inhale 6 L into the lungs continuous May titrate to effect - Patient also placed on Trilogy (Patient taking differently: Inhale 3 L into the lungs continuous ) 1 Units 2    miconazole (MICOTIN) 2 % powder Apply topically 2 times daily. 45 g 1    aspirin 81 MG tablet Take 81 mg by mouth daily      sucralfate (CARAFATE) 1 GM tablet Take 1 tablet by mouth 4 times daily 120 tablet 3    LORazepam (ATIVAN) 0.5 MG tablet Take 1 tablet by mouth 2 times daily as needed for Anxiety . 60 tablet 2    HYDROcodone-acetaminophen (NORCO) 7.5-325 MG per tablet Take 1 tablet by mouth every 6 hours as needed for Pain . Earliest Fill Date: 11/8/17 100 tablet 0     No current facility-administered medications for this visit. Allergies: Dilaudid [hydromorphone]    Review of Systems  Review of Systems   Constitutional: Positive for malaise/fatigue (Chronic). Negative for chills and fever. HENT: Negative for congestion. Eyes: Negative for blurred vision. Respiratory: Positive for shortness of breath (chronic, on oxygen). Negative for cough. Cardiovascular: Positive for leg swelling (mild). Negative for chest pain, palpitations, orthopnea and PND. Gastrointestinal: Negative for blood in stool, heartburn, melena, nausea and vomiting.

## 2017-11-08 NOTE — PATIENT INSTRUCTIONS
list of your current medications. Do not take any of your medications the morning of the test, but bring all morning medications with you as you will take them after the stress portion of the test is completed.  Do not eat Bananas 24 hours prior to test.     No caffeine 24 hours prior to the testing. This includes: coffee, pop/soda, chocolate, cold medications, etc.  Any product that might contain caffeine.  No nicotine or alcohol 12 hours prior to your test.    Nothing to eat or drink 4-6 hours prior to appointment time. It is okay to drink small amounts of water during the four hours prior to the test.   Nitroglycerin patches must be taken off 1 hour before testing.  Wear comfortable clothing.  Please refrain from any strenuous exercise or activities the day before your test, or the day of your test.   The Nuclear Lexiscan Stress test takes about 2 ½ to 3 hours to complete. If for any reason you are unable to keep this appointment, please contact Outpatient Scheduling, [phone], as soon as possible to reschedule.

## 2017-11-08 NOTE — PROGRESS NOTES
withstand surgical procedure for AV fistula. She is also requesting refills on her Norco and Ativan. She has a history of chronic neuropathy, low back pain and anxiety with panic attacks associated with her shortness of air. She currently denies suicidal or homicidal ideation. We have been weaning her Norco down, patient does not feel like she can tolerate pain management, we did not do a urine drug screen is patient does not produce any significant amount of urine. Past Medical History:   Diagnosis Date    Arthritis     CAD (coronary artery disease)     hospitalized first week of March with low O2 sat, low BS, was in critical care for 2-3 days.   was in the hospiral for 1 1/2 weeks total.    Chronic diastolic CHF (congestive heart failure) ([facility])     CKD (chronic kidney disease), stage IV ([address] 3/2/16    COPD (chronic obstructive pulmonary disease) ([facility])     sees [facility] for her dx.; has seen amelia in the past    Diabetic neuropathy ([facility]     DM II (diabetes mellitus, type II), controlled ([address]     Hemodialysis patient ([address]     nick jasmeet sat and [place]    Hypertension     Hypertensive cardiovascular disease 2013  Echo  Normal LVFX, concentric LVH    Hypothyroidism 2013    Morbid obesity with BMI of 40.0-44.9, adult ([address] 2016    BMI 44.7   (was 60+ in )    KASHIF on CPAP     Palliative care encounter     Post-menopausal     Pulmonary hypertension     RVSP 72 on 2017 echo    Sarcoidosis ([address]     Wounds and injuries right toe    [facility]      Past Surgical History:   Procedure Laterality Date    FOOT DEBRIDEMENT      multiple    KIDNEY STONE SURGERY      TUNNELED VENOUS CATHETER PLACEMENT      permacath       Family History   Problem Relation Age of Onset    Arthritis Mother     Pacemaker Mother     Arthritis Father     Heart Disease Father       late 42's of MI       Social History   Substance Use Topics    Smoking status: Former Smoker     Packs/day: 1.00     Years: 35.00    Smokeless tobacco: Never Used      Comment: quit smoking \"years ago\"    Alcohol use No      Current Outpatient Prescriptions   Medication Sig Dispense Refill    sucralfate (CARAFATE) 1 GM tablet Take 1 tablet by mouth 4 times daily 120 tablet 3    LORazepam (ATIVAN) 0.5 MG tablet Take 1 tablet by mouth 2 times daily as needed for Anxiety . 60 tablet 2    HYDROcodone-acetaminophen (NORCO) 7.5-325 MG per tablet Take 1 tablet by mouth every 6 hours as needed for Pain .  Earliest Fill Date: 11/8/17 100 tablet 0    polyethylene glycol (GLYCOLAX) packet Take 17 g by mouth daily as needed for Constipation      gabapentin (NEURONTIN) 100 MG capsule Take 3 capsules by mouth 3 times daily (Patient taking differently: Take 300 mg by mouth 3 times daily Indications: Nerve Disease ) 90 capsule 2    levothyroxine (SYNTHROID) 100 MCG tablet Take 1 tablet by mouth Daily (Patient taking differently: Take 100 mcg by mouth Daily Indications: Underactive Thyroid ) 30 tablet 5    allopurinol (ZYLOPRIM) 100 MG tablet Take 1 tablet by mouth daily (Patient taking differently: Take 100 mg by mouth daily Indications: Arthritis ) 30 tablet 5    albuterol (PROVENTIL) (2.5 MG/3ML) 0.083% nebulizer solution Take 3 mLs by nebulization every 4 hours as needed for Wheezing or Shortness of Breath 120 each 1    atorvastatin (LIPITOR) 40 MG tablet Take 1 tablet by mouth daily (Patient taking differently: Take 40 mg by mouth daily Indications: Changes in Cholesterol ) 30 tablet 11    midodrine (PROAMATINE) 5 MG tablet Take 1 tablet by mouth 3 times daily (with meals) 90 tablet 3    glipiZIDE (GLUCOTROL) 5 MG tablet Take 1 tablet by mouth Daily (Patient taking differently: Take 5 mg by mouth Daily Indications: Diabetes ) 30 tablet 2    pantoprazole (PROTONIX) 40 MG tablet Take 1 tablet by mouth every morning (before breakfast) (Patient taking differently: Take 40 mg by mouth every morning (before breakfast) Indications: Gastroesophageal Reflux Disease ) 30 tablet 3    docusate sodium (COLACE) 100 MG capsule Take 200 mg by mouth 2 times daily as needed Indications: Constipation       silver sulfADIAZINE (SILVADENE) 1 % cream Apply topically daily. 50 g 1    OXYGEN Inhale 6 L into the lungs continuous May titrate to effect - Patient also placed on Trilogy (Patient taking differently: Inhale 3 L into the lungs continuous ) 1 Units 2    miconazole (MICOTIN) 2 % powder Apply topically 2 times daily. 45 g 1    aspirin 81 MG tablet Take 81 mg by mouth daily      metoprolol tartrate (LOPRESSOR) 50 MG tablet Take 1 tablet by mouth 2 times daily 60 tablet 11     No current facility-administered medications for this visit. Allergies   Allergen Reactions    Dilaudid [Hydromorphone] Shortness Of Breath       Health Maintenance   Topic Date Due    Diabetic foot exam  10/13/1960    DTaP/Tdap/Td vaccine (1 - Tdap) 10/13/1968    Colon cancer screen colonoscopy  10/13/1999    DEXA (modify frequency per FRAX score)  10/13/2014    Breast cancer screen  05/18/2017    Flu vaccine (1) 09/01/2017    Lipid screen  09/23/2017    Diabetic retinal exam  11/17/2017 (Originally 10/13/1960)    Zostavax vaccine  01/23/2018 (Originally 10/13/2009)    Pneumococcal high/highest risk (2 of 2 - PPSV23) 01/25/2018 (Originally 2/10/2016)    Diabetic hemoglobin A1C test  10/26/2018    Hepatitis C screen  Completed       Subjective:      Review of Systems   Constitutional: Positive for fatigue. Negative for activity change, appetite change and chills. Respiratory: Positive for apnea, shortness of breath and wheezing. Negative for cough. Patient reports that her apnea, shortness of breath and wheezing have been stable, she has chronic COPD. Cardiovascular: Negative for chest pain, palpitations and leg swelling. Gastrointestinal: Negative. Endocrine: Negative.     Genitourinary:        Chronic kidney disease on dialysis   Musculoskeletal: Positive for arthralgias and back pain. Allergic/Immunologic: Negative. Neurological: Negative. Hematological: Negative. Psychiatric/Behavioral: Positive for sleep disturbance. Negative for agitation, behavioral problems, confusion, decreased concentration, dysphoric mood, hallucinations, self-injury and suicidal ideas. The patient is nervous/anxious. The patient is not hyperactive. Vision has obstructive sleep apnea she has to sleep in a recliner requires oxygen therapy continues. Objective:     Physical Exam   Constitutional: She is oriented to person, place, and time. She appears well-developed and well-nourished. HENT:   Head: Normocephalic and atraumatic. Eyes: Pupils are equal, round, and reactive to light. Neck: Normal range of motion. Neck supple. Cardiovascular: Normal rate, regular rhythm, normal heart sounds and intact distal pulses. Pulmonary/Chest: She has wheezes. She has rales. Labored respirations, patient sat dropped promptly to 88 with any significant activity, equally does so when removing her nasal cannula! And does recovered to 93% on 3 L at rest.   Abdominal: Soft. Bowel sounds are normal.   Musculoskeletal: Normal range of motion. Neurological: She is alert and oriented to person, place, and time. Skin: Skin is warm and dry. Psychiatric: Her behavior is normal. Judgment and thought content normal. Her mood appears anxious. Her speech is rapid and/or pressured. Cognition and memory are normal. She expresses no homicidal and no suicidal ideation. She expresses no suicidal plans and no homicidal plans. Patient is anxious she has rapid speech and shortness of air when speaking which seems to exacerbate anxiety, she reports she does pretty well but on occasion does get panicky requiring her to take her Ativan. Nursing note and vitals reviewed.     /65   Pulse 76   Temp 97.5 °F (36.4 °C) (Temporal) Resp 20   Ht 5' 1\" (1.549 m)   Wt 251 lb 6.4 oz (114 kg)   LMP  (LMP Unknown)   SpO2 93%   BMI 47.50 kg/m²     Assessment:      1. Chronic kidney disease, stage IV (severe) ([facility]     2. Anxiety  LORazepam (ATIVAN) 0.5 MG tablet   3. Other chronic pain  HYDROcodone-acetaminophen (NORCO) 7.5-325 MG per tablet   4. Type 2 diabetes mellitus with stage 3 chronic kidney disease, with long-term current use of insulin ([facility]         Plan:    Darcy received counseling on the following healthy behaviors: nutrition, exercise and medication adherence. Call or go to the ER if you experience worsening depression or thoughts of hurting yourself or others. Return in about 3 months (around 2/8/2018), or if symptoms worsen or fail to improve. Continue oxygen therapy at 3 L per nasal cannula continuously  Continue Current Medications  Call with any problems or concerns  Notify any abnormal/excessive bruising or bleeding  Dietary Education given  Report any sudden change in weight or soa    Orders Placed This Encounter   Medications    sucralfate (CARAFATE) 1 GM tablet     Sig: Take 1 tablet by mouth 4 times daily     Dispense:  120 tablet     Refill:  3    LORazepam (ATIVAN) 0.5 MG tablet     Sig: Take 1 tablet by mouth 2 times daily as needed for Anxiety . Dispense:  60 tablet     Refill:  2    HYDROcodone-acetaminophen (NORCO) 7.5-325 MG per tablet     Sig: Take 1 tablet by mouth every 6 hours as needed for Pain . Earliest Fill Date: 11/8/17     Dispense:  100 tablet     Refill:  0       Patient given educational materials - see patient instructions. Discussed use, benefit, and side effects of prescribed medications. All patient questions answered. Pt voiced understanding. Reviewed health maintenance. Instructed to continue current medications, diet and exercise. Patient agreed with treatment plan. Follow up as directed.          Electronically signed by MALICK Henderson on 11/8/2017 at 12:38 PM

## 2017-11-09 PROBLEM — E78.2 MIXED HYPERLIPIDEMIA: Status: ACTIVE | Noted: 2017-01-01

## 2017-11-21 NOTE — TELEPHONE ENCOUNTER
Due to ESRD and Dependence on hemodialysis Pt has been scheduled for Creation of RUE AV Access. Ben Broussard at [facility] has been notified of patients upcoming appointment for surgery with SJS. Patient is to arrive at [address] [place] [address] on 12/15/17 at 10:00am, for surgery. Pt is to report to [address] [place] [address] for Pre-op , MRSA Swab and CXR. Details/Instructions (See letter) have been reviewed with Ben Broussard and a written copy has been successfully faxed to Ben Broussard to review with patient when notifying. Ben Broussard voiced understanding.  OB

## 2017-12-06 NOTE — TELEPHONE ENCOUNTER
Due to ESRD & Dependence on Hemo Dailysis Pt has been scheduled for Creation AV Access Surgery. Buddy Simon at [facility] has been notified of patients upcoming appointment for surgery with SJS, has been moved up. Patient is to arrive at Friday on 12/08/17 at 6:00am. Details/Instructions (See letter) have been reviewed with Buddy Simon and a written copy has been successfully faxed to Buddy Simon to review with patient when notifying. Buddy Simon voiced understanding.  OB

## 2017-12-08 PROBLEM — J44.9 COPD WITH HYPOXIA (HCC): Status: ACTIVE | Noted: 2017-01-01

## 2017-12-08 PROBLEM — R09.02 COPD WITH HYPOXIA (HCC): Status: ACTIVE | Noted: 2017-01-01

## 2017-12-08 NOTE — ANESTHESIA PRE PROCEDURE
daily  Patient taking differently: Take 40 mg by mouth daily Indications: Changes in Cholesterol  8/23/17  Yes Darreld Furnace, DO   midodrine (PROAMATINE) 5 MG tablet Take 1 tablet by mouth 3 times daily (with meals) 8/23/17  Yes Darreld Furnace, DO   aspirin 81 MG tablet Take 81 mg by mouth daily   Yes Historical Provider, MD   polyethylene glycol (GLYCOLAX) packet Take 17 g by mouth daily as needed for Constipation    Historical Provider, MD   docusate sodium (COLACE) 100 MG capsule Take 200 mg by mouth 2 times daily as needed Indications: Constipation     Historical Provider, MD   OXYGEN Inhale 6 L into the lungs continuous May titrate to effect - Patient also placed on Trilogy  Patient taking differently: Inhale 4 L into the lungs continuous  4/28/17   MALICK Canales   miconazole (MICOTIN) 2 % powder Apply topically 2 times daily. 4/12/17   Sandy Charlton PA-C       Current medications:    Current Facility-Administered Medications   Medication Dose Route Frequency Provider Last Rate Last Dose    0.45 % sodium chloride infusion   Intravenous Continuous Traci Henry MD        midazolam (VERSED) 2 MG/2ML injection                Allergies:     Allergies   Allergen Reactions    Dilaudid [Hydromorphone] Shortness Of Breath       Problem List:    Patient Active Problem List   Diagnosis Code    Essential hypertension I10    Morbid obesity due to excess calories ([facility]) E66.01    Acquired hypothyroidism E03.9    Diabetic ulcer of left foot associated with type 2 diabetes mellitus ([facility] E11.621, L97.529    YANNI (acute kidney injury) ([facility] N17.9    Hypercalcemia E83.52    Chronic kidney disease, stage IV (severe) ([facility]) N18.4    Type 2 diabetes mellitus with stage 3 chronic kidney disease, with long-term current use of insulin ([facility]) E11.22, N18.3, Z79.4    Cor pulmonale, chronic ([facility]) I27.81    Anemia of chronic disorder D63.8    Obstructive sleep apnea G47.33    Pulmonary hypertension Hypothyroidism 2/28/2013    Mixed hyperlipidemia 11/9/2017    Morbid obesity with BMI of 40.0-44.9, adult ([facility] 4/13/2016    BMI 44.7   (was 60+ in 2013)    KASHIF on CPAP     Palliative care encounter     Post-menopausal     Pulmonary hypertension     RVSP 72 on 1/17/2017 echo    Sarcoidosis ([facility]     Wounds and injuries right toe    Shell Phelan       Past Surgical History:        Procedure Laterality Date    FOOT DEBRIDEMENT      multiple    KIDNEY STONE SURGERY      TUNNELED VENOUS CATHETER PLACEMENT      permacath       Social History:    Social History   Substance Use Topics    Smoking status: Former Smoker     Packs/day: 1.00     Years: 35.00     Types: Cigarettes     Quit date: 11/29/2000    Smokeless tobacco: Never Used    Alcohol use No                                Counseling given: Not Answered      Vital Signs (Current):   Vitals:    12/08/17 0625 12/08/17 0638   BP:  (!) 113/57   Pulse:  89   Resp:  18   Temp:  98 °F (36.7 °C)   TempSrc:  Temporal   SpO2:  90%   Weight: 247 lb (112 kg)    Height: 5' 1\" (1.549 m)                                               BP Readings from Last 3 Encounters:   12/08/17 (!) 113/57   11/08/17 119/65   11/08/17 118/60       NPO Status: Time of last liquid consumption: 0000                        Time of last solid consumption: 0000                        Date of last liquid consumption: 12/07/17                        Date of last solid food consumption: 12/07/17    BMI:   Wt Readings from Last 3 Encounters:   12/08/17 247 lb (112 kg)   11/29/17 251 lb (113.9 kg)   11/08/17 251 lb 6.4 oz (114 kg)     Body mass index is 46.67 kg/m².     CBC:   Lab Results   Component Value Date    WBC 10.2 12/08/2017    RBC 3.53 12/08/2017    HGB 11.1 12/08/2017    HCT 37.5 12/08/2017    .2 12/08/2017    RDW 17.7 12/08/2017     12/08/2017       CMP:   Lab Results   Component Value Date     10/30/2017    K 4.1 10/30/2017    CL 96 10/30/2017    CO2 23 10/30/2017 BUN 36 10/30/2017    CREATININE 5.7 10/30/2017    LABGLOM 7 10/30/2017    GLUCOSE 172 10/30/2017    PROT 7.0 10/30/2017    PROT 7.1 02/20/2013    CALCIUM 9.9 10/30/2017    BILITOT 0.3 10/30/2017    ALKPHOS 81 10/30/2017    AST 11 10/30/2017    ALT 8 10/30/2017       POC Tests: No results for input(s): POCGLU, POCNA, POCK, POCCL, POCBUN, POCHEMO, POCHCT in the last 72 hours. Coags:   Lab Results   Component Value Date    PROTIME 13.3 06/09/2017    INR 1.02 06/09/2017    APTT 27.0 01/28/2017       HCG (If Applicable): No results found for: PREGTESTUR, PREGSERUM, HCG, HCGQUANT     ABGs:   Lab Results   Component Value Date    PHART 7.360 10/25/2017    PO2ART 55.0 10/25/2017    BZR9JBO 43.0 10/25/2017    OJE3HTV 24.3 10/25/2017    BEART -1.2 10/25/2017    B2HZHZTX 88.2 10/25/2017        Type & Screen (If Applicable):  No results found for: LABABO, [address]    Anesthesia Evaluation  Patient summary reviewed and Nursing notes reviewed no history of anesthetic complications:   Airway: Mallampati: II  TM distance: >3 FB   Neck ROM: full  Mouth opening: > = 3 FB Dental:          Pulmonary:   (+) COPD:  sleep apnea: on CPAP,      (-) not a current smoker          Patient did not smoke on day of surgery. ROS comment: Sarcoidosis/pulm htn  3-4 L O2 continuously   Cardiovascular:    (+) hypertension:, CHF:, hyperlipidemia    (-) CAD       Beta Blocker:  Dose within 24 Hrs         Neuro/Psych:   Negative Neuro/Psych ROS              GI/Hepatic/Renal:   (+) GERD: well controlled, renal disease: ESRD and dialysis,           Endo/Other:    (+) Type II DM, , hypothyroidism::., .                 Abdominal:           Vascular:                                        Anesthesia Plan      MAC and regional     ASA 4     (Brachial plexus block)  Induction: intravenous. MIPS: Postoperative opioids intended and Prophylactic antiemetics administered.   Anesthetic plan and risks discussed with patient.                       Mandeep Denson MD   12/8/2017

## 2017-12-08 NOTE — H&P
Prescriptions   Medication Sig Dispense Refill    gabapentin (NEURONTIN) 100 MG capsule Take 3 capsules by mouth 3 times daily 90 capsule 2    LORazepam (ATIVAN) 0.5 MG tablet Take 1 tablet by mouth 2 times daily as needed for Anxiety 60 tablet 2    HYDROcodone-acetaminophen (NORCO) 7.5-325 MG per tablet Take 1 tablet by mouth every 6 hours as needed for Pain . Earliest Fill Date: 8/23/17 100 tablet 0    levothyroxine (SYNTHROID) 100 MCG tablet Take 1 tablet by mouth Daily 30 tablet 5    allopurinol (ZYLOPRIM) 100 MG tablet Take 1 tablet by mouth daily 30 tablet 5    albuterol (PROVENTIL) (2.5 MG/3ML) 0.083% nebulizer solution Take 3 mLs by nebulization every 4 hours as needed for Wheezing or Shortness of Breath 120 each 1    atorvastatin (LIPITOR) 40 MG tablet Take 1 tablet by mouth daily 30 tablet 11    midodrine (PROAMATINE) 5 MG tablet Take 1 tablet by mouth 3 times daily (with meals) 90 tablet 3    metoprolol tartrate (LOPRESSOR) 50 MG tablet Take 1 tablet by mouth 2 times daily 60 tablet 0    HYDROcodone-acetaminophen (NORCO) 7.5-325 MG per tablet Do not fill before 9-21-17. 100 tablet 0    glipiZIDE (GLUCOTROL) 5 MG tablet Take 1 tablet by mouth Daily 30 tablet 2    linagliptin (TRADJENTA) 5 MG tablet Take 1 tablet by mouth daily 30 tablet 0    ondansetron (ZOFRAN-ODT) 4 MG disintegrating tablet Take 1 tablet by mouth every 8 hours as needed for Nausea or Vomiting 90 tablet 1    pantoprazole (PROTONIX) 40 MG tablet Take 1 tablet by mouth every morning (before breakfast) 30 tablet 3    sucralfate (CARAFATE) 1 GM tablet Take 1 g by mouth 4 times daily        docusate sodium (COLACE) 100 MG capsule Take 200 mg by mouth 2 times daily        linaclotide (LINZESS) 145 MCG capsule Take 145 mcg by mouth every morning (before breakfast)        silver sulfADIAZINE (SILVADENE) 1 % cream Apply topically daily.  50 g 1    OXYGEN Inhale 6 L into the lungs continuous May titrate to effect - Patient also

## 2017-12-09 PROBLEM — N18.6 ESRD ON HEMODIALYSIS (HCC): Status: ACTIVE | Noted: 2017-01-01

## 2017-12-09 PROBLEM — Z99.2 ESRD ON HEMODIALYSIS (HCC): Status: ACTIVE | Noted: 2017-01-01

## 2017-12-09 NOTE — PROGRESS NOTES
6019   INR  1.05     ABGs:   Lab Results   Component Value Date    PHART 7.360 10/25/2017    PO2ART 55.0 10/25/2017    BLY7LMC 43.0 10/25/2017         Patient Active Problem List    Diagnosis Date Noted    Hypercalcemia 04/14/2016     Priority: High     Class: Acute    Essential hypertension 02/28/2013     Priority: Low    Acquired hypothyroidism 02/28/2013     Priority: Low    COPD with hypoxia ([address] 12/08/2017    Mixed hyperlipidemia 11/09/2017    ESRD (end stage renal disease) ([address] 10/27/2017    Hemodialysis-associated hypotension 10/27/2017    Colitis 10/26/2017    Type 2 diabetes mellitus with skin complication ([address] 20/53/7391    Acute cystitis without hematuria 10/26/2017    KASHIF on CPAP     Hypertension     Hemodialysis patient ([address]      [facility]      DM II (diabetes mellitus, type II), controlled ([address]     COPD (chronic obstructive pulmonary disease) ([address]      sees [facility] for her dx.; has seen [facility] in the past      Chronic diastolic CHF (congestive heart failure) ([address]     Non-pressure chronic ulcer of right calf, limited to breakdown of skin ([address] 08/14/2017    Decubitus ulcer of coccygeal region, stage 3 ([address] 07/31/2017    Chronic pain 07/20/2017    Anxiety 07/20/2017    Slow transit constipation     Leukocytosis 06/10/2017    UTI (urinary tract infection) 06/10/2017    Uremia     Diabetic ulcer of left heel with fat layer exposed ([address] 05/02/2017    Acute respiratory failure with hypoxia (HCC)     Acute pulmonary edema (HCC)     Type 2 diabetes mellitus with stage 3 chronic kidney disease, with long-term current use of insulin ([address] 04/05/2017    Cor pulmonale, chronic ([address] 04/05/2017     Updating Deprecated Diagnoses      Anemia of chronic disorder 04/05/2017    Obstructive sleep apnea 04/05/2017    Pulmonary hypertension 04/05/2017    Diabetic ulcer of toe of right foot associated with type 2 diabetes mellitus, limited to breakdown of skin ([address] 04/05/2017    Chronic kidney disease, stage IV (severe) ([address] 03/10/2017    YANNI (acute kidney injury) ([address] 03/02/2016    Diabetic ulcer of left foot associated with type 2 diabetes mellitus ([address] 01/18/2016    Morbid obesity due to excess calories ([address] 02/28/2013 04/26/2017  BMI 56      Hypothyroidism 02/28/2013       Assessment/plan:   Principal Problem:    ESRD (end stage renal disease) ([address]  Active Problems:    Acquired hypothyroidism    Morbid obesity due to excess calories (HCC)    Cor pulmonale, chronic (HCC)    KASHIF treated with BiPAP    DM II (diabetes mellitus, type II), controlled ([address]    COPD with hypoxia ([address]      Plan:     1. Continue care, see above and orders. 2. Prognosis good. 3. Disposition agree with current plan and home discharge.       Darren Zaidi MD  Hospitalist Service  12/9/2017  12:51 PM

## 2017-12-09 NOTE — CONSULTS
Low    Acquired hypothyroidism 02/28/2013     Priority: Low    COPD with hypoxia ([address] 12/08/2017    Mixed hyperlipidemia 11/09/2017    ESRD (end stage renal disease) ([address] 10/27/2017    Hemodialysis-associated hypotension 10/27/2017    Colitis 10/26/2017    Type 2 diabetes mellitus with skin complication ([address] 93/28/4390    Acute cystitis without hematuria 10/26/2017    KASHIF on CPAP     Hypertension     Hemodialysis patient ([address]      tues jasmeet sat and [place]      DM II (diabetes mellitus, type II), controlled ([address]     COPD (chronic obstructive pulmonary disease) ([address]      sees [facility] for her dx.; has seen amelia in the past      Chronic diastolic CHF (congestive heart failure) ([address]     Non-pressure chronic ulcer of right calf, limited to breakdown of skin ([address] 08/14/2017    Decubitus ulcer of coccygeal region, stage 3 ([address] 07/31/2017    Chronic pain 07/20/2017    Anxiety 07/20/2017    Slow transit constipation     Leukocytosis 06/10/2017    UTI (urinary tract infection) 06/10/2017    Uremia     Diabetic ulcer of left heel with fat layer exposed ([address] 05/02/2017    Acute respiratory failure with hypoxia (HCC)     Acute pulmonary edema (HCC)     Type 2 diabetes mellitus with stage 3 chronic kidney disease, with long-term current use of insulin ([address] 04/05/2017    Cor pulmonale, chronic ([address] 04/05/2017     Updating Deprecated Diagnoses      Anemia of chronic disorder 04/05/2017    Obstructive sleep apnea 04/05/2017    Pulmonary hypertension 04/05/2017    Diabetic ulcer of toe of right foot associated with type 2 diabetes mellitus, limited to breakdown of skin ([address] 04/05/2017    Chronic kidney disease, stage IV (severe) ([address] 03/10/2017    YANNI (acute kidney injury) ([address] 03/02/2016    Diabetic ulcer of left foot associated with type 2 diabetes mellitus ([address] 01/18/2016    Morbid obesity due to excess calories ([address] 02/28/2013 04/26/2017  BMI 56     
hours. Radiology reports as per the Radiologist  Radiology: Xr Chest Standard (2 Vw)    Result Date: 11/29/2017  EXAMINATION:  XR CHEST STANDARD TWO VW  11/29/2017 2:39 PM HISTORY: COPD. Oxygen dependent. Prior history of smoking. High blood pressure. COMPARISON: 10/25/2017. FINDINGS:  The inspiration is not very deep. Coarse markings and bronchial wall thickening remains stable. There is no dense infiltrate. There is no pleural effusion. There is cardiomegaly. There is no significant edema. Thoracic spine degenerative changes are noted. There is a dialysis catheter on the right. 1. No new infiltrate or effusion. 2. Coarse markings and bronchial wall thickening, stable. 3. Mild cardiomegaly. Signed by Dr Sterling Eisenmenger on 11/29/2017 2:42 PM       Assessment   ESRD  HTN  DM2 on insulin   Hyperuricemia  Anemia CKD      Plan:  HD TTS  Monitor labs  D/w Dr. Aimee Mclaughlin      Thank you for the consult, we appreciate the opportunity to provide care to your patients. Feel free to contact me if I can be of any further assistance.       Johnny Delarosa MD  12/08/17  8:12 PM

## 2017-12-09 NOTE — PROGRESS NOTES
12/08/17 0940 (!) 88/40 - - 119 30 95 % -   12/08/17 0939 - - - 118 28 94 % -   12/08/17 0938 - - - 118 27 95 % -   12/08/17 0937 - - - 118 (!) 32 94 % -   12/08/17 0936 - - - 118 26 94 % -   12/08/17 0935 (!) 102/49 - - 119 24 93 % -   12/08/17 0933 - - - 121 27 93 % -   12/08/17 0932 - - - 122 (!) 37 93 % -   12/08/17 0931 - - - 122 28 94 % -   12/08/17 0930 (!) 92/53 - - 122 25 95 % -   12/08/17 0926 - - - 123 - - -   12/08/17 0925 (!) 101/39 - - 124 26 94 % -   12/08/17 0922 (!) 102/33 98 °F (36.7 °C) Temporal 123 27 94 % -   12/08/17 0920 - - - 125 22 - -         Intake/Output Summary (Last 24 hours) at 12/09/17 0824  Last data filed at 12/08/17 0915   Gross per 24 hour   Intake              300 ml   Output               15 ml   Net              285 ml       No intake/output data recorded. I/O last 3 completed shifts: In: 300 [I.V.:300]  Out: 15 [Blood:15]          Wt Readings from Last 3 Encounters:   12/09/17 250 lb 4.8 oz (113.5 kg)   11/29/17 251 lb (113.9 kg)   11/08/17 251 lb 6.4 oz (114 kg)        Body mass index is 47.29 kg/m². Diet: DIET RENAL; Carb Control: 4 carbs/meal (approximate 1800 kcals/day);  Low Sodium (2 GM)        MEDS:     Scheduled Meds:   allopurinol  100 mg Oral Daily    aspirin  81 mg Oral Daily    atorvastatin  40 mg Oral Daily    gabapentin  100 mg Oral TID    glipiZIDE  5 mg Oral Daily    levothyroxine  100 mcg Oral Daily    metoprolol tartrate  50 mg Oral BID    sodium chloride flush  10 mL Intravenous 2 times per day    pneumococcal polyvalent  0.5 mL Intramuscular Once    midodrine  5 mg Oral TID WC     Continuous Infusions:   sodium chloride 50 mL/hr at 12/08/17 0751    sodium chloride 15 mL/hr at 12/08/17 1311     PRN Meds:  albuterol 2.5 mg Q4H PRN   docusate sodium 200 mg BID PRN   HYDROcodone-acetaminophen 1 tablet Q4H PRN   LORazepam 0.5 mg BID PRN   miconazole  BID PRN   sodium chloride flush 10 mL PRN   acetaminophen 650 mg Q4H PRN   ondansetron 4 mg hypothyroidism    Diabetic ulcer of left foot associated with type 2 diabetes mellitus ([address]    YANNI (acute kidney injury) ([address]    Hypercalcemia    Chronic kidney disease, stage IV (severe) (HCC)    Type 2 diabetes mellitus with stage 3 chronic kidney disease, with long-term current use of insulin (HCC)    Cor pulmonale, chronic (HCC)    Anemia of chronic disorder    Obstructive sleep apnea    Pulmonary hypertension    Diabetic ulcer of toe of right foot associated with type 2 diabetes mellitus, limited to breakdown of skin (HCC)    Acute pulmonary edema (HCC)    Acute respiratory failure with hypoxia (HCC)    Diabetic ulcer of left heel with fat layer exposed ([address]    Uremia    Leukocytosis    UTI (urinary tract infection)    Slow transit constipation    Chronic pain    Anxiety    Decubitus ulcer of coccygeal region, stage 3 (HCC)    Non-pressure chronic ulcer of right calf, limited to breakdown of skin (HCC)    KASHIF on CPAP    Hypothyroidism    Hypertension    Hemodialysis patient ([address]    DM II (diabetes mellitus, type II), controlled ([address]    COPD (chronic obstructive pulmonary disease) (HCC)    Chronic diastolic CHF (congestive heart failure) (HCC)    Colitis    Type 2 diabetes mellitus with skin complication (HCC)    Acute cystitis without hematuria    ESRD (end stage renal disease) (HCC)    Hemodialysis-associated hypotension    Mixed hyperlipidemia    COPD with hypoxia ([address]   3. PLAN:     1. Home after dialysis today  2. Horatio Bloch, M.D.    Electronically signed 12/9/2017 at 8:24 AM

## 2017-12-13 PROBLEM — E11.21 CONTROLLED TYPE 2 DIABETES MELLITUS WITH DIABETIC NEPHROPATHY, WITHOUT LONG-TERM CURRENT USE OF INSULIN (HCC): Status: ACTIVE | Noted: 2017-01-01

## 2017-12-15 NOTE — DISCHARGE SUMMARY
Discharge Instructions:    Discharge instructions were discussed with the patient prior to discharge. The patient was also given written discharge instructions. Discharge Medications:    See Epic for discharge medication list, including any new prescriptions. Follow-Up:    She will follow-up with our office in 2-3 weeks. She can call with any questions or concerns.      Signed:  Lambert Hair  12/14/2017  9:28 PM

## 2017-12-16 PROBLEM — L29.9 ITCHING: Status: ACTIVE | Noted: 2017-01-01

## 2017-12-16 PROBLEM — R07.9 CHEST PAIN: Status: ACTIVE | Noted: 2017-01-01

## 2017-12-16 PROBLEM — R06.00 DYSPNEA: Status: ACTIVE | Noted: 2017-01-01

## 2017-12-16 PROBLEM — E11.40 DIABETIC NEUROPATHY (HCC): Status: ACTIVE | Noted: 2017-01-01

## 2017-12-16 PROBLEM — I95.9 HYPOTENSION: Status: ACTIVE | Noted: 2017-01-01

## 2017-12-16 NOTE — ED NOTES
Patient placed in a gown Patient placed on cardiac monitor, continuous pulse oximeter, and NIBP monitor.  Monitor alarms on.       Ti Felder RN  12/16/17 6153

## 2017-12-16 NOTE — ED PROVIDER NOTES
[facility] 7 PROGRESSIVE CARE  eMERGENCY dEPARTMENT eNCOUnter      Pt Name: Jennifer Fam  MRN: [id number]  [place] 1950  Date of evaluation: 12/16/2017  Provider: Alfredo Gunn, [address]       Chief Complaint   Patient presents with    Shortness of Breath     started last night         HISTORY OF PRESENT ILLNESS   (Location/Symptom, Timing/Onset, Context/Setting, Quality, Duration, Modifying Factors, Severity)  Note limiting factors. Jennifer Fam is a 75 y.o. female who presents to the emergency department for evaluation of shortness of breath. Pt tells me that she developed progressive worsening of shortness of breath from baseline since yesterday. She has history of chf, copd, pulmonary htn, and ckd on hemodialysis Tuesday, Thursday and Saturday. She tells me that she uses 3.5L of oxygen per nc continuously. She has had no chest pain or syncope. She denies fever. No history of pe/dvt. HPI    Nursing Notes were reviewed. REVIEW OF SYSTEMS    (2-9 systems for level 4, 10 or more for level 5)     Review of Systems   Constitutional: Negative. HENT: Negative. Respiratory: Positive for shortness of breath. Cardiovascular: Negative. Gastrointestinal: Negative. Skin: Negative. Neurological: Negative. All other systems reviewed and are negative. A complete review of systems was performed and is negative except as noted above in the HPI. PAST MEDICAL HISTORY     Past Medical History:   Diagnosis Date    Arthritis     Blood circulation, collateral     CAD (coronary artery disease)     hospitalized first week of March with low O2 sat, low BS, was in critical care for 2-3 days.   was in the hospiral for 1 1/2 weeks total.    Chronic diastolic CHF (congestive heart failure) (HCC)     CKD (chronic kidney disease), stage IV ([address] 3/2/16    COPD ([facility] obstructive [facility] disease) [facility]     se[facility] for her dx.; has seen amelia in the past    Di[facility] neuropathy [facility]     DM II (diabetes mellitus, type II), controlled ([facility]     Hemodialysis patient ([facility]     jaya gross sat and [place]    Hypertension     Hypertensive cardiovascular disease 2/28/2013 2/20/2013  Echo  Normal LVFX, concentric LVH    Hypothyroidism 2/28/2013    Mixed hyperlipidemia 11/9/2017    Morbid obesity with BMI of 40.0-44.9, adult ([facility] 4/13/2016    BMI 44.7   (was 60+ in 2013)    KASHIF on CPAP     Palliative care encounter     Pneumonia     Post-menopausal     Pulmonary hypertension     RVSP 72 on 1/17/2017 echo    Sarcoidosis ([facility]     Wounds and injuries right toe    Carmina Gabriela         SURGICAL HISTORY       Past Surgical History:   Procedure Laterality Date    DIALYSIS FISTULA CREATION Right 12/8/2017    CREATION OF RIGHT BRACHIAL ARTERY TO RIGHT BASILIC VEIN PRIMARTY ARTERIO-VENOUS HEMODIALYSIS FISTULA performed by Nat Smith MD at [address] [place] [address]    KIDNEY STONE SURGERY      TUNNELED VENOUS CATHETER PLACEMENT      permacath         CURRENT MEDICATIONS       Current Discharge Medication List      CONTINUE these medications which have NOT CHANGED    Details   midodrine (PROAMATINE) 5 MG tablet TAKE 1 TABLET BY MOUTH 3 TIMES DAILY (WITH MEALS)  Qty: 90 tablet, Refills: 3    Associated Diagnoses: Essential hypertension      glipiZIDE (GLUCOTROL) 5 MG tablet TAKE 1 TABLET BY MOUTH DAILY  Qty: 30 tablet, Refills: 2    Associated Diagnoses: Type 2 diabetes mellitus with stage 3 chronic kidney disease, with long-term current use of insulin (HCC)      gabapentin (NEURONTIN) 300 MG capsule Pt to take one tablet TID  Qty: 90 capsule, Refills: 0    Associated Diagnoses: Other chronic pain;  Anxiety      pantoprazole (PROTONIX) 40 MG tablet Take 1 tablet by mouth every morning (before breakfast)  Qty: 30 tablet, Refills: 5      sucralfate (CARAFATE) 1 GM tablet Take 1 tablet by mouth 4 times daily  Qty: 120 tablet, Refills: 3      LORazepam (ATIVAN) 0.5 MG tablet Take 1 tablet by mouth 2 times daily as needed for Anxiety . Qty: 60 tablet, Refills: 2    Associated Diagnoses: Anxiety      HYDROcodone-acetaminophen (NORCO) 7.5-325 MG per tablet Take 1 tablet by mouth every 6 hours as needed for Pain . Earliest Fill Date: 17  Qty: 100 tablet, Refills: 0    Associated Diagnoses: Other chronic pain      metoprolol tartrate (LOPRESSOR) 50 MG tablet Take 1 tablet by mouth 2 times daily  Qty: 60 tablet, Refills: 11    Associated Diagnoses: Essential hypertension      polyethylene glycol (GLYCOLAX) packet Take 17 g by mouth daily as needed for Constipation      levothyroxine (SYNTHROID) 100 MCG tablet Take 1 tablet by mouth Daily  Qty: 30 tablet, Refills: 5    Associated Diagnoses: Hypothyroidism, unspecified type      allopurinol (ZYLOPRIM) 100 MG tablet Take 1 tablet by mouth daily  Qty: 30 tablet, Refills: 5    Associated Diagnoses: Chronic kidney disease, stage IV (severe) ([facility])      albuterol (PROVENTIL) (2.5 MG/3ML) 0.083% nebulizer solution Take 3 mLs by nebulization every 4 hours as needed for Wheezing or Shortness of Breath  Qty: 120 each, Refills: 1    Associated Diagnoses: COPD exacerbation ([facility])      atorvastatin (LIPITOR) 40 MG tablet Take 1 tablet by mouth daily  Qty: 30 tablet, Refills: 11    Associated Diagnoses: Hypercholesteremia      docusate sodium (COLACE) 100 MG capsule Take 200 mg by mouth 2 times daily as needed Indications: Constipation       OXYGEN Inhale 6 L into the lungs continuous May titrate to effect - Patient also placed on Trilogy  Qty: 1 Units, Refills: 2      miconazole (MICOTIN) 2 % powder Apply topically 2 times daily.   Qty: 45 g, Refills: 1      aspirin 81 MG tablet Take 81 mg by mouth daily             ALLERGIES     Dilaudid [hydromorphone]    FAMILY HISTORY       Family History   Problem Relation Age of Onset    Arthritis Mother     Pacemaker Mother     Arthritis Father     Heart Disease Father       late extremity). Vitals reviewed. DIAGNOSTIC RESULTS     EKG: All EKG's are interpreted by the Emergency Department Physician who either signs or Co-signs this chart in the absence of a cardiologist.    There is a regular rate and rhythm. SR w/artifact Normal OK interval and normal P waves. Normal QRS interval. Normal QT interval. No obvious ST elevation or ST depression. Nonspecific Twave inversions similar to previous ekg 11/6/17      RADIOLOGY:   Non-plain film images such as CT, Ultrasound and MRI are read by the radiologist. Plain radiographic images are visualized and preliminarily interpreted by the emergency physician with the below findings:        Interpretation per the Radiologist below, if available at the time of this note:    XR Chest Portable   Final Result   Impression:   No acute process.    Signed by Dr Wilda Cool on 12/16/2017 7:30 AM            ED BEDSIDE ULTRASOUND:   Performed by ED Physician - none    LABS:  Labs Reviewed   BLOOD GAS, ARTERIAL - Abnormal; Notable for the following:        Result Value    pO2, Arterial 56.0 (*)     HCO3, Arterial 31.3 (*)     Base Excess, Arterial 6.5 (*)     Hemoglobin, Art, Extended 9.8 (*)     O2 Sat, Arterial 89.8 (*)     All other components within normal limits   CBC WITH AUTO DIFFERENTIAL - Abnormal; Notable for the following:     RBC 3.26 (*)     Hemoglobin 10.3 (*)     Hematocrit 35.2 (*)     .0 (*)     MCH 31.6 (*)     MCHC 29.3 (*)     RDW 17.9 (*)     Neutrophils % 65.5 (*)     Lymphocytes % 18.0 (*)     Monocytes % 11.3 (*)     All other components within normal limits   COMPREHENSIVE METABOLIC PANEL - Abnormal; Notable for the following:     Sodium 134 (*)     Chloride 92 (*)     Glucose 190 (*)     BUN 36 (*)     CREATININE 4.3 (*)     GFR Non- 10 (*)     Alb 3.3 (*)     All other components within normal limits    Narrative:     CHEMISTRY SPECIMEN SLIGHTLY HEMOLYZED   BRAIN NATRIURETIC PEPTIDE - Abnormal; Notable for the following:     Pro-BNP 35,000 (*)     All other components within normal limits    Narrative:     CHEMISTRY SPECIMEN SLIGHTLY HEMOLYZED   LACTIC ACID, PLASMA - Abnormal; Notable for the following:     Lactic Acid 2.9 (*)     All other components within normal limits    Narrative:     Luisana Cielo tel. ,  Chemistry results called to and read back by Rossana IRWIN at ED, 12/16/2017  08:35, by [facility]   TROPONIN - Abnormal; Notable for the following:     Troponin 0.47 (*)     All other components within normal limits    Narrative:     Luisana Carmelital tel. ,  Chemistry results called to and read back by Ailin Gupta RN at ED, 12/16/2017  09:00, by [address]   TROPONIN - Abnormal; Notable for the following:     Troponin 0.46 (*)     All other components within normal limits    Narrative:     Luisana Cielo tel. ,  Chemistry results called to and read back by Ervin Yusuf RN at Dialysis, 12/16/2017  10:06, by [facility]   POCT VENOUS - Abnormal; Notable for the following:     POC Troponin I 0.13 (*)     All other components within normal limits   RAPID INFLUENZA A/B ANTIGENS   URINE CULTURE   POTASSIUM, WHOLE BLOOD   LACTIC ACID, PLASMA   URINALYSIS   HEPATITIS B SURFACE ANTIGEN   TROPONIN   TROPONIN   TROPONIN   POCT TROPONIN       All other labs were within normal range or not returned as of this dictation. RE-ASSESSMENT           EMERGENCY DEPARTMENT COURSE and DIFFERENTIAL DIAGNOSIS/MDM:   Vitals:    Vitals:    12/16/17 0914 12/16/17 0917 12/16/17 0921 12/16/17 1413   BP:  (!) 105/50  114/73   Pulse: 71 71 71 89   Resp:    18   Temp:       TempSrc:    Temporal   SpO2: 97% 91% 92%    Weight:       Height:           MDM      CONSULTS:  IP CONSULT TO NEPHROLOGY    PROCEDURES:  Unless otherwise noted below, none     Procedures    FINAL IMPRESSION      1. Chronic renal failure, unspecified CKD stage    2. Elevated troponin    3. Dyspnea, unspecified type    4.  Chronic respiratory failure, unspecified whether with hypoxia or hypercapnia ([facility]    5.  Hypotension, unspecified hypotension type          DISPOSITION/PLAN   DISPOSITION     PATIENT REFERRED TO:  Irish Levy, [address] [place] [address], [address] [place], [place] [id number] [phone]            DISCHARGE MEDICATIONS:       Current Discharge Medication List          (Please note that portions of this note were completed with a voice recognition program.  Efforts were made to edit the dictations but occasionally words are mis-transcribed.)              Diana Armijo, APRN  12/16/17 6358

## 2017-12-16 NOTE — ED NOTES
Multiple family members at bedside. Pt is AAOx4. Pt is awaiting admission at this time, awaiting bed assignment.       Erasto Schmidt RN  12/16/17 5748

## 2017-12-16 NOTE — CONSULTS
Consults   Nephrology ([facility]) Consult Note      Patient:  Vladislav Alonzo  YOB: 1950  Date of Service: 12/16/2017  MRN: [id number]   Acct: [de-identified]   Primary Care Physician: MALICK Alex  Advance Directive: Prior  Admit Date: 12/16/2017       Hospital Day: 0  Referring Provider: Aileen Joseph MD    Patient independently seen and examined, Chart, Consults, Notes, Operative notes, Labs, Cardiology, and Radiology studies reviewed as able. Reason for consultation: End-stage renal disease/volume overload. Subjective:  Vladislav Alonzo is a 75 y.o. female  whom we were consulted for end-stage renal disease. Patient has end-stage renal disease secondary to diabetic nephropathy. Patient is on dialysis 3 times a week Tuesday Thursday Saturday. She was scheduled to have dialysis today but she called 911 this morning as she was short of breath. She also has mildly elevated troponin level. She is currently being dialyzed in inpatient clinic. She is supposed to be admitted postdialysis for cardiac workup. Currently seen on hemodialysis  Hemodialysis access: Permacath  Hemodialysis 3-1/2 hour  Ultrafiltration: 3000 mL  2K bath    Allergies:  Dilaudid [hydromorphone]    Medicines:  No current facility-administered medications for this encounter. Current Outpatient Prescriptions   Medication Sig Dispense Refill    midodrine (PROAMATINE) 5 MG tablet TAKE 1 TABLET BY MOUTH 3 TIMES DAILY (WITH MEALS) 90 tablet 3    glipiZIDE (GLUCOTROL) 5 MG tablet TAKE 1 TABLET BY MOUTH DAILY 30 tablet 2    gabapentin (NEURONTIN) 300 MG capsule Pt to take one tablet TID 90 capsule 0    pantoprazole (PROTONIX) 40 MG tablet Take 1 tablet by mouth every morning (before breakfast) 30 tablet 5    sucralfate (CARAFATE) 1 GM tablet Take 1 tablet by mouth 4 times daily 120 tablet 3    LORazepam (ATIVAN) 0.5 MG tablet Take 1 tablet by mouth 2 times daily as needed for Anxiety .  61 tablet 2    HYDROcodone-acetaminophen (NORCO) 7.5-325 MG per tablet Take 1 tablet by mouth every 6 hours as needed for Pain . Earliest Fill Date: 11/8/17 100 tablet 0    metoprolol tartrate (LOPRESSOR) 50 MG tablet Take 1 tablet by mouth 2 times daily 60 tablet 11    polyethylene glycol (GLYCOLAX) packet Take 17 g by mouth daily as needed for Constipation      levothyroxine (SYNTHROID) 100 MCG tablet Take 1 tablet by mouth Daily (Patient taking differently: Take 100 mcg by mouth Daily Indications: Underactive Thyroid ) 30 tablet 5    allopurinol (ZYLOPRIM) 100 MG tablet Take 1 tablet by mouth daily (Patient taking differently: Take 100 mg by mouth daily Indications: Arthritis ) 30 tablet 5    albuterol (PROVENTIL) (2.5 MG/3ML) 0.083% nebulizer solution Take 3 mLs by nebulization every 4 hours as needed for Wheezing or Shortness of Breath 120 each 1    atorvastatin (LIPITOR) 40 MG tablet Take 1 tablet by mouth daily (Patient taking differently: Take 40 mg by mouth daily Indications: Changes in Cholesterol ) 30 tablet 11    docusate sodium (COLACE) 100 MG capsule Take 200 mg by mouth 2 times daily as needed Indications: Constipation       OXYGEN Inhale 6 L into the lungs continuous May titrate to effect - Patient also placed on Trilogy (Patient taking differently: Inhale 4 L into the lungs continuous ) 1 Units 2    miconazole (MICOTIN) 2 % powder Apply topically 2 times daily. 45 g 1    aspirin 81 MG tablet Take 81 mg by mouth daily         Past Medical History:  Past Medical History:   Diagnosis Date    Arthritis     CAD (coronary artery disease)     hospitalized first week of March with low O2 sat, low BS, was in critical care for 2-3 days.   was in the hospiral for 1 1/2 weeks total.    Chronic diastolic CHF (congestive heart failure) (HCC)     CKD (chronic kidney disease), stage IV ([facility] 3/2/16    COPD (chronic obstructive pulmonary disease) ([facility]     [facility] has seen CKTOTAL, CKMB, TROPONINT in the last 72 hours. ABGs: No results for input(s): PH, PCO2, PO2, HCO3, O2SAT in the last 72 hours. CRP:  No results for input(s): CRP in the last 72 hours. Sed Rate:  No results for input(s): SEDRATE in the last 72 hours. Cultures:   No results for input(s): CULTURE in the last 72 hours. No results for input(s): Radha WALSH in the last 72 hours. No results for input(s): CXSURG in the last 72 hours. Radiology reports as per the Radiologist  Radiology: Xr Chest Portable    Result Date: 12/16/2017  History: 71-year-old evaluated for shortness of breath. Reference: Chest radiographs November 29, 2017. Findings: Frontal chest radiograph performed. Enlargement of the cardiac/pericardial silhouette is stable. Chronic interstitial changes are noted. No acute pneumonia or edema is appreciated. No pleural effusion or pneumothorax. Right IJ dialysis catheter remains in place. Impression: No acute process. Signed by Dr Ashley Serrano on 12/16/2017 7:30 AM       Assessment   1. Acute respiratory failure/pulmonary edema  2. End-stage renal disease, currently seen on hemodialysis. 3. Diabetic nephropathy  4. Type II non-insulin-dependent diabetes. 5. Possible non-Q-wave myocardial infarction. Plan:  1. Tolerating dialysis very well. 2. Patient is being admitted postdialysis for possible cardiac workup      Thank you for the consult, we appreciate the opportunity to provide care to your patients. Feel free to contact me if I can be of any further assistance.       Kalani Hassan MD  12/16/17  10:45 AM

## 2017-12-16 NOTE — ED NOTES
Per NP order, pt taken off NRB and placed on O2 BNC 4L (this is pts home O2 rate).      Temo Rodriguez RN  12/16/17 9062

## 2017-12-16 NOTE — H&P
Hospital Medicine  History and Physical    Patient:  Shannon Woods  MRN: [id number]    CHIEF COMPLAINT:  Trouble breathing    History Obtained From: patient    PCP: MALICK Hendrix    HISTORY OF PRESENT ILLNESS:   75 y.o. female admitted vial [facility] 12/16/17 with SOA for the last several days worse especially the day of admission associated with onset of severe SOA, chest pressure-pain at 0500 lasting for appx 4 hours, really improving after HD started. Was sent from ER to HD and 3 L fluid removed and feels a lot better, no CP at present. Has had some dysuria the last day or two and diffuse itching without rash. REVIEW OF SYSTEMS:  14 systems reviewed and negative except as noted above. Past Medical History:      Diagnosis Date    Arthritis     Blood circulation, collateral     CAD (coronary artery disease)     hospitalized first week of March with low O2 sat, low BS, was in critical care for 2-3 days.   was in the hospiral for 1 1/2 weeks total.    Chronic diastolic CHF (congestive heart failure) (HCC)     CKD (chronic kidney disease), stage IV ([address] 3/2/16    COPD (chronic obstructive pulmonary disease) ([facility])     [facility] for her dx.; has seen amelia in the past    Diabetic neuropathy ([address]     DM II (diabetes mellitus, type II), controlled ([address]     Hemodialysis patient ([address]     [facility] sat and [place]    Hypertension     Hypertensive cardiovascular disease 2/28/2013 2/20/2013  Echo  Normal LVFX, concentric LVH    Hypothyroidism 2/28/2013    Mixed hyperlipidemia 11/9/2017    Morbid obesity with BMI of 40.0-44.9, adult ([address] 4/13/2016    BMI 44.7   (was 60+ in 2013)    KASHIF on CPAP     Palliative care encounter     Pneumonia     Post-menopausal     Pulmonary hypertension     RVSP 72 on 1/17/2017 echo    Sarcoidosis ([address]     Wounds and injuries right toe    Altaf Dunn       Past Surgical History:      Procedure Laterality Date    DIALYSIS FISTULA CREATION Right solution Take 3 mLs by nebulization every 4 hours as needed for Wheezing or Shortness of Breath 17   Hans Barfield DO   atorvastatin (LIPITOR) 40 MG tablet Take 1 tablet by mouth daily  Patient taking differently: Take 40 mg by mouth daily Indications: Changes in Cholesterol  17   Hans Barfield DO   docusate sodium (COLACE) 100 MG capsule Take 200 mg by mouth 2 times daily as needed Indications: Constipation     Historical Provider, MD   OXYGEN Inhale 6 L into the lungs continuous May titrate to effect - Patient also placed on Trilogy  Patient taking differently: Inhale 4 L into the lungs continuous  17   MALICK Weber   miconazole (MICOTIN) 2 % powder Apply topically 2 times daily. 17   Alek Reeder PA-C   aspirin 81 MG tablet Take 81 mg by mouth daily    Historical Provider, MD       Allergies:  Dilaudid [hydromorphone]    Social History:   TOBACCO:   reports that she quit smoking about 17 years ago. Her smoking use included Cigarettes. She has a 35.00 pack-year smoking history. She has never used smokeless tobacco.  ETOH:   reports that she does not drink alcohol.   OCCUPATION:  unemployed    Family History:       Problem Relation Age of Onset    Arthritis Mother     Pacemaker Mother     Arthritis Father     Heart Disease Father       late 42's of MI       Physical Exam:    Vitals: BP (!) 88/58   Pulse 95   Temp 99 °F (37.2 °C) (Temporal)   Resp 18   Ht 5' 2\" (1.575 m)   Wt 255 lb (115.7 kg)   LMP  (LMP Unknown)   SpO2 92%   BMI 46.64 kg/m²   24HR INTAKE/OUTPUT:  No intake or output data in the 24 hours ending 17  General appearance: alert and cooperative with exam  HEENT: atraumatic, eyes with clear conjunctiva and normal lids, pupils and irises normal, external ears and nose are normal, lips normal. Neck without masses, lympadenopathy, bruit, thyroid normal  Lungs: no increased work of breathing, clear to auscultation bilaterally without Hypothyroidism - thyroid replacement    COPD with hypoxia - duonebs, oxygen    Diabetic neuropathy - DM control, gabapentin    Itching - benadryl, monitor for rash (none now)    Maddy Hill MD  Admitting Hospitalist

## 2017-12-16 NOTE — PROGRESS NOTES
Blood Gas, Arterial [[id number]] (Abnormal) Collected: 12/16/17 0759      Specimen: Blood gases Updated: 12/16/17 0800      pH, Arterial 7.450      pCO2, Arterial 45.0 mmHg       pO2, Arterial 56.0 (L) mmHg       HCO3, Arterial 31.3 (H) mmol/L       Base Excess, Arterial 6.5 (H) mmol/L       Hemoglobin, Art, Extended 9.8 (L) g/dL       O2 Sat, Arterial 89.8 (L) %       Carboxyhgb, Arterial 2.6 %       Methemoglobin, Arterial 0.5 %       O2 Content, Arterial 12.4 mL/dL       O2 Therapy Unknown     Potassium, Whole Blood [[id number]] Collected: 12/16/17 0759      Updated: 12/16/17 0800      Potassium, Whole Blood 3.9     Pt on 3 lpm nc, LR, AT+

## 2017-12-16 NOTE — ED PROVIDER NOTES
Attending Supervisory Note/Shared Visit   I have personally performed a face to face diagnostic evaluation on this patient. I have reviewed the mid-levels findings and agree. EKG: Sinus rhythm. T-wave inversions in multiple leads. These are similar to prior EKG but more accentuated on today's EKG. Patient presents complaining of shortness of breath. Has COPD and CHF. Wears 3 and half liters at all times. Sleeps with a Trilogy machine. Has history of CHF and renal failure. Is scheduled for dialysis today. No history of DVT or PE. No unilateral leg swelling or pain. Ayden [place] that she has some chronic swelling in both of her legs and little worse on the right but this is not new. Ayden [place] that she has felt more short of breath over the last day or so. No productive cough. No fevers. No chest pain. Said that her symptoms are similar to her chronic dyspnea just a little worse today. Currently, she is resting comfortably and in no distress. Lungs are grossly clear bilaterally with some scant rales. Regular rate and rhythm without murmurs or gallops. Abdomen soft and nontender. Patient nontoxic on exam. O2 sats in the low 90s on 3-1/2 L. Troponin slightly elevated but this might just be secondary to her renal failure. She has no chest pain of any kind and no history of coronary disease. Blood pressure is little low. Patient tells me that her blood pressure is always labile but cannot tell me exactly where it normally runs. Given that her blood pressure is low do not think I can send her for outpatient dialysis. Small fluid bolus has been ordered. I think we'll have to admit her to the hospitalist to watch her blood pressure, trend her her cardiac enzymes and have her dialyzed as an inpatient. If dyspnea resolves after dialysis do not think further workup will be needed concerning her shortness of breath. If it does not then further workup will be needed including possible scan of her chest rule out PE.     Case discussed

## 2017-12-17 NOTE — PROGRESS NOTES
Hospitalist Progress Note  12/17/2017 12:21 PM  Subjective:   Admit Date: 12/16/2017  PCP: MALICK Terrell    Chief Complaint: SOA    Subjective: Feeling better, reports exertional R shoulder pain last few days that is relieved with rest. No other complaints, no palpitations, fevers, chills, SOA, NVD or abdominal pain. Interval History:    75 y.o. female admitted vial [facility] 12/16/17 with SOA for the last several days worse especially the day of admission associated with onset of severe SOA, chest pressure-pain at 0500 lasting for appx 4 hours, really improving after HD started. Was sent from ER to HD and 3 L fluid removed and feels a lot better, no CP at present. Has had some dysuria the last 1-2 days and diffuse itching without rash.     ROS: 14 point review of systems is negative except as specifically addressed above. DIET RENAL;     Intake/Output Summary (Last 24 hours) at 12/17/17 1221  Last data filed at 12/17/17 9685   Gross per 24 hour   Intake              360 ml   Output              100 ml   Net              260 ml     Medications:   dextrose       Current Facility-Administered Medications   Medication Dose Route Frequency Provider Last Rate Last Dose    heparin (porcine) injection 5,000 Units  5,000 Units Subcutaneous 3 times per day Lazarus Gerhard, MD        albuterol (PROVENTIL) nebulizer solution 2.5 mg  2.5 mg Nebulization Q4H PRN Lazarus Gerhard, MD        allopurinol (ZYLOPRIM) tablet 100 mg  100 mg Oral Daily Lazarus Gerhard, MD   100 mg at 12/17/17 0840    aspirin chewable tablet 81 mg  81 mg Oral Daily Lazarus Gerhard, MD   81 mg at 12/17/17 0840    atorvastatin (LIPITOR) tablet 40 mg  40 mg Oral Daily Lazarus Gerhard, MD   40 mg at 12/17/17 0840    docusate sodium (COLACE) capsule 200 mg  200 mg Oral Daily Lazarus Gerhard, MD   200 mg at 12/17/17 0840    gabapentin (NEURONTIN) capsule 300 mg  300 mg Oral TID Lazarus Gerhard, MD   300 mg at 12/17/17 6938    body  habitus.   This study is of limited diagnostic utility.   Suggest transesophageal echocardiogram to heightened diagnostic  sensitivity and specificity.    Electronically signed by Desiree Montes MD (Interpreting physician) on 01/18/2017 06:39 PM    Objective:   Vitals: BP (!) 89/59   Pulse 71   Temp 97.2 °F (36.2 °C) (Temporal)   Resp 18   Ht 5' 2\" (1.575 m)   Wt 244 lb (110.7 kg)   LMP  (LMP Unknown)   SpO2 93%   BMI 44.63 kg/m²   24HR INTAKE/OUTPUT:      Intake/Output Summary (Last 24 hours) at 12/17/17 1221  Last data filed at 12/17/17 6352   Gross per 24 hour   Intake              360 ml   Output              100 ml   Net              260 ml     General appearance: alert and cooperative with exam  HEENT: atraumatic, eyes with clear conjunctiva and normal lids, pupils and irises normal, external ears and nose are normal, lips normal.  Neck without masses, lympadenopathy, bruit, thyroid normal  Lungs: no increased work of breathing, \"clear to auscultation bilaterally\" without rales, rhonchi or wheezes  Heart: regular rate and rhythm, S1, S2 normal, no murmur, click, rub or gallop  Abdomen: soft, non-tender; bowel sounds normal; no masses,  no organomegaly  Extremities: extremities normal, atraumatic, no cyanosis or edema  Neurologic: No focal neurologic deficits, normal sensation, alert and oriented, affect and mood appropriate. Skin: no rashes, nodules. Assessment and Plan:     Principal Problem:    Chest pain - associated with volume overload, elevated troponin, improved with HD, abnormal EKG, NSTEMI considered. - Troponins elevated and flat, EKG abnormal but not changed,reviewed with Dr Dusty Cannon this am, consulted.   Active Problems:    Dyspnea - relieved after 3 L volume overload removed by HD, also with COPD component, improved    Essential hypertension - medical management    Type 2 diabetes mellitus - medical management    Pulmonary hypertension - avoid hypoxia    UTI - Rocephin pending culture results, symptomatic with abnormal UA    COPD - comfortable, duonebs,medical management    Hypotension - resolved post HD, continue proamatine    Morbid obesity due to excess calories - Lifestyle efforts    Hypothyroidism - thyroid replacement    COPD with hypoxia - duonebs, oxygen    Diabetic neuropathy - DM control, gabapentin    Itching - benadryl, monitor for rash (none now)     Advance Directive: Prior    DVT prophylaxis:  Added heparin, teds    Discharge planning: to be determined    MDM: She has persistent unchanging elevation in troponins, abnormal EKG, LVH/ESRD and pulmonary HTN.   Will continue to trend,continue telemetry, consult cardiology as above, repeat Echo still pending,     Buster Steen MD  Rounding Hospitalist

## 2017-12-17 NOTE — FLOWSHEET NOTE
12/16/17 1834   Encounter Summary   Services provided to: Patient and family together   Referral/Consult From: [facility] Family members   Complexity of Encounter Moderate   Length of Encounter 15 minutes   Spiritual/P[employer]alism   Type Spiritual support   Assessment Approachable;Resentful; Anxious   Intervention Active listening;Explored feelings, thoughts, concerns;Prayer   Outcome Expressed gratitude;Engaged in conversation;Receptive;Encouraged

## 2017-12-17 NOTE — CONSULTS
2013        Past Medical History:  Past Medical History:  No date: Arthritis  No date: Blood circulation, collateral  No date: CAD (coronary artery disease)      Comment: hospitalized first week of March with low O2                sat, low BS, was in critical care for 2-3 days. was in the hospiral for 1 1/2 weeks total.  No date: Chronic diastolic CHF (congestive heart failur*  3/2/16: CKD (chronic kidney disease), stage IV (HCC)  No date: COPD (chronic obstructive pulmonary disease) (*      Comment: sees [facility] for her dx.; has                seen amelia in the past  No date: Diabetic neuropathy ([facility]  No date: DM II (diabetes mellitus, type II), controlled*  No date: Hemodialysis patient ([facility]      Comment: jaya gross sat and [facility]  No date: Hypertension  2/28/2013: Hypertensive cardiovascular disease      Comment: 2/20/2013  Echo  Normal LVFX, concentric LVH  2/28/2013: Hypothyroidism  11/9/2017: Mixed hyperlipidemia  4/13/2016:  Morbid obesity with BMI of 40.0-44.9, adult (H*      Comment: BMI 44.7   (was 60+ in 2013)  No date: KASHIF on CPAP  No date: Palliative care encounter  No date: Pneumonia  No date: Post-menopausal  No date: Pulmonary hypertension      Comment: RVSP 72 on 1/17/2017 echo  No date: Sarcoidosis ([facility]  right toe: Wounds and injuries      Comment: Pavel Scruggs    Past Surgical History:  Past Surgical History:  12/8/2017: DIALYSIS FISTULA CREATION Right      Comment: CREATION OF RIGHT BRACHIAL ARTERY TO RIGHT                BASILIC VEIN PRIMARTY ARTERIO-VENOUS                HEMODIALYSIS FISTULA performed by Gustavo Pennington MD at [facility]  No date: FOOT DEBRIDEMENT      Comment: multiple  No date: KIDNEY STONE SURGERY  No date: TUNNELED VENOUS CATHETER PLACEMENT      Comment: permacath    Family History  Review of patient's family history indicates:    Arthritis                      Mother                    Pacemaker                      Mother and rhythm  Abdominal: soft, nontender, normal bowel sounds and obese  Extremities: no cyanosis or edema  Skin: warm and dry without rash      Labs:  BMP:                 12/16/17 12/16/17 0705          0759          NA           134*          --           K            4.6          3.9           CL           92*           --           CO2          27            --           BUN          36*           --           CREATININE   4.3*          --           CALCIUM      9.5           --           CBC:                 12/16/17 0705          WBC          7.1           HGB          10.3*         HCT          35.2*         MCV          108.0*        PLT          187           LIVER PROFILE:                 12/16/17 0705          AST          31            ALT          9             BILITOT      0.9           ALKPHOS      85            PT/INR: No results for input(s): PROTIME, INR in the last 72 hours. APTT: No results for input(s): APTT in the last 72 hours. BNP:  No results for input(s): BNP in the last 72 hours. Ionized Calcium:No results for input(s): IONCA in the last 72 hours. Magnesium:No results for input(s): MG in the last 72 hours. Phosphorus:No results for input(s): PHOS in the last 72 hours. HgbA1C: No results for input(s): LABA1C in the last 72 hours. Hepatic:                 12/16/17 0705          ALKPHOS      85            ALT          9             AST          31            PROT         7.5           BILITOT      0.9           LABALBU      3.3*          Lactic Acid:                 12/16/17                       0935          LACTA        1.8           Troponin: No results for input(s): CKTOTAL, CKMB, TROPONINT in the last 72 hours. ABGs: No results for input(s): PH, PCO2, PO2, HCO3, O2SAT in the last 72 hours. CRP:  No results for input(s): CRP in the last 72 hours.   Sed Rate:  No results for input(s): SEDRATE in the last 72 hours. Cultures:   No results for input(s): CULTURE in the last 72 hours. No results for input(s): BCMaura in the last 72 hours. No results for input(s): CXSURG in the last 72 hours. Radiology reports as per the Radiologist  Radiology: Xr Chest Portable    Result Date: 12/16/2017  History: 74-year-old evaluated for shortness of breath. Reference: Chest radiographs November 29, 2017. Findings: Frontal chest radiograph performed. Enlargement of the cardiac/pericardial silhouette is stable. Chronic interstitial changes are noted. No acute pneumonia or edema is appreciated. No pleural effusion or pneumothorax. Right IJ dialysis catheter remains in place. Impression: No acute process. Signed by Dr Shabnam Schulte on 12/16/2017 7:30 AM       Assessment  1. End-stage renal disease  2. Diabetic nephropathy. 3. Type II non-insulin-dependent diabetes. 4. Pulmonary edema has now resolved. 5. Morbid abdominal obesity. 6. Non-STEMI. 7. Chronic obstructive pulmonary disease. 8. Anemia of chronic disease. Plan:  1. Agree with cardiac workup/she has all the risk factors. 2. Possible cardiac catheterization.         Jose Manuel Forrester MD  12/17/17  11:11 AM

## 2017-12-18 NOTE — PROGRESS NOTES
Critical troponin taken by charge nurse Luanne Cochran and reported to me; trop 0.375 which is trending down from 0.47. Pt continues to be asymptomatic. Will continue to monitor.  Electronically signed by Francesca Garcia RN on 12/17/2017 at 7:54 PM

## 2017-12-18 NOTE — PROGRESS NOTES
PROFILE:  Recent Labs      17   0705  17   0601   AST  31  17   ALT  9  8   LABALBU  3.3*  3.1*       NURSE:  Prabhu Hernandez RN     Elmer Valentin : 1950, Female, 75 y.o. History:  Exertional chest pain. Pulmonary hypertension. ESRD. elevated troponin. Nursing note and diagnostics above reviewed and evaluated    [Allergies/Contraindications:  Dilaudid [hydromorphone]]     Todays status: feels well. No further chest pain    Physical Examination    Respiratory -  Decreased breath sounds. Cardiovascular   Regular rhythm  Abdominal -  Soft. Bowel sounds present. Nontender. Extremities -  Pulses intact. Troponin levels appear to have been stable over the past 48 hours or thereabouts and are not decreasing significantly. Assessment/Plan     elevated troponin levels are likely to be due to her end-stage renal disease and not necessarily a non-STEMI. It is quite possible that she has anginal type symptoms from having pulmonary hypertension. Nevertheless, it is in her best interest if we find out for sure if she has significant coronary artery disease. I will discuss cardiac catheterization with her and we will plan on doing this with her permission within the next 24-48 hours. Discussed with patient and nursing      C.  Andria Ko MD  [facility]

## 2017-12-18 NOTE — PROGRESS NOTES
at 12/18/17 0233    levothyroxine (SYNTHROID) tablet 100 mcg  100 mcg Oral Daily Aubrey Hernandez MD   100 mcg at 12/18/17 0602    LORazepam (ATIVAN) tablet 0.5 mg  0.5 mg Oral BID PRN Aubrey Hernandez MD   0.5 mg at 12/18/17 0602    metoprolol tartrate (LOPRESSOR) tablet 50 mg  50 mg Oral BID Aubrey Hernandez MD   50 mg at 12/18/17 0900    miconazole (MICOTIN) 2 % powder   Topical BID Aubrey Hernandez MD        midodrine (PROAMATINE) tablet 5 mg  5 mg Oral TID WC Aubrey Hernandez MD   5 mg at 12/18/17 1218    pantoprazole (PROTONIX) tablet 40 mg  40 mg Oral QAM AC Aubrey Hernandez MD   40 mg at 12/18/17 0602    polyethylene glycol (GLYCOLAX) packet 17 g  17 g Oral Daily PRN Aubrey Hernandez MD        sucralfate (CARAFATE) tablet 1 g  1 g Oral 4x Daily Aubrey Hernandez MD   1 g at 12/18/17 1218    diphenhydrAMINE (BENADRYL) tablet 25 mg  25 mg Oral Q6H PRN Aubrey Hernandez MD        insulin lispro (HUMALOG) injection vial 0-35 Units  0-35 Units Subcutaneous 4x Daily AC & HS Aubrey Hernandez MD   7 Units at 12/18/17 1210    glipiZIDE (GLUCOTROL) tablet 5 mg  5 mg Oral Daily Aubrey Hernandez MD   5 mg at 12/18/17 0901    glucose (GLUTOSE) 40 % oral gel 15 g  15 g Oral PRN Aubrey Hernandez MD        dextrose 50 % solution 12.5 g  12.5 g Intravenous PRN Aubrey Hernandez MD        glucagon (rDNA) injection 1 mg  1 mg Intramuscular PRN Aubrey Hernandez MD        dextrose 5 % solution  100 mL/hr Intravenous JOSE Hernandez MD        ipratropium-albuterol (DUONEB) nebulizer solution 1 ampule  1 ampule Inhalation Q4H WA Aubrey Hernandez MD   1 ampule at 12/18/17 1524    ondansetron (ZOFRAN) injection 4 mg  4 mg Intravenous Q6H PRN Kodi Crews MD   4 mg at 12/16/17 2013       Past Medical History:  Past Medical History:   Diagnosis Date    Arthritis     Blood circulation, collateral     CAD (coronary artery disease)     hospitalized first week of March with low O2 results for input(s): APTT in the last 72 hours. BNP:  No results for input(s): BNP in the last 72 hours. Ionized Calcium:No results for input(s): IONCA in the last 72 hours. Magnesium:No results for input(s): MG in the last 72 hours. Phosphorus:No results for input(s): PHOS in the last 72 hours. HgbA1C:   Recent Labs      12/18/17   0601   LABA1C  5.4     Hepatic: Recent Labs      12/16/17   0705  12/18/17   0601   ALKPHOS  85  81   ALT  9  8   AST  31  17   PROT  7.5  7.3   BILITOT  0.9  0.6   LABALBU  3.3*  3.1*     Lactic Acid:   Recent Labs      12/16/17   0935   LACTA  1.8     Troponin: No results for input(s): CKTOTAL, CKMB, TROPONINT in the last 72 hours. ABGs: No results for input(s): PH, PCO2, PO2, HCO3, O2SAT in the last 72 hours. CRP:  No results for input(s): CRP in the last 72 hours. Sed Rate:  No results for input(s): SEDRATE in the last 72 hours. Cultures:   No results for input(s): CULTURE in the last 72 hours. Radiology reports as per the Radiologist  Radiology: Xr Chest Portable    Result Date: 12/16/2017  History: 54-year-old evaluated for shortness of breath. Reference: Chest radiographs November 29, 2017. Findings: Frontal chest radiograph performed. Enlargement of the cardiac/pericardial silhouette is stable. Chronic interstitial changes are noted. No acute pneumonia or edema is appreciated. No pleural effusion or pneumothorax. Right IJ dialysis catheter remains in place. Impression: No acute process. Signed by Dr Steve Black on 12/16/2017 7:30 AM       Assessment     1. End-stage renal disease  2. Diabetic nephropathy. 3. Type II non-insulin-dependent diabetes. 4. Pulmonary edema has now resolved. 5. Morbid abdominal obesity. 6. Non-STEMI. 7. Chronic obstructive pulmonary disease. 8. Anemia of chronic disease. Plan:  Dialysis in AM. Will attempt 3 liters UF.

## 2017-12-18 NOTE — CONSULTS
APPLICABLE):    As listed above      Past Medical History:  Past Medical History:   Diagnosis Date    Arthritis     Blood circulation, collateral     CAD (coronary artery disease)     hospitalized first week of March with low O2 sat, low BS, was in critical care for 2-3 days. was in the hospiral for 1 1/2 weeks total.    Chronic diastolic CHF (congestive heart failure) (HCC)     CKD (chronic kidney disease), stage IV ([address] 3/2/16    COPD (chronic obstructive pulmonary disease) ([facility])     sees [facility] for her dx.; has seen amelia in the past    Diabetic neuropathy ([address]     DM II (diabetes mellitus, type II), controlled ([address]     Hemodialysis patient ([address]     jaya gross sat and [place]    Hypertension     Hypertensive cardiovascular disease 2/28/2013 2/20/2013  Echo  Normal LVFX, concentric LVH    Hypothyroidism 2/28/2013    Mixed hyperlipidemia 11/9/2017    Morbid obesity with BMI of 40.0-44.9, adult ([address] 4/13/2016    BMI 44.7   (was 60+ in 2013)    KASHIF on CPAP     Palliative care encounter     Pneumonia     Post-menopausal     Pulmonary hypertension     RVSP 72 on 1/17/2017 echo    Sarcoidosis ([address]     Wounds and injuries right toe    [facility]       Past Surgical History:  Past Surgical History:   Procedure Laterality Date    DIALYSIS FISTULA CREATION Right 12/8/2017    CREATION OF RIGHT BRACHIAL ARTERY TO RIGHT BASILIC VEIN PRIMARTY ARTERIO-VENOUS HEMODIALYSIS FISTULA performed by Sully Minor MD at [address] [place] [address]      multiple    KIDNEY STONE SURGERY      TUNNELED VENOUS CATHETER PLACEMENT      permacath       Home Medications:   Prior to Admission medications    Medication Sig Start Date End Date Taking?  Authorizing Provider   midodrine (PROAMATINE) 5 MG tablet TAKE 1 TABLET BY MOUTH 3 TIMES DAILY (WITH MEALS) 12/15/17   Maksim Priest DO   glipiZIDE (GLUCOTROL) 5 MG tablet TAKE 1 TABLET BY MOUTH DAILY 11/22/17 12/22/17  Itzel Mckeon, APRN epistaxis. No dysphagia. RESPIRATORY:  No shortness of breath, cough, or sputum production. No history of TB exposure. CARDIOVASCULAR:  No hypertension, hypotension, anginal type chest pain, dizziness, or syncope. No history of palpitations. PERIPHERAL VASCULAR:  No history of claudication. GASTROINTESTINAL:  No nausea, vomiting, diarrhea, or constipation. No reflux or gastroesophageal reflux disease. GENITOURINARY:  No dysuria, urgency, frequency, or history of urinary tract infections. No history of nephrolithiasis or renal insufficiency. NEUROLOGIC:  No history of cerebrovascular accident, transient ischemic attack, or amaurosis fugax. No history of seizure disorder. INTEGUMENTARY:  No history of nonhealing wounds or skin cancer removal.  PSYCHIATRIC:  No excessive anxiety or depression. ENDOCRINE:  No polyuria, polydipsia, or significant weight gain. No heat or cold intolerance. MUSCULOSKELETAL:  No limit to range of motion of joints or swelling of limbs. HEMATOLOGIC:  No history of DVT, PE, or anemia. All other review of systems is negative. PHYSICAL EXAMINATION:     BP (!) 118/56   Pulse 81   Temp 98.3 °F (36.8 °C) (Temporal)   Resp 18   Ht 5' 2\" (1.575 m)   Wt 244 lb (110.7 kg)   LMP  (LMP Unknown)   SpO2 94%   BMI 44.63 kg/m²     GENERAL:  Alert and oriented X3 in no apparent distress. Short term and long term memory intact. Judgement intact. HEENT:  Normocephalic without evidence of old or recent trauma. Sclerae clear. Conjunctivae pink. EOMs intact. Pupils equal and round. Tympanic membranes not visualized. No epistaxis, runny nose. No lesions on lips or buccal mucosa. Tongue protrudes in midline and is well papillated. NECK:  Supple without mass or JVD. Carotid pulses 2+ to palpation bilaterally without bruit. No thyromegaly noted. CARDIOVASCULAR:  Regular rate and rhythm without S3, S4 or murmur. PULMONARY:  Equal bilateral expansion.   Clear to

## 2017-12-18 NOTE — PROGRESS NOTES
 docusate sodium (COLACE) capsule 200 mg  200 mg Oral Daily Magnolia Santillan MD   200 mg at 12/18/17 0900    gabapentin (NEURONTIN) capsule 300 mg  300 mg Oral TID Magnolia Santillan MD   300 mg at 12/18/17 1451    HYDROcodone-acetaminophen (Joyice Breeze) 7.5-325 MG per tablet 1 tablet  1 tablet Oral Q6H PRN Magnolia Santillan MD   1 tablet at 12/18/17 7706    levothyroxine (SYNTHROID) tablet 100 mcg  100 mcg Oral Daily Magnolia Santillan MD   100 mcg at 12/18/17 0602    LORazepam (ATIVAN) tablet 0.5 mg  0.5 mg Oral BID PRN Magnolia Santillan MD   0.5 mg at 12/18/17 0602    metoprolol tartrate (LOPRESSOR) tablet 50 mg  50 mg Oral BID Magnolia Santillan MD   50 mg at 12/18/17 0900    miconazole (MICOTIN) 2 % powder   Topical BID Magnolia Santillan MD        midodrine (PROAMATINE) tablet 5 mg  5 mg Oral TID WC Magnolia Santillan MD   5 mg at 12/18/17 1218    pantoprazole (PROTONIX) tablet 40 mg  40 mg Oral QAM AC Magnolia Santillan MD   40 mg at 12/18/17 0602    polyethylene glycol (GLYCOLAX) packet 17 g  17 g Oral Daily PRN Magnolia Santillan MD        sucralfate (CARAFATE) tablet 1 g  1 g Oral 4x Daily Magnolia Santillan MD   1 g at 12/18/17 1218    diphenhydrAMINE (BENADRYL) tablet 25 mg  25 mg Oral Q6H PRN Magnolia Santillan MD        insulin lispro (HUMALOG) injection vial 0-35 Units  0-35 Units Subcutaneous 4x Daily AC & HS Magnolia Santillan MD   7 Units at 12/18/17 1210    glipiZIDE (GLUCOTROL) tablet 5 mg  5 mg Oral Daily Magnolia Santillan MD   5 mg at 12/18/17 0901    glucose (GLUTOSE) 40 % oral gel 15 g  15 g Oral PRN Magnolia Santillan MD        dextrose 50 % solution 12.5 g  12.5 g Intravenous PRN Magnolia Santillan MD        glucagon (rDNA) injection 1 mg  1 mg Intramuscular PRN Magnolia Santillan MD        dextrose 5 % solution  100 mL/hr Intravenous PRMICHAEL Santillan MD        ipratropium-albuterol (DUONEB) nebulizer solution 1 ampule  1 ampule Inhalation Q4H LIZABETH Santillan MD   1 ampule at 12/18/17 1524    ondansetron (ZOFRAN) injection 4 mg  4 mg Intravenous Q6H PRN Hamzah Ambrocio MD   4 mg at 12/16/17 2013        Labs:     Recent Labs      12/16/17   0705  12/18/17   0601   WBC  7.1  11.3*   RBC  3.26*  3.06*   HGB  10.3*  9.6*   HCT  35.2*  32.5*   MCV  108.0*  106.2*   MCH  31.6*  31.4*   MCHC  29.3*  29.5*   PLT  187  149     Recent Labs      12/16/17   0705  12/16/17   0759  12/18/17   0601   NA  134*   --   135*   K  4.6  3.9  4.1   ANIONGAP  15   --   20*   CL  92*   --   93*   CO2  27   --   22   BUN  36*   --   32*   CREATININE  4.3*   --   5.1*   GLUCOSE  190*   --   102   CALCIUM  9.5   --   9.3     No results for input(s): MG, PHOS in the last 72 hours. Recent Labs      12/16/17   0705  12/18/17   0601   AST  31  17   ALT  9  8   BILITOT  0.9  0.6   ALKPHOS  85  81     ABGs:  Recent Labs      12/16/17   0759   PHART  7.450   MLR3CWE  45.0   PO2ART  56.0*   AMA9NHX  31.3*   BEART  6.5*   HGBAE  9.8*   T5VYTBTB  89.8*   CARBOXHGBART  2.6   02THERAPY  Unknown     HgBA1c: pending    FLP:    Lab Results   Component Value Date    TRIG 158 09/23/2016    HDL 64 09/23/2016    LDLCALC 57 09/23/2016    LDLDIRECT 88 02/19/2013     TSH:    Lab Results   Component Value Date    TSH 2.310 10/27/2017     Troponin T:   Recent Labs      12/18/17   0037  12/18/17   0601  12/18/17   1207   TROPONINI  0.37*  0.34*  0.32*     pCXR:   Impression:  No acute process.               Signed by Dr Dennis Mueller on 12/16/2017 7:30 AM     EKG 12/161/7 - panprecordial T inversion as well as III, AVF new compared with  11/8/17    EKG 12/17/17, 12/18/17 - similar, no improvement in T diffuse inferolateroal inversion pattern    Prior Echo 1/18/17  Summary   there is an echo dense area on the lateral aspect of the tricupsid valve   and a vegitation cannot be excluded.   There is mild tricuspid regurgitation with estimated RVSP of 72 mm Hg.   Normal left ventricular size with preserved LV function and an but not changed, Cardiology input appreciated. Active Problems:    Dyspnea - relieved after 3 L volume overload removed by HD, also with COPD component, improved    Essential hypertension - medical management    Type 2 diabetes mellitus - medical management    Pulmonary hypertension - avoid hypoxia    UTI - Rocephin inititally, transitioned to Diflucan, symptomatic with abnormal UA    COPD - comfortable, duonebs,medical management    Hypotension - resolved post HD, continue proamatine    Morbid obesity due to excess calories - Lifestyle efforts    Hypothyroidism - thyroid replacement    COPD with hypoxia - duonebs, oxygen    Diabetic neuropathy - DM control, gabapentin    Itching - benadryl, monitor for rash (none now)     Advance Directive: Prior    DVT prophylaxis:  Added heparin, teds    Discharge planning: to be determined    MDM: She has persistent unchanging elevation in troponins, abnormal EKG, LVH/ESRD and pulmonary HTN. Will continue to trend,continue telemetry, consult cardiology as above, repeat Echo still pending, Cardiology plans awaited, considering medical management vs cath, discussed with family that I rely 100% on the expertise of our Cardiologists re: cath decisions. Continue medical management for now. Her troponins have decreased by 32% without HD, NSTEMI considered.     Joaquín Alvarez MD  [facility]ounding Hospitalist

## 2017-12-18 NOTE — PROGRESS NOTES
Palliative Care:      Palliative Care  made an initial visit to introduce himself and offer support to Patient who presented with chest pain and elevated troponin. Patient has end stage renal disease and recently had a permacath placed in her right subclavian. Since that time she has had exertional discomfort in her right upper chest. The discomfort comes on when she is walking but goes away when she is resting. It is not pleuritic or positional. She can't really tell if it is due to the permacath but it started after the permacath was placed. She came to the emergency room and has now been admitted. Her troponin levels have been elevated and are fairly stable at around 0.4. The most recent troponin seems to be trending downward a bit. Her EKG shows diffuse T-wave abnormalities; this could be due to ischemia and/or biventricular strain. Past Medical History:     Patient has a history of Arthritis, Blood Circulation, CAD, CKD, COPD, Diabetic Neuropathy, DM II, Hemodialysis Patient, Hypertension, Hypertensive Cardiovascular Disease, Hypothyroidism, Mixed Hyperlipidemia, Morbid Obesity, KASHIF on CPAP, Pneumonia, Post-Menopausal, Pulmonary Hypertension, Sarcoidosis, Wounds and Injuries. Advance Directives:      Patient does not have an Advanced Directive and is a Full Code. Pain/Other Symptoms:    Patient does not report of any pain at this time and reports that her breathing has improved. Activity:         Patient is sitting up in a chair at the time of the visit. Psychological/Spiritual:         Patient has good Spiritual Support. Patient/Family Discussion:      Patient has good Family Support. Plan/expectations:  Patient does not have a plan as of yet. Patient reports that Doctors are still running tests. Palliative Care will continue to follow up.       Electronically signed by Tania Morales on 12/18/2017 at 10:50 AM

## 2017-12-19 NOTE — PROGRESS NOTES
Patient in inpatient dialysis at this time and call placed to check a POC glucose this morning if equipment available in dialysis. Nurse called back with glucose of 115 this morning. Will hold insulin per parameters.    Electronically signed by Patricio Espinoza RN on 12/19/2017 at 7:44 AM

## 2017-12-19 NOTE — PROGRESS NOTES
[facility]  Progress Note                            Date:  12/19/2017  Patient: Marlene Brambila  Admission:  12/16/2017  7:02 AM  Admit DX: Hypotension [I95.9]  Age:  75 y. o., 1950     LOS: 3 days     Reason for evaluation:   Chest pain and elevated troponin      SUBJECTIVE:    The patient was seen and examined. Notes and labs reviewed. There were not complications over night. Patient's cardiac review of systems: negative for chest pain/pressure, shortness of breath. The patient is generally feeling better. Patient stated she slept well last night. Patient stated she has decided to try medical management for her chest discomfort rather than having a heart cath at this time. OBJECTIVE:    Telemetry: Sinus at 95 at 1022. In dialysis this morning. Troponin 0.25 and trending down  BP (!) 118/52   Pulse 73   Temp 97.6 °F (36.4 °C) (Temporal)   Resp 18   Ht 5' 2\" (1.575 m)   Wt 247 lb 9.6 oz (112.3 kg)   LMP  (LMP Unknown)   SpO2 92%   BMI 45.29 kg/m²     Intake/Output Summary (Last 24 hours) at 12/19/17 1021  Last data filed at 12/19/17 0639   Gross per 24 hour   Intake              780 ml   Output              100 ml   Net              680 ml           Labs:   CBC:   Recent Labs      12/18/17   0601   WBC  11.3*   HGB  9.6*   HCT  32.5*   PLT  149     BMP: Recent Labs      12/18/17   0601   NA  135*   K  4.1   CO2  22   BUN  32*   CREATININE  5.1*   LABGLOM  8*   GLUCOSE  102     BNP: No results for input(s): BNP in the last 72 hours. PT/INR: No results for input(s): PROTIME, INR in the last 72 hours. APTT:No results for input(s): APTT in the last 72 hours.   CARDIAC ENZYMES:  Recent Labs      12/18/17   1732  12/19/17   0017  12/19/17   0543   TROPONINI  0.30*  0.28*  0.25*     FASTING LIPID PANEL:  Lab Results   Component Value Date    HDL 64 09/23/2016    LDLDIRECT 88 02/19/2013    LDLCALC 57 09/23/2016    TRIG 158 09/23/2016     LIVER PROFILE:  Recent Labs 17   0601   AST  17   ALT  8   LABALBU  3.1*       NURSE:  Delon Delarosa, RN     Efren Hadley : 1950, Female, 75 y.o. History:  Exertional chest discomfort. ESRD on chronic hemodialysis. Pulmonary hypertension. Nursing note and diagnostics above reviewed and evaluated    [Allergies/Contraindications:  Dilaudid [hydromorphone]]     Todays status: seen in dialysis. Has no complaints. Physical Examination    Respiratory -  Decreased breath sounds. Cardiovascular   Regular rhythm  Abdominal -  Soft. Bowel sounds present. Nontender. Extremities -  Pulses intact. Assessment/Plan     her troponin appears to be trending downward gradually. I discussed cardiac catheterization with her today. At the present time she is reluctant to consider this and asks \"can I just be treated with medicines ? \". Will continue discussions with patient in a.m. Meanwhile will treat him empirically for ischemic heart disease and angina. Will add low-dose nitrates. She is on beta blockers already. Discussed with patient and nursing      C.  Kenton Richter MD  [facility]

## 2017-12-19 NOTE — PLAN OF CARE
Problem: Falls - Risk of  Goal: Absence of falls  Outcome: Ongoing      Problem: Risk for Impaired Skin Integrity  Goal: Tissue integrity - skin and mucous membranes  Structural intactness and normal physiological function of skin and  mucous membranes.    Outcome: Ongoing      Problem: Pain:  Goal: Pain level will decrease  Pain level will decrease   Outcome: Met This Shift    Goal: Control of acute pain  Control of acute pain   Outcome: Met This Shift    Goal: Control of chronic pain  Control of chronic pain   Outcome: Met This Shift

## 2017-12-19 NOTE — PROGRESS NOTES
Nephrology ([facility]) Progress Note    Patient:  Gwen Cabrales  YOB: 1950  Date of Service: 12/19/2017  MRN: [id number]   Acct: [de-identified]   Primary Care Physician: MALICK Bass  Advance Directive: Prior  Admit Date: 12/16/2017       Hospital Day: 3  Referring Provider: Parish Menezes MD    Patient Seen, Chart, Consults, Notes, Labs, Radiology studies reviewed. Subjective:  Gwen Cabrales is a 75 y.o. female  whom we were consulted for end-stage renal disease and pulmonary edema. Patient presented with shortness of breath, dyspnea on exertion. She was found to be in pulmonary edema and she received emergent hemodialysis treatment. She was also found with non-STEMI. Today, she was seen and examined on dialysis. She denied chest pain.      Dialysis   Pt was seen on RRT  Modality: Hemodialysis  Access: Catheter  Location: right upper  QB: 400  QD: 600  UF: 2.5 liters    Allergies:  Dilaudid [hydromorphone]    Medicines:  Current Facility-Administered Medications   Medication Dose Route Frequency Provider Last Rate Last Dose    fluconazole (DIFLUCAN) tablet 100 mg  100 mg Oral Once per day on Mon Wed Fri Sonal Choi MD   100 mg at 12/18/17 1739    b complex-C-folic acid (NEPHROCAPS) capsule 1 mg  1 capsule Oral Daily Roselia Keene MD   1 mg at 12/18/17 1741    darbepoetin precious-polysorbate (ARANESP) injection 100 mcg  100 mcg Subcutaneous Weekly Roselia Keene MD   100 mcg at 12/18/17 1932    heparin (porcine) injection 5,000 Units  5,000 Units Subcutaneous 3 times per day Sonal Choi MD   5,000 Units at 12/19/17 2371    albuterol (PROVENTIL) nebulizer solution 2.5 mg  2.5 mg Nebulization Q4H PRN Sonal Choi MD        allopurinol (ZYLOPRIM) tablet 100 mg  100 mg Oral Daily Sonal Choi MD   100 mg at 12/18/17 0900    aspirin chewable tablet 81 mg  81 mg Oral Daily Sonal Choi MD   81 mg at 12/18/17 0900    atorvastatin of Onset    Arthritis Mother     Pacemaker Mother     Arthritis Father     Heart Disease Father       late 42's of MI       Social History  Social History     Social History    Marital status:      Spouse name: N/A    Number of children: 2    Years of education: N/A     Occupational History    Not on file. Social History Main Topics    Smoking status: Former Smoker     Packs/day: 1.00     Years: 35.00     Types: Cigarettes     Quit date: 2000    Smokeless tobacco: Never Used    Alcohol use No    Drug use: No    Sexual activity: No     Other Topics Concern    Not on file     Social History Narrative    No narrative on file         Review of Systems:  Reviewed with the patient at the bedside, 6 points reviewed and negative except as noted above. Objective:  Blood pressure (!) 118/52, pulse 73, temperature 97.6 °F (36.4 °C), temperature source Temporal, resp. rate 18, height 5' 2\" (1.575 m), weight 247 lb 9.6 oz (112.3 kg), SpO2 92 %, not currently breastfeeding. Intake/Output Summary (Last 24 hours) at 17 0840  Last data filed at 17 5477   Gross per 24 hour   Intake              780 ml   Output              100 ml   Net              680 ml     General: awake/alert   Chest:  clear to auscultation bilaterally without respiratory distress  CVS: regular rate and rhythm  Abdominal: soft, nontender, normal bowel sounds  Extremities: no cyanosis or edema  Skin: warm and dry without rash    Labs:  BMP: Recent Labs      17   0601   NA  135*   K  4.1   CL  93*   CO2  22   BUN  32*   CREATININE  5.1*   CALCIUM  9.3     CBC: Recent Labs      17   0601   WBC  11.3*   HGB  9.6*   HCT  32.5*   MCV  106.2*   PLT  149     LIVER PROFILE:   Recent Labs      17   0601   AST  17   ALT  8   BILITOT  0.6   ALKPHOS  81     PT/INR: No results for input(s): PROTIME, INR in the last 72 hours. APTT: No results for input(s): APTT in the last 72 hours.   BNP:  No results for input(s): BNP in the last 72 hours. Ionized Calcium:No results for input(s): IONCA in the last 72 hours. Magnesium:No results for input(s): MG in the last 72 hours. Phosphorus:No results for input(s): PHOS in the last 72 hours. HgbA1C:   Recent Labs      12/18/17   0601   LABA1C  5.4     Hepatic: Recent Labs      12/18/17   0601   ALKPHOS  81   ALT  8   AST  17   PROT  7.3   BILITOT  0.6   LABALBU  3.1*     Lactic Acid:   Recent Labs      12/16/17   0935   LACTA  1.8     Troponin: No results for input(s): CKTOTAL, CKMB, TROPONINT in the last 72 hours. ABGs: No results for input(s): PH, PCO2, PO2, HCO3, O2SAT in the last 72 hours. CRP:  No results for input(s): CRP in the last 72 hours. Sed Rate:  No results for input(s): SEDRATE in the last 72 hours. Cultures:   No results for input(s): CULTURE in the last 72 hours. No results for input(s): BC, Isadore Muslim in the last 72 hours. No results for input(s): CXSURG in the last 72 hours. Radiology reports as per the Radiologist  Radiology: Xr Chest Portable    Result Date: 12/16/2017  History: 69-year-old evaluated for shortness of breath. Reference: Chest radiographs November 29, 2017. Findings: Frontal chest radiograph performed. Enlargement of the cardiac/pericardial silhouette is stable. Chronic interstitial changes are noted. No acute pneumonia or edema is appreciated. No pleural effusion or pneumothorax. Right IJ dialysis catheter remains in place. Impression: No acute process. Signed by Dr Dennis Mueller on 12/16/2017 7:30 AM       Assessment     1. End-stage renal disease  2. Diabetic nephropathy. 3. Type II non-insulin-dependent diabetes. 4. Pulmonary edema has now resolved. 5. Morbid abdominal obesity. 6. Non-STEMI. 7. Chronic obstructive pulmonary disease. 8. Anemia of chronic disease. Plan:  Dialysis as above. Patient is possibly going for heart cath.      Esmer Silverman MD  12/19/17  8:40 AM

## 2017-12-19 NOTE — PROGRESS NOTES
Assumed care of patient at 1900, patient had no needs expressed and showed no signs of distress. Assessment complete and medications administered, patient is in sinus rhythm and is using her home trilogy machine. Call light is within reach and bed alarm is set, will continue to monitor.   Electronically signed by Nancy Mujica RN on 12/19/2017 at 3:11 AM

## 2017-12-19 NOTE — PROGRESS NOTES
[facility] Hospitalists      Patient:    Holli Hawkins  YOB: 1950  Date of Service:  12/19/2017  MRN:    [id number]    Room:   [address] [place] [address]   PCP:    MALICK Joel  Advance Directive:  Prior  Admit Date:   12/16/2017       Hospital Day:  3    CHIEF COMPLAINT: Trouble breathing    SUBJECTIVE: Patient states she is feeling better and she is breathing better. She does not want to have heart cath at this time. CUMULATIVE HOSPITAL HISTORY: 75 y. o. female admitted vial [facility] 12/16/17 with SOA for the last several days worse especially the day of admission associated with onset of severe SOA, chest pressure-pain at 0500 lasting for appx 4 hours, really improving after HD started.  Was sent from ER to HD and 3 L fluid removed and feels a lot better, no CP at present. Has had some dysuria the last 1-2 days and diffuse itching without rash. Troponin's were elevated and these have been slowly trending downward. Cardiology recommends heart catheterization but patient's has declined at this time. REVIEW OF SYSTEMS:    14 point ROS (-) unless otherwise indicated above. OBJECTIVE:     VITALS:  /72   Pulse 80   Temp 97 °F (36.1 °C)   Resp 20   Ht 5' 2\" (1.575 m)   Wt 247 lb 9.6 oz (112.3 kg)   LMP  (LMP Unknown)   SpO2 93%   BMI 45.29 kg/m²     24HR INTAKE/OUTPUT:    Intake/Output Summary (Last 24 hours) at 12/19/17 1603  Last data filed at 12/19/17 1252   Gross per 24 hour   Intake              780 ml   Output              100 ml   Net              680 ml       BOWEL MOVEMENT:  not since admit    PHYSICAL EXAM:  Constitutional:  Well-developed morbidly obese female. Appears stated age. No distress. HENT:    Head: Normocephalic and atraumatic.    Mouth/Throat: Oropharynx is clear and moist. No oropharyngeal exudate. Eyes: Conjunctivae normal and EOMI. PERRAL. No scleral icterus. Neck: Normal ROM. Neck supple. No JVD, tracheal deviation, or thyromegaly present.   Cardiovascular: Normal rate, rhythm, and distant heart sounds. No gallop, friction rub or murmur noted. Pulmonary/Chest: Effort & breath sounds normal. No stridor, respiratory distress, wheezes or rales noted. Abdominal: Soft. Bowel sounds present in all 4 quads with No distension, mass, tenderness, guarding or rebound tenderness noted. Musculoskeletal: Normal ROM with no edema or tenderness. Neurological: Alert and oriented to person, place, and time. No cranial nerve deficit. Skin: Skin is warm and dry. No rash, diaphoresis, erythema, or pallor noted.      MEDICATIONS:   dextrose        isosorbide mononitrate  30 mg Oral Daily    fluconazole  100 mg Oral Once per day on Mon Wed Fri    b complex-C-folic acid  1 capsule Oral Daily    darbepoetin precious-polysorbate  100 mcg Subcutaneous Weekly    heparin (porcine)  5,000 Units Subcutaneous 3 times per day    allopurinol  100 mg Oral Daily    aspirin  81 mg Oral Daily    atorvastatin  40 mg Oral Daily    docusate sodium  200 mg Oral Daily    gabapentin  300 mg Oral TID    levothyroxine  100 mcg Oral Daily    metoprolol tartrate  50 mg Oral BID    miconazole   Topical BID    midodrine  5 mg Oral TID WC    pantoprazole  40 mg Oral QAM AC    sucralfate  1 g Oral 4x Daily    insulin lispro  0-35 Units Subcutaneous 4x Daily AC & HS    glipiZIDE  5 mg Oral Daily    ipratropium-albuterol  1 ampule Inhalation Q4H WA     albuterol, HYDROcodone-acetaminophen, LORazepam, polyethylene glycol, diphenhydrAMINE, glucose, dextrose, glucagon (rDNA), dextrose, ondansetron    DIET: DIET RENAL;    IV Access: PIV    Mobley Catheter: none    GI Prophylaxis: PPI    DVT Prophylaxis: low molecular weight heparin    DIAGNOSTIC DATA:  Recent Labs      12/18/17   0601   WBC  11.3*   RBC  3.06*   HGB  9.6*   HCT  32.5*   MCV  106.2*   MCH  31.4*   MCHC  29.5*   PLT  149     Recent Labs      12/18/17   0601   NA  135*   K  4.1   ANIONGAP  20*   CL  93*   CO2  22   BUN  32*   CREATININE  5.1* GLUCOSE  102   CALCIUM  9.3     Lab Results   Component Value Date    CALCIUM 9.3 12/18/2017    PHOS 5.3 (H) 06/11/2017     Recent Labs      12/18/17   0601   AST  17   ALT  8   BILITOT  0.6   ALKPHOS  81   TSH:    Lab Results   Component Value Date    TSH 2.310 10/27/2017     Troponin T:   Recent Labs      12/19/17   0017  12/19/17   0543  12/19/17   1145   TROPONINI  0.28*  0.25*  0.23*     ABGs: Lab Results   Component Value Date    PHART 7.450 12/16/2017    PO2ART 56.0 12/16/2017    VCE7JAQ 45.0 12/16/2017       ASSESSMENT/PLAN:  Principal Problem:  Chest pain - cardiology recommends heart cath patient refusing at this time. Currently medically managed. Trend troponins  Active Problems: Morbid obesity due to excess calories - encourage lifestyle change  Hypothyroidism - treated  Essential hypertension medical mgt  Type 2 diabetes mellitus with stage 3 chronic kidney disease, with long-term current use of insulin - medical management  Pulmonary hypertension - monitor hypoxia has trilolgy  UTI (urinary tract infection) - rocephin, transitioned to diflucan   COPD (chronic obstructive pulmonary disease) duo nebs  End stage Renal - on HD    IV antibiotics: none    DC PLAN: TBD    BRIANA Dumont - BC    ______________________________________________________________________  I have seen and evaluated the patient by myself. I agree with the plan and documentation per the APRN/PA. Please see addendum. Subjective:    Hesitant for cardiac cath at this point. No more chest pain. No N/V.  SOB continues to improve. Appetite ok. Objective:   General:  NAD. Pleasant and interactive. Respiratory:  CTA without rhonchi or wheezes. Effort regular and unlabored. Cardiovascular:  RRR without murmurs, rubs or gallops. Normal S1/S2. Extremities:  No clubbing or cyanosis. Trace edema. GI:  Abdomen soft, NT, ND.  +BS. No guarding. Neuro:  CN II-XII intact. No focal motor deficits.   Psych:  Alert and oriented times 3. Plan:  Appreciate assistance of consultants with this case. Increase activity as tolerated. Repeat labs in am.  HD per nephrology. See all orders. Further recommendations per clinical course.       Electronically signed by Marco Lopez DO on 12/19/17 at 6:11 PM

## 2017-12-19 NOTE — PROGRESS NOTES
Pt is resting in bed. Breathing tx at present time. Dtr at bedside. No c/o pain or discomfort. States she is tired. Pt \"They wanted to do a heart cath, but I asked them to try medicine first.\". Adding \"And I told them to make it something I can afford\". PC will continue to follow for support and POC.     Electronically signed by Silvia Kramer RN on 12/19/2017 at 3:43 PM

## 2017-12-19 NOTE — CARE COORDINATION
Pt was out of her room for HD when SW attempted to complete dc assessment. Will attempt at another time.

## 2017-12-20 NOTE — PROGRESS NOTES
[facility]  Progress Note                            Date:  12/20/2017  Patient: Samantha Valencia  Admission:  12/16/2017  7:02 AM  Admit DX: Hypotension [I95.9]  Age:  75 y. o., 1950     LOS: 4 days 2    Reason for evaluation:   Chest pain and elevated troponin      SUBJECTIVE:    The patient was seen and examined. Notes and labs reviewed. There were not complications over night. Patient's cardiac review of systems: negative for chest pressure/discomfort and dyspnea. The patient is generally feeling better. Patient stated she has decided she still does not want to have a heart cath and will take medications instead. OBJECTIVE:    Telemetry: Sinus at 76  Patient lying in bed. O2 on at 2L/M per NC. Troponin 0.19  /60   Pulse 73   Temp 96.4 °F (35.8 °C) (Temporal)   Resp 18   Ht 5' 2\" (1.575 m)   Wt 246 lb 14.4 oz (112 kg)   LMP  (LMP Unknown)   SpO2 94%   BMI 45.16 kg/m²     Intake/Output Summary (Last 24 hours) at 12/20/17 1130  Last data filed at 12/20/17 1024   Gross per 24 hour   Intake              850 ml   Output              125 ml   Net              725 ml           Labs:   CBC:   Recent Labs      12/18/17   0601   WBC  11.3*   HGB  9.6*   HCT  32.5*   PLT  149     BMP: Recent Labs      12/18/17   0601   NA  135*   K  4.1   CO2  22   BUN  32*   CREATININE  5.1*   LABGLOM  8*   GLUCOSE  102     BNP: No results for input(s): BNP in the last 72 hours. PT/INR: No results for input(s): PROTIME, INR in the last 72 hours. APTT:No results for input(s): APTT in the last 72 hours.   CARDIAC ENZYMES:  Recent Labs      12/19/17   1807  12/20/17   0021  12/20/17   0534   TROPONINI  0.23*  0.23*  0.19*     FASTING LIPID PANEL:  Lab Results   Component Value Date    HDL 64 09/23/2016    LDLDIRECT 88 02/19/2013    LDLCALC 57 09/23/2016    TRIG 158 09/23/2016     LIVER PROFILE:  Recent Labs      12/18/17   0601   AST  17   ALT  8   LABALBU  3.1*       NURSE:  [employer]

## 2017-12-20 NOTE — DISCHARGE SUMMARY
Hector Harris  :  1950  MRN:  [id number]    Admit date:  2017  Discharge date:  2017    Admitting Physician:  Korina Lagunas MD    Advance Directive: Prior    Consults: Dr. Daksha Aparicio, Dr. Rees-Nephrology    Primary Care Physician:  Chelsie Segura, APRN    Discharge Diagnoses:    Principal Problem:    Chest pain-Cardiology followed this admission, Imdur added, patient refusing Cardiac Cath at this time, will follow up as OP with Dr. Liz Shelley. Counseled on the risks to include worsening heart disease and death. Patient stated understanding. Active Problems:    Pulmonary hypertension-Has Trilogy and O2 at home    Essential hypertension-stable    Type 2 diabetes mellitus with stage 3 chronic kidney disease, with long-term current use of insulin (HCC)-resume home medications    Morbid obesity due to excess calories (HCC)-encouraged lifestyle changes    Hypothyroidism-Synthroid    UTI (urinary tract infection)-treated    COPD (chronic obstructive pulmonary disease) with hypoxia (HCC)-continue home medications    ESRD-continue HD as scheduled    Significant Diagnostic Studies:   Xr Chest Portable  Impression: No acute process. Signed by Dr Garrett James on 2017 7:30 AM    Pertinent Labs:   CBC:   Recent Labs      17   0601   WBC  11.3*   HGB  9.6*   PLT  149     BMP:    Recent Labs      17   0601   NA  135*   K  4.1   CL  93*   CO2  22   BUN  32*   CREATININE  5.1*   GLUCOSE  102     Hospital Course:    Ms. Ofelia Olivas is a 75year old female admitted on 2017 for chest pain and shortness of breath. Shortness of air improved after hemodialysis. Troponins were trended and were elevated but decreasing. Cardiology was consulted for recommendations and did recommend cardiac catheterization. Ms. Ofelia Olivas has refused at this time. She states she would like to go home and think about it. She was counseled on risks to include worsening coronary disease and death.   She stated understanding and wishes to follow up with Dr. Sakina Washington as an outpatient. At this time Ms. Robert is stable for discharge home. Physical Exam:  Vital Signs: /60   Pulse 73   Temp 96.4 °F (35.8 °C) (Temporal)   Resp 18   Ht 5' 2\" (1.575 m)   Wt 246 lb 14.4 oz (112 kg)   LMP  (LMP Unknown)   SpO2 94%   BMI 45.16 kg/m²   General appearance:. Alert and Cooperative   HEENT: Normocephalic. Chest: Decreased air exchange otherwise clear to auscultation bilaterally without wheezes or rhonchi. Cardiac: Normal heart tones with regular rate and rhythm, S1, S2 normal. No murmurs, gallops, or rubs auscultated. Abdomen: Morbidly obese, soft, non-tender; non-distended normal bowel sounds no masses, no organomegaly. Extremities: No clubbing or cyanosis. No peripheral edema. Peripheral pulses palpable. Neurologic: Grossly intact. Discharge Medications:       Sean Tobias   Home Medication Instructions LUV:[id number]    Printed on:12/20/17 1153   Medication Information                      albuterol (PROVENTIL) (2.5 MG/3ML) 0.083% nebulizer solution  Take 3 mLs by nebulization every 4 hours as needed for Wheezing or Shortness of Breath             allopurinol (ZYLOPRIM) 100 MG tablet  Take 1 tablet by mouth daily             aspirin 81 MG tablet  Take 81 mg by mouth daily             atorvastatin (LIPITOR) 40 MG tablet  Take 1 tablet by mouth daily             docusate sodium (COLACE) 100 MG capsule  Take 200 mg by mouth 2 times daily as needed Indications: Constipation              gabapentin (NEURONTIN) 300 MG capsule  Pt to take one tablet TID             glipiZIDE (GLUCOTROL) 5 MG tablet  TAKE 1 TABLET BY MOUTH DAILY             HYDROcodone-acetaminophen (NORCO) 7.5-325 MG per tablet  Take 1 tablet by mouth every 6 hours as needed for Pain .  Earliest Fill Date: 11/8/17             isosorbide mononitrate (IMDUR) 30 MG extended release tablet  Take 1 tablet by mouth daily             levothyroxine (SYNTHROID) 100 MCG tablet  Take 1 tablet by mouth Daily             LORazepam (ATIVAN) 0.5 MG tablet  Take 1 tablet by mouth 2 times daily as needed for Anxiety . metoprolol tartrate (LOPRESSOR) 50 MG tablet  Take 1 tablet by mouth 2 times daily             miconazole (MICOTIN) 2 % powder  Apply topically 2 times daily. midodrine (PROAMATINE) 5 MG tablet  TAKE 1 TABLET BY MOUTH 3 TIMES DAILY (WITH MEALS)             OXYGEN  Inhale 6 L into the lungs continuous May titrate to effect - Patient also placed on Trilogy             pantoprazole (PROTONIX) 40 MG tablet  Take 1 tablet by mouth every morning (before breakfast)             polyethylene glycol (GLYCOLAX) packet  Take 17 g by mouth daily as needed for Constipation             sucralfate (CARAFATE) 1 GM tablet  Take 1 tablet by mouth 4 times daily               Discharge Instructions: Follow up with MALICK Lopez in 3-5 days. Take medications as directed. Resume activity as tolerated. Diet: DIET RENAL; Carb Control: 4 carbs/meal (approximate 1800 kcals/day)     Disposition: Patient is medically stable and will be discharged home. Time spent on discharge 32 minutes. Signed:  Eyad Turcios PA-C     ______________________________________________________________________      Patient seen and examined by myself on the day of discharge. I have reviewed the APRN/PA's note and agree with documentation and plan. See discharge orders. Patient feels better today with no new complaints. Eager to be discharged. All questions and concerns addressed. Objective:  General:  NAD. Pleasant and interactive. Respiratory:  CTA without rhonchi or wheezes. Effort regular and unlabored. Cardiovascular:  RRR without murmurs, rubs or gallops. Normal S1/S2. Right sided permacath. Extremities:  No clubbing or cyanosis or edema. GI:  Abdomen soft, NT, ND.  +BS. No guarding. Neuro:  CN II-XII intact.   No focal motor

## 2017-12-20 NOTE — PROGRESS NOTES
MD Luis   81 mg at 12/19/17 1135    atorvastatin (LIPITOR) tablet 40 mg  40 mg Oral Daily Gray Hernandez MD   40 mg at 12/19/17 1136    docusate sodium (COLACE) capsule 200 mg  200 mg Oral Daily Gray Hernandez MD   200 mg at 12/19/17 1136    gabapentin (NEURONTIN) capsule 300 mg  300 mg Oral TID Gray Hernandez MD   300 mg at 12/19/17 2206    HYDROcodone-acetaminophen (Jerl Ards) 7.5-325 MG per tablet 1 tablet  1 tablet Oral Q6H PRN Gray Hernandez MD   1 tablet at 12/20/17 0430    levothyroxine (SYNTHROID) tablet 100 mcg  100 mcg Oral Daily Gray Hernandez MD   100 mcg at 12/20/17 5065    LORazepam (ATIVAN) tablet 0.5 mg  0.5 mg Oral BID PRN Gray Hernandez MD   0.5 mg at 12/18/17 0602    metoprolol tartrate (LOPRESSOR) tablet 50 mg  50 mg Oral BID Gray Hernandez MD   50 mg at 12/19/17 2206    miconazole (MICOTIN) 2 % powder   Topical BID Gray Hernandez MD        midodrine (PROAMATINE) tablet 5 mg  5 mg Oral TID WC Gray Hernandez MD   5 mg at 12/19/17 1759    pantoprazole (PROTONIX) tablet 40 mg  40 mg Oral QAM AC Gray Hernandez MD   40 mg at 12/20/17 0601    polyethylene glycol (GLYCOLAX) packet 17 g  17 g Oral Daily PRN Gray Hernandez MD        sucralfate (CARAFATE) tablet 1 g  1 g Oral 4x Daily Gray Hernandez MD   1 g at 12/19/17 2206    diphenhydrAMINE (BENADRYL) tablet 25 mg  25 mg Oral Q6H PRN Gray Hernandez MD        insulin lispro (HUMALOG) injection vial 0-35 Units  0-35 Units Subcutaneous 4x Daily AC & HS Gray Hernandez MD   4 Units at 12/19/17 2216    glipiZIDE (GLUCOTROL) tablet 5 mg  5 mg Oral Daily Gray Hernandez MD   5 mg at 12/19/17 1135    glucose (GLUTOSE) 40 % oral gel 15 g  15 g Oral PRN Gray Hernandez MD        dextrose 50 % solution 12.5 g  12.5 g Intravenous PRN Gray Hernandez MD        glucagon (rDNA) injection 1 mg  1 mg Intramuscular PRN Gray Hernandez MD        dextrose 5 % solution  100 mL/hr HGB  9.6*   HCT  32.5*   MCV  106.2*   PLT  149     LIVER PROFILE:   Recent Labs      12/18/17   0601   AST  17   ALT  8   BILITOT  0.6   ALKPHOS  81     PT/INR: No results for input(s): PROTIME, INR in the last 72 hours. APTT: No results for input(s): APTT in the last 72 hours. BNP:  No results for input(s): BNP in the last 72 hours. Ionized Calcium:No results for input(s): IONCA in the last 72 hours. Magnesium:No results for input(s): MG in the last 72 hours. Phosphorus:No results for input(s): PHOS in the last 72 hours. HgbA1C:   Recent Labs      12/18/17   0601   LABA1C  5.4     Hepatic: Recent Labs      12/18/17   0601   ALKPHOS  81   ALT  8   AST  17   PROT  7.3   BILITOT  0.6   LABALBU  3.1*     Lactic Acid: No results for input(s): LACTA in the last 72 hours. Troponin: No results for input(s): CKTOTAL, CKMB, TROPONINT in the last 72 hours. ABGs: No results for input(s): PH, PCO2, PO2, HCO3, O2SAT in the last 72 hours. CRP:  No results for input(s): CRP in the last 72 hours. Sed Rate:  No results for input(s): SEDRATE in the last 72 hours. Cultures:   No results for input(s): CULTURE in the last 72 hours. Radiology reports as per the Radiologist  Radiology: Xr Chest Portable    Result Date: 12/16/2017  History: 69-year-old evaluated for shortness of breath. Reference: Chest radiographs November 29, 2017. Findings: Frontal chest radiograph performed. Enlargement of the cardiac/pericardial silhouette is stable. Chronic interstitial changes are noted. No acute pneumonia or edema is appreciated. No pleural effusion or pneumothorax. Right IJ dialysis catheter remains in place. Impression: No acute process. Signed by Dr Laurie Hernandez on 12/16/2017 7:30 AM       Assessment     1. End-stage renal disease  2. Diabetic nephropathy. 3. Type II non-insulin-dependent diabetes. 4. Pulmonary edema has now resolved. 5. Morbid abdominal obesity. 6. Non-STEMI. 7. Chronic obstructive pulmonary disease.   8.

## 2017-12-20 NOTE — TELEPHONE ENCOUNTER
Please send a prescription to Dr. April Mares or right eye have requested Dr. April Mares is a close

## 2017-12-21 NOTE — Clinical Note
Patient Class: Inpatient [101]   REQUIRED: Diagnosis: Chest pain [772689]   Estimated Length of Stay: Estimated stay of more than 2 midnights   Future Attending Provider: Raúl Vallecillo [[id number]]   Admitting Provider: Raúl Vallecillo [[id number]]   Telemetry Bed Required?: Yes

## 2017-12-22 NOTE — ED PROVIDER NOTES
Refills: 3    Associated Diagnoses: Essential hypertension      glipiZIDE (GLUCOTROL) 5 MG tablet TAKE 1 TABLET BY MOUTH DAILY  Qty: 30 tablet, Refills: 2    Associated Diagnoses: Type 2 diabetes mellitus with stage 3 chronic kidney disease, with long-term current use of insulin ([facility])      gabapentin (NEURONTIN) 300 MG capsule Pt to take one tablet TID  Qty: 90 capsule, Refills: 0    Associated Diagnoses: Other chronic pain; Anxiety      pantoprazole (PROTONIX) 40 MG tablet Take 1 tablet by mouth every morning (before breakfast)  Qty: 30 tablet, Refills: 5      sucralfate (CARAFATE) 1 GM tablet Take 1 tablet by mouth 4 times daily  Qty: 120 tablet, Refills: 3      LORazepam (ATIVAN) 0.5 MG tablet Take 1 tablet by mouth 2 times daily as needed for Anxiety .   Qty: 60 tablet, Refills: 2    Associated Diagnoses: Anxiety      metoprolol tartrate (LOPRESSOR) 50 MG tablet Take 1 tablet by mouth 2 times daily  Qty: 60 tablet, Refills: 11    Associated Diagnoses: Essential hypertension      polyethylene glycol (GLYCOLAX) packet Take 17 g by mouth daily as needed for Constipation      levothyroxine (SYNTHROID) 100 MCG tablet Take 1 tablet by mouth Daily  Qty: 30 tablet, Refills: 5    Associated Diagnoses: Hypothyroidism, unspecified type      allopurinol (ZYLOPRIM) 100 MG tablet Take 1 tablet by mouth daily  Qty: 30 tablet, Refills: 5    Associated Diagnoses: Chronic kidney disease, stage IV (severe) ([facility])      albuterol (PROVENTIL) (2.5 MG/3ML) 0.083% nebulizer solution Take 3 mLs by nebulization every 4 hours as needed for Wheezing or Shortness of Breath  Qty: 120 each, Refills: 1    Associated Diagnoses: COPD exacerbation ([facility])      atorvastatin (LIPITOR) 40 MG tablet Take 1 tablet by mouth daily  Qty: 30 tablet, Refills: 11    Associated Diagnoses: Hypercholesteremia      docusate sodium (COLACE) 100 MG capsule Take 200 mg by mouth 2 times daily as needed Indications: Constipation       OXYGEN Inhale 6 L into the lungs

## 2017-12-22 NOTE — ED NOTES
Bed: 04  Expected date: 12/21/17  Expected time:   Means of arrival: [facility]  Comments:  Damir Duarte, RN  12/21/17 4615

## 2017-12-22 NOTE — PROGRESS NOTES
Per Dr Mnoique Reed: run NS @ 125/hr x 6 hrs post cath. Per Dr Brant Farrell (attending): run NS @ 75 x 1000ml post cath then IID. Will receive HD tomorrow.   Electronically signed by Mejia Correia RN on 12/22/2017 at 1:50 PM

## 2017-12-22 NOTE — H&P
[facility] Hospitalist    History & Physical        Patient:  Nadeem Gutiérrez  YOB: 1950  Date of Service: 12/22/2017  MRN: [id number]   Acct: [de-identified]   Primary Care Physician: MALICK Wiggins    Chief Complaint:  Chest pain    History Obtained From:  patient, electronic medical record, Quality of history:  poor historian    HISTORY OF PRESENT ILLNESS:  The patient is a 75 y.o. female who presents to the ED with complaints of chest pain and shortness of breath. Patient was just discharged from the hospital the day before and began having recurrent chest pain today. Decided to return to the hospital after vomiting, she did attend dialysis. Patient initially refused to have a heart cath now returns and is agreeable to procedure. Past Medical History:        Diagnosis Date    Arthritis     Blood circulation, collateral     CAD (coronary artery disease)     hospitalized first week of March with low O2 sat, low BS, was in critical care for 2-3 days.   was in the hospiral for 1 1/2 weeks total.    Chronic diastolic CHF (congestive heart failure) (HCC)     CKD (chronic kidney disease), stage IV ([address] 3/2/16    COPD (chronic obstructive pulmonary disease) (HCC)     sees [facility] for her dx.; has seen [facility] in [facility] past    Diabetic neuropathy ([address]     Diabetic polyneuropathy associated with type 2 diabetes mellitus ([address]     DM II (diabetes mellitus, type II), controlled ([address]     Hemodialysis patient ([address]     [facility]    Hypertension     Hypertensive cardiovascular disease 2/28/2013 2/20/2013  Echo  Normal LVFX, concentric LVH    Hypothyroidism 2/28/2013    Mixed hyperlipidemia 11/9/2017    Morbid obesity with BMI of 40.0-44.9, adult ([address] 4/13/2016    BMI 44.7   (was 60+ in 2013)    KASHIF on CPAP     Palliative care encounter     Pneumonia     Post-menopausal     Pulmonary hypertension     RVSP 72 on 1/17/2017 echo    Sarcoidosis ([address]  Wounds and injuries right toe    Gladys Almodovar       Past Surgical History:        Procedure Laterality Date    DIALYSIS FISTULA CREATION Right 12/8/2017    CREATION OF RIGHT BRACHIAL ARTERY TO RIGHT BASILIC VEIN PRIMARTY ARTERIO-VENOUS HEMODIALYSIS FISTULA performed by Dee Dee Denson MD at [address] [place] [address]    KIDNEY STONE SURGERY      TUNNELED VENOUS CATHETER PLACEMENT      [facility]    VASCULAR SURGERY  12/08/2017    SJS. Right brachial artery to basilic vein arteriovenous fistula creation. Allergies:  Dilaudid [hydromorphone]    Medications Prior to Admission:    Prescriptions Prior to Admission: ondansetron (ZOFRAN) 4 MG tablet, Take 4 mg by mouth every 8 hours as needed for Nausea or Vomiting  isosorbide mononitrate (IMDUR) 30 MG extended release tablet, Take 1 tablet by mouth daily  midodrine (PROAMATINE) 5 MG tablet, TAKE 1 TABLET BY MOUTH 3 TIMES DAILY (WITH MEALS)  glipiZIDE (GLUCOTROL) 5 MG tablet, TAKE 1 TABLET BY MOUTH DAILY  gabapentin (NEURONTIN) 300 MG capsule, Pt to take one tablet TID  pantoprazole (PROTONIX) 40 MG tablet, Take 1 tablet by mouth every morning (before breakfast)  sucralfate (CARAFATE) 1 GM tablet, Take 1 tablet by mouth 4 times daily  LORazepam (ATIVAN) 0.5 MG tablet, Take 1 tablet by mouth 2 times daily as needed for Anxiety .   metoprolol tartrate (LOPRESSOR) 50 MG tablet, Take 1 tablet by mouth 2 times daily  polyethylene glycol (GLYCOLAX) packet, Take 17 g by mouth daily as needed for Constipation  levothyroxine (SYNTHROID) 100 MCG tablet, Take 1 tablet by mouth Daily (Patient taking differently: Take 100 mcg by mouth Daily Indications: Underactive Thyroid )  allopurinol (ZYLOPRIM) 100 MG tablet, Take 1 tablet by mouth daily (Patient taking differently: Take 100 mg by mouth daily Indications: Arthritis )  albuterol (PROVENTIL) (2.5 MG/3ML) 0.083% nebulizer solution, Take 3 mLs by nebulization every 4 hours as needed for Wheezing or Shortness of Breath  atorvastatin (LIPITOR) 40 MG tablet, Take 1 tablet by mouth daily (Patient taking differently: Take 40 mg by mouth daily Indications: Changes in Cholesterol )  docusate sodium (COLACE) 100 MG capsule, Take 200 mg by mouth 2 times daily as needed Indications: Constipation   OXYGEN, Inhale 6 L into the lungs continuous May titrate to effect - Patient also placed on Trilogy (Patient taking differently: Inhale 4 L into the lungs continuous )  miconazole (MICOTIN) 2 % powder, Apply topically 2 times daily. aspirin 81 MG tablet, Take 81 mg by mouth daily      Social History:    reports that she quit smoking about 17 years ago. Her smoking use included Cigarettes. She has a 35.00 pack-year smoking history. She has never used smokeless tobacco. She reports that she does not drink alcohol or use drugs. Family History:       Problem Relation Age of Onset    Arthritis Mother     Pacemaker Mother     Arthritis Father     Heart Disease Father       late 42's of MI       REVIEW OF SYSTEMS:    Review of Systems   HENT: Negative. Eyes: Negative. Respiratory: Positive for shortness of breath. Cardiovascular: Positive for chest pain. Gastrointestinal: Negative. Genitourinary: Negative. Musculoskeletal: Negative. Skin: Negative for itching and rash. Neurological: Positive for weakness. Endo/Heme/Allergies: Negative. Psychiatric/Behavioral: Negative. PHYSICAL EXAM:  Vital Signs: BP (!) 95/59   Pulse 84   Temp 97.4 °F (36.3 °C)   Resp 17   Ht 5' 1\" (1.549 m)   Wt 254 lb (115.2 kg)   LMP  (LMP Unknown)   SpO2 92%   BMI 47.99 kg/m²     Physical Exam   Constitutional: She is oriented to person, place, and time. Vital signs are normal. She appears well-developed and well-nourished. She appears lethargic. She is sleeping and cooperative. She is easily aroused. No distress. Nasal cannula in place. Morbid obesity   HENT:   Head: Normocephalic and atraumatic.    Right Ear: External ear normal.   Left Ear: External ear normal.   Nose: Nose normal.   Mouth/Throat: Oropharynx is clear and moist.   Eyes: Conjunctivae and EOM are normal. Pupils are equal, round, and reactive to light. Neck: Normal range of motion. Neck supple. No tracheal deviation present. No thyromegaly present. Cardiovascular: Normal rate, regular rhythm, normal heart sounds and intact distal pulses. Pulmonary/Chest: Effort normal. She has decreased breath sounds in the right lower field and the left lower field. She has rales (few and scattered ). Abdominal: Soft. Bowel sounds are normal. There is no splenomegaly or hepatomegaly. Musculoskeletal: Normal range of motion. Neurological: She is oriented to person, place, and time and easily aroused. She appears lethargic. Skin: Skin is warm, dry and intact. Psychiatric: She has a normal mood and affect. Her behavior is normal. Thought content normal.   Nursing note and vitals reviewed.       Patient Active Problem List    Diagnosis Date Noted    Hypercalcemia 04/14/2016     Priority: High     Class: Acute    Chronic obstructive pulmonary disease ([address]     Diabetic polyneuropathy associated with type 2 diabetes mellitus ([address]     Urinary tract infection without hematuria     Non-STEMI (non-ST elevated myocardial infarction) ([address]     Hypotension 12/16/2017    Chest pain 12/16/2017    Dyspnea 12/16/2017    Diabetic neuropathy (HCC) 12/16/2017    Itching 12/16/2017    Chronic renal failure     Chronic respiratory failure (HCC)     Elevated troponin     ESRD on hemodialysis ([address] 12/09/2017    COPD with hypoxia ([address] 12/08/2017    Mixed hyperlipidemia 11/09/2017    ESRD (end stage renal disease) ([address] 10/27/2017    Hemodialysis-associated hypotension 10/27/2017    Colitis 10/26/2017    Controlled type 2 diabetes mellitus with diabetic nephropathy, without long-term current use of insulin ([address] 10/26/2017    Acute cystitis without hematuria 10/26/2017    KASHIF treated with BiPAP     Hypertension     Hemodialysis patient ([address]      tues jasmeet sat and [place]      DM II (diabetes mellitus, type II), controlled ([address]     COPD (chronic obstructive pulmonary disease) ([address]      sees [facility] for her dx.; has seen amelia in the past      Chronic diastolic CHF (congestive heart failure) ([address]     Non-pressure chronic ulcer of right calf, limited to breakdown of skin ([address] 08/14/2017    Decubitus ulcer of coccygeal region, stage 3 ([address] 07/31/2017    Chronic pain 07/20/2017    Anxiety 07/20/2017    Slow transit constipation     Leukocytosis 06/10/2017    UTI (urinary tract infection) 06/10/2017    Uremia     Diabetic ulcer of left heel with fat layer exposed ([address] 05/02/2017    Acute respiratory failure with hypoxia (HCC)     Acute pulmonary edema (HCC)     Type 2 diabetes mellitus with stage 3 chronic kidney disease, with long-term current use of insulin ([address] 04/05/2017    Cor pulmonale, chronic ([address] 04/05/2017     Updating Deprecated Diagnoses      Anemia of chronic disorder 04/05/2017    Pulmonary hypertension 04/05/2017    Diabetic ulcer of toe of right foot associated with type 2 diabetes mellitus, limited to breakdown of skin ([address] 04/05/2017    Chronic kidney disease, stage IV (severe) ([address] 03/10/2017    YANNI (acute kidney injury) ([address] 03/02/2016    Diabetic ulcer of left foot associated with type 2 diabetes mellitus ([address] 01/18/2016    Essential hypertension 02/28/2013    Morbid obesity due to excess calories ([address] 02/28/2013 04/26/2017  BMI 56      Hypothyroidism 02/28/2013       DATA:  Blood Gas, Arterial [[id number]] (Abnormal) Collected: 12/21/17 2246   Updated: 12/21/17 2247    Specimen Source: Blood gases     pH, Arterial 7.440    pCO2, Arterial 35.0 mmHg    pO2, Arterial 67.0 (L) mmHg    HCO3, Arterial 23.8 mmol/L    Base Excess, Arterial -0.1 mmol/L    Hemoglobin, Art, Extended 9.5 (L) g/dL    O2

## 2017-12-22 NOTE — PROCEDURES
Cardiac Catheterization    Patient name:  Jennifer Fam    :  1950  [facility] Rec No:  [id number]    Room:  0727/727-02  Account No:  [unfilled]  Admission date:  2017  Physician:  KADIE Ivan MD      Date of procedure:  17    Procedure:    Left heart catheterization, VILLAGRAN left ventriculography, selective coronary arteriography. Catheters:  5-Japanese pigtail, 5-Japanese JR4 and JL4 diagnostic catheters    Type and site of entry:  Percutaneous right femoral artery with modified Seldinger technique. Contrast material:  Isovue-300    Comments: There were no complications. The access site was closed using a 5-Japanese minx device with adequate hemostasis and intact distal pulses and the patient is returned to his room in satisfactory condition. Description:  The right groin was prepped with chlorhexidine solution, draped in a sterile fashion and anesthetized with 2% plain Xylocaine. A 5-Japanese sheath was placed in the right femoral artery. The catheters were advanced under fluoroscopic guidance into the ascending aorta. The procedure was then carried out in the standard fashion without incident. FINDINGS:    Hemodynamics:  LV pressure 80/14. AO pressure 80/45. VILLAGRAN Left Ventriculography:  The left ventricle has normal size and configuration and a normal ejection fraction of over 60%. There is no noted mitral regurgitation. Selective Coronary Arteriography:      1. The right coronary artery is a very large dominant vessel with mild irregularities. It is free of significant occlusive disease. 2.  The left main coronary artery has mild ostial narrowing of 10-15%. 3.  The circumflex coronary artery is a small-caliber vessel with mild diffuse irregularities. It is free of significant occlusive disease. 4.  The left anterior descending coronary artery tapers into a small caliber vessel as it approaches the left ventricular apex. It is free of significant occlusive disease.     The

## 2017-12-22 NOTE — CONSULTS
PRIOR CARDIAC PROBLEM LIST  (IF APPLICABLE):    See above and below      Past Medical History:  Past Medical History:   Diagnosis Date    Arthritis     Blood circulation, collateral     CAD (coronary artery disease)     hospitalized first week of March with low O2 sat, low BS, was in critical care for 2-3 days. was in the hospiral for 1 1/2 weeks total.    Chronic diastolic CHF (congestive heart failure) (HCC)     CKD (chronic kidney disease), stage IV ([address] 3/2/16    COPD (ch[facility] obstructive pulmonary disease) ([facility])     [facility] has seen amelia in the past    Diabetic neuropathy ([address]     Diabetic polyneuropathy associated with type 2 diabetes mellitus ([address]     DM II (diabetes mellitus, type II), controlled ([address]     Hemodialysis patient ([address]     jaya gross sat and [place]    Hypertension     Hypertensive cardiovascular disease 2/28/2013 2/20/2013  Echo  Normal LVFX, concentric LVH    Hypothyroidism 2/28/2013    Mixed hyperlipidemia 11/9/2017    Morbid obesity with BMI of 40.0-44.9, adult ([address] 4/13/2016    BMI 44.7   (was 60+ in 2013)    KASHIF on CPAP     Palliative care encounter     Pneumonia     Post-menopausal     Pulmonary hypertension     RVSP 72 on 1/17/2017 echo    Sarcoidosis ([address]     Wounds and injuries right toe    Chaparrita Brenner       Past Surgical History:  Past Surgical History:   Procedure Laterality Date    DIALYSIS FISTULA CREATION Right 12/8/2017    CREATION OF RIGHT BRACHIAL ARTERY TO RIGHT BASILIC VEIN PRIMARTY ARTERIO-VENOUS HEMODIALYSIS FISTULA performed by Diamante Maria MD at [address] [place] [address]      multiple    KIDNEY STONE SURGERY      TUNNELED VENOUS CATHETER PLACEMENT      [facility]    VASCULAR SURGERY  12/08/2017    SJS. Right brachial artery to basilic vein arteriovenous fistula creation. Home Medications:   Prior to Admission medications    Medication Sig Start Date End Date Taking?  Authorizing 09/23/2016    HDL 64 09/23/2016    TRIG 158 09/23/2016     TSH:  Lab Results   Component Value Date    TSH 2.310 10/27/2017     UA:   Lab Results   Component Value Date    COLORU DK YELLOW 10/25/2017    PHUR 6.0 10/25/2017    LABCAST 0-1 Hyaline 03/10/2017    WBCUA TNTC 10/25/2017    RBCUA 2 06/06/2017    BACTERIA 3+ 10/25/2017    CLARITYU TURBID 10/25/2017    SPECGRAV 1.020 10/25/2017    LEUKOCYTESUR LARGE 10/25/2017    UROBILINOGEN 0.2 10/25/2017    BILIRUBINUR SMALL 10/25/2017    BLOODU LARGE 10/25/2017    GLUCOSEU Negative 10/25/2017    AMORPHOUS Rare 04/13/2016             ALL THE CARDIOLOGY PROBLEMS ARE LISTED ABOVE; HOWEVER, THE FOLLOWING SPECIFIC CARDIAC PROBLEMS WERE ADDRESSED AND TREATED DURING THE HOSPITAL VISIT TODAY:       Cardiac Specific Problem / Diagnosis  Discussion / Medical Decision Making Plan          1. chest pain  are worsening   Recurrent chest pain, patient now agreeable for cath Risks benefits and other options explained and patient agrees to proceed. This will be performed today. 2. Pulmonary hypertension and cor pulmonale  show no change   We know she has pulmonary hypertension so I will not do a right heart cath. This would markedly increase the risk of this procedure. Ultimately, she should probably be anticoagulated because of this diagnosis. 3. End-stage renal disease  show no change   As per nephrology Consider hemodialysis after the cardiac cath       PLAN:    1. Continue present medications except for changes as noted above  2. Continue to monitor rhythm  3. Further orders per clinical course. Discussed with patient and family and nursing. Electronically signed by KADIE Armas MD on 12/22/17    [facility]

## 2017-12-22 NOTE — ED NOTES
ASSESSMENT: here with sob and right side chest pain radiating to back. Just left hospital yesterday    PT ALERT/ORIENTED X4. PUPILS EQUAL/REACTIVE    SKIN:  WARM/DRY PINK CAPILLARY REFILL < 2SECS    CARDIAC:  S1 S2 NOTED     LUNGS: CLEAR UPPER AND LOWER LOBES, RESPIRATIONS EVEN/UNLABORED     ABDOMEN: BOWEL SOUNDS NOTED UPPER AND LOWER QUADRANTS                     SOFT AND NONTENDER. EXTREMITIES:  BILATERAL DP AND PT AND NO EDEMA NOTED. NO DISTRESS NOTED. SIDE RAILS UP AND CALL LIGHT IN REACH.      Tuyet Kuhn RN  12/21/17 5857

## 2017-12-22 NOTE — CARE COORDINATION
Attempted to complete dc assessment with pt but pt was being transported for heart cath. Will attempt later.

## 2017-12-23 NOTE — DISCHARGE SUMMARY
Cardiology was again consulted who again recommended cardiac catheterization and patient agreed. It was negative for occlusive disease. At this time she has no further complaint of chest pain and will be discharged home in stable condition. Physical Exam:  Vital Signs: BP (!) 89/54   Pulse 69   Temp 97.2 °F (36.2 °C) (Temporal)   Resp 18   Ht 5' (1.524 m)   Wt 247 lb 4.8 oz (112.2 kg)   LMP  (LMP Unknown)   SpO2 90%   BMI 48.30 kg/m²   General appearance:. Alert and Cooperative   HEENT: Normocephalic. Chest: Decreased air exchange otherwise clear to auscultation bilaterally without wheezes or rhonchi. Cardiac: Normal heart tones with regular rate and rhythm, S1, S2 normal. No murmurs, gallops, or rubs auscultated. Abdomen:soft, non-tender; non-distended normal bowel sounds no masses, no organomegaly. Extremities: No clubbing or cyanosis. No peripheral edema. Peripheral pulses palpable. Neurologic: Grossly intact.     Discharge Medications:       Nellie Cord   Home Medication Instructions SME:[id number]    Printed on:12/23/17 8283   Medication Information                      albuterol (PROVENTIL) (2.5 MG/3ML) 0.083% nebulizer solution  Take 3 mLs by nebulization every 4 hours as needed for Wheezing or Shortness of Breath             allopurinol (ZYLOPRIM) 100 MG tablet  Take 1 tablet by mouth daily             aspirin 81 MG tablet  Take 81 mg by mouth daily             atorvastatin (LIPITOR) 40 MG tablet  Take 1 tablet by mouth daily             docusate sodium (COLACE) 100 MG capsule  Take 200 mg by mouth 2 times daily as needed Indications: Constipation              gabapentin (NEURONTIN) 300 MG capsule  Pt to take one tablet TID             glipiZIDE (GLUCOTROL) 5 MG tablet  TAKE 1 TABLET BY MOUTH DAILY             HYDROcodone-acetaminophen (NORCO) 7.5-325 MG per tablet  Take 1 tablet by mouth every 6 hours as needed for Pain .             isosorbide mononitrate (IMDUR) 30 MG extended Extremities:  No clubbing or cyanosis or edema. GI:  Abdomen soft, NT, ND.  +BS. No guarding. Neuro:  CN II-XII intact. No focal motor deficits. Psych:  Alert and oriented times 3.     Plan:  Discharge in stable condition to home. See all discharge orders. Follow up as documented above. See MAR.       Electronically signed by Kelly Silva DO on 12/23/17 at 1:39 PM

## 2017-12-23 NOTE — PROGRESS NOTES
mg Oral BID PRN Lacey Mccann, APRN        gabapentin (NEURONTIN) capsule 300 mg  300 mg Oral TID Lacey Mccann APRN   300 mg at 12/22/17 2145    glipiZIDE (GLUCOTROL) tablet 5 mg  5 mg Oral Daily Lacey Mccann APRN   5 mg at 12/22/17 9439    levothyroxine (SYNTHROID) tablet 100 mcg  100 mcg Oral Daily MALICK Flores   100 mcg at 12/23/17 4588    LORazepam (ATIVAN) tablet 0.5 mg  0.5 mg Oral BID PRN Lacey Mccann, APRN        isosorbide mononitrate (IMDUR) extended release tablet 30 mg  30 mg Oral Daily MALICK Flores   30 mg at 12/22/17 6488    metoprolol tartrate (LOPRESSOR) tablet 50 mg  50 mg Oral BID Lacey Mccann, APRN   50 mg at 12/22/17 2145    miconazole (MICOTIN) 2 % powder   Topical BID MALICK Flores        midodrine (PROAMATINE) tablet 5 mg  5 mg Oral TID WC MALICK Flores   5 mg at 12/22/17 1808    pantoprazole (PROTONIX) tablet 40 mg  40 mg Oral QAM AC MALICK Flores   40 mg at 12/23/17 9441    polyethylene glycol (GLYCOLAX) packet 17 g  17 g Oral Daily PRN MALICK Flores        sucralfate (CARAFATE) tablet 1 g  1 g Oral 4x Daily MALICK Flores   1 g at 12/22/17 2145    ondansetron (ZOFRAN-ODT) disintegrating tablet 4 mg  4 mg Oral Q8H PRN KADIE Ko MD        sodium chloride flush 0.9 % injection 10 mL  10 mL Intravenous 2 times per day KADIE Ko MD   10 mL at 12/22/17 2146    sodium chloride flush 0.9 % injection 10 mL  10 mL Intravenous PRN KADIE Ko MD        glucose (GLUTOSE) 40 % oral gel 15 g  15 g Oral PRN Bri Hurst, PA-C        dextrose 50 % solution 12.5 g  12.5 g Intravenous PRN Bri Hurst, PA-C        glucagon (rDNA) injection 1 mg  1 mg Intramuscular PRN Bri Hurst, PA-C        dextrose 5 % solution  100 mL/hr Intravenous PRN Bri FENG Anand           Past Medical History:  Past Medical History:   Diagnosis Date    Arthritis     Blood circulation, collateral     CAD (coronary artery disease) in the last 72 hours. HgbA1C: No results for input(s): LABA1C in the last 72 hours. Hepatic: Recent Labs      12/21/17   2244   ALKPHOS  105*   ALT  15   AST  36*   PROT  7.3   BILITOT  0.6   LABALBU  3.2*     Lactic Acid: No results for input(s): LACTA in the last 72 hours. Troponin: No results for input(s): CKTOTAL, CKMB, TROPONINT in the last 72 hours. ABGs: No results for input(s): PH, PCO2, PO2, HCO3, O2SAT in the last 72 hours. CRP:  No results for input(s): CRP in the last 72 hours. Sed Rate:  No results for input(s): SEDRATE in the last 72 hours. Cultures:   No results for input(s): CULTURE in the last 72 hours. No results for input(s): BC, Sharmaln Fester in the last 72 hours. No results for input(s): CXSURG in the last 72 hours. Radiology reports as per the Radiologist  Radiology: Xr Chest Portable    Result Date: 12/22/2017  EXAMINATION: Portable one view chest 12/21/2017 HISTORY: Shortness of breath. FINDINGS: Today's exam is compared to previous study of 12/16/2017. A dialysis catheter remains well-positioned. There is pulmonary vascular congestion unchanged from the previous study. No evidence of acute lobar pneumonia or effusion. . Mild pulmonary vascular congestion unchanged from the previous study. No acute infiltrate or effusion. Signed by Dr Gely Looney on 12/22/2017 7:49 AM       Assessment     -ESRD  -Type 2 DM with renal disease  -Benign hypertension  -Chest pain    Plan:  Dialysis as above. Ok to discharge after dialysis. Patient has a maturing AVF in he right upper extremity.      Cameron Benedict MD  12/23/17  9:39 AM

## 2017-12-23 NOTE — CONSULTS
Inpatient consult to Nephrology  Consult performed by: Shar Zamorano ordered by: LYRIC Ramirez  Assessment/Recommendations: Renal Consult Note    Thank you to requesting provider: Dr Che Rizo, for asking us to see Nba Hernandez    Reason for consultation:  ESRD management    Chief Complaint:  Chest pain    History of Presenting Illness     Patient is a 74 yo pleasant woman who has type 2 DM, HTN, ESRD (on dialysis TTS), recently discharged for chest pain, episode of pulmonary edema and NSTEMI. She refused to have a cardiac cath and left on 12/20. On Dec 21, she received dialysis at her outpatient unit. Later that night, she noticed recurrence of her chest pain. It was lasting longer and was more intense. She described it as left sided chest pain (moderate to severe in intensity and was radiating to her back). She came back to the ED and today she underwent emergent cath. She was told that she has no severe disease. No angioplasty or stent placement were needed. Renal service was called to manage her ESRD. She denies any current chest pain now.      Past Medical/Surgical History     Active Ambulatory Problems    Essential hypertension         Date Noted: 02/28/2013      Morbid obesity due to excess calories ([address]         Date Noted: 02/28/2013      Hypothyroidism         Date Noted: 02/28/2013      Diabetic ulcer of left foot associated with type 2 diabetes mellitus ([address]         Date Noted: 01/18/2016      YANNI (acute kidney injury) [facility])         Date Noted: 03/02/2016      Hypercalcemia         Date Noted: 04/14/2016      Chronic kidney disease, stage IV (severe) ([facility]         Date Noted: 03/10/2017      Type 2 diabetes mellitus with stage 3 chronic kidney disease, with long-term current use of insulin ([address]         Date Noted: 04/05/2017      Cor pulmonale, chronic ([address]         Date Noted: 04/05/2017      Anemia of chronic disorder         Date Noted: 04/05/2017      Pulmonary hypertension         Date Elevated troponin      Non-STEMI (non-ST elevated myocardial infarction) (HCC)      Chronic obstructive pulmonary disease (HCC)      Diabetic polyneuropathy associated with type 2 diabetes mellitus ([facility])      Urinary tract infection without hematuria    Resolved Ambulatory Problems    Diabetes mellitus ([facility]         Date Noted: 02/28/2013      COPD exacerbation ([facility]         Date Noted: 03/02/2016      Acute exacerbation of CHF (congestive heart failure) ([facility]         Date Noted: 03/02/2016      Acute GI bleeding         Date Noted: 04/13/2016      Encephalopathy acute         Date Noted: 04/14/2016      Acute renal failure (HCC)      DM (diabetes mellitus) type II controlled with renal manifestation ([facility])      Diabetes mellitus due to underlying condition with stage 4 chronic kidney disease ([facility]         Date Noted: 05/03/2016      Cellulitis of right lower extremity         Date Noted: 01/19/2017      Hypoxia      Simple chronic bronchitis ([facility])      Hyperkalemia         Date Noted: 03/10/2017      Acute kidney injury superimposed on chronic kidney disease ([facility]         Date Noted: 03/10/2017      Hypernatremia         Date Noted: 03/16/2017    Past Medical History:  No date: Arthritis  No date: Blood circulation, collateral  No date: CAD (coronary artery disease)  No date: Chronic diastolic CHF (congestive heart failur*  3/2/16: CKD (chronic kidney disease), stage IV ([facility])  No date: COPD (chronic obstructive pulmonary disease) (*  No date: Diabetic neuropathy (HCC)  No date: Diabetic polyneuropathy associated with type 2*  No date: DM II (diabetes mellitus, type II), controlled*  No date: Hemodialysis patient ([facility]  No date: Hypertension  2/28/2013: Hypertensive cardiovascular disease  2/28/2013: Hypothyroidism  11/9/2017: Mixed hyperlipidemia  4/13/2016:  Morbid obesity with BMI of 40.0-44.9, adult (H*  No date: KASHIF on CPAP  No date: Palliative care encounter  No date: Pneumonia  No date: Post-menopausal  No 1 tablet by mouth every morning (before breakfast), Disp: 30 tablet, Rfl: 5  sucralfate (CARAFATE) 1 GM tablet, Take 1 tablet by mouth 4 times daily, Disp: 120 tablet, Rfl: 3  LORazepam (ATIVAN) 0.5 MG tablet, Take 1 tablet by mouth 2 times daily as needed for Anxiety . , Disp: 60 tablet, Rfl: 2  metoprolol tartrate (LOPRESSOR) 50 MG tablet, Take 1 tablet by mouth 2 times daily, Disp: 60 tablet, Rfl: 11  polyethylene glycol (GLYCOLAX) packet, Take 17 g by mouth daily as needed for Constipation, Disp: , Rfl:   levothyroxine (SYNTHROID) 100 MCG tablet, Take 1 tablet by mouth Daily (Patient taking differently: Take 100 mcg by mouth Daily Indications: Underactive Thyroid ), Disp: 30 tablet, Rfl: 5  allopurinol (ZYLOPRIM) 100 MG tablet, Take 1 tablet by mouth daily (Patient taking differently: Take 100 mg by mouth daily Indications: Arthritis ), Disp: 30 tablet, Rfl: 5  albuterol (PROVENTIL) (2.5 MG/3ML) 0.083% nebulizer solution, Take 3 mLs by nebulization every 4 hours as needed for Wheezing or Shortness of Breath, Disp: 120 each, Rfl: 1  atorvastatin (LIPITOR) 40 MG tablet, Take 1 tablet by mouth daily (Patient taking differently: Take 40 mg by mouth daily Indications: Changes in Cholesterol ), Disp: 30 tablet, Rfl: 11  docusate sodium (COLACE) 100 MG capsule, Take 200 mg by mouth 2 times daily as needed Indications: Constipation , Disp: , Rfl:   OXYGEN, Inhale 6 L into the lungs continuous May titrate to effect - Patient also placed on Trilogy (Patient taking differently: Inhale 4 L into the lungs continuous ), Disp: 1 Units, Rfl: 2  miconazole (MICOTIN) 2 % powder, Apply topically 2 times daily. , Disp: 45 g, Rfl: 1  aspirin 81 MG tablet, Take 81 mg by mouth daily, Disp: , Rfl:         Allergies  Dilaudid (hydromorphone)    Family History    Review of patient's family history indicates:    Arthritis                      Mother                    Pacemaker                      Mother                    Arthritis

## 2017-12-23 NOTE — PROGRESS NOTES
Pt blood sugar 87. Crackers and apple juice given per pt request of foods she likes. Will recheck later. Asymptomatic.

## 2017-12-24 NOTE — CONSULTS
[facility]       Cardiology Consultation      Date of Admission:  12/24/2017  6:12 AM    Date of Initially Being Seen / Consultation:  12/24/17    Cardiologist:  Xochitl Barron MD     Cardiology Attending: Shayy Butt Attending: ED     PCP:  MALICK Graves    Reason for Consultation or Admission / Chief Complaint:  Out of hospital cardiac arrest    SUBJECTIVE AND HISTORY OF PRESENT ILLNESS:    Source of the history:  Patient, family, previous inpatient and outpatient records in  Geni Boyd is a 75 y.o. female who presents to [facility] ED with symptoms / signs / problem or diagnosis of cardiac arrest.  She recently underwent cardiac catheterization which showed no significant disease and normal left ventricular function. Yesterday she said she did not feel well and had pain in her neck. This was similar to symptoms previously. She then had a cardiac arrest and was pulseless and then brought to [place]. She had a pulse and CPR and on has a pulse on an epi drip. The family is surrounding her in ED #2. She in intubated and lifeless. Family present:  yes      CARDIAC RISK PROFILE:    Risk Factor Yes / No / Unknown       Gender Female   Cigarette Use No, but did use in the past    Family History of Cardiovascular Disease Yes: pacemaker, heart disease   Diabetes Mellitus no   Hypercholesteremia no   Hypertension yes          Cardiac Specific Problems:    Specialty Problems        Cardiology Problems    Essential hypertension        Cor pulmonale, chronic (HCC)        Pulmonary hypertension        Chronic diastolic CHF (congestive heart failure) (HCC)        Hypertension        Hemodialysis-associated hypotension        Hypotension        Non-STEMI (non-ST elevated myocardial infarction) ([facility]                PRIOR CARDIAC PROBLEM LIST  (IF APPLICABLE):    Cardiac catheterization 12/21/2017:    .  Normal left ventricular systolic function.     2.  Normal left Radha Ding DO   atorvastatin (LIPITOR) 40 MG tablet Take 1 tablet by mouth daily  Patient taking differently: Take 40 mg by mouth daily Indications: Changes in Cholesterol  17   Nicole Mehta DO   docusate sodium (COLACE) 100 MG capsule Take 200 mg by mouth 2 times daily as needed Indications: Constipation     Historical Provider, MD   OXYGEN Inhale 6 L into the lungs continuous May titrate to effect - Patient also placed on Trilogy  Patient taking differently: Inhale 4 L into the lungs continuous  17   MALICK Reynolds   miconazole (MICOTIN) 2 % powder Apply topically 2 times daily. 17   Chris Huertas PA-C   aspirin 81 MG tablet Take 81 mg by mouth daily    Historical Provider, MD        Facility Administered Medications: Allergies:  Dilaudid [hydromorphone]     Social History:       Social History     Social History    Marital status:      Spouse name: N/A    Number of children: 2    Years of education: N/A     Occupational History    Not on file.      Social History Main Topics    Smoking status: Former Smoker     Packs/day: 1.00     Years: 35.00     Types: Cigarettes     Quit date: 2000    Smokeless tobacco: Never Used    Alcohol use No    Drug use: No    Sexual activity: No     Other Topics Concern    Not on file     Social History Narrative    No narrative on file       Family History:     Family History   Problem Relation Age of Onset    Arthritis Mother     Pacemaker Mother     Arthritis Father     Heart Disease Father       late 42's of MI         REVIEW OF SYSTEMS:     Except as noted in the HPI, all other systems are negative        PHYSICAL EXAMINATION:     BP (!) 161/69   Pulse 82   Resp 12   LMP  (LMP Unknown)   SpO2 90%     GENERAL - well developed and well nourished, in severe amount of generalized distress  HEENT -  PERRLA, Hearing not tested, conjunctiva and lids are normal, ears and nose appear normal  NECK - no thyromegaly, no JVD, trachea is in the midline  CARDIOVASCULAR - PMI is in the left mid line clavicular position, I cannot hear a pulse  PULMONARY - Yes: severe, intubated respiratory distress. scattered wheezes and rales.   Breath sounds in both  lung fields are Decreased, Intubated and sedated and not particularly responsive   ABDOMEN  - soft, non tender, no rebound, no hepatomegaly or splenomegaly  MUSCULOSKELETAL  - Prone/Supine, digitals and nails are without clubbing or cyanosis  EXTREMITIES - 1+ edema  NEUROLOGIC - Intubated and sedated and not particularly responsive   SKIN - turgor is normal, no rash  PSYCHIATRIC - Intubated and sedated and not particularly responsive       LABORATORY EVALUATION & TESTING:    I have personally reviewed and interpreted the results of the following diagnostic testing      EKG and or Telemetry:  which was personally reviewed me:  Atrial fibrillation rhythm,   Pulse Readings from Last 1 Encounters:   12/24/17 82    bpm,  without Acute changes    Troponin:  Not obtained    CBC:   Recent Labs      12/21/17 2244 12/24/17   0638   WBC  11.8*  21.9*   HGB  9.0*  8.6*   HCT  29.9*  33.7*   MCV  106.4*  125.3*   PLT  169  117*     BMP:   Recent Labs      12/21/17 2244 12/21/17 2246 12/24/17   0636   NA  135*   --    --    K  4.2  4.2  5.2   CL  91*   --    --    CO2  23   --    --    BUN  21   --    --    CREATININE  3.7*   --    --      Cardiac Enzymes:   Recent Labs      12/21/17   2257   TROPONINI  0.03     PT/INR:   Recent Labs      12/21/17 2244   PROTIME  15.5*   INR  1.23*     APTT:   Recent Labs      12/21/17 2244 12/24/17   0715   APTT  41.6*  91.9*     Liver Profile:  Lab Results   Component Value Date    AST 36 12/21/2017    ALT 15 12/21/2017    BILITOT 0.6 12/21/2017    ALKPHOS 105 12/21/2017     Lab Results   Component Value Date    CHOL 153 09/23/2016    HDL 64 09/23/2016    TRIG 158 09/23/2016     TSH:  Lab Results   Component Value Date    TSH 2.310 10/27/2017     UA: Lab Results   Component Value Date    COLORU DK YELLOW 10/25/2017    PHUR 6.0 10/25/2017    LABCAST 0-1 Hyaline 03/10/2017    WBCUA TNTC 10/25/2017    RBCUA 2 06/06/2017    BACTERIA 3+ 10/25/2017    CLARITYU TURBID 10/25/2017    SPECGRAV 1.020 10/25/2017    LEUKOCYTESUR LARGE 10/25/2017    UROBILINOGEN 0.2 10/25/2017    BILIRUBINUR SMALL 10/25/2017    BLOODU LARGE 10/25/2017    GLUCOSEU Negative 10/25/2017    AMORPHOUS Rare 04/13/2016             ALL THE CARDIOLOGY PROBLEMS ARE LISTED ABOVE; HOWEVER, THE FOLLOWING SPECIFIC CARDIAC PROBLEMS WERE ADDRESSED AND TREATED DURING THE HOSPITAL VISIT TODAY:                                                                                                                                                                                                                                            MEDICAL DECISION MAKING             Cardiac Specific Problem / Diagnosis  Discussion and Data Reviewed Diagnostic Procedures Ordered Management Options Selected           1. Presenting problem / symptom    Out of hospital cardiac arrest  are worsening   Prolonged resustation, on epi drip, and now is pulseless, on vent and has over an hour of CPR No Continue current medications:     No           2. Neck pain of ? etiology Initial presentation during this evaluation   Review and summation of old records:    Recent cath showed:    1.  Normal left ventricular systolic function. 2.  Normal left ventricular hemodynamics. 3.  Nonocclusive coronary artery disease. 4.  Right dominant system. The cardiac arrest in view of these cath findings suggest a metabolic etiology   No Continue current medications:    No           3. End stage renal disease Initial presentation during this evaluation I believe that patient had a metabolic abnormality  No Continue current medications:       no         PLAN:    1. Continue present medications except for changes as noted above  2.  Continue to monitor rhythm  3. Further orders per clinical course. 4. pronounced dead at [phone] on 12/24/2017           Discussed with family and nursing.     I greatly appreciate the opportunity and your confidence in allowing me to participate in the care of Aden Bear    Electronically signed by Cam Whiting MD on 12/24/17     [facility]

## 2017-12-24 NOTE — ED PROVIDER NOTES
[facility] EMERGENCY DEPT  eMERGENCY dEPARTMENT eNCOUnter      Pt Name: Francesco Rivas  MRN: [id number]  [place] 1950  Date of evaluation: 12/24/2017  Provider: Mary Ellen Jc MD    [address] [place] [address]       Chief Complaint   Patient presents with    Code     Asystole on EMS arrival; ROSC enroute; 4 epi 2 defibs         HISTORY OF PRESENT ILLNESS   (Location/Symptom, Timing/Onset, Context/Setting, Quality, Duration, Modifying Factors, Severity)  Note limiting factors. Francesco Rivas is a 75 y.o. female who presents to the emergency department Code 80     18-year-old female received to the ED code 80. Review of records show that this morbidly obese female on hemodialysis recently had a heart cath for several weeks worth of chest pain evaluation. He'll site her heart cath was pretty unremarkable no occlusive disease no other intervention. She had dialysis after this and then went home. This approximate 48 hours ago. Daughter is here now and tells me the patient had been complaining of not feeling well all day. And this morning when she checked on her mother she wasn't doing any better she seemed very sluggish so she called EMS when they arrived the patient had arrested. They live far out [facility] so there was some time for travel and time spent in the field before they could get here. On arrival she had a pulse but quickly lost it CPR was performed and medications given. The patient arrested multiple times. On arrival she had no neurologic signs of life no spontaneous respirations pupils are fixed and dilated. Family wants a full court press to Save Her Mother. She Did Not Have a Living Will on File. The history is provided by medical records, the EMS personnel and a relative. The history is limited by the condition of the patient. Nursing Notes were reviewed.     REVIEW OF SYSTEMS    (2-9 systems for level 4, 10 or more for level 5)     Review of Systems   Unable to perform ROS: Intubated (See the Previous Medications    ALBUTEROL (PROVENTIL) (2.5 MG/3ML) 0.083% NEBULIZER SOLUTION    Take 3 mLs by nebulization every 4 hours as needed for Wheezing or Shortness of Breath    ALLOPURINOL (ZYLOPRIM) 100 MG TABLET    Take 1 tablet by mouth daily    ASPIRIN 81 MG TABLET    Take 81 mg by mouth daily    ATORVASTATIN (LIPITOR) 40 MG TABLET    Take 1 tablet by mouth daily    DOCUSATE SODIUM (COLACE) 100 MG CAPSULE    Take 200 mg by mouth 2 times daily as needed Indications: Constipation     GABAPENTIN (NEURONTIN) 300 MG CAPSULE    Pt to take one tablet TID    GLIPIZIDE (GLUCOTROL) 5 MG TABLET    TAKE 1 TABLET BY MOUTH DAILY    HYDROCODONE-ACETAMINOPHEN (NORCO) 7.5-325 MG PER TABLET    Take 1 tablet by mouth every 6 hours as needed for Pain . ISOSORBIDE MONONITRATE (IMDUR) 30 MG EXTENDED RELEASE TABLET    Take 1 tablet by mouth daily    LEVOTHYROXINE (SYNTHROID) 100 MCG TABLET    Take 1 tablet by mouth Daily    LORAZEPAM (ATIVAN) 0.5 MG TABLET    Take 1 tablet by mouth 2 times daily as needed for Anxiety . METOPROLOL TARTRATE (LOPRESSOR) 50 MG TABLET    Take 1 tablet by mouth 2 times daily    MICONAZOLE (MICOTIN) 2 % POWDER    Apply topically 2 times daily.     MIDODRINE (PROAMATINE) 5 MG TABLET    TAKE 1 TABLET BY MOUTH 3 TIMES DAILY (WITH MEALS)    ONDANSETRON (ZOFRAN-ODT) 4 MG DISINTEGRATING TABLET    TAKE 1 TABLET BY MOUTH EVERY 8 HOURS AS NEEDED FOR NAUSEA OR VOMITING    OXYGEN    Inhale 6 L into the lungs continuous May titrate to effect - Patient also placed on Trilogy    PANTOPRAZOLE (PROTONIX) 40 MG TABLET    Take 1 tablet by mouth every morning (before breakfast)    POLYETHYLENE GLYCOL (GLYCOLAX) PACKET    Take 17 g by mouth daily as needed for Constipation    SUCRALFATE (CARAFATE) 1 GM TABLET    Take 1 tablet by mouth 4 times daily       ALLERGIES     Dilaudid [hydromorphone]    FAMILY HISTORY       Family History   Problem Relation Age of Onset    Arthritis Mother     Pacemaker Mother    [facility] Arthritis Father     Heart Disease Father       late 42's of MI          SOCIAL HISTORY       Social History     Social History    Marital status:      Spouse name: N/A    Number of children: 2    Years of education: N/A     Social History Main Topics    Smoking status: Former Smoker     Packs/day: 1.00     Years: 35.00     Types: Cigarettes     Quit date: 2000    Smokeless tobacco: Never Used    Alcohol use No    Drug use: No    Sexual activity: No     Other Topics Concern    None     Social History Narrative    None       SCREENINGS             PHYSICAL EXAM    (up to 7 for level 4, 8 or more for level 5)     ED Triage Vitals   BP Temp Temp src Pulse Resp SpO2 Height Weight   17 0621 -- -- 17 0620 17 0648 17 0616 -- --   (!) 145/100   114 20 (!) 72 %         Physical Exam   Constitutional:   Morbidly obese white female intubated without neurologic signs of life. HENT:   Head: Normocephalic and atraumatic. Eyes:   Pupils are fixed and dilated Endal's eyes present. Neck:   She has cyanosis of the face and neck and JVD. Cardiovascular: At times would have a pulse and rhythm and he can see her chest rise heart tones are unremarkable when they were present. Pulmonary/Chest:   There were bilateral breath sounds on presentation ET tube was at 27 chest x-ray showed it to be in the right bronchus we pulled it back about 2 cm. There is still equal breath sounds. Musculoskeletal: She exhibits no deformity. She has an IO IV access in the left tibia   Neurological: GCS eye subscore is 1. GCS verbal subscore is 1. GCS motor subscore is 1. There are no neurologic signs of life. No corneal reflex no spontaneous respirations no response to pain   Skin: Skin is warm and dry.        DIAGNOSTIC RESULTS     EKG: All EKG's are interpreted by the Emergency Department Physician who either signs or Co-signs this chart in the absence of a cardiologist.        RADIOLOGY: Non-plain film images such as CT, Ultrasound and MRI are read by the radiologist. Plain radiographic images are visualized and preliminarily interpreted by the emergency physician with the below findings:    I have seen the film as demonstrated in the report. Interpretation per the Radiologist below, if available at the time of this note:    XR Chest Portable   Final Result   Impression:   1. Endotracheal tube extends into the proximal right mainstem   bronchus. Retraction by 3 cm recommended. Ordering physician was aware   of this (per voice clip). 2. Pulmonary edema/volume overload.    Signed by Dr Angeles Sanchez on 12/24/2017 7:40 AM            ED BEDSIDE ULTRASOUND:   Performed by ED Physician - none    LABS:  Labs Reviewed   BLOOD GAS, ARTERIAL - Abnormal; Notable for the following:        Result Value    pH, Arterial 6.800 (*)     pCO2, Arterial 50.0 (*)     pO2, Arterial 135.0 (*)     HCO3, Arterial 0.0 (*)     Hemoglobin, Art, Extended 9.1 (*)     All other components within normal limits    Narrative:     Luisana Buck tel. ,  Dr. Norah Crane, 12/24/2017 06:38, by CRISELDADE   CBC WITH AUTO DIFFERENTIAL - Abnormal; Notable for the following:     WBC 21.9 (*)     RBC 2.69 (*)     Hemoglobin 8.6 (*)     Hematocrit 33.7 (*)     .3 (*)     MCH 32.0 (*)     MCHC 25.5 (*)     RDW 19.3 (*)     Platelets 151 (*)     Neutrophils % 39.5 (*)     Neutrophils # 8.7 (*)     Lymphocytes # 8.0 (*)     Monocytes # 1.80 (*)     All other components within normal limits   APTT - Abnormal; Notable for the following:     aPTT 91.9 (*)     All other components within normal limits    Narrative:     CALL  Patricio  EDWARD tel. ,  Coag results called to and read back by guido syed in er, 12/24/2017 07:44, by  Coleman CANTRELL, WHOLE BLOOD    Narrative:     Luisana Buck tel. ,  Dr. Norah Crane, 12/24/2017 06:38, by [facility]       All other labs were within normal range or

## 2017-12-25 LAB
EKG P AXIS: 45 DEGREES
EKG P-R INTERVAL: 180 MS
EKG Q-T INTERVAL: 368 MS
EKG QRS DURATION: 114 MS
EKG QTC CALCULATION (BAZETT): 432 MS
EKG T AXIS: -40 DEGREES

## 2017-12-26 LAB
EKG P AXIS: 67 DEGREES
EKG P-R INTERVAL: 158 MS
EKG Q-T INTERVAL: 288 MS
EKG QRS DURATION: 72 MS
EKG QTC CALCULATION (BAZETT): 358 MS
EKG T AXIS: 69 DEGREES
LV EF: 60 %
LVEF MODALITY: NORMAL

## 2018-04-11 PROBLEM — N39.0 UTI (URINARY TRACT INFECTION): Status: RESOLVED | Noted: 2017-01-01 | Resolved: 2018-04-11

## 2021-05-19 NOTE — PLAN OF CARE
Praveen/dayron Watson Janel sent fax requesting refill for cyclobenzaprine 5 mg .   Last ov:3/19/21  Last fill:12/22/20  #30 no refills.   Also requesting gabapentin 300 mg   Problem: Patient Care Overview (Adult)  Goal: Plan of Care Review  Outcome: Ongoing (interventions implemented as appropriate)    07/11/17 [id number]   Coping/Psychosocial Response Interventions   Plan Of Care Reviewed With patient   Patient Care Overview   Progress improving   Outcome Evaluation   Outcome Summary/Follow up Plan denies pain, meet criteria for discharge from PACU         Problem: Perioperative Period (Adult)  Goal: Signs and Symptoms of Listed Potential Problems Will be Absent or Manageable (Perioperative Period)  Outcome: Ongoing (interventions implemented as appropriate)

## (undated) DEVICE — GLV SURG BIOGEL M LTX PF 8

## (undated) DEVICE — ARM SLING: Brand: DEROYAL

## (undated) DEVICE — SUTURE PROL SZ 6-0 L24IN NONABSORBABLE BLU L9.3MM BV-1 3/8 8805H

## (undated) DEVICE — GLOVE SURG SZ 7 L12IN FNGR THK94MIL TRNSLUC YEL LTX HYDRGEL

## (undated) DEVICE — SUTURE VCRL SZ 4-0 L18IN ABSRB UD VCRL POLYGLACTIN 910 COAT J109T

## (undated) DEVICE — CLTH CLENS READYCLEANSE PERI CARE PK/5

## (undated) DEVICE — SURGICAL PROCEDURE PACK VASC LOURDES HOSP

## (undated) DEVICE — ADHESIVE SKIN CLSR 0.7ML TOP DERMBND ADV

## (undated) DEVICE — 3M™ STERI-DRAPE™ INSTRUMENT POUCH 1018: Brand: STERI-DRAPE™

## (undated) DEVICE — Z INACTIVE USE 2535480 CLIP LIG M BLU TI HRT SHP WIRE HORZ 180 PER BX

## (undated) DEVICE — CVR BRD ARM 13X30

## (undated) DEVICE — GW SENSR DUALFLEX NITNL STR .038IN 3X150CM

## (undated) DEVICE — Device: Brand: LEVEL 1

## (undated) DEVICE — DUAL LUMEN URETERAL CATHETER

## (undated) DEVICE — SUTURE MCRYL SZ 4-0 L18IN ABSRB UD L19MM PS-2 3/8 CIR PRIM Y496G

## (undated) DEVICE — SOLUTION IV IRRIG POUR BRL 0.9% SODIUM CHL 2F7124

## (undated) DEVICE — SUTURE ABSORBABLE MONOFILAMENT 3-0 SH 27 IN UD PDS + PDP416H

## (undated) DEVICE — SUTURE VCRL SZ 3-0 L18IN ABSRB UD W/O NDL POLYGLACTIN 910 J110T

## (undated) DEVICE — GOWN,NON-REINFORCED,SIRUS,SET IN SLV,XXL: Brand: MEDLINE

## (undated) DEVICE — CLIP INT SM WIDE RED TI TRNSVRS GRV CHEVRON SHP W/ PRECIS

## (undated) DEVICE — PK TURNOVER CYSTO RM

## (undated) DEVICE — URETERAL ACCESS SHEATH SET: Brand: NAVIGATOR HD

## (undated) DEVICE — ENDOSCOPIC SEAL URO 1 SIZE FITS ALL: Brand: ENDOSCOPIC SEAL

## (undated) DEVICE — TOWEL,OR,DSP,ST,BLUE,DLX,4/PK,20PK/CS: Brand: MEDLINE

## (undated) DEVICE — SOLUTION IV 500ML 0.9% SOD CHL PH 5 INJ USP VIAFLX PLAS

## (undated) DEVICE — PK CYSTO 30

## (undated) DEVICE — WATER 50W MAX / AIR 8W MAX: Brand: FLEXIVA TRACTIP

## (undated) DEVICE — MEDI-TRACE CADENCE ADULT DEFIBRILLATION ELECTRODE (10 PR/PK) - PHYSIO-CONTROL: Brand: MEDI-TRACE CADENCE